# Patient Record
Sex: FEMALE | Race: WHITE | NOT HISPANIC OR LATINO | Employment: OTHER | URBAN - METROPOLITAN AREA
[De-identification: names, ages, dates, MRNs, and addresses within clinical notes are randomized per-mention and may not be internally consistent; named-entity substitution may affect disease eponyms.]

---

## 2019-01-25 ENCOUNTER — OFFICE VISIT (OUTPATIENT)
Dept: FAMILY MEDICINE CLINIC | Facility: CLINIC | Age: 64
End: 2019-01-25

## 2019-01-25 VITALS
HEIGHT: 64 IN | OXYGEN SATURATION: 98 % | WEIGHT: 168 LBS | HEART RATE: 90 BPM | SYSTOLIC BLOOD PRESSURE: 118 MMHG | BODY MASS INDEX: 28.68 KG/M2 | DIASTOLIC BLOOD PRESSURE: 70 MMHG

## 2019-01-25 DIAGNOSIS — M79.7 FIBROMYALGIA: ICD-10-CM

## 2019-01-25 DIAGNOSIS — E78.01 FAMILIAL HYPERCHOLESTEROLEMIA: ICD-10-CM

## 2019-01-25 DIAGNOSIS — I10 ESSENTIAL HYPERTENSION: Primary | ICD-10-CM

## 2019-01-25 PROCEDURE — 99202 OFFICE O/P NEW SF 15 MIN: CPT | Performed by: FAMILY MEDICINE

## 2019-01-25 RX ORDER — VERAPAMIL HYDROCHLORIDE 240 MG/1
240 CAPSULE, EXTENDED RELEASE ORAL
COMMUNITY
End: 2019-01-25 | Stop reason: SDUPTHER

## 2019-01-25 RX ORDER — DULOXETIN HYDROCHLORIDE 60 MG/1
60 CAPSULE, DELAYED RELEASE ORAL DAILY
Qty: 30 CAPSULE | Refills: 5 | Status: SHIPPED | OUTPATIENT
Start: 2019-01-25 | End: 2019-07-03 | Stop reason: SDUPTHER

## 2019-01-25 RX ORDER — CYCLOBENZAPRINE HCL 10 MG
10 TABLET ORAL 3 TIMES DAILY PRN
Qty: 30 TABLET | Refills: 5 | Status: SHIPPED | OUTPATIENT
Start: 2019-01-25 | End: 2019-10-14 | Stop reason: SDUPTHER

## 2019-01-25 RX ORDER — RABEPRAZOLE SODIUM 20 MG/1
20 TABLET, DELAYED RELEASE ORAL DAILY
COMMUNITY
End: 2019-06-12 | Stop reason: SDUPTHER

## 2019-01-25 RX ORDER — VERAPAMIL HYDROCHLORIDE 240 MG/1
240 CAPSULE, EXTENDED RELEASE ORAL
Qty: 90 CAPSULE | Refills: 3 | Status: SHIPPED | OUTPATIENT
Start: 2019-01-25 | End: 2020-01-22

## 2019-01-25 RX ORDER — MOMETASONE FUROATE 50 UG/1
2 SPRAY, METERED NASAL DAILY
COMMUNITY
End: 2019-01-25

## 2019-01-25 RX ORDER — VENLAFAXINE 75 MG/1
75 TABLET ORAL 2 TIMES DAILY
COMMUNITY
End: 2019-10-14 | Stop reason: SDUPTHER

## 2019-01-25 RX ORDER — OXYBUTYNIN CHLORIDE 5 MG/1
5 TABLET ORAL 2 TIMES DAILY
Qty: 180 TABLET | Refills: 3 | Status: SHIPPED | OUTPATIENT
Start: 2019-01-25 | End: 2020-01-30

## 2019-01-25 RX ORDER — SIMVASTATIN 10 MG
10 TABLET ORAL
Qty: 90 TABLET | Refills: 3 | Status: SHIPPED | OUTPATIENT
Start: 2019-01-25 | End: 2020-01-22

## 2019-01-25 RX ORDER — OXYBUTYNIN CHLORIDE 5 MG/1
5 TABLET ORAL 2 TIMES DAILY
COMMUNITY
End: 2019-01-25 | Stop reason: SDUPTHER

## 2019-01-25 RX ORDER — LORATADINE 10 MG/1
10 TABLET ORAL DAILY
COMMUNITY
End: 2022-04-27 | Stop reason: ALTCHOICE

## 2019-01-25 NOTE — PROGRESS NOTES
[facility]        NAME: Nawaf Sanderson is a 59 y o  female  : 1955    MRN: [id number]  DATE: 2019  TIME: 3:25 PM    Assessment and Plan   Essential hypertension [I10]  1  Essential hypertension  oxybutynin (DITROPAN) 5 mg tablet    verapamil (VERELAN) 240 MG 24 hr capsule    simvastatin (ZOCOR) 10 mg tablet   2  Familial hypercholesterolemia     3  Fibromyalgia  DULoxetine (CYMBALTA) 60 mg delayed release capsule    cyclobenzaprine (FLEXERIL) 10 mg tablet         Patient Instructions     Patient Instructions   See meds--labs 6mos          Chief Complaint     Chief Complaint   Patient presents with   Steven Cheung     "old" new pt--est care         History of Present Illness       Returning Pt---f/u for assessed med Dxs        Review of Systems   Review of Systems   Constitutional: Negative for appetite change, chills, diaphoresis and fever  HENT: Negative for ear pain, rhinorrhea, sinus pressure and sore throat  Eyes: Negative for discharge, redness and itching  Respiratory: Negative for cough, shortness of breath and wheezing  Cardiovascular: Negative for chest pain and palpitations  Gastrointestinal: Negative for abdominal pain, diarrhea, nausea and vomiting  Musculoskeletal: Positive for back pain and myalgias           Current Medications       Current Outpatient Prescriptions:     loratadine (CLARITIN) 10 mg tablet, Take 10 mg by mouth daily, Disp: , Rfl:     oxybutynin (DITROPAN) 5 mg tablet, Take 1 tablet (5 mg total) by mouth 2 (two) times a day, Disp: 180 tablet, Rfl: 3    RABEprazole (ACIPHEX) 20 MG tablet, Take 20 mg by mouth daily, Disp: , Rfl:     venlafaxine (EFFEXOR) 75 mg tablet, Take 75 mg by mouth 2 (two) times a day, Disp: , Rfl:     verapamil (VERELAN) 240 MG 24 hr capsule, Take 1 capsule (240 mg total) by mouth daily at bedtime, Disp: 90 capsule, Rfl: 3    cyclobenzaprine (FLEXERIL) 10 mg tablet, Take 1 tablet (10 mg total) by mouth 3 (three) times a day as needed for muscle spasms, Disp: 30 tablet, Rfl: 5    DULoxetine (CYMBALTA) 60 mg delayed release capsule, Take 1 capsule (60 mg total) by mouth daily, Disp: 30 capsule, Rfl: 5    simvastatin (ZOCOR) 10 mg tablet, Take 1 tablet (10 mg total) by mouth daily at bedtime, Disp: 90 tablet, Rfl: 3    Current Allergies     Allergies as of 01/25/2019    (No Known Allergies)            The following portions of the patient's history were reviewed and updated as appropriate: allergies, current medications, past family history, past medical history, past social history, past surgical history and problem list      History reviewed  No pertinent past medical history  History reviewed  No pertinent surgical history  Family History   Problem Relation Age of Onset    Fibromyalgia Mother     Heart disease Father     GI problems Father          Medications have been verified  Objective   /70   Pulse 90   Ht 5' 4" (1 626 m)   Wt 76 2 kg (168 lb)   SpO2 98%   BMI 28 84 kg/m²        Physical Exam     Physical Exam   Constitutional: She is oriented to person, place, and time  Vital signs are normal  She appears well-developed and well-nourished  HENT:   Right Ear: Ear canal normal  Tympanic membrane is not injected  Left Ear: Ear canal normal  Tympanic membrane is not injected  Nose: Nose normal    Mouth/Throat: Oropharynx is clear and moist    Eyes: Pupils are equal, round, and reactive to light  Conjunctivae and EOM are normal  Right eye exhibits no discharge  Left eye exhibits no discharge  Neck: Normal range of motion  Neck supple  No thyromegaly present  Cardiovascular: Normal rate, regular rhythm and normal heart sounds  No murmur heard  Pulmonary/Chest: Effort normal and breath sounds normal  No respiratory distress  She has no wheezes  Abdominal: Soft  Bowel sounds are normal  She exhibits no distension  There is no tenderness     Musculoskeletal: Normal range of motion  Lymphadenopathy:     She has no cervical adenopathy  Neurological: She is alert and oriented to person, place, and time  She has normal strength and normal reflexes  She is not disoriented  No sensory deficit  Gait normal    Skin: Skin is warm and dry  Psychiatric: She has a normal mood and affect   Her speech is normal and behavior is normal  Judgment and thought content normal  Cognition and memory are normal

## 2019-06-12 DIAGNOSIS — K21.9 GASTROESOPHAGEAL REFLUX DISEASE WITHOUT ESOPHAGITIS: Primary | ICD-10-CM

## 2019-06-12 RX ORDER — RABEPRAZOLE SODIUM 20 MG/1
TABLET, DELAYED RELEASE ORAL
Qty: 90 TABLET | Refills: 0 | Status: SHIPPED | OUTPATIENT
Start: 2019-06-12 | End: 2019-09-11 | Stop reason: SDUPTHER

## 2019-07-03 DIAGNOSIS — M79.7 FIBROMYALGIA: ICD-10-CM

## 2019-07-03 RX ORDER — DULOXETIN HYDROCHLORIDE 60 MG/1
CAPSULE, DELAYED RELEASE ORAL
Qty: 90 CAPSULE | Refills: 0 | Status: SHIPPED | OUTPATIENT
Start: 2019-07-22 | End: 2019-10-01 | Stop reason: SDUPTHER

## 2019-07-03 NOTE — TELEPHONE ENCOUNTER
Approve      Pt aware 6mos labs due 7/2019--msg left pt cb verify if she has ins or if we can use price check at [facility]

## 2019-09-11 DIAGNOSIS — K21.9 GASTROESOPHAGEAL REFLUX DISEASE WITHOUT ESOPHAGITIS: ICD-10-CM

## 2019-09-13 RX ORDER — RABEPRAZOLE SODIUM 20 MG/1
TABLET, DELAYED RELEASE ORAL
Qty: 90 TABLET | Refills: 0 | Status: SHIPPED | OUTPATIENT
Start: 2019-09-13 | End: 2019-12-09 | Stop reason: SDUPTHER

## 2019-10-01 DIAGNOSIS — M79.7 FIBROMYALGIA: ICD-10-CM

## 2019-10-01 RX ORDER — DULOXETIN HYDROCHLORIDE 60 MG/1
CAPSULE, DELAYED RELEASE ORAL
Qty: 90 CAPSULE | Refills: 0 | Status: SHIPPED | OUTPATIENT
Start: 2019-10-01 | End: 2019-10-14

## 2019-10-14 ENCOUNTER — OFFICE VISIT (OUTPATIENT)
Dept: FAMILY MEDICINE CLINIC | Facility: CLINIC | Age: 64
End: 2019-10-14

## 2019-10-14 VITALS
HEIGHT: 64 IN | HEART RATE: 94 BPM | WEIGHT: 163 LBS | BODY MASS INDEX: 27.83 KG/M2 | OXYGEN SATURATION: 96 % | DIASTOLIC BLOOD PRESSURE: 74 MMHG | SYSTOLIC BLOOD PRESSURE: 134 MMHG

## 2019-10-14 DIAGNOSIS — M79.7 FIBROMYALGIA: ICD-10-CM

## 2019-10-14 DIAGNOSIS — F41.9 ANXIETY: Primary | ICD-10-CM

## 2019-10-14 PROCEDURE — 99213 OFFICE O/P EST LOW 20 MIN: CPT | Performed by: FAMILY MEDICINE

## 2019-10-14 RX ORDER — CYCLOBENZAPRINE HCL 10 MG
10 TABLET ORAL 3 TIMES DAILY PRN
Qty: 60 TABLET | Refills: 5 | Status: SHIPPED | OUTPATIENT
Start: 2019-10-14 | End: 2021-01-28 | Stop reason: SDUPTHER

## 2019-10-14 RX ORDER — PAROXETINE HYDROCHLORIDE 20 MG/1
20 TABLET, FILM COATED ORAL DAILY
Qty: 90 TABLET | Refills: 1 | Status: SHIPPED | OUTPATIENT
Start: 2019-10-14 | End: 2020-04-08

## 2019-10-14 RX ORDER — VENLAFAXINE 75 MG/1
75 TABLET ORAL 2 TIMES DAILY
Qty: 180 TABLET | Refills: 1 | Status: SHIPPED | OUTPATIENT
Start: 2019-10-14 | End: 2020-04-10

## 2019-10-14 NOTE — PROGRESS NOTES
[facility]        NAME: Tiffany Pugh is a 59 y o  female  : 1955    MRN: [id number]  DATE: 2019  TIME: 10:17 AM    Assessment and Plan   Anxiety [F41 9]  1  Anxiety  venlafaxine (EFFEXOR) 75 mg tablet    PARoxetine (PAXIL) 20 mg tablet   2  Fibromyalgia  cyclobenzaprine (FLEXERIL) 10 mg tablet         Patient Instructions     There are no Patient Instructions on file for this visit  Chief Complaint     Chief Complaint   Patient presents with    Follow-up     discuss medications         History of Present Illness       C/o anxiety      Review of Systems   Review of Systems   Constitutional: Negative for appetite change, chills, diaphoresis and fever  HENT: Negative for ear pain, rhinorrhea, sinus pressure and sore throat  Eyes: Negative for discharge, redness and itching  Respiratory: Negative for cough, shortness of breath and wheezing  Cardiovascular: Negative for chest pain and palpitations  Gastrointestinal: Negative for abdominal pain, diarrhea, nausea and vomiting  Psychiatric/Behavioral: The patient is nervous/anxious            Current Medications       Current Outpatient Medications:     cyclobenzaprine (FLEXERIL) 10 mg tablet, Take 1 tablet (10 mg total) by mouth 3 (three) times a day as needed for muscle spasms, Disp: 60 tablet, Rfl: 5    loratadine (CLARITIN) 10 mg tablet, Take 10 mg by mouth daily, Disp: , Rfl:     oxybutynin (DITROPAN) 5 mg tablet, Take 1 tablet (5 mg total) by mouth 2 (two) times a day, Disp: 180 tablet, Rfl: 3    RABEprazole (ACIPHEX) 20 MG tablet, TAKE 1 TABLET BY MOUTH DAILY, Disp: 90 tablet, Rfl: 0    simvastatin (ZOCOR) 10 mg tablet, Take 1 tablet (10 mg total) by mouth daily at bedtime, Disp: 90 tablet, Rfl: 3    venlafaxine (EFFEXOR) 75 mg tablet, Take 1 tablet (75 mg total) by mouth 2 (two) times a day, Disp: 180 tablet, Rfl: 1    verapamil (VERELAN) 240 MG 24 hr capsule, Take 1 capsule (240 mg total) by mouth daily at bedtime, Disp: 90 capsule, Rfl: 3    PARoxetine (PAXIL) 20 mg tablet, Take 1 tablet (20 mg total) by mouth daily, Disp: 90 tablet, Rfl: 1    Current Allergies     Allergies as of 10/14/2019 - Reviewed 10/14/2019   Allergen Reaction Noted    Erythromycin  04/02/2013            The following portions of the patient's history were reviewed and updated as appropriate: allergies, current medications, past family history, past medical history, past social history, past surgical history and problem list      No past medical history on file  No past surgical history on file  Family History   Problem Relation Age of Onset    Fibromyalgia Mother     Heart disease Father     GI problems Father          Medications have been verified  Objective   /74   Pulse 94   Ht 5' 4" (1 626 m)   Wt 73 9 kg (163 lb)   SpO2 96%   BMI 27 98 kg/m²        Physical Exam     Physical Exam   Constitutional: She appears well-developed and well-nourished  No distress  HENT:   Right Ear: Tympanic membrane, external ear and ear canal normal  Tympanic membrane is not injected  Left Ear: Tympanic membrane, external ear and ear canal normal  Tympanic membrane is not injected  Nose: Nose normal    Mouth/Throat: Oropharynx is clear and moist and mucous membranes are normal    Eyes: Pupils are equal, round, and reactive to light  Conjunctivae and EOM are normal  Right eye exhibits no discharge  Left eye exhibits no discharge  Neck: Normal range of motion  Neck supple  No thyromegaly present  Cardiovascular: Normal rate, regular rhythm and normal heart sounds  No murmur heard  Pulmonary/Chest: Effort normal and breath sounds normal  No respiratory distress  She has no wheezes  Lymphadenopathy:     She has no cervical adenopathy  Skin: She is not diaphoretic  Nursing note and vitals reviewed  BMI Counseling: Body mass index is 27 98 kg/m²   The BMI is above normal  Nutrition recommendations include reducing portion sizes

## 2019-12-09 DIAGNOSIS — K21.9 GASTROESOPHAGEAL REFLUX DISEASE WITHOUT ESOPHAGITIS: ICD-10-CM

## 2019-12-09 RX ORDER — RABEPRAZOLE SODIUM 20 MG/1
TABLET, DELAYED RELEASE ORAL
Qty: 90 TABLET | Refills: 0 | Status: SHIPPED | OUTPATIENT
Start: 2019-12-09 | End: 2020-03-09

## 2020-01-22 DIAGNOSIS — I10 ESSENTIAL HYPERTENSION: ICD-10-CM

## 2020-01-22 RX ORDER — VERAPAMIL HYDROCHLORIDE 240 MG/1
CAPSULE, EXTENDED RELEASE ORAL
Qty: 90 CAPSULE | Refills: 0 | Status: SHIPPED | OUTPATIENT
Start: 2020-01-22 | End: 2020-04-18

## 2020-01-22 RX ORDER — SIMVASTATIN 10 MG
TABLET ORAL
Qty: 90 TABLET | Refills: 0 | Status: SHIPPED | OUTPATIENT
Start: 2020-01-22 | End: 2020-04-18

## 2020-01-30 DIAGNOSIS — I10 ESSENTIAL HYPERTENSION: ICD-10-CM

## 2020-01-30 RX ORDER — OXYBUTYNIN CHLORIDE 5 MG/1
TABLET ORAL
Qty: 180 TABLET | Refills: 0 | Status: SHIPPED | OUTPATIENT
Start: 2020-01-30 | End: 2020-05-18

## 2020-03-09 DIAGNOSIS — K21.9 GASTROESOPHAGEAL REFLUX DISEASE WITHOUT ESOPHAGITIS: ICD-10-CM

## 2020-03-09 RX ORDER — RABEPRAZOLE SODIUM 20 MG/1
TABLET, DELAYED RELEASE ORAL
Qty: 90 TABLET | Refills: 0 | Status: SHIPPED | OUTPATIENT
Start: 2020-03-09 | End: 2020-06-01

## 2020-03-27 ENCOUNTER — TELEPHONE (OUTPATIENT)
Dept: FAMILY MEDICINE CLINIC | Facility: CLINIC | Age: 65
End: 2020-03-27

## 2020-03-27 DIAGNOSIS — J01.00 ACUTE NON-RECURRENT MAXILLARY SINUSITIS: Primary | ICD-10-CM

## 2020-03-27 RX ORDER — AZITHROMYCIN 250 MG/1
TABLET, FILM COATED ORAL
Qty: 6 TABLET | Refills: 0 | Status: SHIPPED | OUTPATIENT
Start: 2020-03-27 | End: 2020-03-31

## 2020-03-27 NOTE — TELEPHONE ENCOUNTER
Patient called stating that she is having sinus issues, headache, sneezing, cough with mucus and dizziness, patient is asking if a z-pack can be send to pharmacy  Patient has allergies to ERYTHROMYCIN

## 2020-04-08 DIAGNOSIS — F41.9 ANXIETY: ICD-10-CM

## 2020-04-08 RX ORDER — PAROXETINE HYDROCHLORIDE 20 MG/1
TABLET, FILM COATED ORAL
Qty: 90 TABLET | Refills: 1 | Status: SHIPPED | OUTPATIENT
Start: 2020-04-08 | End: 2020-10-05

## 2020-04-10 DIAGNOSIS — F41.9 ANXIETY: ICD-10-CM

## 2020-04-10 RX ORDER — VENLAFAXINE 75 MG/1
TABLET ORAL
Qty: 180 TABLET | Refills: 1 | Status: SHIPPED | OUTPATIENT
Start: 2020-04-10 | End: 2020-10-05

## 2020-04-18 DIAGNOSIS — I10 ESSENTIAL HYPERTENSION: ICD-10-CM

## 2020-04-18 RX ORDER — SIMVASTATIN 10 MG
TABLET ORAL
Qty: 90 TABLET | Refills: 0 | Status: SHIPPED | OUTPATIENT
Start: 2020-04-18 | End: 2020-07-20

## 2020-04-18 RX ORDER — VERAPAMIL HYDROCHLORIDE 240 MG/1
CAPSULE, EXTENDED RELEASE ORAL
Qty: 90 CAPSULE | Refills: 0 | Status: SHIPPED | OUTPATIENT
Start: 2020-04-18 | End: 2020-07-20

## 2020-04-20 ENCOUNTER — HOSPITAL ENCOUNTER (INPATIENT)
Facility: HOSPITAL | Age: 65
LOS: 4 days | Discharge: HOME | DRG: 381 | End: 2020-04-24
Attending: EMERGENCY MEDICINE | Admitting: SURGERY

## 2020-04-20 ENCOUNTER — APPOINTMENT (EMERGENCY)
Dept: CT IMAGING | Facility: HOSPITAL | Age: 65
DRG: 381 | End: 2020-04-20

## 2020-04-20 DIAGNOSIS — I16.0 HYPERTENSIVE URGENCY: ICD-10-CM

## 2020-04-20 DIAGNOSIS — K56.609 SBO (SMALL BOWEL OBSTRUCTION) (HCC): ICD-10-CM

## 2020-04-20 DIAGNOSIS — Q43.0 MECKEL'S DIVERTICULITIS: ICD-10-CM

## 2020-04-20 DIAGNOSIS — R77.8 ELEVATED TROPONIN: Primary | ICD-10-CM

## 2020-04-20 DIAGNOSIS — Z90.49 S/P SMALL BOWEL RESECTION: ICD-10-CM

## 2020-04-20 PROBLEM — D72.829 LEUKOCYTOSIS: Status: ACTIVE | Noted: 2020-04-20

## 2020-04-20 PROBLEM — I10 ACCELERATED HYPERTENSION: Status: ACTIVE | Noted: 2020-04-20

## 2020-04-20 PROBLEM — R79.89 ELEVATED TROPONIN: Status: ACTIVE | Noted: 2020-04-20

## 2020-04-20 LAB
ALBUMIN SERPL BCP-MCNC: 4.2 G/DL (ref 3.5–5)
ALP SERPL-CCNC: 102 U/L (ref 46–116)
ALT SERPL W P-5'-P-CCNC: 17 U/L (ref 12–78)
AMORPH PHOS CRY URNS QL MICRO: ABNORMAL /HPF
ANION GAP SERPL CALCULATED.3IONS-SCNC: 11 MMOL/L (ref 4–13)
AST SERPL W P-5'-P-CCNC: 17 U/L (ref 5–45)
BACTERIA UR QL AUTO: ABNORMAL /HPF
BASOPHILS # BLD AUTO: 0.06 THOUSANDS/ΜL (ref 0–0.1)
BASOPHILS NFR BLD AUTO: 0 % (ref 0–1)
BILIRUB SERPL-MCNC: 0.3 MG/DL (ref 0.2–1)
BILIRUB UR QL STRIP: NEGATIVE
BUN SERPL-MCNC: 9 MG/DL (ref 5–25)
CALCIUM SERPL-MCNC: 9.2 MG/DL (ref 8.3–10.1)
CHLORIDE SERPL-SCNC: 98 MMOL/L (ref 100–108)
CLARITY UR: ABNORMAL
CLARITY, POC: CLEAR
CO2 SERPL-SCNC: 27 MMOL/L (ref 21–32)
COARSE GRAN CASTS URNS QL MICRO: ABNORMAL /LPF
COLOR UR: ABNORMAL
COLOR, POC: YELLOW
CREAT SERPL-MCNC: 0.72 MG/DL (ref 0.6–1.3)
EOSINOPHIL # BLD AUTO: 0.02 THOUSAND/ΜL (ref 0–0.61)
EOSINOPHIL NFR BLD AUTO: 0 % (ref 0–6)
ERYTHROCYTE [DISTWIDTH] IN BLOOD BY AUTOMATED COUNT: 12 % (ref 11.6–15.1)
EXT BILIRUBIN, UA: NEGATIVE
EXT BLOOD URINE: NORMAL
EXT GLUCOSE, UA: NEGATIVE
EXT KETONES: NORMAL
EXT NITRITE, UA: NEGATIVE
EXT PH, UA: 7.5
EXT PROTEIN, UA: NEGATIVE
EXT SPECIFIC GRAVITY, UA: 1.01
EXT UROBILINOGEN: 0.2
GFR SERPL CREATININE-BSD FRML MDRD: 89 ML/MIN/1.73SQ M
GLUCOSE SERPL-MCNC: 138 MG/DL (ref 65–140)
GLUCOSE UR STRIP-MCNC: NEGATIVE MG/DL
HCT VFR BLD AUTO: 44.4 % (ref 34.8–46.1)
HGB BLD-MCNC: 15 G/DL (ref 11.5–15.4)
HGB UR QL STRIP.AUTO: NEGATIVE
IMM GRANULOCYTES # BLD AUTO: 0.08 THOUSAND/UL (ref 0–0.2)
IMM GRANULOCYTES NFR BLD AUTO: 1 % (ref 0–2)
KETONES UR STRIP-MCNC: ABNORMAL MG/DL
LEUKOCYTE ESTERASE UR QL STRIP: ABNORMAL
LIPASE SERPL-CCNC: 179 U/L (ref 73–393)
LYMPHOCYTES # BLD AUTO: 1.14 THOUSANDS/ΜL (ref 0.6–4.47)
LYMPHOCYTES NFR BLD AUTO: 7 % (ref 14–44)
MCH RBC QN AUTO: 31.8 PG (ref 26.8–34.3)
MCHC RBC AUTO-ENTMCNC: 33.8 G/DL (ref 31.4–37.4)
MCV RBC AUTO: 94 FL (ref 82–98)
MONOCYTES # BLD AUTO: 0.59 THOUSAND/ΜL (ref 0.17–1.22)
MONOCYTES NFR BLD AUTO: 3 % (ref 4–12)
NEUTROPHILS # BLD AUTO: 15.35 THOUSANDS/ΜL (ref 1.85–7.62)
NEUTS SEG NFR BLD AUTO: 89 % (ref 43–75)
NITRITE UR QL STRIP: NEGATIVE
NON-SQ EPI CELLS URNS QL MICRO: ABNORMAL /HPF
NRBC BLD AUTO-RTO: 0 /100 WBCS
PH UR STRIP.AUTO: 7.5 [PH]
PLATELET # BLD AUTO: 354 THOUSANDS/UL (ref 149–390)
PMV BLD AUTO: 8.6 FL (ref 8.9–12.7)
POTASSIUM SERPL-SCNC: 3.4 MMOL/L (ref 3.5–5.3)
PROT SERPL-MCNC: 8.5 G/DL (ref 6.4–8.2)
PROT UR STRIP-MCNC: NEGATIVE MG/DL
RBC # BLD AUTO: 4.71 MILLION/UL (ref 3.81–5.12)
RBC #/AREA URNS AUTO: ABNORMAL /HPF
SODIUM SERPL-SCNC: 136 MMOL/L (ref 136–145)
SP GR UR STRIP.AUTO: 1.02 (ref 1–1.03)
TROPONIN I SERPL-MCNC: 0.05 NG/ML
UROBILINOGEN UR QL STRIP.AUTO: 0.2 E.U./DL
WBC # BLD AUTO: 17.24 THOUSAND/UL (ref 4.31–10.16)
WBC # BLD EST: NORMAL 10*3/UL
WBC #/AREA URNS AUTO: ABNORMAL /HPF

## 2020-04-20 PROCEDURE — 85025 COMPLETE CBC W/AUTO DIFF WBC: CPT | Performed by: EMERGENCY MEDICINE

## 2020-04-20 PROCEDURE — 99223 1ST HOSP IP/OBS HIGH 75: CPT | Performed by: PHYSICIAN ASSISTANT

## 2020-04-20 PROCEDURE — 99285 EMERGENCY DEPT VISIT HI MDM: CPT

## 2020-04-20 PROCEDURE — 84484 ASSAY OF TROPONIN QUANT: CPT | Performed by: EMERGENCY MEDICINE

## 2020-04-20 PROCEDURE — 81001 URINALYSIS AUTO W/SCOPE: CPT | Performed by: EMERGENCY MEDICINE

## 2020-04-20 PROCEDURE — 36415 COLL VENOUS BLD VENIPUNCTURE: CPT | Performed by: EMERGENCY MEDICINE

## 2020-04-20 PROCEDURE — 99284 EMERGENCY DEPT VISIT MOD MDM: CPT | Performed by: EMERGENCY MEDICINE

## 2020-04-20 PROCEDURE — 80053 COMPREHEN METABOLIC PANEL: CPT | Performed by: EMERGENCY MEDICINE

## 2020-04-20 PROCEDURE — 83690 ASSAY OF LIPASE: CPT | Performed by: EMERGENCY MEDICINE

## 2020-04-20 PROCEDURE — 93005 ELECTROCARDIOGRAM TRACING: CPT

## 2020-04-20 PROCEDURE — 96361 HYDRATE IV INFUSION ADD-ON: CPT

## 2020-04-20 PROCEDURE — 74177 CT ABD & PELVIS W/CONTRAST: CPT

## 2020-04-20 PROCEDURE — 96375 TX/PRO/DX INJ NEW DRUG ADDON: CPT

## 2020-04-20 PROCEDURE — 96374 THER/PROPH/DIAG INJ IV PUSH: CPT

## 2020-04-20 PROCEDURE — 84484 ASSAY OF TROPONIN QUANT: CPT | Performed by: PHYSICIAN ASSISTANT

## 2020-04-20 RX ORDER — HYDROMORPHONE HCL/PF 1 MG/ML
0.5 SYRINGE (ML) INJECTION ONCE
Status: COMPLETED | OUTPATIENT
Start: 2020-04-20 | End: 2020-04-20

## 2020-04-20 RX ORDER — MORPHINE SULFATE 4 MG/ML
4 INJECTION, SOLUTION INTRAMUSCULAR; INTRAVENOUS ONCE
Status: COMPLETED | OUTPATIENT
Start: 2020-04-20 | End: 2020-04-20

## 2020-04-20 RX ORDER — ONDANSETRON 2 MG/ML
4 INJECTION INTRAMUSCULAR; INTRAVENOUS ONCE
Status: COMPLETED | OUTPATIENT
Start: 2020-04-20 | End: 2020-04-20

## 2020-04-20 RX ORDER — LABETALOL 20 MG/4 ML (5 MG/ML) INTRAVENOUS SYRINGE
10 ONCE
Status: COMPLETED | OUTPATIENT
Start: 2020-04-20 | End: 2020-04-20

## 2020-04-20 RX ADMIN — MORPHINE SULFATE 4 MG: 4 INJECTION INTRAVENOUS at 20:24

## 2020-04-20 RX ADMIN — SODIUM CHLORIDE 1000 ML: 0.9 INJECTION, SOLUTION INTRAVENOUS at 20:30

## 2020-04-20 RX ADMIN — LABETALOL 20 MG/4 ML (5 MG/ML) INTRAVENOUS SYRINGE 10 MG: at 22:10

## 2020-04-20 RX ADMIN — HYDROMORPHONE HYDROCHLORIDE 0.5 MG: 1 INJECTION, SOLUTION INTRAMUSCULAR; INTRAVENOUS; SUBCUTANEOUS at 22:04

## 2020-04-20 RX ADMIN — IOHEXOL 100 ML: 350 INJECTION, SOLUTION INTRAVENOUS at 21:24

## 2020-04-20 RX ADMIN — ONDANSETRON 4 MG: 2 INJECTION INTRAMUSCULAR; INTRAVENOUS at 20:24

## 2020-04-21 ENCOUNTER — ANESTHESIA (INPATIENT)
Dept: PERIOP | Facility: HOSPITAL | Age: 65
DRG: 381 | End: 2020-04-21

## 2020-04-21 ENCOUNTER — APPOINTMENT (INPATIENT)
Dept: RADIOLOGY | Facility: HOSPITAL | Age: 65
DRG: 381 | End: 2020-04-21

## 2020-04-21 ENCOUNTER — ANESTHESIA EVENT (INPATIENT)
Dept: PERIOP | Facility: HOSPITAL | Age: 65
DRG: 381 | End: 2020-04-21

## 2020-04-21 PROBLEM — H81.93 PERIPHERAL VESTIBULOPATHY OF BOTH EARS: Status: ACTIVE | Noted: 2020-04-21

## 2020-04-21 PROBLEM — I16.0 HYPERTENSIVE URGENCY: Status: ACTIVE | Noted: 2020-04-21

## 2020-04-21 LAB
ALBUMIN SERPL BCP-MCNC: 3.3 G/DL (ref 3.5–5)
ALP SERPL-CCNC: 87 U/L (ref 46–116)
ALT SERPL W P-5'-P-CCNC: 16 U/L (ref 12–78)
ANION GAP SERPL CALCULATED.3IONS-SCNC: 8 MMOL/L (ref 4–13)
ANION GAP SERPL CALCULATED.3IONS-SCNC: 9 MMOL/L (ref 4–13)
AST SERPL W P-5'-P-CCNC: 19 U/L (ref 5–45)
ATRIAL RATE: 90 BPM
BASOPHILS # BLD AUTO: 0.03 THOUSANDS/ΜL (ref 0–0.1)
BASOPHILS # BLD AUTO: 0.05 THOUSANDS/ΜL (ref 0–0.1)
BASOPHILS NFR BLD AUTO: 0 % (ref 0–1)
BASOPHILS NFR BLD AUTO: 0 % (ref 0–1)
BILIRUB SERPL-MCNC: 0.2 MG/DL (ref 0.2–1)
BUN SERPL-MCNC: 8 MG/DL (ref 5–25)
BUN SERPL-MCNC: 9 MG/DL (ref 5–25)
CALCIUM SERPL-MCNC: 8 MG/DL (ref 8.3–10.1)
CALCIUM SERPL-MCNC: 8.3 MG/DL (ref 8.3–10.1)
CHLORIDE SERPL-SCNC: 101 MMOL/L (ref 100–108)
CHLORIDE SERPL-SCNC: 102 MMOL/L (ref 100–108)
CO2 SERPL-SCNC: 26 MMOL/L (ref 21–32)
CO2 SERPL-SCNC: 27 MMOL/L (ref 21–32)
CREAT SERPL-MCNC: 0.71 MG/DL (ref 0.6–1.3)
CREAT SERPL-MCNC: 0.82 MG/DL (ref 0.6–1.3)
EOSINOPHIL # BLD AUTO: 0.01 THOUSAND/ΜL (ref 0–0.61)
EOSINOPHIL # BLD AUTO: 0.09 THOUSAND/ΜL (ref 0–0.61)
EOSINOPHIL NFR BLD AUTO: 0 % (ref 0–6)
EOSINOPHIL NFR BLD AUTO: 0 % (ref 0–6)
ERYTHROCYTE [DISTWIDTH] IN BLOOD BY AUTOMATED COUNT: 12.3 % (ref 11.6–15.1)
ERYTHROCYTE [DISTWIDTH] IN BLOOD BY AUTOMATED COUNT: 12.6 % (ref 11.6–15.1)
GFR SERPL CREATININE-BSD FRML MDRD: 76 ML/MIN/1.73SQ M
GFR SERPL CREATININE-BSD FRML MDRD: 91 ML/MIN/1.73SQ M
GLUCOSE SERPL-MCNC: 109 MG/DL (ref 65–140)
GLUCOSE SERPL-MCNC: 117 MG/DL (ref 65–140)
HCT VFR BLD AUTO: 40.6 % (ref 34.8–46.1)
HCT VFR BLD AUTO: 41.1 % (ref 34.8–46.1)
HGB BLD-MCNC: 13.4 G/DL (ref 11.5–15.4)
HGB BLD-MCNC: 13.6 G/DL (ref 11.5–15.4)
IMM GRANULOCYTES # BLD AUTO: 0.04 THOUSAND/UL (ref 0–0.2)
IMM GRANULOCYTES # BLD AUTO: 0.1 THOUSAND/UL (ref 0–0.2)
IMM GRANULOCYTES NFR BLD AUTO: 0 % (ref 0–2)
IMM GRANULOCYTES NFR BLD AUTO: 1 % (ref 0–2)
LYMPHOCYTES # BLD AUTO: 2.01 THOUSANDS/ΜL (ref 0.6–4.47)
LYMPHOCYTES # BLD AUTO: 2.33 THOUSANDS/ΜL (ref 0.6–4.47)
LYMPHOCYTES NFR BLD AUTO: 10 % (ref 14–44)
LYMPHOCYTES NFR BLD AUTO: 19 % (ref 14–44)
MAGNESIUM SERPL-MCNC: 1.8 MG/DL (ref 1.6–2.6)
MCH RBC QN AUTO: 31.5 PG (ref 26.8–34.3)
MCH RBC QN AUTO: 32 PG (ref 26.8–34.3)
MCHC RBC AUTO-ENTMCNC: 32.6 G/DL (ref 31.4–37.4)
MCHC RBC AUTO-ENTMCNC: 33.5 G/DL (ref 31.4–37.4)
MCV RBC AUTO: 96 FL (ref 82–98)
MCV RBC AUTO: 97 FL (ref 82–98)
MONOCYTES # BLD AUTO: 0.9 THOUSAND/ΜL (ref 0.17–1.22)
MONOCYTES # BLD AUTO: 0.92 THOUSAND/ΜL (ref 0.17–1.22)
MONOCYTES NFR BLD AUTO: 5 % (ref 4–12)
MONOCYTES NFR BLD AUTO: 7 % (ref 4–12)
NEUTROPHILS # BLD AUTO: 17.07 THOUSANDS/ΜL (ref 1.85–7.62)
NEUTROPHILS # BLD AUTO: 9.27 THOUSANDS/ΜL (ref 1.85–7.62)
NEUTS SEG NFR BLD AUTO: 74 % (ref 43–75)
NEUTS SEG NFR BLD AUTO: 84 % (ref 43–75)
NRBC BLD AUTO-RTO: 0 /100 WBCS
NRBC BLD AUTO-RTO: 0 /100 WBCS
P AXIS: 82 DEGREES
PHOSPHATE SERPL-MCNC: 3.4 MG/DL (ref 2.3–4.1)
PLATELET # BLD AUTO: 324 THOUSANDS/UL (ref 149–390)
PLATELET # BLD AUTO: 338 THOUSANDS/UL (ref 149–390)
PMV BLD AUTO: 8.7 FL (ref 8.9–12.7)
PMV BLD AUTO: 8.8 FL (ref 8.9–12.7)
POTASSIUM SERPL-SCNC: 3.4 MMOL/L (ref 3.5–5.3)
POTASSIUM SERPL-SCNC: 4 MMOL/L (ref 3.5–5.3)
PR INTERVAL: 176 MS
PROT SERPL-MCNC: 7.1 G/DL (ref 6.4–8.2)
QRS AXIS: 22 DEGREES
QRSD INTERVAL: 80 MS
QT INTERVAL: 380 MS
QTC INTERVAL: 464 MS
RBC # BLD AUTO: 4.25 MILLION/UL (ref 3.81–5.12)
RBC # BLD AUTO: 4.26 MILLION/UL (ref 3.81–5.12)
SODIUM SERPL-SCNC: 136 MMOL/L (ref 136–145)
SODIUM SERPL-SCNC: 137 MMOL/L (ref 136–145)
T WAVE AXIS: 80 DEGREES
TROPONIN I SERPL-MCNC: 0.18 NG/ML
TROPONIN I SERPL-MCNC: 0.31 NG/ML
TROPONIN I SERPL-MCNC: 0.33 NG/ML
TROPONIN I SERPL-MCNC: 0.45 NG/ML
VENTRICULAR RATE: 90 BPM
WBC # BLD AUTO: 12.58 THOUSAND/UL (ref 4.31–10.16)
WBC # BLD AUTO: 20.24 THOUSAND/UL (ref 4.31–10.16)

## 2020-04-21 PROCEDURE — 74248 X-RAY SM INT F-THRU STD: CPT

## 2020-04-21 PROCEDURE — 80053 COMPREHEN METABOLIC PANEL: CPT | Performed by: PHYSICIAN ASSISTANT

## 2020-04-21 PROCEDURE — 93010 ELECTROCARDIOGRAM REPORT: CPT | Performed by: INTERNAL MEDICINE

## 2020-04-21 PROCEDURE — 49320 DIAG LAPARO SEPARATE PROC: CPT | Performed by: PHYSICIAN ASSISTANT

## 2020-04-21 PROCEDURE — 99233 SBSQ HOSP IP/OBS HIGH 50: CPT | Performed by: INTERNAL MEDICINE

## 2020-04-21 PROCEDURE — C9113 INJ PANTOPRAZOLE SODIUM, VIA: HCPCS | Performed by: PHYSICIAN ASSISTANT

## 2020-04-21 PROCEDURE — 85025 COMPLETE CBC W/AUTO DIFF WBC: CPT | Performed by: PHYSICIAN ASSISTANT

## 2020-04-21 PROCEDURE — 84100 ASSAY OF PHOSPHORUS: CPT | Performed by: PHYSICIAN ASSISTANT

## 2020-04-21 PROCEDURE — 83735 ASSAY OF MAGNESIUM: CPT | Performed by: PHYSICIAN ASSISTANT

## 2020-04-21 PROCEDURE — 49320 DIAG LAPARO SEPARATE PROC: CPT | Performed by: SURGERY

## 2020-04-21 PROCEDURE — 88304 TISSUE EXAM BY PATHOLOGIST: CPT | Performed by: PATHOLOGY

## 2020-04-21 PROCEDURE — NC001 PR NO CHARGE: Performed by: SURGERY

## 2020-04-21 PROCEDURE — 99254 IP/OBS CNSLTJ NEW/EST MOD 60: CPT | Performed by: SURGERY

## 2020-04-21 PROCEDURE — 36415 COLL VENOUS BLD VENIPUNCTURE: CPT

## 2020-04-21 PROCEDURE — 84484 ASSAY OF TROPONIN QUANT: CPT | Performed by: PHYSICIAN ASSISTANT

## 2020-04-21 PROCEDURE — 74240 X-RAY XM UPR GI TRC 1CNTRST: CPT

## 2020-04-21 PROCEDURE — 0DB84ZX EXCISION OF SMALL INTESTINE, PERCUTANEOUS ENDOSCOPIC APPROACH, DIAGNOSTIC: ICD-10-PCS | Performed by: SURGERY

## 2020-04-21 PROCEDURE — 84484 ASSAY OF TROPONIN QUANT: CPT

## 2020-04-21 PROCEDURE — 80048 BASIC METABOLIC PNL TOTAL CA: CPT | Performed by: PHYSICIAN ASSISTANT

## 2020-04-21 PROCEDURE — 74022 RADEX COMPL AQT ABD SERIES: CPT

## 2020-04-21 RX ORDER — HEPARIN SODIUM 5000 [USP'U]/ML
5000 INJECTION, SOLUTION INTRAVENOUS; SUBCUTANEOUS EVERY 8 HOURS SCHEDULED
Status: DISCONTINUED | OUTPATIENT
Start: 2020-04-21 | End: 2020-04-24 | Stop reason: HOSPADM

## 2020-04-21 RX ORDER — MIDAZOLAM HYDROCHLORIDE 2 MG/2ML
INJECTION, SOLUTION INTRAMUSCULAR; INTRAVENOUS AS NEEDED
Status: DISCONTINUED | OUTPATIENT
Start: 2020-04-21 | End: 2020-04-21 | Stop reason: SURG

## 2020-04-21 RX ORDER — FENTANYL CITRATE/PF 50 MCG/ML
25 SYRINGE (ML) INJECTION
Status: COMPLETED | OUTPATIENT
Start: 2020-04-21 | End: 2020-04-21

## 2020-04-21 RX ORDER — HYDROMORPHONE HCL/PF 1 MG/ML
0.5 SYRINGE (ML) INJECTION EVERY 2 HOUR PRN
Status: DISCONTINUED | OUTPATIENT
Start: 2020-04-21 | End: 2020-04-23

## 2020-04-21 RX ORDER — DEXTROSE AND SODIUM CHLORIDE 5; .45 G/100ML; G/100ML
125 INJECTION, SOLUTION INTRAVENOUS CONTINUOUS
Status: DISCONTINUED | OUTPATIENT
Start: 2020-04-21 | End: 2020-04-21

## 2020-04-21 RX ORDER — PRAVASTATIN SODIUM 20 MG
20 TABLET ORAL
Status: DISCONTINUED | OUTPATIENT
Start: 2020-04-21 | End: 2020-04-22

## 2020-04-21 RX ORDER — LIDOCAINE HYDROCHLORIDE 10 MG/ML
INJECTION, SOLUTION EPIDURAL; INFILTRATION; INTRACAUDAL; PERINEURAL AS NEEDED
Status: DISCONTINUED | OUTPATIENT
Start: 2020-04-21 | End: 2020-04-21 | Stop reason: SURG

## 2020-04-21 RX ORDER — ONDANSETRON 2 MG/ML
4 INJECTION INTRAMUSCULAR; INTRAVENOUS EVERY 6 HOURS PRN
Status: DISCONTINUED | OUTPATIENT
Start: 2020-04-21 | End: 2020-04-24 | Stop reason: HOSPADM

## 2020-04-21 RX ORDER — ROCURONIUM BROMIDE 10 MG/ML
INJECTION, SOLUTION INTRAVENOUS AS NEEDED
Status: DISCONTINUED | OUTPATIENT
Start: 2020-04-21 | End: 2020-04-21 | Stop reason: SURG

## 2020-04-21 RX ORDER — DEXTROSE AND SODIUM CHLORIDE 5; .45 G/100ML; G/100ML
75 INJECTION, SOLUTION INTRAVENOUS CONTINUOUS
Status: DISCONTINUED | OUTPATIENT
Start: 2020-04-21 | End: 2020-04-21

## 2020-04-21 RX ORDER — ONDANSETRON 2 MG/ML
4 INJECTION INTRAMUSCULAR; INTRAVENOUS EVERY 4 HOURS PRN
Status: DISCONTINUED | OUTPATIENT
Start: 2020-04-21 | End: 2020-04-21 | Stop reason: SDUPTHER

## 2020-04-21 RX ORDER — HYDROCODONE BITARTRATE AND ACETAMINOPHEN 5; 325 MG/1; MG/1
1 TABLET ORAL EVERY 4 HOURS PRN
Status: DISCONTINUED | OUTPATIENT
Start: 2020-04-21 | End: 2020-04-24 | Stop reason: HOSPADM

## 2020-04-21 RX ORDER — SODIUM CHLORIDE, SODIUM LACTATE, POTASSIUM CHLORIDE, CALCIUM CHLORIDE 600; 310; 30; 20 MG/100ML; MG/100ML; MG/100ML; MG/100ML
75 INJECTION, SOLUTION INTRAVENOUS CONTINUOUS
Status: DISCONTINUED | OUTPATIENT
Start: 2020-04-21 | End: 2020-04-24

## 2020-04-21 RX ORDER — ONDANSETRON 2 MG/ML
INJECTION INTRAMUSCULAR; INTRAVENOUS AS NEEDED
Status: DISCONTINUED | OUTPATIENT
Start: 2020-04-21 | End: 2020-04-21 | Stop reason: SURG

## 2020-04-21 RX ORDER — METOCLOPRAMIDE HYDROCHLORIDE 5 MG/ML
5 INJECTION INTRAMUSCULAR; INTRAVENOUS EVERY 6 HOURS PRN
Status: DISCONTINUED | OUTPATIENT
Start: 2020-04-21 | End: 2020-04-24 | Stop reason: HOSPADM

## 2020-04-21 RX ORDER — PROPOFOL 10 MG/ML
INJECTION, EMULSION INTRAVENOUS AS NEEDED
Status: DISCONTINUED | OUTPATIENT
Start: 2020-04-21 | End: 2020-04-21 | Stop reason: SURG

## 2020-04-21 RX ORDER — LORATADINE 10 MG/1
10 TABLET ORAL DAILY
Status: DISCONTINUED | OUTPATIENT
Start: 2020-04-21 | End: 2020-04-22

## 2020-04-21 RX ORDER — ACETAMINOPHEN 325 MG/1
650 TABLET ORAL EVERY 6 HOURS PRN
Status: DISCONTINUED | OUTPATIENT
Start: 2020-04-21 | End: 2020-04-24 | Stop reason: HOSPADM

## 2020-04-21 RX ORDER — VERAPAMIL HYDROCHLORIDE 240 MG/1
240 TABLET, FILM COATED, EXTENDED RELEASE ORAL DAILY
Status: DISCONTINUED | OUTPATIENT
Start: 2020-04-21 | End: 2020-04-22

## 2020-04-21 RX ORDER — NEOSTIGMINE METHYLSULFATE 1 MG/ML
INJECTION INTRAVENOUS AS NEEDED
Status: DISCONTINUED | OUTPATIENT
Start: 2020-04-21 | End: 2020-04-21 | Stop reason: SURG

## 2020-04-21 RX ORDER — MECLIZINE HCL 12.5 MG/1
12.5 TABLET ORAL EVERY 8 HOURS SCHEDULED
Status: DISCONTINUED | OUTPATIENT
Start: 2020-04-21 | End: 2020-04-22

## 2020-04-21 RX ORDER — HYDROMORPHONE HCL/PF 1 MG/ML
0.5 SYRINGE (ML) INJECTION
Status: DISCONTINUED | OUTPATIENT
Start: 2020-04-21 | End: 2020-04-22

## 2020-04-21 RX ORDER — VENLAFAXINE 37.5 MG/1
75 TABLET ORAL 2 TIMES DAILY
Status: DISCONTINUED | OUTPATIENT
Start: 2020-04-21 | End: 2020-04-22

## 2020-04-21 RX ORDER — SODIUM CHLORIDE, SODIUM LACTATE, POTASSIUM CHLORIDE, CALCIUM CHLORIDE 600; 310; 30; 20 MG/100ML; MG/100ML; MG/100ML; MG/100ML
200 INJECTION, SOLUTION INTRAVENOUS CONTINUOUS
Status: DISCONTINUED | OUTPATIENT
Start: 2020-04-21 | End: 2020-04-21

## 2020-04-21 RX ORDER — METOCLOPRAMIDE HYDROCHLORIDE 5 MG/ML
5 INJECTION INTRAMUSCULAR; INTRAVENOUS EVERY 6 HOURS PRN
Status: DISCONTINUED | OUTPATIENT
Start: 2020-04-21 | End: 2020-04-21

## 2020-04-21 RX ORDER — SUCCINYLCHOLINE/SOD CL,ISO/PF 100 MG/5ML
SYRINGE (ML) INTRAVENOUS AS NEEDED
Status: DISCONTINUED | OUTPATIENT
Start: 2020-04-21 | End: 2020-04-21 | Stop reason: SURG

## 2020-04-21 RX ORDER — PAROXETINE HYDROCHLORIDE 20 MG/1
20 TABLET, FILM COATED ORAL DAILY
Status: DISCONTINUED | OUTPATIENT
Start: 2020-04-21 | End: 2020-04-22

## 2020-04-21 RX ORDER — PANTOPRAZOLE SODIUM 40 MG/1
40 INJECTION, POWDER, FOR SOLUTION INTRAVENOUS
Status: DISCONTINUED | OUTPATIENT
Start: 2020-04-21 | End: 2020-04-24 | Stop reason: HOSPADM

## 2020-04-21 RX ORDER — HYDROCODONE BITARTRATE AND ACETAMINOPHEN 5; 325 MG/1; MG/1
2 TABLET ORAL EVERY 4 HOURS PRN
Status: DISCONTINUED | OUTPATIENT
Start: 2020-04-21 | End: 2020-04-24 | Stop reason: HOSPADM

## 2020-04-21 RX ORDER — CEFAZOLIN SODIUM 2 G/50ML
2000 SOLUTION INTRAVENOUS ONCE
Status: COMPLETED | OUTPATIENT
Start: 2020-04-21 | End: 2020-04-21

## 2020-04-21 RX ORDER — LEVOFLOXACIN 5 MG/ML
500 INJECTION, SOLUTION INTRAVENOUS EVERY 24 HOURS
Status: DISCONTINUED | OUTPATIENT
Start: 2020-04-21 | End: 2020-04-24 | Stop reason: HOSPADM

## 2020-04-21 RX ORDER — FENTANYL CITRATE 50 UG/ML
INJECTION, SOLUTION INTRAMUSCULAR; INTRAVENOUS AS NEEDED
Status: DISCONTINUED | OUTPATIENT
Start: 2020-04-21 | End: 2020-04-21 | Stop reason: SURG

## 2020-04-21 RX ORDER — DEXAMETHASONE SODIUM PHOSPHATE 4 MG/ML
INJECTION, SOLUTION INTRA-ARTICULAR; INTRALESIONAL; INTRAMUSCULAR; INTRAVENOUS; SOFT TISSUE AS NEEDED
Status: DISCONTINUED | OUTPATIENT
Start: 2020-04-21 | End: 2020-04-21 | Stop reason: SURG

## 2020-04-21 RX ORDER — GLYCOPYRROLATE 0.2 MG/ML
INJECTION INTRAMUSCULAR; INTRAVENOUS AS NEEDED
Status: DISCONTINUED | OUTPATIENT
Start: 2020-04-21 | End: 2020-04-21 | Stop reason: SURG

## 2020-04-21 RX ADMIN — DEXTROSE AND SODIUM CHLORIDE 75 ML/HR: 5; .45 INJECTION, SOLUTION INTRAVENOUS at 08:52

## 2020-04-21 RX ADMIN — ONDANSETRON 4 MG: 2 INJECTION INTRAMUSCULAR; INTRAVENOUS at 10:09

## 2020-04-21 RX ADMIN — Medication 100 MG: at 15:16

## 2020-04-21 RX ADMIN — FENTANYL CITRATE 50 MCG: 50 INJECTION, SOLUTION INTRAMUSCULAR; INTRAVENOUS at 15:10

## 2020-04-21 RX ADMIN — LEVOFLOXACIN 500 MG: 5 INJECTION, SOLUTION INTRAVENOUS at 22:46

## 2020-04-21 RX ADMIN — ONDANSETRON 4 MG: 2 INJECTION INTRAMUSCULAR; INTRAVENOUS at 16:10

## 2020-04-21 RX ADMIN — ROCURONIUM BROMIDE 5 MG: 10 INJECTION, SOLUTION INTRAVENOUS at 15:27

## 2020-04-21 RX ADMIN — ROCURONIUM BROMIDE 20 MG: 10 INJECTION, SOLUTION INTRAVENOUS at 15:30

## 2020-04-21 RX ADMIN — FENTANYL CITRATE 25 MCG: 50 INJECTION, SOLUTION INTRAMUSCULAR; INTRAVENOUS at 16:04

## 2020-04-21 RX ADMIN — FENTANYL CITRATE 25 MCG: 50 INJECTION, SOLUTION INTRAMUSCULAR; INTRAVENOUS at 17:17

## 2020-04-21 RX ADMIN — PANTOPRAZOLE SODIUM 40 MG: 40 INJECTION, POWDER, FOR SOLUTION INTRAVENOUS at 05:44

## 2020-04-21 RX ADMIN — DEXTROSE AND SODIUM CHLORIDE 125 ML/HR: 5; .45 INJECTION, SOLUTION INTRAVENOUS at 07:24

## 2020-04-21 RX ADMIN — LIDOCAINE HYDROCHLORIDE 50 MG: 10 INJECTION, SOLUTION EPIDURAL; INFILTRATION; INTRACAUDAL; PERINEURAL at 15:15

## 2020-04-21 RX ADMIN — SODIUM CHLORIDE, SODIUM LACTATE, POTASSIUM CHLORIDE, AND CALCIUM CHLORIDE 125 ML/HR: .6; .31; .03; .02 INJECTION, SOLUTION INTRAVENOUS at 21:45

## 2020-04-21 RX ADMIN — NEOSTIGMINE METHYLSULFATE 3 MG: 1 INJECTION, SOLUTION INTRAVENOUS at 16:13

## 2020-04-21 RX ADMIN — MECLIZINE 12.5 MG: 12.5 TABLET ORAL at 21:05

## 2020-04-21 RX ADMIN — FENTANYL CITRATE 25 MCG: 50 INJECTION, SOLUTION INTRAMUSCULAR; INTRAVENOUS at 16:57

## 2020-04-21 RX ADMIN — PAROXETINE HYDROCHLORIDE 20 MG: 20 TABLET, FILM COATED ORAL at 09:39

## 2020-04-21 RX ADMIN — CEFAZOLIN SODIUM 2000 MG: 2 SOLUTION INTRAVENOUS at 15:13

## 2020-04-21 RX ADMIN — VENLAFAXINE 75 MG: 37.5 TABLET ORAL at 09:38

## 2020-04-21 RX ADMIN — SODIUM CHLORIDE, SODIUM LACTATE, POTASSIUM CHLORIDE, AND CALCIUM CHLORIDE 125 ML/HR: .6; .31; .03; .02 INJECTION, SOLUTION INTRAVENOUS at 19:12

## 2020-04-21 RX ADMIN — VERAPAMIL HYDROCHLORIDE 240 MG: 240 TABLET, FILM COATED, EXTENDED RELEASE ORAL at 09:37

## 2020-04-21 RX ADMIN — MIDAZOLAM 2 MG: 1 INJECTION INTRAMUSCULAR; INTRAVENOUS at 15:07

## 2020-04-21 RX ADMIN — GLYCOPYRROLATE 0.4 MG: 0.2 INJECTION, SOLUTION INTRAMUSCULAR; INTRAVENOUS at 16:13

## 2020-04-21 RX ADMIN — SODIUM CHLORIDE, SODIUM LACTATE, POTASSIUM CHLORIDE, AND CALCIUM CHLORIDE 200 ML/HR: .6; .31; .03; .02 INJECTION, SOLUTION INTRAVENOUS at 14:45

## 2020-04-21 RX ADMIN — DEXAMETHASONE SODIUM PHOSPHATE 4 MG: 4 INJECTION, SOLUTION INTRAMUSCULAR; INTRAVENOUS at 15:37

## 2020-04-21 RX ADMIN — LORATADINE 10 MG: 10 TABLET ORAL at 09:40

## 2020-04-21 RX ADMIN — METRONIDAZOLE 500 MG: 500 INJECTION, SOLUTION INTRAVENOUS at 15:29

## 2020-04-21 RX ADMIN — HYDROCODONE BITARTRATE AND ACETAMINOPHEN 2 TABLET: 5; 325 TABLET ORAL at 20:44

## 2020-04-21 RX ADMIN — METRONIDAZOLE 500 MG: 500 INJECTION, SOLUTION INTRAVENOUS at 21:06

## 2020-04-21 RX ADMIN — FENTANYL CITRATE 25 MCG: 50 INJECTION, SOLUTION INTRAMUSCULAR; INTRAVENOUS at 17:11

## 2020-04-21 RX ADMIN — HEPARIN SODIUM 5000 UNITS: 5000 INJECTION INTRAVENOUS; SUBCUTANEOUS at 21:06

## 2020-04-21 RX ADMIN — MECLIZINE 12.5 MG: 12.5 TABLET ORAL at 08:52

## 2020-04-21 RX ADMIN — FENTANYL CITRATE 25 MCG: 50 INJECTION, SOLUTION INTRAMUSCULAR; INTRAVENOUS at 17:06

## 2020-04-21 RX ADMIN — HYDROMORPHONE HYDROCHLORIDE 0.5 MG: 1 INJECTION, SOLUTION INTRAMUSCULAR; INTRAVENOUS; SUBCUTANEOUS at 17:27

## 2020-04-21 RX ADMIN — VENLAFAXINE 75 MG: 37.5 TABLET ORAL at 19:24

## 2020-04-21 RX ADMIN — FENTANYL CITRATE 25 MCG: 50 INJECTION, SOLUTION INTRAMUSCULAR; INTRAVENOUS at 16:19

## 2020-04-21 RX ADMIN — PROPOFOL 180 MG: 10 INJECTION, EMULSION INTRAVENOUS at 15:16

## 2020-04-21 RX ADMIN — ACETAMINOPHEN 650 MG: 325 TABLET ORAL at 13:13

## 2020-04-22 PROBLEM — K56.609 SBO (SMALL BOWEL OBSTRUCTION) (HCC): Status: RESOLVED | Noted: 2020-04-22 | Resolved: 2020-04-22

## 2020-04-22 PROBLEM — K56.609 SBO (SMALL BOWEL OBSTRUCTION) (HCC): Status: ACTIVE | Noted: 2020-04-22

## 2020-04-22 LAB
ANION GAP SERPL CALCULATED.3IONS-SCNC: 5 MMOL/L (ref 4–13)
BUN SERPL-MCNC: 7 MG/DL (ref 5–25)
CALCIUM SERPL-MCNC: 8.3 MG/DL (ref 8.3–10.1)
CHLORIDE SERPL-SCNC: 101 MMOL/L (ref 100–108)
CO2 SERPL-SCNC: 29 MMOL/L (ref 21–32)
CREAT SERPL-MCNC: 0.72 MG/DL (ref 0.6–1.3)
ERYTHROCYTE [DISTWIDTH] IN BLOOD BY AUTOMATED COUNT: 12.3 % (ref 11.6–15.1)
GFR SERPL CREATININE-BSD FRML MDRD: 89 ML/MIN/1.73SQ M
GLUCOSE SERPL-MCNC: 114 MG/DL (ref 65–140)
HCT VFR BLD AUTO: 38.8 % (ref 34.8–46.1)
HGB BLD-MCNC: 12.9 G/DL (ref 11.5–15.4)
MAGNESIUM SERPL-MCNC: 1.7 MG/DL (ref 1.6–2.6)
MCH RBC QN AUTO: 32.2 PG (ref 26.8–34.3)
MCHC RBC AUTO-ENTMCNC: 33.2 G/DL (ref 31.4–37.4)
MCV RBC AUTO: 97 FL (ref 82–98)
PHOSPHATE SERPL-MCNC: 3.2 MG/DL (ref 2.3–4.1)
PLATELET # BLD AUTO: 297 THOUSANDS/UL (ref 149–390)
PMV BLD AUTO: 8.8 FL (ref 8.9–12.7)
POTASSIUM SERPL-SCNC: 3.7 MMOL/L (ref 3.5–5.3)
RBC # BLD AUTO: 4.01 MILLION/UL (ref 3.81–5.12)
SODIUM SERPL-SCNC: 135 MMOL/L (ref 136–145)
WBC # BLD AUTO: 14.5 THOUSAND/UL (ref 4.31–10.16)

## 2020-04-22 PROCEDURE — C9113 INJ PANTOPRAZOLE SODIUM, VIA: HCPCS | Performed by: PHYSICIAN ASSISTANT

## 2020-04-22 PROCEDURE — 84100 ASSAY OF PHOSPHORUS: CPT | Performed by: PHYSICIAN ASSISTANT

## 2020-04-22 PROCEDURE — 83735 ASSAY OF MAGNESIUM: CPT | Performed by: PHYSICIAN ASSISTANT

## 2020-04-22 PROCEDURE — 99024 POSTOP FOLLOW-UP VISIT: CPT | Performed by: SURGERY

## 2020-04-22 PROCEDURE — 80048 BASIC METABOLIC PNL TOTAL CA: CPT | Performed by: PHYSICIAN ASSISTANT

## 2020-04-22 PROCEDURE — 99232 SBSQ HOSP IP/OBS MODERATE 35: CPT | Performed by: INTERNAL MEDICINE

## 2020-04-22 PROCEDURE — 85027 COMPLETE CBC AUTOMATED: CPT | Performed by: PHYSICIAN ASSISTANT

## 2020-04-22 RX ORDER — METRONIDAZOLE 500 MG/1
500 TABLET ORAL EVERY 8 HOURS SCHEDULED
Qty: 21 TABLET | Refills: 0 | Status: SHIPPED | OUTPATIENT
Start: 2020-04-22 | End: 2020-04-29

## 2020-04-22 RX ORDER — METOPROLOL TARTRATE 5 MG/5ML
5 INJECTION INTRAVENOUS ONCE
Status: COMPLETED | OUTPATIENT
Start: 2020-04-22 | End: 2020-04-22

## 2020-04-22 RX ORDER — HYDROCODONE BITARTRATE AND ACETAMINOPHEN 5; 325 MG/1; MG/1
1 TABLET ORAL EVERY 4 HOURS PRN
Qty: 15 TABLET | Refills: 0 | Status: SHIPPED | OUTPATIENT
Start: 2020-04-22 | End: 2020-04-25

## 2020-04-22 RX ORDER — LEVOFLOXACIN 500 MG/1
500 TABLET, FILM COATED ORAL EVERY 24 HOURS
Qty: 7 TABLET | Refills: 0 | Status: SHIPPED | OUTPATIENT
Start: 2020-04-22 | End: 2020-04-29

## 2020-04-22 RX ADMIN — PANTOPRAZOLE SODIUM 40 MG: 40 INJECTION, POWDER, FOR SOLUTION INTRAVENOUS at 05:18

## 2020-04-22 RX ADMIN — MECLIZINE 12.5 MG: 12.5 TABLET ORAL at 05:19

## 2020-04-22 RX ADMIN — SODIUM CHLORIDE, SODIUM LACTATE, POTASSIUM CHLORIDE, AND CALCIUM CHLORIDE 125 ML/HR: .6; .31; .03; .02 INJECTION, SOLUTION INTRAVENOUS at 08:53

## 2020-04-22 RX ADMIN — METRONIDAZOLE 500 MG: 500 INJECTION, SOLUTION INTRAVENOUS at 21:51

## 2020-04-22 RX ADMIN — LEVOFLOXACIN 500 MG: 5 INJECTION, SOLUTION INTRAVENOUS at 22:49

## 2020-04-22 RX ADMIN — HYDROCODONE BITARTRATE AND ACETAMINOPHEN 1 TABLET: 5; 325 TABLET ORAL at 19:31

## 2020-04-22 RX ADMIN — METRONIDAZOLE 500 MG: 500 INJECTION, SOLUTION INTRAVENOUS at 13:18

## 2020-04-22 RX ADMIN — HYDROCODONE BITARTRATE AND ACETAMINOPHEN 1 TABLET: 5; 325 TABLET ORAL at 08:16

## 2020-04-22 RX ADMIN — METOPROLOL TARTRATE 5 MG: 1 INJECTION, SOLUTION INTRAVENOUS at 23:31

## 2020-04-22 RX ADMIN — HEPARIN SODIUM 5000 UNITS: 5000 INJECTION INTRAVENOUS; SUBCUTANEOUS at 05:21

## 2020-04-22 RX ADMIN — HEPARIN SODIUM 5000 UNITS: 5000 INJECTION INTRAVENOUS; SUBCUTANEOUS at 13:18

## 2020-04-22 RX ADMIN — SODIUM CHLORIDE, SODIUM LACTATE, POTASSIUM CHLORIDE, AND CALCIUM CHLORIDE 75 ML/HR: .6; .31; .03; .02 INJECTION, SOLUTION INTRAVENOUS at 22:55

## 2020-04-22 RX ADMIN — HYDROCODONE BITARTRATE AND ACETAMINOPHEN 1 TABLET: 5; 325 TABLET ORAL at 13:17

## 2020-04-22 RX ADMIN — HEPARIN SODIUM 5000 UNITS: 5000 INJECTION INTRAVENOUS; SUBCUTANEOUS at 21:49

## 2020-04-22 RX ADMIN — HYDROCODONE BITARTRATE AND ACETAMINOPHEN 2 TABLET: 5; 325 TABLET ORAL at 04:11

## 2020-04-22 RX ADMIN — METRONIDAZOLE 500 MG: 500 INJECTION, SOLUTION INTRAVENOUS at 05:18

## 2020-04-22 RX ADMIN — HYDROMORPHONE HYDROCHLORIDE 0.5 MG: 1 INJECTION, SOLUTION INTRAMUSCULAR; INTRAVENOUS; SUBCUTANEOUS at 23:08

## 2020-04-23 PROBLEM — E87.6 HYPOKALEMIA: Status: ACTIVE | Noted: 2020-04-23

## 2020-04-23 LAB
ANION GAP SERPL CALCULATED.3IONS-SCNC: 6 MMOL/L (ref 4–13)
BUN SERPL-MCNC: 7 MG/DL (ref 5–25)
CALCIUM SERPL-MCNC: 8.4 MG/DL (ref 8.3–10.1)
CHLORIDE SERPL-SCNC: 101 MMOL/L (ref 100–108)
CO2 SERPL-SCNC: 31 MMOL/L (ref 21–32)
CREAT SERPL-MCNC: 0.72 MG/DL (ref 0.6–1.3)
ERYTHROCYTE [DISTWIDTH] IN BLOOD BY AUTOMATED COUNT: 12.4 % (ref 11.6–15.1)
GFR SERPL CREATININE-BSD FRML MDRD: 89 ML/MIN/1.73SQ M
GLUCOSE SERPL-MCNC: 103 MG/DL (ref 65–140)
HCT VFR BLD AUTO: 37.6 % (ref 34.8–46.1)
HGB BLD-MCNC: 12.1 G/DL (ref 11.5–15.4)
MAGNESIUM SERPL-MCNC: 1.8 MG/DL (ref 1.6–2.6)
MCH RBC QN AUTO: 31.3 PG (ref 26.8–34.3)
MCHC RBC AUTO-ENTMCNC: 32.2 G/DL (ref 31.4–37.4)
MCV RBC AUTO: 97 FL (ref 82–98)
PHOSPHATE SERPL-MCNC: 2.7 MG/DL (ref 2.3–4.1)
PLATELET # BLD AUTO: 255 THOUSANDS/UL (ref 149–390)
PMV BLD AUTO: 9.1 FL (ref 8.9–12.7)
POTASSIUM SERPL-SCNC: 3 MMOL/L (ref 3.5–5.3)
RBC # BLD AUTO: 3.86 MILLION/UL (ref 3.81–5.12)
SODIUM SERPL-SCNC: 138 MMOL/L (ref 136–145)
WBC # BLD AUTO: 9.89 THOUSAND/UL (ref 4.31–10.16)

## 2020-04-23 PROCEDURE — 84100 ASSAY OF PHOSPHORUS: CPT | Performed by: PHYSICIAN ASSISTANT

## 2020-04-23 PROCEDURE — C9113 INJ PANTOPRAZOLE SODIUM, VIA: HCPCS | Performed by: PHYSICIAN ASSISTANT

## 2020-04-23 PROCEDURE — 99024 POSTOP FOLLOW-UP VISIT: CPT | Performed by: PHYSICIAN ASSISTANT

## 2020-04-23 PROCEDURE — 83735 ASSAY OF MAGNESIUM: CPT | Performed by: PHYSICIAN ASSISTANT

## 2020-04-23 PROCEDURE — 99232 SBSQ HOSP IP/OBS MODERATE 35: CPT | Performed by: INTERNAL MEDICINE

## 2020-04-23 PROCEDURE — 85027 COMPLETE CBC AUTOMATED: CPT | Performed by: INTERNAL MEDICINE

## 2020-04-23 PROCEDURE — 80048 BASIC METABOLIC PNL TOTAL CA: CPT | Performed by: INTERNAL MEDICINE

## 2020-04-23 RX ORDER — LABETALOL 20 MG/4 ML (5 MG/ML) INTRAVENOUS SYRINGE
10 ONCE
Status: COMPLETED | OUTPATIENT
Start: 2020-04-23 | End: 2020-04-23

## 2020-04-23 RX ORDER — METOPROLOL TARTRATE 5 MG/5ML
2.5 INJECTION INTRAVENOUS ONCE
Status: COMPLETED | OUTPATIENT
Start: 2020-04-23 | End: 2020-04-23

## 2020-04-23 RX ORDER — LABETALOL 20 MG/4 ML (5 MG/ML) INTRAVENOUS SYRINGE
10 EVERY 4 HOURS PRN
Status: DISCONTINUED | OUTPATIENT
Start: 2020-04-23 | End: 2020-04-24 | Stop reason: HOSPADM

## 2020-04-23 RX ORDER — POTASSIUM CHLORIDE 20 MEQ/1
40 TABLET, EXTENDED RELEASE ORAL ONCE
Status: COMPLETED | OUTPATIENT
Start: 2020-04-23 | End: 2020-04-23

## 2020-04-23 RX ORDER — PAROXETINE HYDROCHLORIDE 20 MG/1
20 TABLET, FILM COATED ORAL DAILY
Status: DISCONTINUED | OUTPATIENT
Start: 2020-04-23 | End: 2020-04-24 | Stop reason: HOSPADM

## 2020-04-23 RX ORDER — PRAVASTATIN SODIUM 40 MG
40 TABLET ORAL
Status: DISCONTINUED | OUTPATIENT
Start: 2020-04-23 | End: 2020-04-24 | Stop reason: HOSPADM

## 2020-04-23 RX ORDER — VENLAFAXINE 37.5 MG/1
75 TABLET ORAL 2 TIMES DAILY WITH MEALS
Status: DISCONTINUED | OUTPATIENT
Start: 2020-04-23 | End: 2020-04-24 | Stop reason: HOSPADM

## 2020-04-23 RX ORDER — MECLIZINE HCL 12.5 MG/1
12.5 TABLET ORAL EVERY 8 HOURS PRN
Status: DISCONTINUED | OUTPATIENT
Start: 2020-04-23 | End: 2020-04-24 | Stop reason: HOSPADM

## 2020-04-23 RX ORDER — VERAPAMIL HYDROCHLORIDE 240 MG/1
240 TABLET, FILM COATED, EXTENDED RELEASE ORAL DAILY
Status: DISCONTINUED | OUTPATIENT
Start: 2020-04-23 | End: 2020-04-24 | Stop reason: HOSPADM

## 2020-04-23 RX ADMIN — VENLAFAXINE 75 MG: 37.5 TABLET ORAL at 16:19

## 2020-04-23 RX ADMIN — VENLAFAXINE 75 MG: 37.5 TABLET ORAL at 09:37

## 2020-04-23 RX ADMIN — METRONIDAZOLE 500 MG: 500 INJECTION, SOLUTION INTRAVENOUS at 21:28

## 2020-04-23 RX ADMIN — VERAPAMIL HYDROCHLORIDE 240 MG: 240 TABLET, FILM COATED, EXTENDED RELEASE ORAL at 09:33

## 2020-04-23 RX ADMIN — METRONIDAZOLE 500 MG: 500 INJECTION, SOLUTION INTRAVENOUS at 05:34

## 2020-04-23 RX ADMIN — HYDROCODONE BITARTRATE AND ACETAMINOPHEN 2 TABLET: 5; 325 TABLET ORAL at 04:11

## 2020-04-23 RX ADMIN — PANTOPRAZOLE SODIUM 40 MG: 40 INJECTION, POWDER, FOR SOLUTION INTRAVENOUS at 05:11

## 2020-04-23 RX ADMIN — HEPARIN SODIUM 5000 UNITS: 5000 INJECTION INTRAVENOUS; SUBCUTANEOUS at 05:14

## 2020-04-23 RX ADMIN — HEPARIN SODIUM 5000 UNITS: 5000 INJECTION INTRAVENOUS; SUBCUTANEOUS at 14:16

## 2020-04-23 RX ADMIN — LEVOFLOXACIN 500 MG: 5 INJECTION, SOLUTION INTRAVENOUS at 22:11

## 2020-04-23 RX ADMIN — PAROXETINE 20 MG: 20 TABLET, FILM COATED ORAL at 09:33

## 2020-04-23 RX ADMIN — METOPROLOL TARTRATE 2.5 MG: 1 INJECTION, SOLUTION INTRAVENOUS at 00:25

## 2020-04-23 RX ADMIN — METRONIDAZOLE 500 MG: 500 INJECTION, SOLUTION INTRAVENOUS at 14:16

## 2020-04-23 RX ADMIN — HYDROCODONE BITARTRATE AND ACETAMINOPHEN 2 TABLET: 5; 325 TABLET ORAL at 16:17

## 2020-04-23 RX ADMIN — POTASSIUM CHLORIDE 40 MEQ: 1500 TABLET, EXTENDED RELEASE ORAL at 09:33

## 2020-04-23 RX ADMIN — LABETALOL 20 MG/4 ML (5 MG/ML) INTRAVENOUS SYRINGE 10 MG: at 01:51

## 2020-04-23 RX ADMIN — PRAVASTATIN SODIUM 40 MG: 40 TABLET ORAL at 16:19

## 2020-04-23 RX ADMIN — HEPARIN SODIUM 5000 UNITS: 5000 INJECTION INTRAVENOUS; SUBCUTANEOUS at 21:31

## 2020-04-23 RX ADMIN — SODIUM CHLORIDE, SODIUM LACTATE, POTASSIUM CHLORIDE, AND CALCIUM CHLORIDE 75 ML/HR: .6; .31; .03; .02 INJECTION, SOLUTION INTRAVENOUS at 14:23

## 2020-04-24 VITALS
HEART RATE: 80 BPM | RESPIRATION RATE: 20 BRPM | SYSTOLIC BLOOD PRESSURE: 124 MMHG | TEMPERATURE: 97.5 F | OXYGEN SATURATION: 93 % | BODY MASS INDEX: 27.31 KG/M2 | WEIGHT: 160 LBS | HEIGHT: 64 IN | DIASTOLIC BLOOD PRESSURE: 66 MMHG

## 2020-04-24 PROBLEM — R79.89 ELEVATED TROPONIN: Status: RESOLVED | Noted: 2020-04-20 | Resolved: 2020-04-24

## 2020-04-24 PROBLEM — D72.829 LEUKOCYTOSIS: Status: RESOLVED | Noted: 2020-04-20 | Resolved: 2020-04-24

## 2020-04-24 PROBLEM — R77.8 ELEVATED TROPONIN: Status: RESOLVED | Noted: 2020-04-20 | Resolved: 2020-04-24

## 2020-04-24 PROBLEM — I16.0 HYPERTENSIVE URGENCY: Status: RESOLVED | Noted: 2020-04-21 | Resolved: 2020-04-24

## 2020-04-24 LAB
ANION GAP SERPL CALCULATED.3IONS-SCNC: 9 MMOL/L (ref 4–13)
BUN SERPL-MCNC: 9 MG/DL (ref 5–25)
CALCIUM SERPL-MCNC: 8.6 MG/DL (ref 8.3–10.1)
CHLORIDE SERPL-SCNC: 97 MMOL/L (ref 100–108)
CO2 SERPL-SCNC: 28 MMOL/L (ref 21–32)
CREAT SERPL-MCNC: 0.76 MG/DL (ref 0.6–1.3)
GFR SERPL CREATININE-BSD FRML MDRD: 84 ML/MIN/1.73SQ M
GLUCOSE SERPL-MCNC: 127 MG/DL (ref 65–140)
POTASSIUM SERPL-SCNC: 3.2 MMOL/L (ref 3.5–5.3)
SODIUM SERPL-SCNC: 134 MMOL/L (ref 136–145)

## 2020-04-24 PROCEDURE — NC001 PR NO CHARGE: Performed by: SURGERY

## 2020-04-24 PROCEDURE — C9113 INJ PANTOPRAZOLE SODIUM, VIA: HCPCS | Performed by: PHYSICIAN ASSISTANT

## 2020-04-24 PROCEDURE — 80048 BASIC METABOLIC PNL TOTAL CA: CPT | Performed by: PHYSICIAN ASSISTANT

## 2020-04-24 PROCEDURE — 99024 POSTOP FOLLOW-UP VISIT: CPT | Performed by: SURGERY

## 2020-04-24 PROCEDURE — 99232 SBSQ HOSP IP/OBS MODERATE 35: CPT | Performed by: INTERNAL MEDICINE

## 2020-04-24 RX ORDER — POTASSIUM CHLORIDE 20 MEQ/1
40 TABLET, EXTENDED RELEASE ORAL ONCE
Status: COMPLETED | OUTPATIENT
Start: 2020-04-24 | End: 2020-04-24

## 2020-04-24 RX ADMIN — METRONIDAZOLE 500 MG: 500 INJECTION, SOLUTION INTRAVENOUS at 05:00

## 2020-04-24 RX ADMIN — POTASSIUM CHLORIDE 40 MEQ: 1500 TABLET, EXTENDED RELEASE ORAL at 15:29

## 2020-04-24 RX ADMIN — PAROXETINE 20 MG: 20 TABLET, FILM COATED ORAL at 09:25

## 2020-04-24 RX ADMIN — PANTOPRAZOLE SODIUM 40 MG: 40 INJECTION, POWDER, FOR SOLUTION INTRAVENOUS at 05:01

## 2020-04-24 RX ADMIN — SODIUM CHLORIDE, SODIUM LACTATE, POTASSIUM CHLORIDE, AND CALCIUM CHLORIDE 75 ML/HR: .6; .31; .03; .02 INJECTION, SOLUTION INTRAVENOUS at 04:51

## 2020-04-24 RX ADMIN — VENLAFAXINE 75 MG: 37.5 TABLET ORAL at 09:30

## 2020-04-24 RX ADMIN — ONDANSETRON 4 MG: 2 INJECTION INTRAMUSCULAR; INTRAVENOUS at 10:17

## 2020-04-24 RX ADMIN — VERAPAMIL HYDROCHLORIDE 240 MG: 240 TABLET, FILM COATED, EXTENDED RELEASE ORAL at 09:25

## 2020-04-24 RX ADMIN — HEPARIN SODIUM 5000 UNITS: 5000 INJECTION INTRAVENOUS; SUBCUTANEOUS at 05:00

## 2020-04-27 ENCOUNTER — TRANSITIONAL CARE MANAGEMENT (OUTPATIENT)
Dept: FAMILY MEDICINE CLINIC | Facility: CLINIC | Age: 65
End: 2020-04-27

## 2020-04-30 ENCOUNTER — OFFICE VISIT (OUTPATIENT)
Dept: FAMILY MEDICINE CLINIC | Facility: CLINIC | Age: 65
End: 2020-04-30

## 2020-04-30 VITALS
BODY MASS INDEX: 26.29 KG/M2 | HEIGHT: 64 IN | TEMPERATURE: 98.2 F | HEART RATE: 120 BPM | WEIGHT: 154 LBS | OXYGEN SATURATION: 97 %

## 2020-04-30 DIAGNOSIS — I10 ESSENTIAL HYPERTENSION: Primary | ICD-10-CM

## 2020-04-30 DIAGNOSIS — K56.609 SBO (SMALL BOWEL OBSTRUCTION) (HCC): ICD-10-CM

## 2020-04-30 DIAGNOSIS — Q43.0 MECKEL'S DIVERTICULITIS: ICD-10-CM

## 2020-04-30 PROCEDURE — 99213 OFFICE O/P EST LOW 20 MIN: CPT | Performed by: FAMILY MEDICINE

## 2020-04-30 RX ORDER — LISINOPRIL 10 MG/1
10 TABLET ORAL DAILY
Qty: 90 TABLET | Refills: 3 | Status: SHIPPED | OUTPATIENT
Start: 2020-04-30 | End: 2021-04-26

## 2020-05-04 ENCOUNTER — OFFICE VISIT (OUTPATIENT)
Dept: SURGERY | Facility: HOSPITAL | Age: 65
End: 2020-05-04

## 2020-05-04 VITALS
SYSTOLIC BLOOD PRESSURE: 169 MMHG | BODY MASS INDEX: 26.6 KG/M2 | TEMPERATURE: 98.7 F | HEIGHT: 64 IN | WEIGHT: 155.8 LBS | RESPIRATION RATE: 16 BRPM | HEART RATE: 96 BPM | DIASTOLIC BLOOD PRESSURE: 93 MMHG

## 2020-05-04 DIAGNOSIS — Z09 POSTOP CHECK: Primary | ICD-10-CM

## 2020-05-04 PROCEDURE — 99024 POSTOP FOLLOW-UP VISIT: CPT | Performed by: SURGERY

## 2020-05-04 PROCEDURE — 3077F SYST BP >= 140 MM HG: CPT | Performed by: SURGERY

## 2020-05-04 PROCEDURE — 3080F DIAST BP >= 90 MM HG: CPT | Performed by: SURGERY

## 2020-05-04 PROCEDURE — 1111F DSCHRG MED/CURRENT MED MERGE: CPT | Performed by: SURGERY

## 2020-05-16 DIAGNOSIS — I10 ESSENTIAL HYPERTENSION: ICD-10-CM

## 2020-05-18 RX ORDER — OXYBUTYNIN CHLORIDE 5 MG/1
TABLET ORAL
Qty: 180 TABLET | Refills: 0 | Status: SHIPPED | OUTPATIENT
Start: 2020-05-18 | End: 2020-12-22

## 2020-05-31 DIAGNOSIS — K21.9 GASTROESOPHAGEAL REFLUX DISEASE WITHOUT ESOPHAGITIS: ICD-10-CM

## 2020-06-01 RX ORDER — RABEPRAZOLE SODIUM 20 MG/1
TABLET, DELAYED RELEASE ORAL
Qty: 90 TABLET | Refills: 0 | Status: SHIPPED | OUTPATIENT
Start: 2020-06-01 | End: 2020-07-02

## 2020-07-01 DIAGNOSIS — K21.9 GASTROESOPHAGEAL REFLUX DISEASE WITHOUT ESOPHAGITIS: ICD-10-CM

## 2020-07-02 RX ORDER — RABEPRAZOLE SODIUM 20 MG/1
TABLET, DELAYED RELEASE ORAL
Qty: 30 TABLET | Refills: 2 | Status: SHIPPED | OUTPATIENT
Start: 2020-07-02 | End: 2020-10-01

## 2020-07-19 DIAGNOSIS — I10 ESSENTIAL HYPERTENSION: ICD-10-CM

## 2020-07-20 RX ORDER — VERAPAMIL HYDROCHLORIDE 240 MG/1
240 CAPSULE, EXTENDED RELEASE ORAL DAILY
Qty: 90 CAPSULE | Refills: 3 | Status: SHIPPED | OUTPATIENT
Start: 2020-07-20 | End: 2021-07-19

## 2020-07-20 RX ORDER — SIMVASTATIN 10 MG
TABLET ORAL
Qty: 90 TABLET | Refills: 0 | Status: SHIPPED | OUTPATIENT
Start: 2020-07-20 | End: 2020-10-12

## 2020-10-01 DIAGNOSIS — K21.9 GASTROESOPHAGEAL REFLUX DISEASE WITHOUT ESOPHAGITIS: ICD-10-CM

## 2020-10-01 RX ORDER — RABEPRAZOLE SODIUM 20 MG/1
TABLET, DELAYED RELEASE ORAL
Qty: 30 TABLET | Refills: 2 | Status: SHIPPED | OUTPATIENT
Start: 2020-10-01 | End: 2021-01-13

## 2020-10-03 DIAGNOSIS — F41.9 ANXIETY: ICD-10-CM

## 2020-10-05 RX ORDER — PAROXETINE HYDROCHLORIDE 20 MG/1
TABLET, FILM COATED ORAL
Qty: 90 TABLET | Refills: 1 | Status: SHIPPED | OUTPATIENT
Start: 2020-10-05 | End: 2021-03-30

## 2020-10-05 RX ORDER — VENLAFAXINE 75 MG/1
TABLET ORAL
Qty: 180 TABLET | Refills: 1 | Status: SHIPPED | OUTPATIENT
Start: 2020-10-05 | End: 2021-03-30

## 2020-10-11 DIAGNOSIS — I10 ESSENTIAL HYPERTENSION: ICD-10-CM

## 2020-10-12 RX ORDER — SIMVASTATIN 10 MG
TABLET ORAL
Qty: 90 TABLET | Refills: 0 | Status: SHIPPED | OUTPATIENT
Start: 2020-10-12 | End: 2021-01-09

## 2020-12-22 DIAGNOSIS — I10 ESSENTIAL HYPERTENSION: ICD-10-CM

## 2020-12-22 RX ORDER — OXYBUTYNIN CHLORIDE 5 MG/1
TABLET ORAL
Qty: 180 TABLET | Refills: 0 | Status: SHIPPED | OUTPATIENT
Start: 2020-12-22 | End: 2021-03-22

## 2021-01-09 DIAGNOSIS — I10 ESSENTIAL HYPERTENSION: ICD-10-CM

## 2021-01-09 RX ORDER — SIMVASTATIN 10 MG
TABLET ORAL
Qty: 90 TABLET | Refills: 0 | Status: SHIPPED | OUTPATIENT
Start: 2021-01-09 | End: 2021-04-03

## 2021-01-10 ENCOUNTER — OFFICE VISIT (OUTPATIENT)
Dept: URGENT CARE | Facility: CLINIC | Age: 66
End: 2021-01-10
Payer: COMMERCIAL

## 2021-01-10 VITALS
RESPIRATION RATE: 20 BRPM | TEMPERATURE: 97.2 F | WEIGHT: 171.4 LBS | DIASTOLIC BLOOD PRESSURE: 73 MMHG | BODY MASS INDEX: 29.26 KG/M2 | OXYGEN SATURATION: 97 % | SYSTOLIC BLOOD PRESSURE: 139 MMHG | HEIGHT: 64 IN | HEART RATE: 71 BPM

## 2021-01-10 DIAGNOSIS — N30.01 ACUTE CYSTITIS WITH HEMATURIA: Primary | ICD-10-CM

## 2021-01-10 DIAGNOSIS — R30.0 DYSURIA: ICD-10-CM

## 2021-01-10 LAB
SL AMB  POCT GLUCOSE, UA: ABNORMAL
SL AMB LEUKOCYTE ESTERASE,UA: ABNORMAL
SL AMB POCT BILIRUBIN,UA: ABNORMAL
SL AMB POCT BLOOD,UA: ABNORMAL
SL AMB POCT CLARITY,UA: ABNORMAL
SL AMB POCT COLOR,UA: YELLOW
SL AMB POCT KETONES,UA: ABNORMAL
SL AMB POCT NITRITE,UA: ABNORMAL
SL AMB POCT PH,UA: 6
SL AMB POCT SPECIFIC GRAVITY,UA: 1.02
SL AMB POCT URINE PROTEIN: ABNORMAL
SL AMB POCT UROBILINOGEN: 0.2

## 2021-01-10 PROCEDURE — 81002 URINALYSIS NONAUTO W/O SCOPE: CPT | Performed by: PHYSICIAN ASSISTANT

## 2021-01-10 PROCEDURE — 87086 URINE CULTURE/COLONY COUNT: CPT | Performed by: PHYSICIAN ASSISTANT

## 2021-01-10 PROCEDURE — 87186 SC STD MICRODIL/AGAR DIL: CPT | Performed by: PHYSICIAN ASSISTANT

## 2021-01-10 PROCEDURE — G0382 LEV 3 HOSP TYPE B ED VISIT: HCPCS | Performed by: PHYSICIAN ASSISTANT

## 2021-01-10 PROCEDURE — 87077 CULTURE AEROBIC IDENTIFY: CPT | Performed by: PHYSICIAN ASSISTANT

## 2021-01-10 RX ORDER — CEPHALEXIN 500 MG/1
500 CAPSULE ORAL EVERY 12 HOURS SCHEDULED
Qty: 14 CAPSULE | Refills: 0 | Status: SHIPPED | OUTPATIENT
Start: 2021-01-10 | End: 2021-01-17

## 2021-01-10 NOTE — PROGRESS NOTES
[facility]        NAME: Rosalio Johnson is a 60 y o  female  : 1955    MRN: [id number]  DATE: January 15, 2021  TIME: 7:56 AM    Assessment and Plan   Acute cystitis with hematuria [N30 01]  1  Acute cystitis with hematuria  cephalexin (KEFLEX) 500 mg capsule    Urine culture   2  Dysuria  POCT urine dip         Patient Instructions       Follow up with PCP in 3-5 days  Proceed to  ER if symptoms worsen  Chief Complaint     Chief Complaint   Patient presents with    Possible UTI     Pt c/o R back/flank pain, R lower abdominal cramping, burning with urination, increased frequency, and urgency since friday  Pt also states no bm since friday (usually [facility]), pt states passing gas daily  Denies fever, chills, n/v/d  History of Present Illness         71-year-old female presents the clinic with right-sided flank and back pain, right lower abdominal cramping, dysuria, frequency, urgency that started on Friday  Patient denies fevers, chills, nausea, vomiting, diarrhea  Patient states that she did notice a pink tinge when wiping  Review of Systems   Review of Systems   Constitutional: Negative for chills and fever  Respiratory: Negative for shortness of breath  Cardiovascular: Negative for chest pain and palpitations  Gastrointestinal: Positive for abdominal pain  Negative for constipation, diarrhea, nausea and vomiting  Genitourinary: Positive for dysuria, frequency, hematuria and urgency  Negative for flank pain  Musculoskeletal: Negative for back pain  Neurological: Negative for dizziness, syncope, weakness, light-headedness and headaches  All other systems reviewed and are negative          Current Medications       Current Outpatient Medications:     cephalexin (KEFLEX) 500 mg capsule, Take 1 capsule (500 mg total) by mouth every 12 (twelve) hours for 7 days, Disp: 14 capsule, Rfl: 0    cyclobenzaprine (FLEXERIL) 10 mg tablet, Take 1 tablet (10 mg total) by mouth 3 (three) times a day as needed for muscle spasms, Disp: 60 tablet, Rfl: 5    lisinopril (ZESTRIL) 10 mg tablet, Take 1 tablet (10 mg total) by mouth daily, Disp: 90 tablet, Rfl: 3    loratadine (CLARITIN) 10 mg tablet, Take 10 mg by mouth daily, Disp: , Rfl:     oxybutynin (DITROPAN) 5 mg tablet, TAKE 1 TABLET BY MOUTH TWICE A DAY, Disp: 180 tablet, Rfl: 0    PARoxetine (PAXIL) 20 mg tablet, TAKE 1 TABLET BY MOUTH EVERY DAY, Disp: 90 tablet, Rfl: 1    RABEprazole (ACIPHEX) 20 MG tablet, TAKE 1 TABLET BY MOUTH EVERY DAY, Disp: 30 tablet, Rfl: 2    simvastatin (ZOCOR) 10 mg tablet, TAKE 1 TABLET BY MOUTH EVERYDAY AT BEDTIME, Disp: 90 tablet, Rfl: 0    venlafaxine (EFFEXOR) 75 mg tablet, TAKE 1 TABLET BY MOUTH TWICE A DAY, Disp: 180 tablet, Rfl: 1    verapamil (VERELAN) 240 MG 24 hr capsule, Take 1 capsule (240 mg total) by mouth daily, Disp: 90 capsule, Rfl: 3    Current Allergies     Allergies as of 01/10/2021 - Reviewed 01/10/2021   Allergen Reaction Noted    Erythromycin  04/02/2013            The following portions of the patient's history were reviewed and updated as appropriate: allergies, current medications, past family history, past medical history, past social history, past surgical history and problem list      Past Medical History:   Diagnosis Date    Fibromyalgia     Hypertension        Past Surgical History:   Procedure Laterality Date    ADENOIDECTOMY      pt has 2nd appendix removed   BOWEL RESECTION      CHOLECYSTECTOMY  2000    OOPHORECTOMY      MN LAP,DIAGNOSTIC ABDOMEN N/A 4/21/2020    Procedure: LAPAROSCOPY DIAGNOSTIC, lysis of adhesions , possible bowel resection;  Surgeon: Mike Phillip MD;  Location:  MAIN OR;  Service: General    TUBAL LIGATION         Family History   Problem Relation Age of Onset    Fibromyalgia Mother     Heart disease Father     GI problems Father          Medications have been verified          Objective   /73   Pulse 71   Temp (!) 97 2 °F (36 2 °C)   Resp 20   Ht 5' 4" (1 626 m)   Wt 77 7 kg (171 lb 6 4 oz)   SpO2 97%   BMI 29 42 kg/m²   No LMP recorded  Patient is postmenopausal        Physical Exam     Physical Exam  Vitals signs and nursing note reviewed  Constitutional:       General: She is not in acute distress  Appearance: She is well-developed  She is not diaphoretic  Pulmonary:      Effort: Pulmonary effort is normal    Abdominal:      General: Bowel sounds are normal       Palpations: Abdomen is soft  Tenderness: There is abdominal tenderness (with R sided flank) in the suprapubic area  There is no right CVA tenderness or left CVA tenderness  Skin:     General: Skin is warm and dry  Neurological:      Mental Status: She is alert and oriented to person, place, and time

## 2021-01-10 NOTE — PATIENT INSTRUCTIONS
Urinary Tract Infection in Women   WHAT YOU NEED TO KNOW:   A urinary tract infection (UTI) is caused by bacteria that get inside your urinary tract  Most bacteria that enter your urinary tract come out when you urinate  If the bacteria stay in your urinary tract, you may get an infection  Your urinary tract includes your kidneys, ureters, bladder, and urethra  Urine is made in your kidneys, and it flows from the ureters to the bladder  Urine leaves the bladder through the urethra  A UTI is more common in your lower urinary tract, which includes your bladder and urethra  DISCHARGE INSTRUCTIONS:   Return to the emergency department if:   · You are urinating very little or not at all  · You have a high fever with shaking chills  · You have side or back pain that gets worse  Call your doctor if:   · You have a fever  · You do not feel better after 2 days of taking antibiotics  · You are vomiting  · You have questions or concerns about your condition or care  Medicines:   · Antibiotics  help fight a bacterial infection  If you have UTIs often (called recurrent UTIs), you may be given antibiotics to take regularly  You will be given directions for when and how to use antibiotics  The goal is to prevent UTIs but not cause antibiotic resistance by using antibiotics too often  · Medicines  may be given to decrease pain and burning when you urinate  They will also help decrease the feeling that you need to urinate often  These medicines will make your urine orange or red  · Take your medicine as directed  Contact your healthcare provider if you think your medicine is not helping or if you have side effects  Tell him or her if you are allergic to any medicine  Keep a list of the medicines, vitamins, and herbs you take  Include the amounts, and when and why you take them  Bring the list or the pill bottles to follow-up visits   Carry your medicine list with you in case of an emergency  Follow up with your healthcare provider as directed:  Write down your questions so you remember to ask them during your visits  Prevent another UTI:   · Empty your bladder often  Urinate and empty your bladder as soon as you feel the need  Do not hold your urine for long periods of time  · Wipe from front to back after you urinate or have a bowel movement  This will help prevent germs from getting into your urinary tract through your urethra  · Drink liquids as directed  Ask how much liquid to drink each day and which liquids are best for you  You may need to drink more liquids than usual to help flush out the bacteria  Do not drink alcohol, caffeine, or citrus juices  These can irritate your bladder and increase your symptoms  Your healthcare provider may recommend cranberry juice to help prevent a UTI  · Urinate after you have sex  This can help flush out bacteria passed during sex  · Do not douche or use feminine deodorants  These can change the chemical balance in your vagina  · Change sanitary pads or tampons often  This will help prevent germs from getting into your urinary tract  · Talk to your healthcare provider about your birth control method  You may need to change your method if it is increasing your risk for UTIs  · Wear cotton underwear and clothes that are loose  Tight pants and nylon underwear can trap moisture and cause bacteria to grow  · Vaginal estrogen may be recommended  This medicine helps prevent UTIs in women who have gone through menopause or are in belén-menopause  · Do pelvic muscle exercises often  Pelvic muscle exercises may help you start and stop urinating  Strong pelvic muscles may help you empty your bladder easier  Squeeze these muscles tightly for 5 seconds like you are trying to hold back urine  Then relax for 5 seconds  Gradually work up to squeezing for 10 seconds  Do 3 sets of 15 repetitions a day, or as directed      © Copyright [address] Information is for [employer] use only and may not be sold, redistributed or otherwise used for commercial purposes  All illustrations and images included in CareNotes® are the copyrighted property of A D A M [employer] Inc  or [employer]  The above information is an  only  It is not intended as medical advice for individual conditions or treatments  Talk to your doctor, nurse or pharmacist before following any medical regimen to see if it is safe and effective for you

## 2021-01-13 DIAGNOSIS — K21.9 GASTROESOPHAGEAL REFLUX DISEASE WITHOUT ESOPHAGITIS: ICD-10-CM

## 2021-01-13 LAB — BACTERIA UR CULT: ABNORMAL

## 2021-01-13 RX ORDER — RABEPRAZOLE SODIUM 20 MG/1
TABLET, DELAYED RELEASE ORAL
Qty: 30 TABLET | Refills: 2 | Status: SHIPPED | OUTPATIENT
Start: 2021-01-13 | End: 2021-04-19

## 2021-01-28 DIAGNOSIS — M79.7 FIBROMYALGIA: ICD-10-CM

## 2021-01-29 ENCOUNTER — OFFICE VISIT (OUTPATIENT)
Dept: URGENT CARE | Facility: CLINIC | Age: 66
End: 2021-01-29
Payer: COMMERCIAL

## 2021-01-29 VITALS — HEART RATE: 82 BPM | RESPIRATION RATE: 18 BRPM | OXYGEN SATURATION: 97 % | TEMPERATURE: 97 F

## 2021-01-29 DIAGNOSIS — J01.10 ACUTE NON-RECURRENT FRONTAL SINUSITIS: Primary | ICD-10-CM

## 2021-01-29 PROCEDURE — 99213 OFFICE O/P EST LOW 20 MIN: CPT | Performed by: FAMILY MEDICINE

## 2021-01-29 RX ORDER — AZITHROMYCIN 250 MG/1
TABLET, FILM COATED ORAL
Qty: 6 TABLET | Refills: 0 | Status: SHIPPED | OUTPATIENT
Start: 2021-01-29 | End: 2021-02-02

## 2021-01-29 NOTE — PROGRESS NOTES
[facility]        NAME: Prashant Ríos is a 60 y o  female  : 1955    MRN: [id number]  DATE: 2021  TIME: 1:04 PM    Assessment and Plan   Acute non-recurrent frontal sinusitis [J01 10]  1  Acute non-recurrent frontal sinusitis  azithromycin (ZITHROMAX) 250 mg tablet         Patient Instructions       Follow up with PCP in 3-5 days  Proceed to  ER if symptoms worsen  Chief Complaint     Chief Complaint   Patient presents with    Sinusitis     pressure, started 4 days ago    Earache     right hurts for 1 day    Dizziness     4 days         History of Present Illness         Patient with a three-day history of sinus pressure, headaches and ear pain  Pain is worse in the right ear described as a throbbing, achy sensation with associated decrease in hearing  Denies any nausea vomiting  Denies any fevers or chills  Review of Systems   Review of Systems   Constitutional: Negative  HENT: Positive for ear pain, facial swelling, rhinorrhea and sinus pain  Eyes: Negative  Respiratory: Negative  Cardiovascular: Negative  Gastrointestinal: Negative  Genitourinary: Negative  Skin: Negative  Allergic/Immunologic: Negative  Neurological: Negative  Hematological: Negative  Psychiatric/Behavioral: Negative            Current Medications       Current Outpatient Medications:     cyclobenzaprine (FLEXERIL) 10 mg tablet, Take 1 tablet (10 mg total) by mouth 3 (three) times a day as needed for muscle spasms, Disp: 60 tablet, Rfl: 5    lisinopril (ZESTRIL) 10 mg tablet, Take 1 tablet (10 mg total) by mouth daily, Disp: 90 tablet, Rfl: 3    loratadine (CLARITIN) 10 mg tablet, Take 10 mg by mouth daily, Disp: , Rfl:     oxybutynin (DITROPAN) 5 mg tablet, TAKE 1 TABLET BY MOUTH TWICE A DAY, Disp: 180 tablet, Rfl: 0    PARoxetine (PAXIL) 20 mg tablet, TAKE 1 TABLET BY MOUTH EVERY DAY, Disp: 90 tablet, Rfl: 1    RABEprazole (ACIPHEX) 20 MG tablet, TAKE 1 TABLET BY MOUTH EVERY DAY, Disp: 30 tablet, Rfl: 2    simvastatin (ZOCOR) 10 mg tablet, TAKE 1 TABLET BY MOUTH EVERYDAY AT BEDTIME, Disp: 90 tablet, Rfl: 0    venlafaxine (EFFEXOR) 75 mg tablet, TAKE 1 TABLET BY MOUTH TWICE A DAY, Disp: 180 tablet, Rfl: 1    verapamil (VERELAN) 240 MG 24 hr capsule, Take 1 capsule (240 mg total) by mouth daily, Disp: 90 capsule, Rfl: 3    azithromycin (ZITHROMAX) 250 mg tablet, Take 2 tablets today then 1 tablet daily x 4 days, Disp: 6 tablet, Rfl: 0    Current Allergies     Allergies as of 01/29/2021 - Reviewed 01/29/2021   Allergen Reaction Noted    Erythromycin  04/02/2013            The following portions of the patient's history were reviewed and updated as appropriate: allergies, current medications, past family history, past medical history, past social history, past surgical history and problem list      Past Medical History:   Diagnosis Date    Fibromyalgia     Hypertension        Past Surgical History:   Procedure Laterality Date    ADENOIDECTOMY      pt has 2nd appendix removed   BOWEL RESECTION      CHOLECYSTECTOMY  2000    OOPHORECTOMY      ME LAP,DIAGNOSTIC ABDOMEN N/A 4/21/2020    Procedure: LAPAROSCOPY DIAGNOSTIC, lysis of adhesions , possible bowel resection;  Surgeon: Ajit Wolfe MD;  Location:  MAIN OR;  Service: General    TUBAL LIGATION         Family History   Problem Relation Age of Onset    Fibromyalgia Mother     Heart disease Father     GI problems Father          Medications have been verified  Objective   Pulse 82   Temp (!) 97 °F (36 1 °C)   Resp 18   SpO2 97%   No LMP recorded  Patient is postmenopausal        Physical Exam     Physical Exam  Vitals signs and nursing note reviewed  Constitutional:       Appearance: She is well-developed  HENT:      Head: Normocephalic  Eyes:      Pupils: Pupils are equal, round, and reactive to light     Pulmonary:      Effort: Pulmonary effort is normal    Musculoskeletal: Normal range of motion  Skin:     General: Skin is warm and dry  Neurological:      Mental Status: She is alert and oriented to person, place, and time

## 2021-02-02 RX ORDER — CYCLOBENZAPRINE HCL 10 MG
10 TABLET ORAL 3 TIMES DAILY PRN
Qty: 60 TABLET | Refills: 0 | Status: SHIPPED | OUTPATIENT
Start: 2021-02-02 | End: 2022-04-27 | Stop reason: ALTCHOICE

## 2021-02-02 NOTE — TELEPHONE ENCOUNTER
PTS LAST OV WAS 4/20  NEEDS HER YEARLY PE/LABS  CMP/LIPIDS    NOT SURE WHAT LAB/INS      ONLY APPROVE [address]

## 2021-03-01 ENCOUNTER — OFFICE VISIT (OUTPATIENT)
Dept: FAMILY MEDICINE CLINIC | Facility: CLINIC | Age: 66
End: 2021-03-01

## 2021-03-01 VITALS
DIASTOLIC BLOOD PRESSURE: 80 MMHG | HEIGHT: 64 IN | SYSTOLIC BLOOD PRESSURE: 136 MMHG | BODY MASS INDEX: 30.01 KG/M2 | TEMPERATURE: 98.2 F | OXYGEN SATURATION: 98 % | WEIGHT: 175.8 LBS | HEART RATE: 90 BPM

## 2021-03-01 DIAGNOSIS — J01.10 ACUTE NON-RECURRENT FRONTAL SINUSITIS: ICD-10-CM

## 2021-03-01 DIAGNOSIS — Z12.31 SCREENING MAMMOGRAM, ENCOUNTER FOR: Primary | ICD-10-CM

## 2021-03-01 PROCEDURE — 99213 OFFICE O/P EST LOW 20 MIN: CPT | Performed by: FAMILY MEDICINE

## 2021-03-01 RX ORDER — PREDNISONE 20 MG/1
TABLET ORAL
Qty: 15 TABLET | Refills: 0 | Status: SHIPPED | OUTPATIENT
Start: 2021-03-01 | End: 2022-04-27 | Stop reason: ALTCHOICE

## 2021-03-01 RX ORDER — AMOXICILLIN AND CLAVULANATE POTASSIUM 875; 125 MG/1; MG/1
1 TABLET, FILM COATED ORAL EVERY 12 HOURS SCHEDULED
Qty: 20 TABLET | Refills: 0 | Status: SHIPPED | OUTPATIENT
Start: 2021-03-01 | End: 2021-03-11

## 2021-03-01 NOTE — PROGRESS NOTES
[facility]        NAME: Fidel Hawk is a 60 y o  female  : 1955    MRN: [id number]  DATE: 2021  TIME: 11:05 AM    Assessment and Plan   Screening mammogram, encounter for [Z12 31]  1  Screening mammogram, encounter for  Mammo screening bilateral w 3d & cad   2  Acute non-recurrent frontal sinusitis  amoxicillin-clavulanate (Augmentin) 875-125 mg per tablet    predniSONE 20 mg tablet         Patient Instructions     Patient Instructions   Pred/augmentin--self pay          Chief Complaint     Chief Complaint   Patient presents with    Sinus Problem     headaches, ear, & veritgo--feeling fatigue    Annual Exam     --NEEDS labs         History of Present Illness       C/o sinus Sx--4wks    Sinus Problem  Associated symptoms include sinus pressure  Pertinent negatives include no chills, coughing, diaphoresis, ear pain, shortness of breath or sore throat  Review of Systems   Review of Systems   Constitutional: Negative for appetite change, chills, diaphoresis and fever  HENT: Positive for sinus pressure and sinus pain  Negative for ear pain, rhinorrhea and sore throat  Eyes: Negative for discharge, redness and itching  Respiratory: Negative for cough, shortness of breath and wheezing  Cardiovascular: Negative for chest pain and palpitations  Gastrointestinal: Negative for abdominal pain, diarrhea, nausea and vomiting           Current Medications       Current Outpatient Medications:     cyclobenzaprine (FLEXERIL) 10 mg tablet, Take 1 tablet (10 mg total) by mouth 3 (three) times a day as needed for muscle spasms, Disp: 60 tablet, Rfl: 0    lisinopril (ZESTRIL) 10 mg tablet, Take 1 tablet (10 mg total) by mouth daily, Disp: 90 tablet, Rfl: 3    loratadine (CLARITIN) 10 mg tablet, Take 10 mg by mouth daily, Disp: , Rfl:     oxybutynin (DITROPAN) 5 mg tablet, TAKE 1 TABLET BY MOUTH TWICE A DAY, Disp: 180 tablet, Rfl: 0    PARoxetine (PAXIL) 20 mg tablet, TAKE 1 TABLET BY MOUTH EVERY DAY, Disp: 90 tablet, Rfl: 1    RABEprazole (ACIPHEX) 20 MG tablet, TAKE 1 TABLET BY MOUTH EVERY DAY, Disp: 30 tablet, Rfl: 2    simvastatin (ZOCOR) 10 mg tablet, TAKE 1 TABLET BY MOUTH EVERYDAY AT BEDTIME, Disp: 90 tablet, Rfl: 0    venlafaxine (EFFEXOR) 75 mg tablet, TAKE 1 TABLET BY MOUTH TWICE A DAY, Disp: 180 tablet, Rfl: 1    verapamil (VERELAN) 240 MG 24 hr capsule, Take 1 capsule (240 mg total) by mouth daily, Disp: 90 capsule, Rfl: 3    amoxicillin-clavulanate (Augmentin) 875-125 mg per tablet, Take 1 tablet by mouth every 12 (twelve) hours for 10 days, Disp: 20 tablet, Rfl: 0    predniSONE 20 mg tablet, TAKE 1 TABLET BID X 5 DAYS THEN TAKE 1 TABLET QD FOR 5 DAYS, Disp: 15 tablet, Rfl: 0    Current Allergies     Allergies as of 03/01/2021 - Reviewed 03/01/2021   Allergen Reaction Noted    Erythromycin  04/02/2013            The following portions of the patient's history were reviewed and updated as appropriate: allergies, current medications, past family history, past medical history, past social history, past surgical history and problem list      Past Medical History:   Diagnosis Date    Fibromyalgia     Hypertension        Past Surgical History:   Procedure Laterality Date    ADENOIDECTOMY      pt has 2nd appendix removed   BOWEL RESECTION      CHOLECYSTECTOMY  2000    OOPHORECTOMY      [place] LAP,DIAGNOSTIC ABDOMEN N/A 4/21/2020    Procedure: LAPAROSCOPY DIAGNOSTIC, lysis of adhesions , possible bowel resection;  Surgeon: Vincent Sanderson MD;  Location:  MAIN OR;  Service: General    TUBAL LIGATION         Family History   Problem Relation Age of Onset    Fibromyalgia Mother     Heart disease Father     GI problems Father          Medications have been verified          Objective   /80   Pulse 90   Temp 98 2 °F (36 8 °C) (Temporal)   Ht 5' 4" (1 626 m)   Wt 79 7 kg (175 lb 12 8 oz)   SpO2 98%   BMI 30 18 kg/m²        Physical Exam Physical Exam  Vitals signs and nursing note reviewed  Constitutional:       General: She is not in acute distress  Appearance: She is well-developed  She is not diaphoretic  HENT:      Right Ear: Tympanic membrane, ear canal and external ear normal  Tympanic membrane is not injected  Left Ear: Tympanic membrane, ear canal and external ear normal  Tympanic membrane is not injected  Nose: Nose normal    Eyes:      General:         Right eye: No discharge  Left eye: No discharge  Conjunctiva/sclera: Conjunctivae normal       Pupils: Pupils are equal, round, and reactive to light  Neck:      Musculoskeletal: Normal range of motion and neck supple  Thyroid: No thyromegaly  Cardiovascular:      Rate and Rhythm: Normal rate and regular rhythm  Heart sounds: Normal heart sounds  No murmur  Pulmonary:      Effort: Pulmonary effort is normal  No respiratory distress  Breath sounds: Normal breath sounds  No wheezing  Lymphadenopathy:      Cervical: No cervical adenopathy

## 2021-03-22 DIAGNOSIS — I10 ESSENTIAL HYPERTENSION: ICD-10-CM

## 2021-03-22 RX ORDER — OXYBUTYNIN CHLORIDE 5 MG/1
TABLET ORAL
Qty: 180 TABLET | Refills: 0 | Status: SHIPPED | OUTPATIENT
Start: 2021-03-22 | End: 2021-06-20

## 2021-03-30 DIAGNOSIS — F41.9 ANXIETY: ICD-10-CM

## 2021-03-30 RX ORDER — PAROXETINE HYDROCHLORIDE 20 MG/1
TABLET, FILM COATED ORAL
Qty: 90 TABLET | Refills: 1 | Status: SHIPPED | OUTPATIENT
Start: 2021-03-30 | End: 2022-04-27 | Stop reason: ALTCHOICE

## 2021-03-30 RX ORDER — VENLAFAXINE 75 MG/1
TABLET ORAL
Qty: 180 TABLET | Refills: 1 | Status: SHIPPED | OUTPATIENT
Start: 2021-03-30 | End: 2021-10-01

## 2021-04-03 DIAGNOSIS — I10 ESSENTIAL HYPERTENSION: ICD-10-CM

## 2021-04-03 RX ORDER — SIMVASTATIN 10 MG
TABLET ORAL
Qty: 90 TABLET | Refills: 0 | Status: SHIPPED | OUTPATIENT
Start: 2021-04-03 | End: 2021-07-01

## 2021-04-19 DIAGNOSIS — K21.9 GASTROESOPHAGEAL REFLUX DISEASE WITHOUT ESOPHAGITIS: ICD-10-CM

## 2021-04-19 RX ORDER — RABEPRAZOLE SODIUM 20 MG/1
TABLET, DELAYED RELEASE ORAL
Qty: 30 TABLET | Refills: 2 | Status: SHIPPED | OUTPATIENT
Start: 2021-04-19 | End: 2021-07-14

## 2021-04-25 DIAGNOSIS — I10 ESSENTIAL HYPERTENSION: ICD-10-CM

## 2021-04-26 RX ORDER — LISINOPRIL 10 MG/1
TABLET ORAL
Qty: 90 TABLET | Refills: 3 | Status: SHIPPED | OUTPATIENT
Start: 2021-04-26

## 2021-06-18 DIAGNOSIS — I10 ESSENTIAL HYPERTENSION: ICD-10-CM

## 2021-06-20 RX ORDER — OXYBUTYNIN CHLORIDE 5 MG/1
TABLET ORAL
Qty: 180 TABLET | Refills: 0 | Status: SHIPPED | OUTPATIENT
Start: 2021-06-20 | End: 2022-04-27 | Stop reason: ALTCHOICE

## 2021-07-01 DIAGNOSIS — I10 ESSENTIAL HYPERTENSION: ICD-10-CM

## 2021-07-01 RX ORDER — SIMVASTATIN 10 MG
TABLET ORAL
Qty: 90 TABLET | Refills: 0 | Status: SHIPPED | OUTPATIENT
Start: 2021-07-01 | End: 2021-09-28

## 2021-07-14 DIAGNOSIS — K21.9 GASTROESOPHAGEAL REFLUX DISEASE WITHOUT ESOPHAGITIS: ICD-10-CM

## 2021-07-14 RX ORDER — RABEPRAZOLE SODIUM 20 MG/1
TABLET, DELAYED RELEASE ORAL
Qty: 30 TABLET | Refills: 2 | Status: SHIPPED | OUTPATIENT
Start: 2021-07-14 | End: 2021-10-18

## 2021-07-18 DIAGNOSIS — I10 ESSENTIAL HYPERTENSION: ICD-10-CM

## 2021-07-19 RX ORDER — VERAPAMIL HYDROCHLORIDE 240 MG/1
CAPSULE, EXTENDED RELEASE ORAL
Qty: 90 CAPSULE | Refills: 3 | Status: SHIPPED | OUTPATIENT
Start: 2021-07-19

## 2021-09-28 DIAGNOSIS — I10 ESSENTIAL HYPERTENSION: ICD-10-CM

## 2021-09-28 RX ORDER — SIMVASTATIN 10 MG
TABLET ORAL
Qty: 90 TABLET | Refills: 0 | Status: SHIPPED | OUTPATIENT
Start: 2021-09-28 | End: 2021-11-17 | Stop reason: SDUPTHER

## 2021-10-01 DIAGNOSIS — F41.9 ANXIETY: ICD-10-CM

## 2021-10-01 RX ORDER — VENLAFAXINE 75 MG/1
TABLET ORAL
Qty: 180 TABLET | Refills: 0 | Status: SHIPPED | OUTPATIENT
Start: 2021-10-01 | End: 2022-04-27 | Stop reason: ALTCHOICE

## 2021-10-17 DIAGNOSIS — K21.9 GASTROESOPHAGEAL REFLUX DISEASE WITHOUT ESOPHAGITIS: ICD-10-CM

## 2021-10-18 RX ORDER — RABEPRAZOLE SODIUM 20 MG/1
TABLET, DELAYED RELEASE ORAL
Qty: 30 TABLET | Refills: 2 | Status: SHIPPED | OUTPATIENT
Start: 2021-10-18 | End: 2022-01-14

## 2021-11-17 DIAGNOSIS — I10 ESSENTIAL HYPERTENSION: ICD-10-CM

## 2021-11-17 RX ORDER — SIMVASTATIN 10 MG
10 TABLET ORAL
Qty: 90 TABLET | Refills: 0 | Status: SHIPPED | OUTPATIENT
Start: 2021-11-17 | End: 2022-04-27 | Stop reason: ALTCHOICE

## 2022-01-14 DIAGNOSIS — K21.9 GASTROESOPHAGEAL REFLUX DISEASE WITHOUT ESOPHAGITIS: ICD-10-CM

## 2022-01-14 RX ORDER — RABEPRAZOLE SODIUM 20 MG/1
TABLET, DELAYED RELEASE ORAL
Qty: 30 TABLET | Refills: 2 | Status: SHIPPED | OUTPATIENT
Start: 2022-01-14 | End: 2022-01-14

## 2022-01-14 RX ORDER — RABEPRAZOLE SODIUM 20 MG/1
TABLET, DELAYED RELEASE ORAL
Qty: 30 TABLET | Refills: 0 | Status: SHIPPED | OUTPATIENT
Start: 2022-01-14 | End: 2022-04-27 | Stop reason: ALTCHOICE

## 2022-01-14 RX ORDER — RABEPRAZOLE SODIUM 20 MG/1
20 TABLET, DELAYED RELEASE ORAL DAILY
Qty: 30 TABLET | Refills: 0 | Status: SHIPPED | OUTPATIENT
Start: 2022-01-14 | End: 2022-01-14

## 2022-01-14 RX ORDER — RABEPRAZOLE SODIUM 20 MG/1
TABLET, DELAYED RELEASE ORAL
Qty: 30 TABLET | Refills: 0 | Status: SHIPPED | OUTPATIENT
Start: 2022-01-14 | End: 2022-01-14

## 2022-01-14 RX ORDER — RABEPRAZOLE SODIUM 20 MG/1
TABLET, DELAYED RELEASE ORAL
Qty: 30 TABLET | Refills: 2 | Status: SHIPPED | OUTPATIENT
Start: 2022-01-14 | End: 2022-01-14 | Stop reason: SDUPTHER

## 2022-02-24 ENCOUNTER — HOSPITAL ENCOUNTER (OUTPATIENT)
Dept: RADIOLOGY | Facility: HOSPITAL | Age: 67
Discharge: HOME | End: 2022-02-24
Payer: COMMERCIAL

## 2022-02-24 DIAGNOSIS — M25.551 RIGHT HIP PAIN: ICD-10-CM

## 2022-02-24 PROCEDURE — 73502 X-RAY EXAM HIP UNI 2-3 VIEWS: CPT

## 2022-03-09 ENCOUNTER — HOSPITAL ENCOUNTER (OUTPATIENT)
Dept: MAMMOGRAPHY | Facility: IMAGING CENTER | Age: 67
Discharge: HOME | End: 2022-03-09
Payer: COMMERCIAL

## 2022-03-09 ENCOUNTER — HOSPITAL ENCOUNTER (OUTPATIENT)
Dept: BONE DENSITY | Facility: IMAGING CENTER | Age: 67
Discharge: HOME | End: 2022-03-09
Payer: COMMERCIAL

## 2022-03-09 VITALS — BODY MASS INDEX: 29.02 KG/M2 | HEIGHT: 64 IN | WEIGHT: 170 LBS

## 2022-03-09 DIAGNOSIS — N95.1 POSTMENOPAUSAL DISORDER: ICD-10-CM

## 2022-03-09 DIAGNOSIS — Z12.31 ENCOUNTER FOR SCREENING MAMMOGRAM FOR MALIGNANT NEOPLASM OF BREAST: ICD-10-CM

## 2022-03-09 PROCEDURE — 77067 SCR MAMMO BI INCL CAD: CPT

## 2022-03-09 PROCEDURE — 77080 DXA BONE DENSITY AXIAL: CPT

## 2022-03-09 PROCEDURE — 77063 BREAST TOMOSYNTHESIS BI: CPT

## 2022-03-16 ENCOUNTER — OFFICE VISIT (OUTPATIENT)
Dept: OBGYN CLINIC | Facility: CLINIC | Age: 67
End: 2022-03-16
Payer: COMMERCIAL

## 2022-03-16 ENCOUNTER — TELEPHONE (OUTPATIENT)
Dept: RADIOLOGY | Facility: CLINIC | Age: 67
End: 2022-03-16

## 2022-03-16 VITALS
HEIGHT: 64 IN | DIASTOLIC BLOOD PRESSURE: 80 MMHG | BODY MASS INDEX: 29.71 KG/M2 | SYSTOLIC BLOOD PRESSURE: 128 MMHG | WEIGHT: 174 LBS

## 2022-03-16 DIAGNOSIS — M46.1 SACROILIITIS (HCC): ICD-10-CM

## 2022-03-16 DIAGNOSIS — M16.11 PRIMARY OSTEOARTHRITIS OF RIGHT HIP: Primary | ICD-10-CM

## 2022-03-16 PROCEDURE — 1160F RVW MEDS BY RX/DR IN RCRD: CPT | Performed by: ORTHOPAEDIC SURGERY

## 2022-03-16 PROCEDURE — 99203 OFFICE O/P NEW LOW 30 MIN: CPT | Performed by: ORTHOPAEDIC SURGERY

## 2022-03-16 PROCEDURE — 1036F TOBACCO NON-USER: CPT | Performed by: ORTHOPAEDIC SURGERY

## 2022-03-16 PROCEDURE — 3008F BODY MASS INDEX DOCD: CPT | Performed by: ORTHOPAEDIC SURGERY

## 2022-03-16 NOTE — PROGRESS NOTES
Assessment:     1  Primary osteoarthritis of right hip    2  Sacroiliitis ([address] )        Plan:     Problem List Items Addressed This Visit        Musculoskeletal and Integument    Primary osteoarthritis of right hip - Primary    Relevant Orders    Ambulatory Referral to Pain Management    Ambulatory Referral to Physical Therapy    Sacroiliitis [facility])    Relevant Orders    Ambulatory Referral to Pain Management        Findings consistent with multifactorial pain right hip osteoarthritis moderate to severe and lumbar scoliosis, lumbar ddd, and right sacroiliitis  Imaging and prognosis reviewed with patient  At this time we recommend seeing Dr Lalo Fowler for right SI joint injection and also right hip cortisone injection  She will also be given script for physical therapy  She can use Ibuprofen for pain control, voltaren gel or aspercreme  Discuss surgical interventions with right hip replacement  I would like to have patient's low back issue under control 1st prior to hip replacement  See patient back in 3 months  All patient's questions were answered to her satisfaction  This note is created using dictation transcription  It may contain typographical errors, grammatical errors, improperly dictated words, background noise and other errors  Subjective:     Patient ID: Ashish Chang is a 73 y o  female  Chief Complaint:  73 yr old female in for evaluation of right hip pain  She has had chronic pain for years gradually worsening over time  She denies any injury or trauma, states just old age  Pain posterior-anterior groin region on daily basis  She has difficulty getting up from seated positions and then first 20 steps before she feels she can walk half decent  Prolonged activity also causes pain  Laying down at night her hip just throbs  She is losing motion in her hip and at times has difficulty bending down for shoes, socks  She uses ibuprofen for pain which helps some   She also has chronic back issues, scoliosis and does currently have pain in right buttock region that wraps around hip  Denies any numbness or tingling or radiating pain down right leg  Information on patient's intake form was reviewed  Allergy:  Allergies   Allergen Reactions    Erythromycin      Other reaction(s): GI Reaction     Medications:  all current active meds have been reviewed  Past Medical History:  Past Medical History:   Diagnosis Date    Fibromyalgia     Hypertension      Past Surgical History:  Past Surgical History:   Procedure Laterality Date    ADENOIDECTOMY      pt has 2nd appendix removed   BOWEL RESECTION      CHOLECYSTECTOMY  2000    OOPHORECTOMY      one     [place] LAP,DIAGNOSTIC ABDOMEN N/A 4/21/2020    Procedure: LAPAROSCOPY DIAGNOSTIC, lysis of adhesions , possible bowel resection;  Surgeon: Jocelynn Zamorano MD;  Location:  MAIN OR;  Service: General    TUBAL LIGATION       Family History:  Family History   Problem Relation Age of Onset    Fibromyalgia Mother     Heart disease Father     GI problems Father     No Known Problems Sister     No Known Problems Daughter     No Known Problems Maternal Grandmother     No Known Problems Maternal Grandfather     No Known Problems Paternal Grandmother     No Known Problems Paternal Grandfather     No Known Problems Sister     No Known Problems Sister     No Known Problems Paternal Aunt     No Known Problems Paternal Aunt      Social History:  Social History     Substance and Sexual Activity   Alcohol Use No     Social History     Substance and Sexual Activity   Drug Use Yes    Types: Marijuana     Social History     Tobacco Use   Smoking Status Former Smoker    Packs/day: 0 25    Types: Cigarettes   Smokeless Tobacco Never Used     Review of Systems   Constitutional: Negative for chills and fever  HENT: Negative for ear pain and sore throat  Eyes: Negative for pain and visual disturbance  Respiratory: Negative for cough and shortness of breath  Cardiovascular: Negative for chest pain and palpitations  Gastrointestinal: Negative for abdominal pain and vomiting  Genitourinary: Negative for dysuria and hematuria  Musculoskeletal: Positive for arthralgias (Right hip), back pain (Right buttock) and gait problem (Antalgic)  Skin: Negative for color change and rash  Neurological: Negative for seizures and syncope  Psychiatric/Behavioral: Negative  All other systems reviewed and are negative  Objective:  BP Readings from Last 1 Encounters:   03/16/22 128/80      Wt Readings from Last 1 Encounters:   03/16/22 78 9 kg (174 lb)      BMI:   Estimated body mass index is 29 87 kg/m² as calculated from the following:    Height as of this encounter: 5' 4" (1 626 m)  Weight as of this encounter: 78 9 kg (174 lb)  BSA:   Estimated body surface area is 1 84 meters squared as calculated from the following:    Height as of this encounter: 5' 4" (1 626 m)  Weight as of this encounter: 78 9 kg (174 lb)  Physical Exam  Vitals and nursing note reviewed  Constitutional:       Appearance: Normal appearance  She is well-developed  HENT:      Head: Normocephalic and atraumatic  Right Ear: External ear normal       Left Ear: External ear normal    Eyes:      Extraocular Movements: Extraocular movements intact  Conjunctiva/sclera: Conjunctivae normal    Pulmonary:      Effort: Pulmonary effort is normal    Musculoskeletal:         General: Tenderness (right hip arthralgia ) present  Cervical back: Neck supple  Lumbar back: Negative right straight leg raise test and negative left straight leg raise test    Skin:     General: Skin is warm and dry  Neurological:      Mental Status: She is alert and oriented to person, place, and time  Deep Tendon Reflexes: Reflexes are normal and symmetric     Psychiatric:         Mood and Affect: Mood normal          Behavior: Behavior normal        Right Hip Exam     Tenderness   Right hip tenderness location: right SI joint  Range of Motion   Flexion: 110   External rotation: 50   Internal rotation: 5 (pain)     Muscle Strength   The patient has normal right hip strength  Tests   AYDEN: positive  Austen: negative    Other   Erythema: absent  Scars: absent  Sensation: normal  Pulse: present      Back Exam     Tenderness   The patient is experiencing tenderness in the sacroiliac  Tests   Straight leg raise right: negative  Straight leg raise left: negative    Other   Sensation: normal  Gait: normal             I have personally reviewed pertinent films in PACS and my interpretation is xr right hip demonstrates moderate to severe degenerative changes with sclerosis, spurring inferior and superior femoral head       Scribe Attestation    I,:  Golden Cardenas am acting as a scribe while in the presence of the attending physician :       I,:  Asha Melton MD personally performed the services described in this documentation    as scribed in my presence :

## 2022-03-16 NOTE — TELEPHONE ENCOUNTER
Pt is direct referral from Dr Olga Lidia Ramírez  Called pt to ask where pain was located- she reported backside and around into groin- per SL proceed w RT HIP INJ  Scheduled RT HIP INJ on Thurs 03/24/22 arriving at 10:30am      Raffi over pre procedure instructions, no vaccinations 2 weeks prior/post proc, NPO 1 hr prior, if sick or on abx needs to call to rs, wear loose, comf clothing- no buttons/zippers, needs   Pt verbalized understanding

## 2022-03-24 ENCOUNTER — HOSPITAL ENCOUNTER (OUTPATIENT)
Dept: RADIOLOGY | Facility: CLINIC | Age: 67
Discharge: HOME | End: 2022-03-24
Attending: ANESTHESIOLOGY | Admitting: ANESTHESIOLOGY
Payer: COMMERCIAL

## 2022-03-24 VITALS
HEART RATE: 79 BPM | DIASTOLIC BLOOD PRESSURE: 93 MMHG | SYSTOLIC BLOOD PRESSURE: 165 MMHG | TEMPERATURE: 97.1 F | RESPIRATION RATE: 20 BRPM | OXYGEN SATURATION: 96 %

## 2022-03-24 DIAGNOSIS — M16.11 PRIMARY OSTEOARTHRITIS OF RIGHT HIP: ICD-10-CM

## 2022-03-24 DIAGNOSIS — M53.3 SACROILIAC JOINT DYSFUNCTION: ICD-10-CM

## 2022-03-24 PROCEDURE — 77002 NEEDLE LOCALIZATION BY XRAY: CPT | Performed by: ANESTHESIOLOGY

## 2022-03-24 PROCEDURE — 20610 DRAIN/INJ JOINT/BURSA W/O US: CPT | Performed by: ANESTHESIOLOGY

## 2022-03-24 PROCEDURE — 77002 NEEDLE LOCALIZATION BY XRAY: CPT

## 2022-03-24 RX ORDER — METHYLPREDNISOLONE ACETATE 80 MG/ML
80 INJECTION, SUSPENSION INTRA-ARTICULAR; INTRALESIONAL; INTRAMUSCULAR; PARENTERAL; SOFT TISSUE ONCE
Status: COMPLETED | OUTPATIENT
Start: 2022-03-24 | End: 2022-03-24

## 2022-03-24 RX ADMIN — METHYLPREDNISOLONE ACETATE 80 MG: 80 INJECTION, SUSPENSION INTRA-ARTICULAR; INTRALESIONAL; INTRAMUSCULAR; SOFT TISSUE at 10:52

## 2022-03-24 RX ADMIN — IOHEXOL 0.5 ML: 300 INJECTION, SOLUTION INTRAVENOUS at 10:51

## 2022-03-24 RX ADMIN — LIDOCAINE HYDROCHLORIDE 5 ML: 20 INJECTION, SOLUTION EPIDURAL; INFILTRATION; INTRACAUDAL at 10:51

## 2022-03-24 NOTE — DISCHARGE INSTRUCTIONS

## 2022-03-24 NOTE — H&P
History of Present Illness: The patient is a 73 y o  female who presents with complaints of hip pain    Patient Active Problem List   Diagnosis    Peripheral vestibulopathy of both ears    Hypokalemia    Acute frontal sinusitis    Primary osteoarthritis of right hip    Sacroiliitis (HCC)       Past Medical History:   Diagnosis Date    Fibromyalgia     Hypertension        Past Surgical History:   Procedure Laterality Date    ADENOIDECTOMY      pt has 2nd appendix removed       BOWEL RESECTION      CHOLECYSTECTOMY  2000    OOPHORECTOMY      one     [place] LAP,DIAGNOSTIC ABDOMEN N/A 4/21/2020    Procedure: LAPAROSCOPY DIAGNOSTIC, lysis of adhesions , possible bowel resection;  Surgeon: Pino Clinton MD;  Location:  MAIN OR;  Service: General    TUBAL LIGATION           Current Outpatient Medications:     cyclobenzaprine (FLEXERIL) 10 mg tablet, Take 1 tablet (10 mg total) by mouth 3 (three) times a day as needed for muscle spasms, Disp: 60 tablet, Rfl: 0    lisinopril (ZESTRIL) 10 mg tablet, TAKE 1 TABLET BY MOUTH ONCE A DAY, Disp: 90 tablet, Rfl: 3    loratadine (CLARITIN) 10 mg tablet, Take 10 mg by mouth daily, Disp: , Rfl:     oxybutynin (DITROPAN) 5 mg tablet, TAKE 1 TABLET BY MOUTH TWICE A DAY, Disp: 180 tablet, Rfl: 0    PARoxetine (PAXIL) 20 mg tablet, TAKE 1 TABLET BY MOUTH EVERY DAY, Disp: 90 tablet, Rfl: 1    predniSONE 20 mg tablet, TAKE 1 TABLET BID X 5 DAYS THEN TAKE 1 TABLET QD FOR 5 DAYS, Disp: 15 tablet, Rfl: 0    RABEprazole (ACIPHEX) 20 MG tablet, TAKE 1 TABLET BY MOUTH EVERY DAY, Disp: 30 tablet, Rfl: 0    simvastatin (ZOCOR) 10 mg tablet, Take 1 tablet (10 mg total) by mouth daily at bedtime, Disp: 90 tablet, Rfl: 0    venlafaxine (EFFEXOR) 75 mg tablet, TAKE 1 TABLET BY MOUTH TWICE A DAY, Disp: 180 tablet, Rfl: 0    verapamil (VERELAN) 240 MG 24 hr capsule, TAKE 1 CAPSULE BY MOUTH EVERY DAY, Disp: 90 capsule, Rfl: 3    Current Facility-Administered Medications:     iohexol (OMNIPAQUE) 300 mg/mL injection 50 mL, 50 mL, Intra-articular, Once, Sam Ashley DO    lidocaine (PF) (XYLOCAINE-MPF) 2 % injection 5 mL, 5 mL, Intra-articular, Once, Sam Ashley DO    methylPREDNISolone acetate (DEPO-MEDROL) injection 80 mg, 80 mg, Intra-articular, Once, Sam Ashley DO    Allergies   Allergen Reactions    Erythromycin      Other reaction(s): GI Reaction       Physical Exam:   Vitals:    03/24/22 1040   BP: 161/73   Pulse: 71   Resp: 20   Temp: (!) 97 1 °F (36 2 °C)   SpO2: 96%     General: Awake, Alert, Oriented x 3, Mood and affect appropriate  Respiratory: Respirations even and unlabored  Cardiovascular: Peripheral pulses intact; no edema  Musculoskeletal Exam:  Decreased range of motion right hip    ASA Score: II    Patient/Chart Verification  Patient ID Verified: Verbal  ID Band Applied: No  Consents Confirmed: Procedural,To be obtained in the Pre-Procedure area  H&P( within 30 days) Verified: To be obtained in the Pre-Procedure area  Allergies Reviewed: Yes  Anticoag/NSAID held?: NA  Currently on antibiotics?: No    Assessment:   1  Primary osteoarthritis of right hip    2   Sacroiliac joint dysfunction        Plan: RT HIP INJ- DIRECT REFERRAL

## 2022-03-31 ENCOUNTER — TELEPHONE (OUTPATIENT)
Dept: PAIN MEDICINE | Facility: CLINIC | Age: 67
End: 2022-03-31

## 2022-04-04 ENCOUNTER — TELEPHONE (OUTPATIENT)
Dept: RADIOLOGY | Facility: CLINIC | Age: 67
End: 2022-04-04

## 2022-04-04 NOTE — TELEPHONE ENCOUNTER
Patient called in wanting to push her procedure out due to some side effects that she was having with the first procedure  She also wanted some questions answered about the side effects advised I would forward to nurse and she would receive a call back to talk about medical questions she would have about the medications that we use  Please reach out to patient to discuss further

## 2022-04-04 NOTE — TELEPHONE ENCOUNTER
S/w pt, stated that she has noticed some improvement in her pain over the last few days, also noticed that x ~ 1 week after her R hip inj, she was very angry, miserable and mean to people for no reason  Pt questioned if there is an alternative medication - trying to avoid that behavior after her upcoming procedure in April  Advised pt, the writer will d/w Dr Patricia Clarke and cb if there is anything different or an alternative plan  Advised pt that she could have additional improvement over the next several days  Advised pt that if she does not feel that she needs the next injection - ok to call and cancel  Pt verbalized understanding and appreciation

## 2022-04-18 ENCOUNTER — HOSPITAL ENCOUNTER (OUTPATIENT)
Dept: RADIOLOGY | Facility: CLINIC | Age: 67
Discharge: HOME | End: 2022-04-18
Attending: ANESTHESIOLOGY | Admitting: ANESTHESIOLOGY
Payer: COMMERCIAL

## 2022-04-18 VITALS
SYSTOLIC BLOOD PRESSURE: 130 MMHG | OXYGEN SATURATION: 96 % | RESPIRATION RATE: 20 BRPM | DIASTOLIC BLOOD PRESSURE: 81 MMHG | TEMPERATURE: 97.2 F | HEART RATE: 54 BPM

## 2022-04-18 DIAGNOSIS — M16.11 PRIMARY OSTEOARTHRITIS OF RIGHT HIP: ICD-10-CM

## 2022-04-18 DIAGNOSIS — M46.1 SACROILIITIS (HCC): ICD-10-CM

## 2022-04-18 DIAGNOSIS — M53.3 SACROILIAC JOINT DYSFUNCTION: ICD-10-CM

## 2022-04-18 PROCEDURE — 27096 INJECT SACROILIAC JOINT: CPT | Performed by: ANESTHESIOLOGY

## 2022-04-18 RX ORDER — METHYLPREDNISOLONE ACETATE 80 MG/ML
80 INJECTION, SUSPENSION INTRA-ARTICULAR; INTRALESIONAL; INTRAMUSCULAR; PARENTERAL; SOFT TISSUE ONCE
Status: COMPLETED | OUTPATIENT
Start: 2022-04-18 | End: 2022-04-18

## 2022-04-18 RX ADMIN — LIDOCAINE HYDROCHLORIDE 2 ML: 20 INJECTION, SOLUTION EPIDURAL; INFILTRATION; INTRACAUDAL at 10:22

## 2022-04-18 RX ADMIN — IOHEXOL 0.5 ML: 300 INJECTION, SOLUTION INTRAVENOUS at 10:22

## 2022-04-18 RX ADMIN — METHYLPREDNISOLONE ACETATE 80 MG: 80 INJECTION, SUSPENSION INTRA-ARTICULAR; INTRALESIONAL; INTRAMUSCULAR; SOFT TISSUE at 10:22

## 2022-04-18 NOTE — H&P
History of Present Illness: The patient is a 73 y o  female who presents with complaints of low back pain  Patient Active Problem List   Diagnosis    Peripheral vestibulopathy of both ears    Hypokalemia    Acute frontal sinusitis    Primary osteoarthritis of right hip    [address]ro[address] ([facility] [address] )       Past Medical History:   Diagnosis Date    Fibromyalgia     Hypertension        Past Surgical History:   Procedure Laterality Date    ADENOIDECTOMY      pt has 2nd appendix removed       BOWEL RESECTION      CHOLECYSTECTOMY  2000    OOPHORECTOMY      one     [place] LAP,DIAGNOSTIC ABDOMEN N/A 4/21/2020    Procedure: LAPAROSCOPY DIAGNOSTIC, lysis of adhesions , possible bowel resection;  Surgeon: Sylvie Sheldon MD;  Location:  MAIN OR;  Service: General    TUBAL LIGATION           Current Outpatient Medications:     cyclobenzaprine (FLEXERIL) 10 mg tablet, Take 1 tablet (10 mg total) by mouth 3 (three) times a day as needed for muscle spasms, Disp: 60 tablet, Rfl: 0    lisinopril (ZESTRIL) 10 mg tablet, TAKE 1 TABLET BY MOUTH ONCE A DAY, Disp: 90 tablet, Rfl: 3    loratadine (CLARITIN) 10 mg tablet, Take 10 mg by mouth daily, Disp: , Rfl:     oxybutynin (DITROPAN) 5 mg tablet, TAKE 1 TABLET BY MOUTH TWICE A DAY, Disp: 180 tablet, Rfl: 0    PARoxetine (PAXIL) 20 mg tablet, TAKE 1 TABLET BY MOUTH EVERY DAY, Disp: 90 tablet, Rfl: 1    predniSONE 20 mg tablet, TAKE 1 TABLET BID X 5 DAYS THEN TAKE 1 TABLET QD FOR 5 DAYS, Disp: 15 tablet, Rfl: 0    RABEprazole (ACIPHEX) 20 MG tablet, TAKE 1 TABLET BY MOUTH EVERY DAY, Disp: 30 tablet, Rfl: 0    simvastatin (ZOCOR) 10 mg tablet, Take 1 tablet (10 mg total) by mouth daily at bedtime, Disp: 90 tablet, Rfl: 0    venlafaxine (EFFEXOR) 75 mg tablet, TAKE 1 TABLET BY MOUTH TWICE A DAY, Disp: 180 tablet, Rfl: 0    verapamil (VERELAN) 240 MG 24 hr capsule, TAKE 1 CAPSULE BY MOUTH EVERY DAY, Disp: 90 capsule, Rfl: 3    Current Facility-Administered Medications:    iohexol (OMNIPAQUE) 300 mg/mL injection 50 mL, 50 mL, Intra-articular, Once, Sam Ashley DO    lidocaine (PF) (XYLOCAINE-MPF) 2 % injection 5 mL, 5 mL, Intra-articular, Once, Sam Ashley DO    methylPREDNISolone acetate (DEPO-MEDROL) injection 80 mg, 80 mg, Intra-articular, Once, Sam Ashley DO    Allergies   Allergen Reactions    Erythromycin      Other reaction(s): GI Reaction       Physical Exam:   General: Awake, Alert, Oriented x 3, Mood and affect appropriate  Respiratory: Respirations even and unlabored  Cardiovascular: Peripheral pulses intact; no edema  Musculoskeletal Exam:  Normal gait and station    ASA Score: II         Assessment:   1  Sacroiliitis ([facility] [address] )    2  Sacroiliac joint dysfunction    3   Primary osteoarthritis of right hip        Plan: Right SI joint injection

## 2022-04-18 NOTE — DISCHARGE INSTRUCTIONS

## 2022-04-25 ENCOUNTER — TELEPHONE (OUTPATIENT)
Dept: PAIN MEDICINE | Facility: CLINIC | Age: 67
End: 2022-04-25

## 2022-04-25 NOTE — TELEPHONE ENCOUNTER
Pt reports 40% improvement post inj   Pain level 6/10      S/p Right SI joint injection 4/18, 5/12 F/u

## 2022-04-27 ENCOUNTER — OFFICE VISIT (OUTPATIENT)
Dept: GASTROENTEROLOGY | Facility: CLINIC | Age: 67
End: 2022-04-27
Payer: COMMERCIAL

## 2022-04-27 VITALS
HEIGHT: 64 IN | BODY MASS INDEX: 30.39 KG/M2 | WEIGHT: 178 LBS | DIASTOLIC BLOOD PRESSURE: 82 MMHG | SYSTOLIC BLOOD PRESSURE: 148 MMHG

## 2022-04-27 DIAGNOSIS — K21.00 REGURGITANT ESOPHAGITIS: Primary | ICD-10-CM

## 2022-04-27 DIAGNOSIS — Z12.11 SCREENING FOR COLON CANCER: ICD-10-CM

## 2022-04-27 DIAGNOSIS — K21.9 GASTROESOPHAGEAL REFLUX DISEASE, UNSPECIFIED WHETHER ESOPHAGITIS PRESENT: ICD-10-CM

## 2022-04-27 PROCEDURE — 99204 OFFICE O/P NEW MOD 45 MIN: CPT | Performed by: INTERNAL MEDICINE

## 2022-04-27 PROCEDURE — 1160F RVW MEDS BY RX/DR IN RCRD: CPT | Performed by: INTERNAL MEDICINE

## 2022-04-27 PROCEDURE — 3008F BODY MASS INDEX DOCD: CPT | Performed by: INTERNAL MEDICINE

## 2022-04-27 PROCEDURE — 1036F TOBACCO NON-USER: CPT | Performed by: INTERNAL MEDICINE

## 2022-04-27 RX ORDER — ATORVASTATIN CALCIUM 40 MG/1
40 TABLET, FILM COATED ORAL DAILY
COMMUNITY
Start: 2022-02-26

## 2022-04-27 RX ORDER — PANTOPRAZOLE SODIUM 40 MG/1
TABLET, DELAYED RELEASE ORAL
COMMUNITY
Start: 2022-01-21

## 2022-04-27 RX ORDER — CHOLECALCIFEROL (VITAMIN D3) 10(400)/ML
DROPS ORAL
COMMUNITY
Start: 2022-03-12 | End: 2022-07-22

## 2022-04-27 NOTE — PROGRESS NOTES
[address] [facility] - Outpatient Consultation  Tiffany Pugh 73 y o  female MRN: [id number]  Encounter: [id number]    ASSESSMENT AND PLAN:      1  Regurgitant esophagitis  2  Gastroesophageal reflux disease, unspecified whether esophagitis present  Longstanding reflux symptoms, previously well controlled with AcipHex, now well controlled on pantoprazole    About 1 year of progressive issues with gagging, regurgitation , vomiting and dry heaves  No specific food triggers  Not definitively related to reflux  Discussed treatment options  Will start with a barium swallow to assess for esophageal dysmotility or stricture  Pending results may benefit from EGD, potentially with Bravo pH probe    3  Screening for colon cancer  Most recent colonoscopy 2017 in [facility]  Negative Cologuard 2022      Followup Appointment:  Pending barium swallow  ______________________________________________________________________    Chief Complaint   Patient presents with    Gagging     x 1 year multiple times a day, referred by pcp       HPI:   Tiffany Pugh is a 73y o  year old female who presents for consultation at the request of her PCP regarding gagging and vomiting  She has a longstanding reflux history and has had upper endoscopies in [address] [place] [address] in 2012 and 2017 that were negative for Daniel's esophagus  Reflux symptoms previously required AcipHex  Symptoms in the past were not well controlled with other PPIs  Recently AcipHex was not covered by her insurance but symptoms are now well controlled with pantoprazole  She denies dysphagia, weight loss, early satiety, melena or other alarm symptoms  She denies any trigger foods  She is up-to-date on colon cancer screening with her last colonoscopy in 2017 and a negative Cologuard test in 2022  Her main complaint today is gagging and dry heaves  Symptoms began at least a year ago    They initially were intermittent but now happen nearly every day  Symptoms are typically worse in the morning  There are no food triggers  She denies any dysphagia  Symptoms are not preceded by nausea  She denies early satiety or other gastroparesis symptoms  Symptoms often improved with sucking on a mint  There is no improvement with drinking water  She recently traveled to [place] for a week, she had no symptoms while traveling, she thinks this may correlate with lower stress levels     Historical Information   Past Medical History:   Diagnosis Date    Fibromyalgia     GERD (gastroesophageal reflux disease)     Hypertension     Lactose intolerance      Past Surgical History:   Procedure Laterality Date    ABDOMINAL SURGERY      ADENOIDECTOMY      pt has 2nd appendix removed       BOWEL RESECTION      CHOLECYSTECTOMY      OOPHORECTOMY      one     [place] LAP,DIAGNOSTIC ABDOMEN N/A 2020    Procedure: LAPAROSCOPY DIAGNOSTIC, lysis of adhesions , possible bowel resection;  Surgeon: Azeem Loomis MD;  Location:  MAIN OR;  Service: General    TUBAL LIGATION       Social History     Substance and Sexual Activity   Alcohol Use No     Social History     Substance and Sexual Activity   Drug Use Never    Types: Marijuana     Social History     Tobacco Use   Smoking Status Former Smoker    Packs/day: 2 00    Years: 30 00    Pack years: 60 00    Types: Cigarettes    Start date: 1973   Boo Gifford Quit date: 2020    Years since quittin 6   Smokeless Tobacco Never Used     Family History   Problem Relation Age of Onset    Fibromyalgia Mother     Arthritis Mother     Hypertension Mother     Vision loss Mother         Glaucoma    Heart disease Father     GI problems Father     Hypertension Father     Vision loss Father         Macular Degeneration    Birth defects Sister         Arterial Vascular Malformation    No Known Problems Daughter     No Known Problems Maternal Grandmother     Diabetes Maternal Grandfather     No Known Problems Paternal Grandmother     No Known Problems Paternal Grandfather     No Known Problems Sister     No Known Problems Sister     No Known Problems Paternal Aunt     No Known Problems Paternal Aunt     Mental illness Brother     Colon cancer Neg Hx     Colon polyps Neg Hx        Meds/Allergies     Current Outpatient Medications:     atorvastatin (LIPITOR) 40 mg tablet    Calcium Carbonate-Vit D-Min (CALCIUM 1200 PO)    cholecalciferol (VITAMIN D) 400 units/1 mL    lisinopril (ZESTRIL) 10 mg tablet    Multiple Vitamins-Minerals (MULTIVITAMIN GUMMIES WOMENS PO)    pantoprazole (PROTONIX) 40 mg tablet    Probiotic Product (PROBIOTIC ADVANCED PO)    verapamil (VERELAN) 240 MG 24 hr capsule    Allergies   Allergen Reactions    Erythromycin      Other reaction(s): GI Reaction       PHYSICAL EXAM:    Blood pressure 148/82, height 5' 4" (1 626 m), weight 80 7 kg (178 lb)  Body mass index is 30 55 kg/m²  General Appearance: NAD, cooperative, alert  Eyes: Anicteric, PERRLA, EOMI  ENT:  Normocephalic, atraumatic, normal mucosa  Neck:  Supple, symmetrical, trachea midline,   Resp:  Clear to auscultation bilaterally; no rales, rhonchi or wheezing; respirations unlabored   CV:  S1 S2, Regular rate and rhythm; no murmur, rub, or gallop  GI:  Soft, non-tender, non-distended; normal bowel sounds; no masses, no organomegaly   Rectal: Deferred  Musculoskeletal: No cyanosis, clubbing or edema  Normal ROM    Skin:  No jaundice, rashes, or lesions   Heme/Lymph: No palpable cervical lymphadenopathy  Psych: Normal affect, good eye contact  Neuro: No gross deficits, AAOx3    Lab Results:   Lab Results   Component Value Date    WBC 9 89 04/23/2020    HGB 12 1 04/23/2020    HCT 37 6 04/23/2020    MCV 97 04/23/2020     04/23/2020     Lab Results   Component Value Date    K 3 2 (L) 04/24/2020    CL 97 (L) 04/24/2020    CO2 28 04/24/2020    BUN 9 04/24/2020    CREATININE 0 76 04/24/2020    CALCIUM 8 6 04/24/2020    AST 19 04/21/2020    ALT 16 04/21/2020    ALKPHOS 87 04/21/2020    EGFR 84 04/24/2020     No results found for: IRON, TIBC, FERRITIN  Lab Results   Component Value Date    LIPASE 179 04/20/2020       Radiology Results:   FL spine and pain procedure    Result Date: 4/18/2022  Narrative: Procedure:  Right sacroiliac joint injection under fluoroscopy  Diagnosis: Disorder of the sacrum, sacroiliitis Indication for Fluoroscopy: This procedure requires the precise placement of the spinal needle  It is the only way to accurately and safely perform the injection  Medical Necessity:  Failure of conservative management  Procedure Note: After fully informed written consent was obtained, the patient was positioned prone on the fluoroscopy table with pillows under the abdomen  Fluoroscopic guidance was used to isolate and identify the sacroiliac joint  A medial to lateral oblique projection allowed separation of the anterior (lateral) and posterior (medial) joint space  The sacrum and posterior iliac crest were sterilely prepared and draped  A 25-gauge needle was placed under the skin and lidocaine 1%  was injected for subcutaneous anesthesia  A 22-gauge 3 ½ inch spinal needle was directed into the inferior aspect of the sacroiliac joint using a posterior approach in bull's eye fashion  The interosseous ligament was engaged  Lidocaine 2%  and methylprednisolone was injected  Volumes were kept small-commensurate with the joint's capacity as determined by end-injection back pressure on the syringe  The needle was removed  Disposition:  Motor function was intact  Vital signs stable  The patient was discharged home with a   The discharge instruction sheet was given to the patient   Sacroiliac Joint Radiography: Omnipaque 300 was injected in the inferior pole of the SIJ Estimated blood loss:  Minimal No specimens were taken                REVIEW OF SYSTEMS:    CONSTITUTIONAL: Denies any fever, chills, rigors, and weight loss  HEENT: No earache or tinnitus  Denies hearing loss or visual disturbances  CARDIOVASCULAR: No chest pain or palpitations  RESPIRATORY: Denies any cough, hemoptysis, shortness of breath or dyspnea on exertion  GASTROINTESTINAL: As noted in the History of Present Illness  GENITOURINARY: No problems with urination  Denies any hematuria or dysuria  NEUROLOGIC: No dizziness or vertigo, denies headaches  MUSCULOSKELETAL: Denies any muscle or joint pain  SKIN: Denies skin rashes or itching  ENDOCRINE: Denies excessive thirst  Denies intolerance to heat or cold  PSYCHOSOCIAL: Denies depression or anxiety  Denies any recent memory loss

## 2022-05-04 ENCOUNTER — HOSPITAL ENCOUNTER (OUTPATIENT)
Dept: RADIOLOGY | Facility: HOSPITAL | Age: 67
Discharge: HOME | End: 2022-05-04
Attending: INTERNAL MEDICINE
Payer: COMMERCIAL

## 2022-05-04 DIAGNOSIS — K21.00 REGURGITANT ESOPHAGITIS: ICD-10-CM

## 2022-05-04 PROCEDURE — 74220 X-RAY XM ESOPHAGUS 1CNTRST: CPT

## 2022-05-11 DIAGNOSIS — I10 ESSENTIAL HYPERTENSION: ICD-10-CM

## 2022-05-11 RX ORDER — LISINOPRIL 10 MG/1
TABLET ORAL
Qty: 90 TABLET | Refills: 3 | OUTPATIENT
Start: 2022-05-11

## 2022-05-11 RX ORDER — SUCRALFATE ORAL 1 G/10ML
SUSPENSION ORAL
COMMUNITY
Start: 2022-02-13 | End: 2022-07-01

## 2022-05-11 RX ORDER — TIMOLOL MALEATE 5 MG/ML
SOLUTION/ DROPS OPHTHALMIC
COMMUNITY
Start: 2022-05-08

## 2022-05-11 NOTE — TELEPHONE ENCOUNTER
Yesenia Thompson, RN 5/11/22 2:31 PM    to [facility]   Patient needs an appointment  Refill refused  Chart reviewed, patient has not been seen in office 3/1/21  Iker Mi is listed as her PCP    Call to [facility], per [facility] -- above CRNP refilled

## 2022-05-12 ENCOUNTER — TELEPHONE (OUTPATIENT)
Dept: GASTROENTEROLOGY | Facility: CLINIC | Age: 67
End: 2022-05-12

## 2022-05-12 ENCOUNTER — TELEPHONE (OUTPATIENT)
Dept: OTHER | Facility: OTHER | Age: 67
End: 2022-05-12

## 2022-05-12 NOTE — TELEPHONE ENCOUNTER
1st call-lvm for pt to sched egd at [facility] per dr Pan Maddox have a hold on shin's schedule for May 25th to offer pt  Needs assessment questions asked

## 2022-05-13 NOTE — TELEPHONE ENCOUNTER
Called pt again  She states she tested positive for covid yesterday 5/12  She will call us back next week to schedule and I did let her know we need to schedule 4 weeks out due to positive date

## 2022-06-15 ENCOUNTER — TELEPHONE (OUTPATIENT)
Dept: GASTROENTEROLOGY | Facility: CLINIC | Age: 67
End: 2022-06-15

## 2022-06-15 NOTE — TELEPHONE ENCOUNTER
Pt sched 7/1/22 endoScheduled date of EGD(as of today):07/01/2022  Physician performing EGD:Dr Arden Jolley  Location of EGD:[facility]  Instructions reviewed with patient by:TK  Clearances: none

## 2022-06-23 ENCOUNTER — OFFICE VISIT (OUTPATIENT)
Dept: OBGYN CLINIC | Facility: CLINIC | Age: 67
End: 2022-06-23
Payer: COMMERCIAL

## 2022-06-23 VITALS
DIASTOLIC BLOOD PRESSURE: 82 MMHG | BODY MASS INDEX: 30.39 KG/M2 | WEIGHT: 178 LBS | SYSTOLIC BLOOD PRESSURE: 124 MMHG | HEIGHT: 64 IN

## 2022-06-23 DIAGNOSIS — M16.11 PRIMARY OSTEOARTHRITIS OF RIGHT HIP: Primary | ICD-10-CM

## 2022-06-23 PROCEDURE — 99214 OFFICE O/P EST MOD 30 MIN: CPT | Performed by: ORTHOPAEDIC SURGERY

## 2022-06-23 PROCEDURE — 1160F RVW MEDS BY RX/DR IN RCRD: CPT | Performed by: ORTHOPAEDIC SURGERY

## 2022-06-23 PROCEDURE — 1036F TOBACCO NON-USER: CPT | Performed by: ORTHOPAEDIC SURGERY

## 2022-06-23 PROCEDURE — 3008F BODY MASS INDEX DOCD: CPT | Performed by: ORTHOPAEDIC SURGERY

## 2022-06-23 NOTE — PROGRESS NOTES
Assessment:     1  Primary osteoarthritis of right hip        Plan:     Problem List Items Addressed This Visit        Musculoskeletal and Integument    Primary osteoarthritis of right hip - Primary        Findings consistent with right hip moderate to severe osteoarthritis  Patient did get short term relief from right hip intra articular injection but not enough to repeat  Discussed total hip replacement with patient as only option to alleviate arthritic pain  Patient would like to proceed with right posterior total hip replacement in July or August   She can continue with exercising in pool, stationary bike or elliptical for low impact exercise  OTC anti inflammatories for pain  See patient back for H&P in next week or two as she wants surgery end of July  All patient's questions were answered to her satisfaction  This note is created using dictation transcription  It may contain typographical errors, grammatical errors, improperly dictated words, background noise and other errors  Subjective:     Patient ID: Prashant Ríos is a 73 y o  female  Chief Complaint:  73 yr old female in for follow up regarding right hip moderate to severe osteoarthritis  At last appt also having pain related to right SI joint and lumbar ddd  She did have right hip intra articular injection 3/24/22 and right SI joint injection 4/18/22 by Dr Diane Nichols  She did get relief from right hip intra articular injection, more than she thought now that it has worn off  Right SI injection was beneficial for back pain but has returned  Pain starts around right SI joint and wraps around to anterior hip and groin  When she has bad days she really can't move secondary to pain  Difficulty bending over, putting socks and shoes on  Difficulty/pain initially walking  She is not able to walk far and pain does limit her daily activities  She does have to lift leg to get in and out of car at times  She does exercise in pool when able   She did not attend physical therapy due to getting covid  OTC anti inflammatories for pain  Allergy:  Allergies   Allergen Reactions    Erythromycin      Other reaction(s): GI Reaction     Medications:  all current active meds have been reviewed  Past Medical History:  Past Medical History:   Diagnosis Date    Fibromyalgia     GERD (gastroesophageal reflux disease) 1999    Hypertension     Lactose intolerance 1984     Past Surgical History:  Past Surgical History:   Procedure Laterality Date    ABDOMINAL SURGERY  2020    ADENOIDECTOMY      pt has 2nd appendix removed       BOWEL RESECTION      CHOLECYSTECTOMY  2000    OOPHORECTOMY      one     [place] LAP,DIAGNOSTIC ABDOMEN N/A 4/21/2020    Procedure: LAPAROSCOPY DIAGNOSTIC, lysis of adhesions , possible bowel resection;  Surgeon: Ian Bhatti MD;  Location:  MAIN OR;  Service: General    TUBAL LIGATION       Family History:  Family History   Problem Relation Age of Onset    Fibromyalgia Mother     Arthritis Mother     Hypertension Mother     Vision loss Mother         Glaucoma    Heart disease Father     GI problems Father     Hypertension Father     Vision loss Father         Macular Degeneration    Birth defects Sister         Arterial Vascular Malformation    No Known Problems Daughter     No Known Problems Maternal Grandmother     Diabetes Maternal Grandfather     No Known Problems Paternal Grandmother     No Known Problems Paternal Grandfather     No Known Problems Sister     No Known Problems Sister     No Known Problems Paternal Aunt     No Known Problems Paternal Aunt     Mental illness Brother     Colon cancer Neg Hx     Colon polyps Neg Hx      Social History:  Social History     Substance and Sexual Activity   Alcohol Use No     Social History     Substance and Sexual Activity   Drug Use Never    Types: Marijuana     Social History     Tobacco Use   Smoking Status Former Smoker    Packs/day: 2 00    Years: 30 00    Pack years: 60 00    Types: Cigarettes    Start date: 1973   Herman Matthews Quit date: 2020    Years since quittin 8   Smokeless Tobacco Never Used     Review of Systems   Constitutional: Negative for chills and fever  HENT: Negative for ear pain and sore throat  Eyes: Negative for pain and visual disturbance  Respiratory: Negative for cough and shortness of breath  Cardiovascular: Negative for chest pain and palpitations  Gastrointestinal: Negative for abdominal pain and vomiting  Genitourinary: Negative for dysuria and hematuria  Musculoskeletal: Positive for arthralgias (Right groin, right low back)  Negative for back pain and joint swelling  Skin: Negative for color change and rash  Neurological: Negative for seizures, syncope, weakness and numbness  Psychiatric/Behavioral: Negative  All other systems reviewed and are negative  Objective:  BP Readings from Last 1 Encounters:   22 124/82      Wt Readings from Last 1 Encounters:   22 80 7 kg (178 lb)      BMI:   Estimated body mass index is 30 55 kg/m² as calculated from the following:    Height as of this encounter: 5' 4" (1 626 m)  Weight as of this encounter: 80 7 kg (178 lb)  BSA:   Estimated body surface area is 1 86 meters squared as calculated from the following:    Height as of this encounter: 5' 4" (1 626 m)  Weight as of this encounter: 80 7 kg (178 lb)  Physical Exam  Vitals and nursing note reviewed  Constitutional:       Appearance: Normal appearance  She is well-developed  HENT:      Head: Normocephalic and atraumatic  Right Ear: External ear normal       Left Ear: External ear normal    Eyes:      Extraocular Movements: Extraocular movements intact  Conjunctiva/sclera: Conjunctivae normal    Pulmonary:      Effort: Pulmonary effort is normal    Musculoskeletal:         General: Tenderness (right hip arthralgia ) present  Cervical back: Neck supple     Skin:     General: Skin is warm and dry    Neurological:      Mental Status: She is alert and oriented to person, place, and time  Deep Tendon Reflexes: Reflexes are normal and symmetric  Psychiatric:         Mood and Affect: Mood normal          Behavior: Behavior normal        Right Hip Exam     Tenderness   The patient is experiencing no tenderness  Range of Motion   Flexion: 110 (pain)   External rotation: 40   Internal rotation: 5 (pain)     Muscle Strength   The patient has normal right hip strength      Tests   AYDEN: positive  Austen: negative    Other   Erythema: absent  Scars: absent  Sensation: normal  Pulse: present    Comments:  TTP right SI joint               no new imaging to review      Scribe Attestation    I,:  Jose Tobias am acting as a scribe while in the presence of the attending physician :       I,:  Janell Vargas MD personally performed the services described in this documentation    as scribed in my presence :

## 2022-07-01 ENCOUNTER — HOSPITAL ENCOUNTER (OUTPATIENT)
Dept: GASTROENTEROLOGY | Facility: AMBULATORY SURGERY CENTER | Age: 67
Discharge: HOME | End: 2022-07-01
Payer: COMMERCIAL

## 2022-07-01 ENCOUNTER — ANESTHESIA (OUTPATIENT)
Dept: GASTROENTEROLOGY | Facility: AMBULATORY SURGERY CENTER | Age: 67
End: 2022-07-01

## 2022-07-01 ENCOUNTER — ANESTHESIA EVENT (OUTPATIENT)
Dept: GASTROENTEROLOGY | Facility: AMBULATORY SURGERY CENTER | Age: 67
End: 2022-07-01

## 2022-07-01 VITALS
HEART RATE: 58 BPM | TEMPERATURE: 98 F | OXYGEN SATURATION: 100 % | WEIGHT: 178 LBS | RESPIRATION RATE: 27 BRPM | SYSTOLIC BLOOD PRESSURE: 178 MMHG | HEIGHT: 64 IN | DIASTOLIC BLOOD PRESSURE: 86 MMHG | BODY MASS INDEX: 30.39 KG/M2

## 2022-07-01 DIAGNOSIS — K21.9 GASTROESOPHAGEAL REFLUX DISEASE, UNSPECIFIED WHETHER ESOPHAGITIS PRESENT: ICD-10-CM

## 2022-07-01 DIAGNOSIS — K21.00 REGURGITANT ESOPHAGITIS: ICD-10-CM

## 2022-07-01 PROCEDURE — 88305 TISSUE EXAM BY PATHOLOGIST: CPT | Performed by: PATHOLOGY

## 2022-07-01 PROCEDURE — 43239 EGD BIOPSY SINGLE/MULTIPLE: CPT | Performed by: INTERNAL MEDICINE

## 2022-07-01 RX ORDER — GLYCOPYRROLATE 0.2 MG/ML
INJECTION INTRAMUSCULAR; INTRAVENOUS AS NEEDED
Status: DISCONTINUED | OUTPATIENT
Start: 2022-07-01 | End: 2022-07-01

## 2022-07-01 RX ORDER — LIDOCAINE HYDROCHLORIDE 10 MG/ML
INJECTION, SOLUTION EPIDURAL; INFILTRATION; INTRACAUDAL; PERINEURAL AS NEEDED
Status: DISCONTINUED | OUTPATIENT
Start: 2022-07-01 | End: 2022-07-01

## 2022-07-01 RX ORDER — PROPOFOL 10 MG/ML
INJECTION, EMULSION INTRAVENOUS AS NEEDED
Status: DISCONTINUED | OUTPATIENT
Start: 2022-07-01 | End: 2022-07-01

## 2022-07-01 RX ORDER — SODIUM CHLORIDE, SODIUM LACTATE, POTASSIUM CHLORIDE, CALCIUM CHLORIDE 600; 310; 30; 20 MG/100ML; MG/100ML; MG/100ML; MG/100ML
50 INJECTION, SOLUTION INTRAVENOUS CONTINUOUS
Status: DISCONTINUED | OUTPATIENT
Start: 2022-07-01 | End: 2022-07-05 | Stop reason: HOSPADM

## 2022-07-01 RX ADMIN — SODIUM CHLORIDE, SODIUM LACTATE, POTASSIUM CHLORIDE, CALCIUM CHLORIDE 50 ML/HR: 600; 310; 30; 20 INJECTION, SOLUTION INTRAVENOUS at 11:38

## 2022-07-01 RX ADMIN — PROPOFOL 50 MG: 10 INJECTION, EMULSION INTRAVENOUS at 12:18

## 2022-07-01 RX ADMIN — PROPOFOL 50 MG: 10 INJECTION, EMULSION INTRAVENOUS at 12:22

## 2022-07-01 RX ADMIN — LIDOCAINE HYDROCHLORIDE 80 MG: 10 INJECTION, SOLUTION EPIDURAL; INFILTRATION; INTRACAUDAL; PERINEURAL at 12:15

## 2022-07-01 RX ADMIN — GLYCOPYRROLATE 0.1 MG: 0.2 INJECTION INTRAMUSCULAR; INTRAVENOUS at 12:15

## 2022-07-01 RX ADMIN — PROPOFOL 100 MG: 10 INJECTION, EMULSION INTRAVENOUS at 12:15

## 2022-07-01 NOTE — H&P
History and Physical - [facility]  Ashish Chang 73 y o  female MRN: [id number]    HPI: Ashish Chang is a 73y o  year old female who presents for EGD for evaluation of GERD    REVIEW OF SYSTEMS: Per the HPI, and otherwise unremarkable  Historical Information   Past Medical History:   Diagnosis Date    COVID-19 2022    Fibromyalgia     GERD (gastroesophageal reflux disease)     Hyperlipidemia     Hypertension     Lactose intolerance      Past Surgical History:   Procedure Laterality Date    ABDOMINAL SURGERY      ADENOIDECTOMY      pt has 2nd appendix removed       BOWEL RESECTION      CHOLECYSTECTOMY      COLON SURGERY      EYE SURGERY      lasik    OOPHORECTOMY      one     [place] LAP,DIAGNOSTIC ABDOMEN N/A 2020    Procedure: LAPAROSCOPY DIAGNOSTIC, lysis of adhesions , possible bowel resection;  Surgeon: Vladimir Moses MD;  Location:  MAIN OR;  Service: General    TUBAL LIGATION       Social History   Social History     Substance and Sexual Activity   Alcohol Use No     Social History     Substance and Sexual Activity   Drug Use Not on file     Social History     Tobacco Use   Smoking Status Former Smoker    Packs/day: 2 00    Years: 30 00    Pack years: 60 00    Types: Cigarettes    Start date: 1973   Jovanna Levelock Quit date: 2020    Years since quittin 8   Smokeless Tobacco Never Used     Family History   Problem Relation Age of Onset    Fibromyalgia Mother     Arthritis Mother     Hypertension Mother     Vision loss Mother         Glaucoma    Heart disease Father     GI problems Father     Hypertension Father     Vision loss Father         Macular Degeneration    Birth defects Sister         Arterial Vascular Malformation    No Known Problems Daughter     No Known Problems Maternal Grandmother     Diabetes Maternal Grandfather     No Known Problems Paternal Grandmother     No Known Problems Paternal Grandfather     No Known Problems Sister     No Known Problems Sister     No Known Problems Paternal Aunt     No Known Problems Paternal Aunt     Mental illness Brother     Colon cancer Neg Hx     Colon polyps Neg Hx        Meds/Allergies       Current Outpatient Medications:     atorvastatin (LIPITOR) 40 mg tablet    Calcium Carbonate-Vit D-Min (CALCIUM 1200 PO)    cholecalciferol (VITAMIN D) 400 units/1 mL    lisinopril (ZESTRIL) 10 mg tablet    Multiple Vitamins-Minerals (MULTIVITAMIN GUMMIES WOMENS PO)    pantoprazole (PROTONIX) 40 mg tablet    Probiotic Product (PROBIOTIC ADVANCED PO)    timolol (TIMOPTIC) 0 5 % ophthalmic solution    verapamil (VERELAN) 240 MG 24 hr capsule    Current Facility-Administered Medications:     lactated ringers infusion, 50 mL/hr, Intravenous, Continuous, Continue from Pre-op at 07/01/22 1143    Allergies   Allergen Reactions    Erythromycin Nausea Only     Other reaction(s): GI Reaction       Objective     /68   Pulse (!) 53   Temp 98 °F (36 7 °C) (Temporal)   Resp 22   Ht 5' 4" (1 626 m)   Wt 80 7 kg (178 lb)   SpO2 97%   BMI 30 55 kg/m²     PHYSICAL EXAM    Gen: NAD AAOx3  Head: Normocephalic, Atraumatic  CV: S1S2 RRR no m/r/g  CHEST: Clear b/l no c/r/w  ABD: soft, +BS NT/ND  EXT: no edema    ASSESSMENT/PLAN:  This is a 73y o  year old female here for EGD, and she is stable and optimized for her procedure

## 2022-07-01 NOTE — ANESTHESIA PREPROCEDURE EVALUATION
Procedure:  EGD    Relevant Problems   ANESTHESIA (within normal limits)      CARDIO   (+) Hyperlipidemia   (+) Hypertension      GI/HEPATIC   (+) GERD (gastroesophageal reflux disease)      PULMONARY  COVID 5/2022 - mild, recovered   (-) Sleep apnea   (-) Smoking (former smoker)   (-) URI (upper respiratory infection)    BMI 30    Physical Exam    Airway    Mallampati score: II  TM Distance: <3 FB  Neck ROM: full     Dental   Comment: Lower intact, upper dentures,     Cardiovascular      Pulmonary      Other Findings     No recent labs    Anesthesia Plan  ASA Score- 2     Anesthesia Type- IV sedation with anesthesia with ASA Monitors  Additional Monitors:   Airway Plan:           Plan Factors-Exercise tolerance (METS): >4 METS  Chart reviewed  Existing labs reviewed  Patient summary reviewed  Patient is not a current smoker  Induction- intravenous  Postoperative Plan-     Informed Consent- Anesthetic plan and risks discussed with patient  I personally reviewed this patient with the CRNA  Discussed and agreed on the Anesthesia Plan with the CRNA  Richmond Hong

## 2022-07-01 NOTE — ANESTHESIA POSTPROCEDURE EVALUATION
Post-Op Assessment Note    CV Status:  Stable  Pain Score: 0    Pain management: adequate     Mental Status:  Awake and somnolent   Hydration Status:  Stable   PONV Controlled:  None   Airway Patency:  Patent      Post Op Vitals Reviewed: Yes      Staff: CRNA, Anesthesiologist         No complications documented      BP  124/60   Temp      Pulse  60   Resp  16   SpO2   96% on RA

## 2022-07-06 ENCOUNTER — TELEPHONE (OUTPATIENT)
Dept: GASTROENTEROLOGY | Facility: CLINIC | Age: 67
End: 2022-07-06

## 2022-07-08 ENCOUNTER — OFFICE VISIT (OUTPATIENT)
Dept: OBGYN CLINIC | Facility: CLINIC | Age: 67
End: 2022-07-08
Payer: COMMERCIAL

## 2022-07-08 VITALS
WEIGHT: 178 LBS | DIASTOLIC BLOOD PRESSURE: 82 MMHG | SYSTOLIC BLOOD PRESSURE: 138 MMHG | HEIGHT: 64 IN | BODY MASS INDEX: 30.39 KG/M2

## 2022-07-08 DIAGNOSIS — M16.11 PRIMARY OSTEOARTHRITIS OF RIGHT HIP: Primary | ICD-10-CM

## 2022-07-08 DIAGNOSIS — Z01.818 PREOP TESTING: ICD-10-CM

## 2022-07-08 PROCEDURE — 99214 OFFICE O/P EST MOD 30 MIN: CPT | Performed by: ORTHOPAEDIC SURGERY

## 2022-07-08 PROCEDURE — 1160F RVW MEDS BY RX/DR IN RCRD: CPT | Performed by: ORTHOPAEDIC SURGERY

## 2022-07-08 RX ORDER — CHLORHEXIDINE GLUCONATE 0.12 MG/ML
15 RINSE ORAL ONCE
Status: CANCELLED | OUTPATIENT
Start: 2022-08-08 | End: 2022-07-08

## 2022-07-08 RX ORDER — SODIUM CHLORIDE, SODIUM LACTATE, POTASSIUM CHLORIDE, CALCIUM CHLORIDE 600; 310; 30; 20 MG/100ML; MG/100ML; MG/100ML; MG/100ML
50 INJECTION, SOLUTION INTRAVENOUS CONTINUOUS
Status: CANCELLED | OUTPATIENT
Start: 2022-08-08

## 2022-07-08 RX ORDER — FOLIC ACID 1 MG/1
1 TABLET ORAL DAILY
Qty: 30 TABLET | Refills: 2 | Status: SHIPPED | OUTPATIENT
Start: 2022-07-08 | End: 2022-08-02

## 2022-07-08 RX ORDER — FERROUS SULFATE TAB EC 324 MG (65 MG FE EQUIVALENT) 324 (65 FE) MG
324 TABLET DELAYED RESPONSE ORAL
Qty: 60 TABLET | Refills: 2 | Status: SHIPPED | OUTPATIENT
Start: 2022-07-08 | End: 2022-08-02

## 2022-07-08 RX ORDER — CHLORHEXIDINE GLUCONATE 4 G/100ML
SOLUTION TOPICAL DAILY PRN
Status: CANCELLED | OUTPATIENT
Start: 2022-08-08

## 2022-07-08 RX ORDER — ASCORBIC ACID 500 MG
500 TABLET ORAL 2 TIMES DAILY
Qty: 60 TABLET | Refills: 2 | Status: SHIPPED | OUTPATIENT
Start: 2022-07-08 | End: 2022-08-02

## 2022-07-08 RX ORDER — CEFAZOLIN SODIUM 2 G/50ML
2000 SOLUTION INTRAVENOUS ONCE
Status: CANCELLED | OUTPATIENT
Start: 2022-08-08 | End: 2022-07-08

## 2022-07-08 NOTE — PROGRESS NOTES
Assessment:     1  Primary osteoarthritis of right hip    2  Preop testing        Plan:     Problem List Items Addressed This Visit        Musculoskeletal and Integument    Primary osteoarthritis of right hip - Primary    Relevant Medications    ascorbic acid (VITAMIN C) 500 MG tablet    ferrous sulfate 324 (65 Fe) mg    folic acid (FOLVITE) 1 mg tablet    Multiple Vitamin (MULTI-VITAMIN) tablet    Other Relevant Orders    Comprehensive metabolic panel    Hemoglobin A1C W/EAG Estimation    CBC and differential    Anemia Panel w/Reflex    Protime-INR    APTT    Type and screen    Ambulatory referral to [facility]    Ambulatory referral to Physical Therapy    Case request operating room: ARTHROPLASTY HIP TOTAL (Completed)    EKG 12 lead    Crutches    Walker      Other Visit Diagnoses     Preop testing        Relevant Medications    ascorbic acid (VITAMIN C) 500 MG tablet    ferrous sulfate 324 (65 Fe) mg    folic acid (FOLVITE) 1 mg tablet    Multiple Vitamin (MULTI-VITAMIN) tablet    Other Relevant Orders    Comprehensive metabolic panel    Hemoglobin A1C W/EAG Estimation    CBC and differential    Anemia Panel w/Reflex    Protime-INR    APTT    Type and screen    Ambulatory referral to [facility]    Ambulatory referral to Physical Therapy    EKG 12 lead          Findings consistent with right hip moderate to severe osteoarthritis  Findings and treatment options were discussed the patient  She has failed conservative treatment of intra-articular cortisone injection to the right hip joint  She would like to proceed in scheduling the right total hip arthroplasty  She will proceed with preoperative testing  All patient's questions were answered to her satisfaction  This note is created using dictation transcription  It may contain typographical errors, grammatical errors, improperly dictated words, background noise and other errors      Discussed with patient surgical risks and complications including but not limited to infection, persistent pain, nerve and vessel injury, complications associated with anesthesia, DVT, exposure to COVID virus, problem with prosthesis, hip dislocation, leg-length discrepancy, etc   Patient understands the risks and complication and consented to the surgery  Subjective:     Patient ID: Sebastian Howe is a 73 y o  female  Chief Complaint: This is a 72-year-old white female following up for her advanced right hip osteoarthritis  She had a previous intra-articular cortisone injection with Dr Darian Sierra on March 24, 2020 to with short term relief  She continues to have aching pain in the right groin  It is difficult to walk long distances  She is here to set up the right total hip arthroplasty  Allergy:  Allergies   Allergen Reactions    Erythromycin Nausea Only     Other reaction(s): GI Reaction     Medications:  all current active meds have been reviewed  Past Medical History:  Past Medical History:   Diagnosis Date    COVID-19 05/2022    Fibromyalgia     GERD (gastroesophageal reflux disease) 1999    Hyperlipidemia     Hypertension     Lactose intolerance 1984     Past Surgical History:  Past Surgical History:   Procedure Laterality Date    ABDOMINAL SURGERY  2020    ADENOIDECTOMY      pt has 2nd appendix removed       BOWEL RESECTION      CHOLECYSTECTOMY  2000    COLON SURGERY      EYE SURGERY      lasik    OOPHORECTOMY      one     [place] LAP,DIAGNOSTIC ABDOMEN N/A 04/21/2020    Procedure: LAPAROSCOPY DIAGNOSTIC, lysis of adhesions , possible bowel resection;  Surgeon: Onesimo Rahman MD;  Location:  MAIN OR;  Service: General    TUBAL LIGATION       Family History:  Family History   Problem Relation Age of Onset    Fibromyalgia Mother     Arthritis Mother     Hypertension Mother     Vision loss Mother         Glaucoma    Heart disease Father     GI problems Father     Hypertension Father     Vision loss Father         Macular Degeneration    Birth defects Sister         Arterial Vascular Malformation    No Known Problems Daughter     No Known Problems Maternal Grandmother     Diabetes Maternal Grandfather     No Known Problems Paternal Grandmother     No Known Problems Paternal Grandfather     No Known Problems Sister     No Known Problems Sister     No Known Problems Paternal Aunt     No Known Problems Paternal Aunt     Mental illness Brother     Colon cancer Neg Hx     Colon polyps Neg Hx      Social History:  Social History     Substance and Sexual Activity   Alcohol Use No     Social History     Substance and Sexual Activity   Drug Use Not on file     Social History     Tobacco Use   Smoking Status Former Smoker    Packs/day: 2 00    Years: 30 00    Pack years: 60 00    Types: Cigarettes    Start date: 1973   [facility] Quit date: 2020    Years since quittin 8   Smokeless Tobacco Never Used     Review of Systems   Constitutional: Negative for chills and fever  HENT: Negative for ear pain and sore throat  Eyes: Negative for pain and visual disturbance  Respiratory: Negative for cough and shortness of breath  Cardiovascular: Negative for chest pain and palpitations  Gastrointestinal: Negative for abdominal pain and vomiting  Genitourinary: Negative for dysuria and hematuria  Musculoskeletal: Positive for arthralgias (Right groin, right low back) and gait problem (Antalgic)  Negative for back pain and joint swelling  Skin: Negative for color change and rash  Neurological: Negative for seizures, syncope, weakness and numbness  Psychiatric/Behavioral: Negative  All other systems reviewed and are negative  Objective:  BP Readings from Last 1 Encounters:   22 138/82      Wt Readings from Last 1 Encounters:   22 80 7 kg (178 lb)      BMI:   Estimated body mass index is 30 55 kg/m² as calculated from the following:    Height as of this encounter: 5' 4" (1 626 m)      Weight as of this encounter: 80 7 kg (178 lb)  BSA:   Estimated body surface area is 1 86 meters squared as calculated from the following:    Height as of this encounter: 5' 4" (1 626 m)  Weight as of this encounter: 80 7 kg (178 lb)  Physical Exam  Vitals and nursing note reviewed  Constitutional:       Appearance: Normal appearance  She is well-developed  HENT:      Head: Normocephalic and atraumatic  Right Ear: External ear normal       Left Ear: External ear normal    Eyes:      Extraocular Movements: Extraocular movements intact  Conjunctiva/sclera: Conjunctivae normal       Pupils: Pupils are equal, round, and reactive to light  Cardiovascular:      Rate and Rhythm: Normal rate and regular rhythm  Heart sounds: Normal heart sounds  No murmur heard  No gallop  Pulmonary:      Effort: Pulmonary effort is normal  No respiratory distress  Breath sounds: Normal breath sounds  Chest:      Chest wall: No tenderness  Abdominal:      Palpations: Abdomen is soft  Musculoskeletal:      Cervical back: Normal range of motion and neck supple  Skin:     General: Skin is warm and dry  Neurological:      Mental Status: She is alert and oriented to person, place, and time  Deep Tendon Reflexes: Reflexes are normal and symmetric  Psychiatric:         Mood and Affect: Mood normal          Behavior: Behavior normal        Right Hip Exam     Tenderness   The patient is experiencing no tenderness  Range of Motion   Flexion: 110 (pain)   External rotation: 40   Internal rotation: 5 (pain)     Muscle Strength   The patient has normal right hip strength      Tests   AYDEN: positive  Austen: negative    Other   Erythema: absent  Scars: absent  Sensation: normal  Pulse: present    Comments:                no new imaging to review      Scribe Attestation    I,:  Nimco Ugarte PA-C am acting as a scribe while in the presence of the attending physician :       I,:  Bienvenido Bailey MD personally performed the services described in this documentation    as scribed in my presence :

## 2022-07-08 NOTE — PROGRESS NOTES
Assessment:     1  Primary osteoarthritis of right hip        Plan:     Problem List Items Addressed This Visit        Musculoskeletal and Integument    Primary osteoarthritis of right hip - Primary        Findings consistent with right hip moderate to severe osteoarthritis  Patient did get short term relief from right hip intra articular injection but not enough to repeat  Discussed total hip replacement with patient as only option to alleviate arthritic pain  Patient would like to proceed with right posterior total hip replacement in July or August   She can continue with exercising in pool, stationary bike or elliptical for low impact exercise  OTC anti inflammatories for pain  See patient back for H&P in next week or two as she wants surgery end of July  All patient's questions were answered to her satisfaction  This note is created using dictation transcription  It may contain typographical errors, grammatical errors, improperly dictated words, background noise and other errors  Subjective:     Patient ID: Farrukh Medina is a 73 y o  female  Chief Complaint:  73 yr old female in for follow up regarding right hip moderate to severe osteoarthritis  At last appt also having pain related to right SI joint and lumbar ddd  She did have right hip intra articular injection 3/24/22 and right SI joint injection 4/18/22 by Dr Masood Downs  She did get relief from right hip intra articular injection, more than she thought now that it has worn off  Right SI injection was beneficial for back pain but has returned  Pain starts around right SI joint and wraps around to anterior hip and groin  When she has bad days she really can't move secondary to pain  Difficulty bending over, putting socks and shoes on  Difficulty/pain initially walking  She is not able to walk far and pain does limit her daily activities  She does have to lift leg to get in and out of car at times  She does exercise in pool when able   She did not attend physical therapy due to getting covid  OTC anti inflammatories for pain  Allergy:  Allergies   Allergen Reactions    Erythromycin Nausea Only     Other reaction(s): GI Reaction     Medications:  all current active meds have been reviewed  Past Medical History:  Past Medical History:   Diagnosis Date    COVID-19 05/2022    Fibromyalgia     GERD (gastroesophageal reflux disease) 1999    Hyperlipidemia     Hypertension     Lactose intolerance 1984     Past Surgical History:  Past Surgical History:   Procedure Laterality Date    ABDOMINAL SURGERY  2020    ADENOIDECTOMY      pt has 2nd appendix removed       BOWEL RESECTION      CHOLECYSTECTOMY  2000    COLON SURGERY      EYE SURGERY      lasik    OOPHORECTOMY      one     [place] LAP,DIAGNOSTIC ABDOMEN N/A 04/21/2020    Procedure: LAPAROSCOPY DIAGNOSTIC, lysis of adhesions , possible bowel resection;  Surgeon: Salley Schaumann, MD;  Location:  MAIN OR;  Service: General    TUBAL LIGATION       Family History:  Family History   Problem Relation Age of Onset    Fibromyalgia Mother     Arthritis Mother     Hypertension Mother     Vision loss Mother         Glaucoma    Heart disease Father     GI problems Father     Hypertension Father     Vision loss Father         Macular Degeneration    Birth defects Sister         Arterial Vascular Malformation    No Known Problems Daughter     No Known Problems Maternal Grandmother     Diabetes Maternal Grandfather     No Known Problems Paternal Grandmother     No Known Problems Paternal Grandfather     No Known Problems Sister     No Known Problems Sister     No Known Problems Paternal Aunt     No Known Problems Paternal Aunt     Mental illness Brother     Colon cancer Neg Hx     Colon polyps Neg Hx      Social History:  Social History     Substance and Sexual Activity   Alcohol Use No     Social History     Substance and Sexual Activity   Drug Use Not on file     Social History     Tobacco Use Smoking Status Former Smoker    Packs/day: 2 00    Years: 30 00    Pack years: 60 00    Types: Cigarettes    Start date: 1973   [facility] Quit date: 2020    Years since quittin 8   Smokeless Tobacco Never Used     Review of Systems   Constitutional: Negative for chills and fever  HENT: Negative for ear pain and sore throat  Eyes: Negative for pain and visual disturbance  Respiratory: Negative for cough and shortness of breath  Cardiovascular: Negative for chest pain and palpitations  Gastrointestinal: Negative for abdominal pain and vomiting  Genitourinary: Negative for dysuria and hematuria  Musculoskeletal: Positive for arthralgias (Right groin, right low back)  Negative for back pain and joint swelling  Skin: Negative for color change and rash  Neurological: Negative for seizures, syncope, weakness and numbness  Psychiatric/Behavioral: Negative  All other systems reviewed and are negative  Objective:  BP Readings from Last 1 Encounters:   22 138/82      Wt Readings from Last 1 Encounters:   22 80 7 kg (178 lb)      BMI:   Estimated body mass index is 30 55 kg/m² as calculated from the following:    Height as of this encounter: 5' 4" (1 626 m)  Weight as of this encounter: 80 7 kg (178 lb)  BSA:   Estimated body surface area is 1 86 meters squared as calculated from the following:    Height as of this encounter: 5' 4" (1 626 m)  Weight as of this encounter: 80 7 kg (178 lb)  Physical Exam  Vitals and nursing note reviewed  Constitutional:       Appearance: Normal appearance  She is well-developed  HENT:      Head: Normocephalic and atraumatic  Right Ear: External ear normal       Left Ear: External ear normal    Eyes:      Extraocular Movements: Extraocular movements intact        Conjunctiva/sclera: Conjunctivae normal    Pulmonary:      Effort: Pulmonary effort is normal    Musculoskeletal:         General: Tenderness (right hip arthralgia ) present  Cervical back: Neck supple  Skin:     General: Skin is warm and dry  Neurological:      Mental Status: She is alert and oriented to person, place, and time  Deep Tendon Reflexes: Reflexes are normal and symmetric  Psychiatric:         Mood and Affect: Mood normal          Behavior: Behavior normal        Right Hip Exam     Tenderness   The patient is experiencing no tenderness  Range of Motion   Flexion: 110 (pain)   External rotation: 40   Internal rotation: 5 (pain)     Muscle Strength   The patient has normal right hip strength      Tests   AYDEN: positive  Austen: negative    Other   Erythema: absent  Scars: absent  Sensation: normal  Pulse: present    Comments:  TTP right SI joint               no new imaging to review      Scribe Attestation    I,:  Jeremiah Harmon am acting as a scribe while in the presence of the attending physician :       I,:  Carla Hutton MD personally performed the services described in this documentation    as scribed in my presence :

## 2022-07-13 ENCOUNTER — TELEPHONE (OUTPATIENT)
Dept: OBGYN CLINIC | Facility: HOSPITAL | Age: 67
End: 2022-07-13

## 2022-07-13 LAB
ABO GROUP BLD: NORMAL
ALBUMIN SERPL-MCNC: 4.2 G/DL (ref 3.8–4.8)
ALBUMIN/GLOB SERPL: 1.7 {RATIO} (ref 1.2–2.2)
ALP SERPL-CCNC: 82 IU/L (ref 44–121)
ALT SERPL-CCNC: 7 IU/L (ref 0–32)
APTT PPP: 25 SEC (ref 24–33)
AST SERPL-CCNC: 14 IU/L (ref 0–40)
BASOPHILS # BLD AUTO: 0.1 X10E3/UL (ref 0–0.2)
BASOPHILS NFR BLD AUTO: 1 %
BILIRUB SERPL-MCNC: 0.5 MG/DL (ref 0–1.2)
BLD GP AB SCN SERPL QL: NEGATIVE
BUN SERPL-MCNC: 11 MG/DL (ref 8–27)
BUN/CREAT SERPL: 13 (ref 12–28)
CALCIUM SERPL-MCNC: 9.5 MG/DL (ref 8.7–10.3)
CHLORIDE SERPL-SCNC: 103 MMOL/L (ref 96–106)
CO2 SERPL-SCNC: 26 MMOL/L (ref 20–29)
CREAT SERPL-MCNC: 0.87 MG/DL (ref 0.57–1)
EGFR: 74 ML/MIN/1.73
EOSINOPHIL # BLD AUTO: 0.2 X10E3/UL (ref 0–0.4)
EOSINOPHIL NFR BLD AUTO: 3 %
ERYTHROCYTE [DISTWIDTH] IN BLOOD BY AUTOMATED COUNT: 12.5 % (ref 11.7–15.4)
EST. AVERAGE GLUCOSE BLD GHB EST-MCNC: 111 MG/DL
GLOBULIN SER-MCNC: 2.5 G/DL (ref 1.5–4.5)
GLUCOSE SERPL-MCNC: 101 MG/DL (ref 65–99)
HBA1C MFR BLD: 5.5 % (ref 4.8–5.6)
HCT VFR BLD AUTO: 37.9 % (ref 34–46.6)
HGB BLD-MCNC: 12.4 G/DL (ref 11.1–15.9)
IMM GRANULOCYTES # BLD: 0 X10E3/UL (ref 0–0.1)
IMM GRANULOCYTES NFR BLD: 0 %
INR PPP: 1 (ref 0.9–1.2)
IRON SATN MFR SERPL: 36 % (ref 15–55)
IRON SERPL-MCNC: 101 UG/DL (ref 27–139)
LYMPHOCYTES # BLD AUTO: 1.8 X10E3/UL (ref 0.7–3.1)
LYMPHOCYTES NFR BLD AUTO: 28 %
MCH RBC QN AUTO: 31.3 PG (ref 26.6–33)
MCHC RBC AUTO-ENTMCNC: 32.7 G/DL (ref 31.5–35.7)
MCV RBC AUTO: 96 FL (ref 79–97)
MONOCYTES # BLD AUTO: 0.5 X10E3/UL (ref 0.1–0.9)
MONOCYTES NFR BLD AUTO: 8 %
NEUTROPHILS # BLD AUTO: 3.8 X10E3/UL (ref 1.4–7)
NEUTROPHILS NFR BLD AUTO: 60 %
PLATELET # BLD AUTO: 348 X10E3/UL (ref 150–450)
POTASSIUM SERPL-SCNC: 4.2 MMOL/L (ref 3.5–5.2)
PROT SERPL-MCNC: 6.7 G/DL (ref 6–8.5)
PROTHROMBIN TIME: 10.2 SEC (ref 9.1–12)
RBC # BLD AUTO: 3.96 X10E6/UL (ref 3.77–5.28)
RETICS/RBC NFR AUTO: 2.3 % (ref 0.6–2.6)
RH BLD: NEGATIVE
SODIUM SERPL-SCNC: 143 MMOL/L (ref 134–144)
TIBC SERPL-MCNC: 279 UG/DL (ref 250–450)
UIBC SERPL-MCNC: 178 UG/DL (ref 118–369)
WBC # BLD AUTO: 6.3 X10E3/UL (ref 3.4–10.8)

## 2022-07-13 NOTE — TELEPHONE ENCOUNTER
Preoperative Elective Admission Assessment- spoke to patient    [employer]- [facility]     Living Situation:    Who does pt live with: relative, Son, lives in an apartment in son's home  What kind of home: apartment  How do they enter the home: front  How many levels in home: 1 level apartment    # of steps to enter home: 0 to enter  Sleeping arrangement: first/entry floor  Where is Bathroom: Walk in shower on entry level, with shower chair  First Floor Setup:   Is there a bathroom: Yes  Where would pt sleep: bed     DME: frame walker, cane and 3-in-1 bedside commode     Patient's Current Level of Function: Ambulates: Independently and ADLs: Independent    Post-op Caregiver: relative  Caregiver Name and phone number for Inpatient discharge needs: Sister, Wayne Cottrell- will be coming to stay with patient for a "couple of days "   Currently receive any HHC/aides/community supports: No     Post-op Transport: child , daughter in law  To/from hospital: child  To/from PT 2-3x/week: child, daughter in law   Uses community transport now: No     Outpatient Physical Therapy Site:  Site: [facility]  pre and post-op appts scheduled? Yes     Medication Management: self and pillbox  Preferred Pharmacy for Post-op Medications: [facility]  Blood Management Vitamin Regimen: Pt confirms taking as prescribed  Post-op anticoagulant: to be determined by surgical team postoperatively     DC Plan: Pt plans to be discharged home  Pt educated that our goal, if at all possible, is to appropriately discharge patient based off their post-op function while striving to maintain maximal independence  If possible, the goal is to discharge patient to home and for them to attend outpatient physical therapy      Barriers to DC identified preoperatively: none identified    BMI: 30 55     [employer]: Enrolled in surveys     Patient Education:  Pt educated on post-op pain, early mobilization (POD0), Average inpt LOS, OP PT goal   Patient educated that our goal is to appropriately discharge patient based off their post-op function while striving to maintain maximal independence  The goal is to discharge patient to home and for them to attend outpatient physical therapy

## 2022-07-22 ENCOUNTER — APPOINTMENT (OUTPATIENT)
Dept: PREADMISSION TESTING | Facility: HOSPITAL | Age: 67
End: 2022-07-22
Payer: COMMERCIAL

## 2022-07-22 NOTE — PRE-PROCEDURE INSTRUCTIONS
Pre-Surgery Instructions:   Medication Instructions    ascorbic acid (VITAMIN C) 500 MG tablet Hold day of surgery   atorvastatin (LIPITOR) 40 mg tablet Hold day of surgery   Calcium Carbonate-Vit D-Min (CALCIUM 1200 PO) Hold day of surgery   ferrous sulfate 324 (65 Fe) mg Hold day of surgery   folic acid (FOLVITE) 1 mg tablet Hold day of surgery   lisinopril (ZESTRIL) 10 mg tablet Hold day of surgery   Multiple Vitamin (MULTI-VITAMIN) tablet Hold day of surgery   pantoprazole (PROTONIX) 40 mg tablet Take day of surgery   Probiotic Product (PROBIOTIC ADVANCED PO) Hold day of surgery   timolol (TIMOPTIC) 0 5 % ophthalmic solution Take day of surgery   verapamil (VERELAN) 240 MG 24 hr capsule Take night before surgery        NPO after midnight, meds in am with sips of water  Understands when to expect preop phone call  Remove all jewelry  Advised of visitation policy  Before your operation, you play an important role in decreasing your risk for infection by washing with special antiseptic soap  This is an effective way to reduce bacteria on the skin which may help to prevent infections at the surgical site  Please read the following directions in advance  1  In the week before your operation purchase a 4 ounce bottle of antiseptic soap containing chlorhexidine gluconate 4%  Some brand names include: Aplicare, Endure, and Hibiclens  The cost is usually less than $5 00  · For your convenience, the [address] [place] [address] carries the soap  · It may also be available at your doctor's office or pre-admission testing center, and at most retail pharmacies  · If you are allergic or sensitive to soaps containing chlorhexidine gluconate (CHG), please let your doctor know so another antiseptic soap can be suggested  · CHG antiseptic soap is for external use only  2      The day before your operation follow these directions carefully to get ready    · Place clean lines (sheets) on your bed; you should sleep on clean sheets after your evening shower  · Get clean towels and washcloths ready - you need enough for 2 showers  · Set aside clean underwear, pajamas, and clothing to wear after the shower  Reminders:  · DO NOT use any other soap or body rinse on your skin during or after the antiseptic showers  · DO NOT use lotion , powder, deodorant, or perfume/aftershave of any kind on your skin after your antiseptic shower  · DO NOT shave any body parts in the 24 hours/the day before your operation  · DO NOT get the antiseptic soap in your eyes, ears, nose, mouth, or vaginal area  3      You will need to shower the night before AND the morning of your Surgery  Shower 1:  · The evening before your operation, take the fist shower  · First, shampoo your hair with regular shampoo and rinse it completely before you use the anitseptic soap  After washing and rinsing your hair, rinse your body  · Next, use a clean wash cloth to apply the antiseptic soap and wash your body from the neck down to your toes using 1/2 bottle of the antiseptic soap  You will use the other 1/2 bottle for the second shower  · Clean the area where your incision will be; later this area well for about 2 minutes  · If you ar having head or neck surgery, wash areas with the antiseptic soap  · Rinse yourself completely with running water  · Use a clean towel to dry off  · Wear clean underwear and clothing/pajamas  Shower 2:  · The Morning of your operation, take the second shower following the same steps as Shower 1 using the second 1/2 of the bottle of antiseptic soap  · Use clean cloths and towels to was and dry yourself off  · Wear clean underwear and clothing

## 2022-07-26 ENCOUNTER — EVALUATION (OUTPATIENT)
Dept: PHYSICAL THERAPY | Facility: CLINIC | Age: 67
End: 2022-07-26
Payer: COMMERCIAL

## 2022-07-26 DIAGNOSIS — M16.11 PRIMARY OSTEOARTHRITIS OF RIGHT HIP: ICD-10-CM

## 2022-07-26 DIAGNOSIS — M25.551 RIGHT HIP PAIN: Primary | ICD-10-CM

## 2022-07-26 DIAGNOSIS — Z01.818 PREOP TESTING: ICD-10-CM

## 2022-07-26 PROCEDURE — 97161 PT EVAL LOW COMPLEX 20 MIN: CPT | Performed by: PHYSICAL THERAPIST

## 2022-07-26 NOTE — PROGRESS NOTES
PT Evaluation  and PT Discharge    Today's date: 2022  Patient name: Reema Damon  : 1955  MRN: [id number]  Referring provider: Ethel Chery PA-C  Dx:   Encounter Diagnosis     ICD-10-CM    1  Right hip pain  M25 551    2  Primary osteoarthritis of right hip  M16 11    3  Pre-op testing  Z01 [id number]                   Assessment  Assessment details: Reema Damon is a 73 y o  female presenting to outpatient physical therapy at [facility] with complaints of R hip pain, pre-op testing for R DENI on 22   They present with decreased range of motion, decreased strength, limited flexibility, poor postural awareness, poor body mechanics, poor balance, decreased tolerance to activity and decreased functional mobility due to Right hip pain  (primary encounter diagnosis)  Primary osteoarthritis of right hip  Pre-op testing  Jay Parker would benefit from skilled physical therapy to address noted impairments in order to allow for full functional return to work and ADL-related activities  Thank you for the referral!  Impairments: abnormal gait, abnormal muscle firing, abnormal or restricted ROM, activity intolerance, impaired balance, impaired physical strength, lacks appropriate home exercise program, pain with function and poor body mechanics  Barriers to therapy: n/a  Understanding of Dx/Px/POC: excellent  Goals  ST   Independent with HEP  - met      Plan  Plan details: D/c to HEP and f/u post-op    Patient would benefit from: skilled physical therapy  Planned modality interventions: cryotherapy, electrical stimulation/Russian stimulation and thermotherapy: hydrocollator packs  Planned therapy interventions: abdominal trunk stabilization, manual therapy, neuromuscular re-education, therapeutic activities, therapeutic exercise, body mechanics training and home exercise program  Treatment plan discussed with: patient        Subjective Evaluation    History of Present Illness  Mechanism of injury: Pt c/o R hip pain  She is scheduled for R DENI on 8/8/22 with Dr Rekha Nickerson secondary to R hip OA  Pain levels are 4/10 on avg within the R hip joint, buttocks, groin and occasional R knee pain  Agg with standing from prolonged sitting, walking  Eased with rest/lying down, ibupfrofen daily  Denies N/T, instability, balance issues or delacruz weakness of the R hip  Occasional clicking  She is able to perform all ADLs and driving independently  No AD use for walking  She is busy with walking her dog, doing office work and babysitting her grandkids (8 and 10 y/o)  She lives in an in-law suite type apartment below her son  Her family is available for help as needed  She has been working to make her space more accessible following surgery such as shower chairs, grab bars  No stairs to enter her apartment  Her sister is staying with her the first week post-op  Patient Goals  Patient goals for therapy: decreased edema, decreased pain, improved balance, increased motion, return to work, return to [employer] activities, independence with ADLs/IADLs and increased strength          Objective     Observations     Additional Observation Details  Standing posture with minimal WB through RLE    Tenderness     Additional Tenderness Details  TTP R greater troch, ant joint line    SLR to 90 deg bilat, HS length good    Lumbar Screen  Lumbar range of motion within normal limits      Active Range of Motion     Right Hip   Flexion: WFL and with pain  Extension: 0 degrees with pain  External rotation (90/90): 45 degrees with pain  Internal rotation (90/90): 0 degrees with pain    Strength/Myotome Testing     Right Hip   Planes of Motion   Flexion: 3+  Extension: 4  Abduction: 4+  Adduction: 4+  External rotation: 4+  Internal rotation: 4    Right Knee   Flexion: 5  Extension: 5  Quadriceps contraction: good    Functional Assessment        Comments  Gait: antalgic             Precautions: scheduled for R DENI 8/8/22    HEP:  Access Code: [email or web address]  URL: [email or web address]  Date: 07/26/2022  Prepared by: Carlos England    Exercises  · Glute squeeze + bridge - 15 reps  · Supine Active Straight Leg Raise - 15 reps  · Sidelying Hip Abduction - 15 reps  · Supine Hip Adduction Isometric with Ball - 15 reps  · Hip Hiking on Step - 30 sec hold            Manuals                                                                 Neuro Re-Ed                                                                                                        Ther Ex                                                                                                                     Ther Activity                                       Gait Training                                       Modalities             [facility] 10'

## 2022-08-01 DIAGNOSIS — Z01.818 PREOP TESTING: ICD-10-CM

## 2022-08-01 DIAGNOSIS — M16.11 PRIMARY OSTEOARTHRITIS OF RIGHT HIP: ICD-10-CM

## 2022-08-02 RX ORDER — FOLIC ACID 1 MG/1
TABLET ORAL
Qty: 90 TABLET | Refills: 1 | Status: SHIPPED | OUTPATIENT
Start: 2022-08-02

## 2022-08-02 RX ORDER — FERROUS SULFATE TAB EC 324 MG (65 MG FE EQUIVALENT) 324 (65 FE) MG
TABLET DELAYED RESPONSE ORAL
Qty: 180 TABLET | Refills: 1 | Status: SHIPPED | OUTPATIENT
Start: 2022-08-02

## 2022-08-02 RX ORDER — LORATADINE 10 MG
TABLET ORAL
Qty: 180 TABLET | Refills: 1 | Status: SHIPPED | OUTPATIENT
Start: 2022-08-02

## 2022-08-08 ENCOUNTER — APPOINTMENT (OUTPATIENT)
Dept: RADIOLOGY | Facility: HOSPITAL | Age: 67
End: 2022-08-08
Payer: COMMERCIAL

## 2022-08-08 ENCOUNTER — ANESTHESIA EVENT (OUTPATIENT)
Dept: PERIOP | Facility: HOSPITAL | Age: 67
End: 2022-08-08
Payer: COMMERCIAL

## 2022-08-08 ENCOUNTER — ANESTHESIA (OUTPATIENT)
Dept: PERIOP | Facility: HOSPITAL | Age: 67
End: 2022-08-08
Payer: COMMERCIAL

## 2022-08-08 ENCOUNTER — HOSPITAL ENCOUNTER (OUTPATIENT)
Facility: HOSPITAL | Age: 67
Setting detail: OUTPATIENT SURGERY
Discharge: HOME | End: 2022-08-09
Attending: ORTHOPAEDIC SURGERY | Admitting: ORTHOPAEDIC SURGERY
Payer: COMMERCIAL

## 2022-08-08 DIAGNOSIS — M16.11 PRIMARY OSTEOARTHRITIS OF RIGHT HIP: Primary | ICD-10-CM

## 2022-08-08 LAB
ABO GROUP BLD: NORMAL
BLD GP AB SCN SERPL QL: NEGATIVE
PLATELET # BLD AUTO: 279 THOUSANDS/UL (ref 149–390)
PMV BLD AUTO: 8.8 FL (ref 8.9–12.7)
RH BLD: NEGATIVE
SPECIMEN EXPIRATION DATE: NORMAL

## 2022-08-08 PROCEDURE — 85049 AUTOMATED PLATELET COUNT: CPT | Performed by: PHYSICIAN ASSISTANT

## 2022-08-08 PROCEDURE — 86900 BLOOD TYPING SEROLOGIC ABO: CPT | Performed by: STUDENT IN AN ORGANIZED HEALTH CARE EDUCATION/TRAINING PROGRAM

## 2022-08-08 PROCEDURE — 86901 BLOOD TYPING SEROLOGIC RH(D): CPT | Performed by: STUDENT IN AN ORGANIZED HEALTH CARE EDUCATION/TRAINING PROGRAM

## 2022-08-08 PROCEDURE — 97162 PT EVAL MOD COMPLEX 30 MIN: CPT

## 2022-08-08 PROCEDURE — 27130 TOTAL HIP ARTHROPLASTY: CPT | Performed by: ORTHOPAEDIC SURGERY

## 2022-08-08 PROCEDURE — C1776 JOINT DEVICE (IMPLANTABLE): HCPCS | Performed by: ORTHOPAEDIC SURGERY

## 2022-08-08 PROCEDURE — 27130 TOTAL HIP ARTHROPLASTY: CPT | Performed by: PHYSICIAN ASSISTANT

## 2022-08-08 PROCEDURE — C1713 ANCHOR/SCREW BN/BN,TIS/BN: HCPCS | Performed by: ORTHOPAEDIC SURGERY

## 2022-08-08 PROCEDURE — 97116 GAIT TRAINING THERAPY: CPT

## 2022-08-08 PROCEDURE — 99219 PR INITIAL OBSERVATION CARE/DAY 50 MINUTES: CPT

## 2022-08-08 PROCEDURE — 99024 POSTOP FOLLOW-UP VISIT: CPT | Performed by: ORTHOPAEDIC SURGERY

## 2022-08-08 PROCEDURE — 72170 X-RAY EXAM OF PELVIS: CPT

## 2022-08-08 PROCEDURE — 97166 OT EVAL MOD COMPLEX 45 MIN: CPT

## 2022-08-08 PROCEDURE — 86850 RBC ANTIBODY SCREEN: CPT | Performed by: STUDENT IN AN ORGANIZED HEALTH CARE EDUCATION/TRAINING PROGRAM

## 2022-08-08 DEVICE — ACTIS DUOFIX HIP PROSTHESIS (FEMORAL STEM 12/14 TAPER CEMENTLESS SIZE 3 HIGH COLLAR)  CE
Type: IMPLANTABLE DEVICE | Site: HIP | Status: FUNCTIONAL
Brand: ACTIS

## 2022-08-08 DEVICE — ARTICUL/EZE FEMORAL HEAD DIAMETER 32MM +5 12/14 TAPER
Type: IMPLANTABLE DEVICE | Site: HIP | Status: FUNCTIONAL
Brand: ARTICUL/EZE

## 2022-08-08 DEVICE — PINNACLE CANCELLOUS BONE SCREW 6.5MM X 20MM
Type: IMPLANTABLE DEVICE | Site: HIP | Status: FUNCTIONAL
Brand: PINNACLE

## 2022-08-08 DEVICE — PINNACLE POROCOAT ACETABULAR SHELL SECTOR II 48MM OD
Type: IMPLANTABLE DEVICE | Site: HIP | Status: FUNCTIONAL
Brand: PINNACLE POROCOAT

## 2022-08-08 DEVICE — PINNACLE HIP SOLUTIONS ALTRX POLYETHYLENE ACETABULAR LINER +4 10 DEGREES 32MM ID 48MM OD
Type: IMPLANTABLE DEVICE | Site: HIP | Status: FUNCTIONAL
Brand: PINNACLE ALTRX

## 2022-08-08 RX ORDER — GABAPENTIN 100 MG/1
100 CAPSULE ORAL EVERY 8 HOURS
Status: DISCONTINUED | OUTPATIENT
Start: 2022-08-08 | End: 2022-08-09 | Stop reason: HOSPADM

## 2022-08-08 RX ORDER — TIMOLOL MALEATE 5 MG/ML
1 SOLUTION/ DROPS OPHTHALMIC DAILY
Status: DISCONTINUED | OUTPATIENT
Start: 2022-08-08 | End: 2022-08-09 | Stop reason: HOSPADM

## 2022-08-08 RX ORDER — ONDANSETRON 2 MG/ML
4 INJECTION INTRAMUSCULAR; INTRAVENOUS ONCE AS NEEDED
Status: DISCONTINUED | OUTPATIENT
Start: 2022-08-08 | End: 2022-08-08 | Stop reason: HOSPADM

## 2022-08-08 RX ORDER — SENNOSIDES 8.6 MG
1 TABLET ORAL DAILY
Status: DISCONTINUED | OUTPATIENT
Start: 2022-08-08 | End: 2022-08-09 | Stop reason: HOSPADM

## 2022-08-08 RX ORDER — BISACODYL 10 MG
10 SUPPOSITORY, RECTAL RECTAL DAILY PRN
Status: DISCONTINUED | OUTPATIENT
Start: 2022-08-08 | End: 2022-08-09 | Stop reason: HOSPADM

## 2022-08-08 RX ORDER — MAGNESIUM HYDROXIDE 1200 MG/15ML
LIQUID ORAL AS NEEDED
Status: DISCONTINUED | OUTPATIENT
Start: 2022-08-08 | End: 2022-08-08 | Stop reason: HOSPADM

## 2022-08-08 RX ORDER — ONDANSETRON 2 MG/ML
4 INJECTION INTRAMUSCULAR; INTRAVENOUS EVERY 6 HOURS PRN
Status: DISCONTINUED | OUTPATIENT
Start: 2022-08-08 | End: 2022-08-09 | Stop reason: HOSPADM

## 2022-08-08 RX ORDER — ASCORBIC ACID 500 MG
500 TABLET ORAL 2 TIMES DAILY
Status: DISCONTINUED | OUTPATIENT
Start: 2022-08-08 | End: 2022-08-09 | Stop reason: HOSPADM

## 2022-08-08 RX ORDER — DOCUSATE SODIUM 100 MG/1
100 CAPSULE, LIQUID FILLED ORAL 2 TIMES DAILY
Status: DISCONTINUED | OUTPATIENT
Start: 2022-08-08 | End: 2022-08-09 | Stop reason: HOSPADM

## 2022-08-08 RX ORDER — METOCLOPRAMIDE HYDROCHLORIDE 5 MG/ML
10 INJECTION INTRAMUSCULAR; INTRAVENOUS ONCE AS NEEDED
Status: DISCONTINUED | OUTPATIENT
Start: 2022-08-08 | End: 2022-08-08 | Stop reason: HOSPADM

## 2022-08-08 RX ORDER — CHLORHEXIDINE GLUCONATE 0.12 MG/ML
15 RINSE ORAL ONCE
Status: COMPLETED | OUTPATIENT
Start: 2022-08-08 | End: 2022-08-08

## 2022-08-08 RX ORDER — SODIUM CHLORIDE, SODIUM LACTATE, POTASSIUM CHLORIDE, CALCIUM CHLORIDE 600; 310; 30; 20 MG/100ML; MG/100ML; MG/100ML; MG/100ML
75 INJECTION, SOLUTION INTRAVENOUS CONTINUOUS
Status: DISCONTINUED | OUTPATIENT
Start: 2022-08-08 | End: 2022-08-09 | Stop reason: HOSPADM

## 2022-08-08 RX ORDER — FAMOTIDINE 10 MG/ML
20 INJECTION, SOLUTION INTRAVENOUS ONCE AS NEEDED
Status: COMPLETED | OUTPATIENT
Start: 2022-08-08 | End: 2022-08-08

## 2022-08-08 RX ORDER — FENTANYL CITRATE 50 UG/ML
INJECTION, SOLUTION INTRAMUSCULAR; INTRAVENOUS AS NEEDED
Status: DISCONTINUED | OUTPATIENT
Start: 2022-08-08 | End: 2022-08-08

## 2022-08-08 RX ORDER — MAGNESIUM HYDROXIDE/ALUMINUM HYDROXICE/SIMETHICONE 120; 1200; 1200 MG/30ML; MG/30ML; MG/30ML
30 SUSPENSION ORAL EVERY 6 HOURS PRN
Status: DISCONTINUED | OUTPATIENT
Start: 2022-08-08 | End: 2022-08-09 | Stop reason: HOSPADM

## 2022-08-08 RX ORDER — FERROUS SULFATE 325(65) MG
325 TABLET ORAL 2 TIMES DAILY WITH MEALS
Status: DISCONTINUED | OUTPATIENT
Start: 2022-08-08 | End: 2022-08-09 | Stop reason: HOSPADM

## 2022-08-08 RX ORDER — BUPIVACAINE HYDROCHLORIDE 7.5 MG/ML
INJECTION, SOLUTION INTRASPINAL AS NEEDED
Status: DISCONTINUED | OUTPATIENT
Start: 2022-08-08 | End: 2022-08-08

## 2022-08-08 RX ORDER — PANTOPRAZOLE SODIUM 40 MG/1
40 TABLET, DELAYED RELEASE ORAL
Status: DISCONTINUED | OUTPATIENT
Start: 2022-08-09 | End: 2022-08-09 | Stop reason: HOSPADM

## 2022-08-08 RX ORDER — SIMETHICONE 80 MG
80 TABLET,CHEWABLE ORAL 4 TIMES DAILY PRN
Status: DISCONTINUED | OUTPATIENT
Start: 2022-08-08 | End: 2022-08-09 | Stop reason: HOSPADM

## 2022-08-08 RX ORDER — OXYCODONE HYDROCHLORIDE 5 MG/1
10 TABLET ORAL EVERY 4 HOURS PRN
Status: DISCONTINUED | OUTPATIENT
Start: 2022-08-08 | End: 2022-08-09 | Stop reason: HOSPADM

## 2022-08-08 RX ORDER — ONDANSETRON 2 MG/ML
INJECTION INTRAMUSCULAR; INTRAVENOUS AS NEEDED
Status: DISCONTINUED | OUTPATIENT
Start: 2022-08-08 | End: 2022-08-08

## 2022-08-08 RX ORDER — MIDAZOLAM HYDROCHLORIDE 2 MG/2ML
INJECTION, SOLUTION INTRAMUSCULAR; INTRAVENOUS AS NEEDED
Status: DISCONTINUED | OUTPATIENT
Start: 2022-08-08 | End: 2022-08-08

## 2022-08-08 RX ORDER — SODIUM CHLORIDE, SODIUM LACTATE, POTASSIUM CHLORIDE, CALCIUM CHLORIDE 600; 310; 30; 20 MG/100ML; MG/100ML; MG/100ML; MG/100ML
50 INJECTION, SOLUTION INTRAVENOUS CONTINUOUS
Status: DISCONTINUED | OUTPATIENT
Start: 2022-08-08 | End: 2022-08-08

## 2022-08-08 RX ORDER — OXYCODONE HYDROCHLORIDE 5 MG/1
5 TABLET ORAL EVERY 4 HOURS PRN
Status: DISCONTINUED | OUTPATIENT
Start: 2022-08-08 | End: 2022-08-09 | Stop reason: HOSPADM

## 2022-08-08 RX ORDER — HYDROMORPHONE HCL/PF 1 MG/ML
0.5 SYRINGE (ML) INJECTION
Status: DISCONTINUED | OUTPATIENT
Start: 2022-08-08 | End: 2022-08-08 | Stop reason: HOSPADM

## 2022-08-08 RX ORDER — IBUPROFEN 800 MG/1
TABLET ORAL 2 TIMES DAILY PRN
COMMUNITY
End: 2022-08-09

## 2022-08-08 RX ORDER — CYCLOBENZAPRINE HCL 5 MG
5 TABLET ORAL 3 TIMES DAILY
Status: DISCONTINUED | OUTPATIENT
Start: 2022-08-08 | End: 2022-08-09 | Stop reason: HOSPADM

## 2022-08-08 RX ORDER — LISINOPRIL 10 MG/1
10 TABLET ORAL DAILY
Status: DISCONTINUED | OUTPATIENT
Start: 2022-08-09 | End: 2022-08-09 | Stop reason: HOSPADM

## 2022-08-08 RX ORDER — FOLIC ACID 1 MG/1
1000 TABLET ORAL DAILY
Status: DISCONTINUED | OUTPATIENT
Start: 2022-08-08 | End: 2022-08-09 | Stop reason: HOSPADM

## 2022-08-08 RX ORDER — CHLORHEXIDINE GLUCONATE 4 G/100ML
SOLUTION TOPICAL DAILY PRN
Status: DISCONTINUED | OUTPATIENT
Start: 2022-08-08 | End: 2022-08-08

## 2022-08-08 RX ORDER — EPHEDRINE SULFATE 50 MG/ML
INJECTION INTRAVENOUS AS NEEDED
Status: DISCONTINUED | OUTPATIENT
Start: 2022-08-08 | End: 2022-08-08

## 2022-08-08 RX ORDER — METOCLOPRAMIDE HYDROCHLORIDE 5 MG/ML
10 INJECTION INTRAMUSCULAR; INTRAVENOUS ONCE AS NEEDED
Status: COMPLETED | OUTPATIENT
Start: 2022-08-08 | End: 2022-08-08

## 2022-08-08 RX ORDER — CEFAZOLIN SODIUM 2 G/50ML
2000 SOLUTION INTRAVENOUS ONCE
Status: COMPLETED | OUTPATIENT
Start: 2022-08-08 | End: 2022-08-08

## 2022-08-08 RX ORDER — PROPOFOL 10 MG/ML
INJECTION, EMULSION INTRAVENOUS CONTINUOUS PRN
Status: DISCONTINUED | OUTPATIENT
Start: 2022-08-08 | End: 2022-08-08

## 2022-08-08 RX ORDER — CALCIUM CARBONATE 200(500)MG
1000 TABLET,CHEWABLE ORAL DAILY PRN
Status: DISCONTINUED | OUTPATIENT
Start: 2022-08-08 | End: 2022-08-09 | Stop reason: HOSPADM

## 2022-08-08 RX ORDER — DEXAMETHASONE SODIUM PHOSPHATE 10 MG/ML
INJECTION, SOLUTION INTRAMUSCULAR; INTRAVENOUS AS NEEDED
Status: DISCONTINUED | OUTPATIENT
Start: 2022-08-08 | End: 2022-08-08

## 2022-08-08 RX ORDER — VERAPAMIL HYDROCHLORIDE 240 MG/1
240 TABLET, FILM COATED, EXTENDED RELEASE ORAL
Status: DISCONTINUED | OUTPATIENT
Start: 2022-08-09 | End: 2022-08-09 | Stop reason: HOSPADM

## 2022-08-08 RX ORDER — ATORVASTATIN CALCIUM 40 MG/1
40 TABLET, FILM COATED ORAL DAILY
Status: DISCONTINUED | OUTPATIENT
Start: 2022-08-08 | End: 2022-08-09 | Stop reason: HOSPADM

## 2022-08-08 RX ORDER — ENOXAPARIN SODIUM 100 MG/ML
40 INJECTION SUBCUTANEOUS DAILY
Status: DISCONTINUED | OUTPATIENT
Start: 2022-08-09 | End: 2022-08-09 | Stop reason: HOSPADM

## 2022-08-08 RX ORDER — CEFAZOLIN SODIUM 2 G/50ML
2000 SOLUTION INTRAVENOUS EVERY 8 HOURS
Status: DISPENSED | OUTPATIENT
Start: 2022-08-08 | End: 2022-08-09

## 2022-08-08 RX ORDER — SODIUM CHLORIDE, SODIUM LACTATE, POTASSIUM CHLORIDE, CALCIUM CHLORIDE 600; 310; 30; 20 MG/100ML; MG/100ML; MG/100ML; MG/100ML
INJECTION, SOLUTION INTRAVENOUS CONTINUOUS PRN
Status: DISCONTINUED | OUTPATIENT
Start: 2022-08-08 | End: 2022-08-08

## 2022-08-08 RX ORDER — ACETAMINOPHEN 325 MG/1
975 TABLET ORAL EVERY 8 HOURS
Status: DISCONTINUED | OUTPATIENT
Start: 2022-08-08 | End: 2022-08-09 | Stop reason: HOSPADM

## 2022-08-08 RX ORDER — TRANEXAMIC ACID 100 MG/ML
INJECTION, SOLUTION INTRAVENOUS AS NEEDED
Status: DISCONTINUED | OUTPATIENT
Start: 2022-08-08 | End: 2022-08-08

## 2022-08-08 RX ADMIN — TRANEXAMIC ACID 1000 G: 100 INJECTION, SOLUTION INTRAVENOUS at 10:08

## 2022-08-08 RX ADMIN — SODIUM CHLORIDE, POTASSIUM CHLORIDE, SODIUM LACTATE AND CALCIUM CHLORIDE 50 ML/HR: 600; 310; 30; 20 INJECTION, SOLUTION INTRAVENOUS at 09:01

## 2022-08-08 RX ADMIN — SODIUM CHLORIDE, SODIUM LACTATE, POTASSIUM CHLORIDE, AND CALCIUM CHLORIDE: .6; .31; .03; .02 INJECTION, SOLUTION INTRAVENOUS at 10:07

## 2022-08-08 RX ADMIN — PROPOFOL 80 MCG/KG/MIN: 10 INJECTION, EMULSION INTRAVENOUS at 10:18

## 2022-08-08 RX ADMIN — BUPIVACAINE HYDROCHLORIDE IN DEXTROSE 1.2 ML: 7.5 INJECTION, SOLUTION SUBARACHNOID at 10:17

## 2022-08-08 RX ADMIN — FERROUS SULFATE TAB 325 MG (65 MG ELEMENTAL FE) 325 MG: 325 (65 FE) TAB at 15:43

## 2022-08-08 RX ADMIN — SENNOSIDES 8.6 MG: 8.6 TABLET, FILM COATED ORAL at 13:46

## 2022-08-08 RX ADMIN — Medication 1 TABLET: at 13:46

## 2022-08-08 RX ADMIN — FOLIC ACID 1000 MCG: 1 TABLET ORAL at 13:46

## 2022-08-08 RX ADMIN — EPHEDRINE SULFATE 10 MG: 50 INJECTION INTRAVENOUS at 10:41

## 2022-08-08 RX ADMIN — TRANEXAMIC ACID 1000 MG: 1 INJECTION, SOLUTION INTRAVENOUS at 09:01

## 2022-08-08 RX ADMIN — FENTANYL CITRATE 0.15 MCG: 50 INJECTION, SOLUTION INTRAMUSCULAR; INTRAVENOUS at 10:17

## 2022-08-08 RX ADMIN — ONDANSETRON 4 MG: 2 INJECTION INTRAMUSCULAR; INTRAVENOUS at 11:53

## 2022-08-08 RX ADMIN — GABAPENTIN 100 MG: 100 CAPSULE ORAL at 23:06

## 2022-08-08 RX ADMIN — GABAPENTIN 100 MG: 100 CAPSULE ORAL at 13:46

## 2022-08-08 RX ADMIN — CEFAZOLIN SODIUM 2000 MG: 2 SOLUTION INTRAVENOUS at 18:01

## 2022-08-08 RX ADMIN — MIDAZOLAM 2 MG: 1 INJECTION INTRAMUSCULAR; INTRAVENOUS at 10:08

## 2022-08-08 RX ADMIN — CHLORHEXIDINE GLUCONATE 15 ML: 1.2 SOLUTION ORAL at 09:01

## 2022-08-08 RX ADMIN — CEFAZOLIN SODIUM 2000 MG: 2 SOLUTION INTRAVENOUS at 10:18

## 2022-08-08 RX ADMIN — FAMOTIDINE 20 MG: 10 INJECTION INTRAVENOUS at 12:44

## 2022-08-08 RX ADMIN — DOCUSATE SODIUM 100 MG: 100 CAPSULE, LIQUID FILLED ORAL at 18:01

## 2022-08-08 RX ADMIN — OXYCODONE HYDROCHLORIDE 5 MG: 5 TABLET ORAL at 13:46

## 2022-08-08 RX ADMIN — DEXAMETHASONE SODIUM PHOSPHATE 10 MG: 10 INJECTION, SOLUTION INTRAMUSCULAR; INTRAVENOUS at 10:08

## 2022-08-08 RX ADMIN — OXYCODONE HYDROCHLORIDE AND ACETAMINOPHEN 500 MG: 500 TABLET ORAL at 18:01

## 2022-08-08 RX ADMIN — SODIUM CHLORIDE, SODIUM LACTATE, POTASSIUM CHLORIDE, AND CALCIUM CHLORIDE: .6; .31; .03; .02 INJECTION, SOLUTION INTRAVENOUS at 11:08

## 2022-08-08 RX ADMIN — SODIUM CHLORIDE, POTASSIUM CHLORIDE, SODIUM LACTATE AND CALCIUM CHLORIDE 75 ML/HR: 600; 310; 30; 20 INJECTION, SOLUTION INTRAVENOUS at 18:02

## 2022-08-08 RX ADMIN — SODIUM CHLORIDE, POTASSIUM CHLORIDE, SODIUM LACTATE AND CALCIUM CHLORIDE 75 ML/HR: 600; 310; 30; 20 INJECTION, SOLUTION INTRAVENOUS at 13:47

## 2022-08-08 RX ADMIN — ACETAMINOPHEN 975 MG: 325 TABLET ORAL at 13:46

## 2022-08-08 RX ADMIN — MORPHINE SULFATE 2 MG: 2 INJECTION, SOLUTION INTRAMUSCULAR; INTRAVENOUS at 15:46

## 2022-08-08 RX ADMIN — ATORVASTATIN CALCIUM 40 MG: 40 TABLET, FILM COATED ORAL at 13:46

## 2022-08-08 RX ADMIN — EPHEDRINE SULFATE 10 MG: 50 INJECTION INTRAVENOUS at 10:54

## 2022-08-08 RX ADMIN — ACETAMINOPHEN 975 MG: 325 TABLET ORAL at 23:06

## 2022-08-08 RX ADMIN — OXYCODONE HYDROCHLORIDE 10 MG: 5 TABLET ORAL at 20:07

## 2022-08-08 RX ADMIN — METOCLOPRAMIDE 10 MG: 5 INJECTION, SOLUTION INTRAMUSCULAR; INTRAVENOUS at 12:36

## 2022-08-08 NOTE — H&P
Assessment:     1  Primary osteoarthritis of right hip        Plan:     Problem List Items Addressed This Visit    Primary osteoarthritis of right hip          Findings consistent with right hip moderate to severe osteoarthritis  Findings and treatment options were discussed the patient  She has failed conservative treatment of intra-articular cortisone injection to the right hip joint  She is here to proceed in scheduling the right total hip arthroplasty  All patient's questions were answered to her satisfaction  This note is created using dictation transcription  It may contain typographical errors, grammatical errors, improperly dictated words, background noise and other errors  Discussed with patient surgical risks and complications including but not limited to infection, persistent pain, nerve and vessel injury, complications associated with anesthesia, DVT, exposure to COVID virus, problem with prosthesis, hip dislocation, leg-length discrepancy, etc   Patient understands the risks and complication and consented to the surgery  Subjective:     Patient ID: Phil Martinez is a 73 y o  female  Chief Complaint: This is a 28-year-old white female following up for her advanced right hip osteoarthritis  She had a previous intra-articular cortisone injection with Dr Martín Shaffer on March 24, 2020 to with short term relief  She continues to have aching pain in the right groin  It is difficult to walk long distances  She is here to proceed with right total hip arthroplasty      Allergy:  Allergies   Allergen Reactions    Erythromycin Nausea Only     Other reaction(s): GI Reaction     Medications:  all current active meds have been reviewed  Past Medical History:  Past Medical History:   Diagnosis Date    COVID-19 05/2022    Fibromyalgia, primary     GERD (gastroesophageal reflux disease) 1999    Hyperlipidemia     Hypertension     Lactose intolerance 1984    Small bowel obstruction (HCC)      Past Surgical History:  Past Surgical History:   Procedure Laterality Date    ABDOMINAL SURGERY      ADENOIDECTOMY      pt has 2nd appendix removed   BOWEL RESECTION      CHOLECYSTECTOMY      EYE SURGERY      lasik    OOPHORECTOMY      one     [place] LAP,DIAGNOSTIC ABDOMEN N/A 2020    Procedure: LAPAROSCOPY DIAGNOSTIC, lysis of adhesions , possible bowel resection;  Surgeon: Antony Bonner MD;  Location:  MAIN OR;  Service: General    TUBAL LIGATION       Family History:  Family History   Problem Relation Age of Onset    Fibromyalgia Mother     Arthritis Mother     Hypertension Mother     Vision loss Mother         Glaucoma    Heart disease Father     GI problems Father     Hypertension Father     Vision loss Father         Macular Degeneration    Birth defects Sister         Arterial Vascular Malformation    No Known Problems Daughter     No Known Problems Maternal Grandmother     Diabetes Maternal Grandfather     No Known Problems Paternal Grandmother     No Known Problems Paternal Grandfather     No Known Problems Sister     No Known Problems Sister     No Known Problems Paternal Aunt     No Known Problems Paternal Aunt     Mental illness Brother     Colon cancer Neg Hx     Colon polyps Neg Hx      Social History:  Social History     Substance and Sexual Activity   Alcohol Use Yes    Comment: rarely, 3 x year     Social History     Substance and Sexual Activity   Drug Use Yes    Types: Marijuana    Comment: medical marijuana     Social History     Tobacco Use   Smoking Status Former Smoker    Packs/day: 2 00    Years: 30 00    Pack years: 60 00    Types: Cigarettes    Start date: 1973   [employer] Quit date: 2020    Years since quittin 9   Smokeless Tobacco Never Used     Review of Systems   Constitutional: Negative for chills and fever  HENT: Negative for ear pain and sore throat  Eyes: Negative for pain and visual disturbance     Respiratory: Negative for cough and shortness of breath  Cardiovascular: Negative for chest pain and palpitations  Gastrointestinal: Negative for abdominal pain and vomiting  Genitourinary: Negative for dysuria and hematuria  Musculoskeletal: Positive for arthralgias (Right groin, right low back) and gait problem (Antalgic)  Negative for back pain and joint swelling  Skin: Negative for color change and rash  Neurological: Negative for seizures, syncope, weakness and numbness  Psychiatric/Behavioral: Negative  All other systems reviewed and are negative  Objective:  /65   Pulse 55   Temp (!) 97 1 °F (36 2 °C) (Temporal)   Resp 18   Ht 5' 4" (1 626 m)   Wt 78 2 kg (172 lb 4 8 oz)   SpO2 95%   BMI 29 58 kg/m²      Physical Exam  Vitals and nursing note reviewed  Constitutional:       Appearance: Normal appearance  She is well-developed  HENT:      Head: Normocephalic and atraumatic  Right Ear: External ear normal       Left Ear: External ear normal    Eyes:      Extraocular Movements: Extraocular movements intact  Conjunctiva/sclera: Conjunctivae normal       Pupils: Pupils are equal, round, and reactive to light  Cardiovascular:      Rate and Rhythm: Normal rate and regular rhythm  Heart sounds: Normal heart sounds  No murmur heard  No gallop  Pulmonary:      Effort: Pulmonary effort is normal  No respiratory distress  Breath sounds: Normal breath sounds  Chest:      Chest wall: No tenderness  Abdominal:      Palpations: Abdomen is soft  Musculoskeletal:      Cervical back: Normal range of motion and neck supple  Skin:     General: Skin is warm and dry  Neurological:      Mental Status: She is alert and oriented to person, place, and time  Deep Tendon Reflexes: Reflexes are normal and symmetric  Psychiatric:         Mood and Affect: Mood normal          Behavior: Behavior normal        Right Hip Exam     Tenderness   The patient is experiencing no tenderness  Range of Motion   Flexion: 110 (pain)   External rotation: 40   Internal rotation: 5 (pain)     Muscle Strength   The patient has normal right hip strength  Tests   AYDEN: positive  Austen: negative    Other   Erythema: absent  Scars: absent  Sensation: normal  Pulse: present    Comments:                I have personally reviewed pertinent films in PACS and my interpretation is moderate to severe right hip osteoarthritis

## 2022-08-08 NOTE — OCCUPATIONAL THERAPY NOTE
Occupational Therapy Evaluation     Patient Name: Michelet Alaniz  CDPBG'R Date: 8/8/2022  Problem List  Principal Problem:    Primary osteoarthritis of right hip  Active Problems:    GERD (gastroesophageal reflux disease)    Hypertension    Hyperlipidemia    Past Medical History  Past Medical History:   Diagnosis Date    COVID-19 05/2022    Fibromyalgia, primary     GERD (gastroesophageal reflux disease) 1999    Hyperlipidemia     Hypertension     Lactose intolerance 1984    Small bowel obstruction ([address] )      Past Surgical History  Past Surgical History:   Procedure Laterality Date    ABDOMINAL SURGERY  2020    ADENOIDECTOMY      pt has 2nd appendix removed  BOWEL RESECTION      CHOLECYSTECTOMY  2000    EYE SURGERY      lasik    OOPHORECTOMY      one     [place] LAP,DIAGNOSTIC ABDOMEN N/A 04/21/2020    Procedure: LAPAROSCOPY DIAGNOSTIC, lysis of adhesions , possible bowel resection;  Surgeon: María Elena Guerrier MD;  Location: [facility] MAIN OR;  Service: General    TUBAL LIGATION           08/08/22 3055   OT Last Visit   OT Visit Date 08/08/22   Note Type   Note type Evaluation   Restrictions/Precautions   Weight Bearing Precautions Per Order Yes   RLE Weight Bearing Per Order WBAT   Other Precautions Fall Risk;THR   Pain Assessment   Pain Assessment Tool 0-10   Pain Score 5   Pain Location/Orientation Orientation: Right;Location: Hip   Hospital Pain Intervention(s) Repositioned; Rest   Home Living   Type of Home Apartment  (In-law suite of son's home)   Home Layout One level   Bathroom Shower/Tub Walk-in shower   Bathroom Equipment Shower chair;Commode   Home Equipment Walker   Additional Comments Pt reports that she may stay at sister's home which requries steps to enter   Sister's home also has a stall shower with shower chair   Prior Function   Level of [place] Independent with ADLs and functional mobility   Lives With Alone   Receives Help From Family   ADL Assistance Independent   IADLs Independent   Subjective Subjective Pt received in supine position  Pt agreeable to session  ADL   Eating Assistance 7  Independent   Grooming Assistance 7  Independent   UB Bathing Assistance 5  Supervision/Setup   LB Bathing Assistance 2  Maximal Assistance   UB Dressing Assistance 5  Supervision/Setup   LB Dressing Assistance 2  Maximal [address]  4  Minimal Assistance   Bed Mobility   Supine to Sit 5  Supervision   Additional items Verbal cues; Increased time required   Additional Comments Pt remained seated in recliner by end of session   Transfers   Sit to Stand 5  Supervision   Stand to Sit 5  Supervision   Toilet transfer 5  Supervision   Additional items Increased time required  (Commode over toilet)   Balance   Static Sitting Good   Dynamic Sitting Fair +   Static Standing Fair -   Dynamic Standing Fair -   Activity Tolerance   Activity Tolerance Patient tolerated treatment well   Medical Staff Made Aware PT Charley   Nurse Made Aware RN Cody Malave   RUE Assessment   RUE Assessment WFL   LUE Assessment   LUE Assessment WFL   Cognition   Overall Cognitive Status WFL   Arousal/Participation Alert   Attention Within functional limits   Orientation Level Oriented X4   Memory Within functional limits   Following Commands Follows all commands and directions without difficulty   Assessment   Limitation Decreased ADL status; Decreased self-care trans;Decreased high-level ADLs   Prognosis Good   Assessment Pt is a 73 y o  female seen for OT evaluation at [facility], admitted 8/8/2022 w/ Primary osteoarthritis of right hip  Pt now s/p right DENI  OT completed expanded review of pt's medical and social history  Comorbidities affecting pt's functional performance at time of assessment include: HTN, Peripheral vestibulopathy of both ears, etc (see chart)    Personal factors affecting pt at time of IE include:steps to enter environment, difficulty performing ADLS, difficulty performing IADLS  and decreased functional mobility  Prior to admission, pt was living alone in an in-law suite with no steps to manage  Pt was I w/  ADLS and IADLS, (+) drove, & required no DME/AD PTA  Pt reports that she may stay at sister's home which requires steps to manage  Upon evaluation: Pt requires sup for bed mobility, sup for functional mobility/transfers, sup for UB ADLs and min-max A for LB ADLS 2* the following deficits impacting occupational performance: weakness, decreased balance, decreased tolerance and orthopedic restrictions  Full objective findings from OT assessment regarding body systems outlined above  Pt to benefit from continued skilled OT tx while in the hospital to address deficits as defined above and maximize level of functional independence w ADL's and functional mobility  Occupational Performance areas to address include: bathing/shower, toilet hygiene, dressing and functional mobility  Based on findings, pt is of moderate complexity  The patient's raw score on the AM-PAC Daily Activity inpatient short form is 19, standardized score is 40 22, greater than 39 4  Patients at this level are likely to benefit from DC to home, which DOES coincide with CURRENT above OT recommendations  However please refer to therapist recommendation for discharge planning given other factors that may influence destination  At this time, OT recommendations at time of discharge are home with no OT needs  Goals   Patient Goals Pt wishes to get better   Plan   Treatment Interventions ADL retraining;Functional transfer training;Patient/family training; Compensatory technique education;Equipment evaluation/education   Goal Expiration Date 08/18/22   OT Treatment Day 0   OT Frequency 2-3x/wk   Recommendation   OT Discharge Recommendation No rehabilitation needs   AM-PAC Daily Activity Inpatient   Lower Body Dressing 2   Bathing 2   Toileting 3   Upper Body Dressing 4   Grooming 4   Eating 4   Daily Activity Raw Score 19   Daily Activity Standardized Score (Calc for Raw Score >=11) 40 22   AM-PAC Applied Cognition Inpatient   Following a Speech/Presentation 4   Understanding Ordinary Conversation 4   Taking Medications 4   Remembering Where Things Are Placed or Put Away 4   Remembering List of 4-5 Errands 4   Taking Care of Complicated Tasks 4   Applied Cognition Raw Score 24   Applied Cognition Standardized Score 62 21     Pt will achieve the following goals within 10 days  *Pt will complete UB bathing and dressing with mod I     *Pt will complete LB bathing and dressing with mod I      * Pt will complete toileting w/ mod I w/ G hygiene/thoroughness using DME PRN    *Pt will complete bed mobility with mod I, with bed flat and no side rail to prep for purposeful tasks    *Pt will perform functional transfers with on/off all surfaces with mod I using DME as needed w/ G balance/safety  *Pt will increase standing tolerance to 5 minutes in order to complete sinkside ADL task  *Pt will identify hip precautions to ensure safety upon discharge  *Pt will improve functional mobility during ADL/IADL/leisure tasks to mod I using DME as needed w/ G balance/safety       *Assess DME/AD needs         Erin Nath OTR/L

## 2022-08-08 NOTE — DISCHARGE INSTRUCTIONS
Dr Daniel Fermin MD  Department of [address]  [address], [address], [address] [facility]  [phone]                                        PATIENT INSTRUCTIONS AFTER TOTAL HIP REPLACEMENT/REVISION    Diet: You may resume your normal diet  It is important to maintain a healthy, balanced diet  Include plenty of fluids, as pain medicines tend to cause constipation  Medications  Pain Medications: A prescription for pain medication has been provided to upon your hospital discharge  Your goal is reduce your pain medication gradually over the next 4-6 weeks  Take your pain medicine with food to avoid stomach discomfort  Please allow at least 24-48 hours (Monday through Friday) from the time of your request to the time a will need the prescription  Constipation: To avoid constipation, take an over-the-counter stool softener (i e  docusate sodium) twice daily such as docusate sodium or Senna S twice daily and Miralax laxative once daily while on pain medication  If the combination does not alleviate your symptoms after 3 days, use a rectal suppository such as dulcolax  Blood Clot Prevention as below:   Aspirin 325 mg TWICE daily x 4 weeks                   Activity  Rest Periods: Gradually increase your activity on a daily basis  The amount of time you spend out of bed and the number and distance of your walks should gradually increase each day  Between activities such as walking, meals, exercises, etc , take a rest period for approximately 1 hour in the morning, afternoon and evening  Limit sitting to 1 hour intervals for the next 4 - 6 weeks  Weight-bearing: You may put as much weight as is comfortable on your operative leg with activity  Use a walker or crutches while walking for at least 3 weeks  At that time, it is advised to use a cane until you are able to apply full weight to the operated leg    Impact Loading: Low-impact activities such as golf, stationary bicycling and slow dancing may begin in 6 weeks, while swimming is allowed at 3 weeks after surgery  All activities involving quick starts and stops, or impact loading, such as tennis, should be avoided to lower your risk of early loosening of the prosthesis  Bathing:  DO NOT shower until seen in office for first post op visit  Driving: You should not drive until approximately 4 weeks after surgery  While you are traveling as a passenger for the first 4 weeks following surgery, it is advised that you get out of the car at least hourly and take a short walk  Returning to work: The decision to return to work will be based on the type of work you do, your physical stamina, and whether you have other medical conditions  We recommend that you avoid making any major changes in your work or halfway plans until your recovery is complete  Dislocation Precautions No crossing your legs for 3 months  Wound Care  Your incision is covered with a Mepilex dressing  It can be removed in one week  Then daily dressing change with gauze and tape can be done  Do NOT scrub or rub the incision  No creams or ointments on the incision for 4 weeks  Your incision may be warm, itchy, and slightly red for several weeks after surgery  Excessive redness, soreness, or drainage from the incision area should be reported to our office  Common Problems  Leg & ankle swelling: You may have some swelling in your operated leg that should gradually decrease  If swelling occurs, lie down, elevate your legs, and rest   Pain:  Pain may be a result of over-activity  When you are in pain, sit or lie down, elevated your legs, and rest   If the pain does not subside, take the pain medication prescribed for you  Pain is a protective mechanism that helps to prevent over-usage and should not be ignored  Return Appointments: You should have a scheduled appointment for followup    If you do not already have a followup appointment scheduled, please call the office at [phone] to schedule an appointment  Call our office if you have:  Temperature of 101o or higher  Drainage from your incision  Increasing redness around your incision  Increasing  pain around the incision, unrelieved by pain medication  Excessive calf or thigh pain & swelling that does not go away with elevation and rest      ANTIBIOTIC PROPHYLAXIS AFTER TOTAL JOINT REPLACEMENT  For protection against the remote possibility of blood-borne bacteria, carried from the mouth during a dental procedure, creating an infection in a total joint replacement, a combined task force of [employer] and the [address] [place] [address] has made the following guideline recommendations: Following total joint replacement, all patients are advised to take an antibiotic regimen for the following dental procedures AS LIFETIME THERAPY:  Prophylactic cleaning of teeth or implants  Intraligamentary local anesthetic injections  Periodontal procedures  Root canal procedures  Dental extractions  Dental implant procedures  Implantation of avulsed teeth  Initial placement of orthodontic bands    The recommended antibiotic regimen (if not allergic to penicillin) is:  Amoxicillin, Cephalexin (e g  Keflex), or Cephradine two (2 0) grams orally one (1) hour prior to the dental procedure  For patients with a penicillin allergy, the recommended antibiotic is:  Clindamycin (e g  Cleocin) 600 mg orally one hour prior to the dental procedure  Antibiotic prophylaxis is not warranted for dental procedures for patients with previously placed orthopedic pins, plates or screws  The above recommendations are considered minimum guidelines  Your doctor and/or dentist are ultimately responsible for making individual treatment recommendations to you based on their clinical judgment

## 2022-08-08 NOTE — ANESTHESIA POSTPROCEDURE EVALUATION
Post-Op Assessment Note    CV Status:  Stable  Pain Score: 0    Pain management: adequate     Mental Status:  Alert and awake   Hydration Status:  Euvolemic   PONV Controlled:  Controlled   Airway Patency:  Patent      Post Op Vitals Reviewed: Yes      Staff: CRNA, Anesthesiologist         No complications documented      BP   119/62   Temp  97 6   Pulse  66   Resp   17   SpO2   98

## 2022-08-08 NOTE — ASSESSMENT & PLAN NOTE
· S/P total hip arthroplasty right hip on 08/08 due to primary OA of right hip  · Orthopedics on board, appreciate recommendations  · Pain management  · Resume DVT prophylaxis 8/9 (lovenox)  · PT/OT eval

## 2022-08-08 NOTE — PLAN OF CARE
Problem: MOBILITY - ADULT  Goal: Maintain or return to baseline ADL function  Description: INTERVENTIONS:  -  Assess patient's ability to carry out ADLs; assess patient's baseline for ADL function and identify physical deficits which impact ability to perform ADLs (bathing, care of mouth/teeth, toileting, grooming, dressing, etc )  - Assess/evaluate cause of self-care deficits   - Assess range of motion  - Assess patient's mobility; develop plan if impaired  - Assess patient's need for assistive devices and provide as appropriate  - Encourage maximum independence but intervene and supervise when necessary  - Involve family in performance of ADLs  - Assess for home care needs following discharge   - Consider OT consult to assist with ADL evaluation and planning for discharge  - Provide patient education as appropriate  Outcome: Progressing  Goal: Maintains/Returns to pre admission functional level  Description: INTERVENTIONS:  - Perform BMAT or MOVE assessment daily    - Set and communicate daily mobility goal to care team and patient/family/caregiver  - Collaborate with rehabilitation services on mobility goals if consulted  - Perform Range of Motion 5 times a day  - Reposition patient every 2 hours    - Dangle patient 3 times a day  - Stand patient 3 times a day  - Ambulate patient 3 times a day  - Out of bed to chair 3 times a day   - Out of bed for meals 3 times a day  - Out of bed for toileting  - Record patient progress and toleration of activity level   Outcome: Progressing     Problem: Potential for Falls  Goal: Patient will remain free of falls  Description: INTERVENTIONS:  -  Assess patient's ability to carry out ADLs; assess patient's baseline for ADL function and identify physical deficits which impact ability to perform ADLs (bathing, care of mouth/teeth, toileting, grooming, dressing, etc )  - Assess/evaluate cause of self-care deficits   - Assess range of motion  - Assess patient's mobility; develop plan if impaired  - Assess patient's need for assistive devices and provide as appropriate  - Encourage maximum independence but intervene and supervise when necessary  - Involve family in performance of ADLs  - Assess for home care needs following discharge   - Consider OT consult to assist with ADL evaluation and planning for discharge  - Provide patient education as appropriate  Outcome: Progressing     Problem: PAIN - ADULT  Goal: Verbalizes/displays adequate comfort level or baseline comfort level  Description: Interventions:  - Encourage patient to monitor pain and request assistance  - Assess pain using appropriate pain scale  - Administer analgesics based on type and severity of pain and evaluate response  - Implement non-pharmacological measures as appropriate and evaluate response  - Consider cultural and social influences on pain and pain management  - Notify physician/advanced practitioner if interventions unsuccessful or patient reports new pain  Outcome: Progressing     Problem: INFECTION - ADULT  Goal: Absence or prevention of progression during hospitalization  Description: INTERVENTIONS:  - Assess and monitor for signs and symptoms of infection  - Monitor lab/diagnostic results  - Monitor all insertion sites, i e  indwelling lines, tubes, and drains  - Monitor endotracheal if appropriate and nasal secretions for changes in amount and color  - [place] appropriate cooling/warming therapies per order  - Administer medications as ordered  - Instruct and encourage patient and family to use good hand hygiene technique  - Identify and instruct in appropriate isolation precautions for identified infection/condition  Outcome: Progressing  Goal: Absence of fever/infection during neutropenic period  Description: INTERVENTIONS:  - Monitor WBC    Outcome: Progressing     Problem: SAFETY ADULT  Goal: Maintain or return to baseline ADL function  Description: INTERVENTIONS:  -  Assess patient's ability to carry out ADLs; assess patient's baseline for ADL function and identify physical deficits which impact ability to perform ADLs (bathing, care of mouth/teeth, toileting, grooming, dressing, etc )  - Assess/evaluate cause of self-care deficits   - Assess range of motion  - Assess patient's mobility; develop plan if impaired  - Assess patient's need for assistive devices and provide as appropriate  - Encourage maximum independence but intervene and supervise when necessary  - Involve family in performance of ADLs  - Assess for home care needs following discharge   - Consider OT consult to assist with ADL evaluation and planning for discharge  - Provide patient education as appropriate  Outcome: Progressing  Goal: Maintains/Returns to pre admission functional level  Description: INTERVENTIONS:  - Perform BMAT or MOVE assessment daily    - Set and communicate daily mobility goal to care team and patient/family/caregiver  - Collaborate with rehabilitation services on mobility goals if consulted  - Perform Range of Motion 5 times a day  - Reposition patient every 2 hours    - Dangle patient 3 times a day  - Stand patient 3 times a day  - Ambulate patient 3 times a day  - Out of bed to chair 3 times a day   - Out of bed for meals 3 times a day  - Out of bed for toileting  - Record patient progress and toleration of activity level   Outcome: Progressing  Goal: Patient will remain free of falls  Description: INTERVENTIONS:  -  Assess patient's ability to carry out ADLs; assess patient's baseline for ADL function and identify physical deficits which impact ability to perform ADLs (bathing, care of mouth/teeth, toileting, grooming, dressing, etc )  - Assess/evaluate cause of self-care deficits   - Assess range of motion  - Assess patient's mobility; develop plan if impaired  - Assess patient's need for assistive devices and provide as appropriate  - Encourage maximum independence but intervene and supervise when necessary  - Involve family in performance of ADLs  - Assess for home care needs following discharge   - Consider OT consult to assist with ADL evaluation and planning for discharge  - Provide patient education as appropriate  Outcome: Progressing     Problem: DISCHARGE PLANNING  Goal: Discharge to home or other facility with appropriate resources  Description: INTERVENTIONS:  - Identify barriers to discharge w/patient and caregiver  - Arrange for needed discharge resources and transportation as appropriate  - Identify discharge learning needs (meds, wound care, etc )  - Arrange for interpretive services to assist at discharge as needed  - Refer to Case Management Department for coordinating discharge planning if the patient needs post-hospital services based on physician/advanced practitioner order or complex needs related to functional status, cognitive ability, or social support system  Outcome: Progressing     Problem: Knowledge Deficit  Goal: Patient/family/caregiver demonstrates understanding of disease process, treatment plan, medications, and discharge instructions  Description: Complete learning assessment and assess knowledge base    Interventions:  - Provide teaching at level of understanding  - Provide teaching via preferred learning methods  Outcome: Progressing     Problem: METABOLIC, FLUID AND ELECTROLYTES - ADULT  Goal: Electrolytes maintained within normal limits  Description: INTERVENTIONS:  - Monitor labs and assess patient for signs and symptoms of electrolyte imbalances  - Administer electrolyte replacement as ordered  - Monitor response to electrolyte replacements, including repeat lab results as appropriate  - Instruct patient on fluid and nutrition as appropriate  Outcome: Progressing  Goal: Fluid balance maintained  Description: INTERVENTIONS:  - Monitor labs   - Monitor I/O and WT  - Instruct patient on fluid and nutrition as appropriate  - Assess for signs & symptoms of volume excess or deficit  Outcome: Progressing  Goal: Glucose maintained within target range  Description: INTERVENTIONS:  - Monitor Blood Glucose as ordered  - Assess for signs and symptoms of hyperglycemia and hypoglycemia  - Administer ordered medications to maintain glucose within target range  - Assess nutritional intake and initiate nutrition service referral as needed  Outcome: Progressing     Problem: MUSCULOSKELETAL - ADULT  Goal: Maintain or return mobility to safest level of function  Description: INTERVENTIONS:  - Assess patient's ability to carry out ADLs; assess patient's baseline for ADL function and identify physical deficits which impact ability to perform ADLs (bathing, care of mouth/teeth, toileting, grooming, dressing, etc )  - Assess/evaluate cause of self-care deficits   - Assess range of motion  - Assess patient's mobility  - Assess patient's need for assistive devices and provide as appropriate  - Encourage maximum independence but intervene and supervise when necessary  - Involve family in performance of ADLs  - Assess for home care needs following discharge   - Consider OT consult to assist with ADL evaluation and planning for discharge  - Provide patient education as appropriate  Outcome: Progressing  Goal: Maintain proper alignment of affected body part  Description: INTERVENTIONS:  - Support, maintain and protect limb and body alignment  - Provide patient/ family with appropriate education  Outcome: Progressing

## 2022-08-08 NOTE — CONSULTS
[address] [facility] 1955, 73 y o  female MRN: [id number]  Unit/Bed#: -01 Encounter: [id number]  Primary Care Provider: LOGAN Taylor   Date and time admitted to hospital: 8/8/2022  8:06 AM    Inpatient consult to Internal Medicine  Consult performed by: Chidi Peng PA-C  Consult ordered by: Rosmery Beck PA-C          * Primary osteoarthritis of right hip  Assessment & Plan  · S/P total hip arthroplasty right hip on 08/08 due to primary OA of right hip  · Orthopedics on board, appreciate recommendations  · Pain management  · Resume DVT prophylaxis 8/9 (lovenox)  · PT/OT eval    Hyperlipidemia  Assessment & Plan  · Continue atorvastatin 40 mg daily    Hypertension  Assessment & Plan  · Home regimen: lisinopril 10 mg daily and verapamil 240 mg daily  · SBP following procedure controlled 115-120s  · Restart home medications tomorrow     GERD (gastroesophageal reflux disease)  Assessment & Plan  · Continue Protonix 40 mg daily      VTE Prophylaxis:   Moderate Risk (Score 3-4) - Pharmacological DVT Prophylaxis Ordered: enoxaparin (Lovenox)  Recommendations for Discharge:  · Please continue all previously prescribed medications by outpatient specialists and PCP  · Please follow-up with orthopedics for postop care  · Please see PCP within 1 week of discharge    Counseling / Coordination of Care Time: 30 minutes Greater than 50% of total time spent on patient counseling and coordination of care  Collaboration of Care: Were Recommendations Directly Discussed with Primary Treatment Team? No    History of Present Illness:  Rylee Sanchez is a 73 y o  female with PMH of HTN, GERD, HLD and mod-severe R hip OA who is originally admitted to the orthopedics service due to need for DENI following failed conservative treatment of OA with intra-articular cortisone  We are consulted for medical management of the patient's comorbidities      Patient seen/examined following procedure, she denies fevers/chills, headache/dizziness, chest pain, shortness of breath, abdominal symptoms, urinary symptoms  She reports starting to notice mild pain in R hip but is otherwise feeling comfortable  She tolerated lunch well, no further complaints at this time  Discussed home medications as above  Review of Systems:  Review of Systems   Constitutional: Negative for chills, fatigue and fever  HENT: Negative for congestion, rhinorrhea and sore throat  Eyes: Negative for visual disturbance  Respiratory: Negative for cough, chest tightness, shortness of breath and wheezing  Cardiovascular: Negative for chest pain and palpitations  Gastrointestinal: Negative for abdominal pain, constipation, diarrhea, nausea and vomiting  Genitourinary: Negative for difficulty urinating, dysuria, frequency and urgency  Musculoskeletal: Positive for arthralgias  Negative for back pain and myalgias  Skin: Negative for rash and wound  Neurological: Negative for dizziness, light-headedness and headaches  All other systems reviewed and are negative  Past Medical and Surgical History:   Past Medical History:   Diagnosis Date    COVID-19 05/2022    Fibromyalgia, primary     GERD (gastroesophageal reflux disease) 1999    Hyperlipidemia     Hypertension     Lactose intolerance 1984    Small bowel obstruction ([facility] [address] )        Past Surgical History:   Procedure Laterality Date    ABDOMINAL SURGERY  2020    ADENOIDECTOMY      pt has 2nd appendix removed       BOWEL RESECTION      CHOLECYSTECTOMY  2000    EYE SURGERY      lasik    OOPHORECTOMY      one     CA LAP,DIAGNOSTIC ABDOMEN N/A 04/21/2020    Procedure: LAPAROSCOPY DIAGNOSTIC, lysis of adhesions , possible bowel resection;  Surgeon: Dex Jeronimo MD;  Location:  MAIN OR;  Service: General    TUBAL LIGATION         Meds/Allergies:  PTA meds:   Prior to Admission Medications   Prescriptions Last Dose Informant Patient Reported? Taking? [facility] Vitamin C 500 MG tablet 2022 at Unknown time  No Yes   Sig: TAKE 1 TABLET BY MOUTH TWICE A DAY   Calcium Carbonate-Vit D-Min (CALCIUM 1200 PO) 2022 at Unknown time Self Yes Yes   Multiple Vitamin (MULTI-VITAMIN) tablet 2022 at Unknown time  No Yes   Sig: Take 1 tablet by mouth daily   Probiotic Product (PROBIOTIC ADVANCED PO) 2022 at Unknown time Self Yes Yes   atorvastatin (LIPITOR) 40 mg tablet 2022 at Unknown time Self Yes Yes   Sig: Take 40 mg by mouth daily   ferrous sulfate 324 (65 Fe) mg 2022 at Unknown time  No Yes   Sig: TAKE 1 TABLET BY MOUTH 2 TIMES A DAY BEFORE MEALS   folic acid (FOLVITE) 1 mg tablet 2022 at Unknown time  No Yes   Sig: TAKE 1 TABLET BY MOUTH EVERY DAY   ibuprofen (MOTRIN) 800 mg tablet 2022  Yes Yes   Sig: Take by mouth 2 (two) times a day as needed for mild pain   lisinopril (ZESTRIL) 10 mg tablet 2022 at Unknown time Self No Yes   Sig: TAKE 1 TABLET BY MOUTH ONCE A DAY   pantoprazole (PROTONIX) 40 mg tablet 2022 at 0700 Self Yes Yes   timolol (TIMOPTIC) 0 5 % ophthalmic solution 2022 at Unknown time  Yes Yes   verapamil (VERELAN) 240 MG 24 hr capsule 2022 at Unknown time Self No Yes   Sig: TAKE 1 CAPSULE BY MOUTH EVERY DAY      Facility-Administered Medications: None       Allergies:    Allergies   Allergen Reactions    Erythromycin Nausea Only     Other reaction(s): GI Reaction       Social History:  Marital Status: Single  Substance Use History:   Social History     Substance and Sexual Activity   Alcohol Use Yes    Comment: rarely, 3 x year     Social History     Tobacco Use   Smoking Status Former Smoker    Packs/day: 2 00    Years: 30 00    Pack years: 60 00    Types: Cigarettes    Start date: 1973   Daphne Mare Quit date: 2020    Years since quittin 9   Smokeless Tobacco Never Used     Social History     Substance and Sexual Activity   Drug Use Yes    Types: Marijuana    Comment: medical marijuana       Family History:  Family History   Problem Relation Age of Onset    Fibromyalgia Mother     Arthritis Mother     Hypertension Mother     Vision loss Mother         Glaucoma    Heart disease Father     GI problems Father     Hypertension Father     Vision loss Father         Macular Degeneration    Birth defects Sister         Arterial Vascular Malformation    No Known Problems Daughter     No Known Problems Maternal Grandmother     Diabetes Maternal Grandfather     No Known Problems Paternal Grandmother     No Known Problems Paternal Grandfather     No Known Problems Sister     No Known Problems Sister     No Known Problems Paternal Aunt     No Known Problems Paternal Aunt     Mental illness Brother     Colon cancer Neg Hx     Colon polyps Neg Hx        Physical Exam:   Vitals:   Blood Pressure: 127/60 (08/08/22 1317)  Pulse: 67 (08/08/22 1317)  Temperature: 97 8 °F (36 6 °C) (08/08/22 1317)  Temp Source: Axillary (08/08/22 1317)  Respirations: 15 (08/08/22 1317)  Height: 5' 4" (162 6 cm) (08/08/22 0843)  Weight - Scale: 78 2 kg (172 lb 4 8 oz) (08/08/22 1322)  SpO2: 99 % (08/08/22 1317)    Physical Exam  Vitals and nursing note reviewed  Constitutional:       General: She is not in acute distress  Appearance: She is well-developed  She is not ill-appearing  HENT:      Head: Normocephalic and atraumatic  Eyes:      General:         Right eye: No discharge  Left eye: No discharge  Extraocular Movements: Extraocular movements intact  Conjunctiva/sclera: Conjunctivae normal    Cardiovascular:      Rate and Rhythm: Normal rate and regular rhythm  Heart sounds: No murmur heard  Pulmonary:      Effort: Pulmonary effort is normal  No respiratory distress  Breath sounds: Normal breath sounds  No wheezing, rhonchi or rales  Abdominal:      General: Bowel sounds are normal  There is no distension  Palpations: Abdomen is soft        Tenderness: There is no abdominal tenderness  Musculoskeletal:         General: Tenderness present  Cervical back: Neck supple  Right lower leg: No edema  Left lower leg: No edema  Comments: Tenderness of R hip, post surgical dressing is in place, no evidence of bleeding/saturation   Skin:     General: Skin is warm and dry  Neurological:      Mental Status: She is alert and oriented to person, place, and time  Psychiatric:         Mood and Affect: Mood normal          Behavior: Behavior normal          Additional Data:   Lab Results:    Results from last 7 days   Lab Units 08/08/22  1421   PLATELETS Thousands/uL 279                 Lab Results   Component Value Date/Time    HGBA1C 5 5 07/12/2022 09:52 AM               Imaging: Reviewed radiology reports from this admission including: xray(s)  XR pelvis ap only 1 or 2 vw   Final Result by Antwon Membreno MD (08/08 1316)      Typical appearance newly placed total right hip arthroplasty without complication      Workstation performed: [id number]             EKG, Pathology, and Other Studies Reviewed on Admission:   · EKG: No EKG obtained  ** Please Note: This note may have been constructed using a voice recognition system   **

## 2022-08-08 NOTE — PLAN OF CARE
Problem: PHYSICAL THERAPY ADULT  Goal: Performs mobility at highest level of function for planned discharge setting  See evaluation for individualized goals  Description: Treatment/Interventions: ADL retraining, LE strengthening/ROM, Functional transfer training, Elevations, Therapeutic exercise, Endurance training, Patient/family training, Equipment eval/education, Gait training, Bed mobility, Continued evaluation, Compensatory technique education, Spoke to nursing, OT  Equipment Recommended: Tori Long       See flowsheet documentation for full assessment, interventions and recommendations  Note: Prognosis: Fair  Problem List: Decreased strength, Decreased range of motion, Decreased endurance, Impaired balance, Decreased mobility, Decreased coordination, Decreased cognition, Impaired judgement, Decreased safety awareness, Decreased skin integrity, Orthopedic restrictions, Pain  Assessment: Pt is a 73 y o  female who is POD0 R DENI, posterior THP  Comorbidities affecting pt's physical performance at time of assessment include: COVID, fibromyalgia  Personal factors affecting pt at time of IE include: THP, pain, IV  PLOF and home set up listed above; Pt has the option to stay with sister as well at DC  Upon evaluation: Pt requires S for bed mobility, S for sit to stand, and S for ambulation with RW; VC for THP throughout  Full objective findings from PT assessment regarding body systems outlined above  Current limitations include impaired balance, decreased endurance/activity tolerance, gait deviations  The following objective measures performed on IE also reveal limitations: appearance of leg length discrepancy  Pt able to use RW and ambulate to/from bathroom, able to perform hygiene without assistance  Pt's clinical presentation is currently unstable/unpredictable seen in pt's presentation of continuous monitoring in hospital, fall risk, and THP     Pt would benefit from continued PT while in hospital and follow up with [facility]T at D/C to increase strength, balance, endurance, independence with funcitonal mobility to return to PLOF, maximize independence, decrease caregiver burden and improve quality of life  The patient's AM-PAC Basic Mobility Inpatient Short Form Raw Score is 22  A Raw score of greater than 17 suggests the patient may benefit from discharge to home  Please also refer to the recommendation of the Physical Therapist for safe discharge planning  PT Discharge Recommendation: Home with home health rehabilitation    See flowsheet documentation for full assessment

## 2022-08-08 NOTE — ANESTHESIA PROCEDURE NOTES
Spinal Block    Patient location during procedure: OR  Start time: 8/8/2022 10:17 AM  Reason for block: procedure for pain and at surgeon's request  Staffing  Performed: CRNA   Anesthesiologist: Chelsea Ramos MD  Resident/CRNA: Flaquito Boone CRNA  Preanesthetic Checklist  Completed: patient identified, IV checked, site marked, risks and benefits discussed, surgical consent, monitors and equipment checked, pre-op evaluation and timeout performed  Spinal Block  Patient position: sitting  Prep: ChloraPrep  Patient monitoring: cardiac monitor and frequent blood pressure checks  Approach: midline  Location: L3-4  Injection technique: single-shot  Needle  Needle type: Quincke   Needle gauge: 22 G  Needle length: 10 cm  Assessment  Sensory level: T4  Injection Assessment:  negative aspiration for heme, no paresthesia on injection and positive aspiration for clear CSF    Post-procedure:  adhesive bandage applied, pressure dressing applied, secured with tape, site cleaned and sterile dressing applied

## 2022-08-08 NOTE — PLAN OF CARE
Problem: OCCUPATIONAL THERAPY ADULT  Goal: Performs self-care activities at highest level of function for planned discharge setting  See evaluation for individualized goals  Description: Treatment Interventions: ADL retraining, Functional transfer training, Patient/family training, Compensatory technique education, Equipment evaluation/education          See flowsheet documentation for full assessment, interventions and recommendations  Note: Limitation: Decreased ADL status, Decreased self-care trans, Decreased high-level ADLs  Prognosis: Good  Assessment: Pt is a 73 y o  female seen for OT evaluation at [facility], admitted 8/8/2022 w/ Primary osteoarthritis of right hip  Pt now s/p right DENI  OT completed expanded review of pt's medical and social history  Comorbidities affecting pt's functional performance at time of assessment include: HTN, Peripheral vestibulopathy of both ears, etc (see chart)   Personal factors affecting pt at time of IE include:steps to enter environment, difficulty performing ADLS, difficulty performing IADLS  and decreased functional mobility  Prior to admission, pt was living alone in an in-law suite with no steps to manage  Pt was I w/  ADLS and IADLS, (+) drove, & required no DME/AD PTA  Pt reports that she may stay at sister's home which requires steps to manage  Upon evaluation: Pt requires sup for bed mobility, sup for functional mobility/transfers, sup for [facility] ADLs and min-max A for LB ADLS 2* the following deficits impacting occupational performance: weakness, decreased balance, decreased tolerance and orthopedic restrictions  Full objective findings from OT assessment regarding body systems outlined above  Pt to benefit from continued skilled OT tx while in the hospital to address deficits as defined above and maximize level of functional independence w ADL's and functional mobility   Occupational Performance areas to address include: bathing/shower, toilet hygiene, dressing and functional mobility  Based on findings, pt is of moderate complexity  The patient's raw score on the AM-PAC Daily Activity inpatient short form is 19, standardized score is 40 22, greater than 39 4  Patients at this level are likely to benefit from DC to home, which DOES coincide with CURRENT above OT recommendations  However please refer to therapist recommendation for discharge planning given other factors that may influence destination  At this time, OT recommendations at time of discharge are home with no OT needs       OT Discharge Recommendation: No rehabilitation needs

## 2022-08-08 NOTE — OP NOTE
OPERATIVE REPORT  PATIENT NAME: Nawaf Sanderson    :  1955  MRN: [id number]  Pt Location:  OR ROOM 03    SURGERY DATE: 2022    Surgeon(s) and Role:     * Marika Escoto MD - Primary     * Lacy Cody PA-C - Assisting    Preop Diagnosis:  Primary osteoarthritis of right hip [M16 11]    Post-Op Diagnosis Codes:     * Primary osteoarthritis of right hip [M16 11]    Procedure(s) (LRB):  ARTHROPLASTY HIP TOTAL (Right)    Specimen(s):  * No specimens in log *    Estimated Blood Loss:   Minimal    Drains:  * No LDAs found *    Anesthesia Type:   Choice    Operative Indications:  Primary osteoarthritis of right hip [M16 11]    Prosthesis: Depuy Actis femur size 3 Hi-offset, Sector acetabulum size 48 +4 10* liner, Femoral head size 32 +5 mm  Indications: Nawaf Sanderson is a 73y o  years old female diagnosed with right hip osteoarthritis  Patient failed conservative treatments and elected to proceed with surgical intervention  The risks and complications are discussed with the patient  The patient consented to the procedure  Procedure: Patient was brought into the OR and anethetized with spinal anesthesia without any complications  Patient was placed in lateral decubitus position with right up  Patient's right hip and leg were prepped and draped in sterile fashion  A time out was called and identified the right hip was the operating site  A posterior-lateral incision was made over the right hip  Dissection was then carried down to the Gluteus jazzmine and fascia sharla  The short external rotators was detached from the posterior aspect of the greater trochanter  Bleeders were controlled with electro-Bovie  Care was make sure the Siatic nerve was protected and retracted out of way  An arthrotomy was then done in the posterior capsule in a T-fashion  The hip was then dislocated posteriorly  Femoral neck osteotomy was done about 2 cm proximal to the lesser trochanter   The hip was then repositioned to exposed the acetabulum  The soft tissue in the Acetabular fossa was then cleared  Sequential acetabular reamer was used to enlarge the acetabulum to accommendate a 48 trial, which appeared to have good anterior, posterior, superior, and inferior fit  The actual prosthesis was then placed in the acetabulum with press fit mechanism  Care was made sure that the prosthesis its position in a 45 degree  of inclination and 15-20 degree of anteversion  The acetabulum cup was further fixed with a screw  A trial liner was then placed in the acetabulum component  The attention was then turned to the proximal femur  The leg was then internally rotated to expose the proximal femur  A sequential broach system was used to enlarge the femoral canal   Size 3 broach appeared to be fitting the femoral canal well  Care was made sure while broaching the femoral canal about 15 degree of anteversion was created  A size 32 femoral head trial with hi-offset +5 neck length was used for trial   The trial was reduced and range of motion was carried out  Patient appeared to have a good stability with the hip in full flexion, internal rotation to more than 50 degree, and adduction of the hip, but there is some uncovering of the femoral head posteriorly  A +4 10* line was placed to test out the stability further which had better posterior cover  The femoral trial and acetabulum liner trial were then removed  The actual acetabular liner +4 10* was then inserted with a taper lock mechanism into the acetabulum component  The femoral prosthesis was also inserted with  Press-fit mechanism  Again, about 15 of anteversion was placed with the femoral component  The femoral head component was placed onto the femoral stem with taper lock mechanism  The prosthesis was reduced and range of motion was carried out    The hip appeared to be stable just like the trial  Wound was thoroughly irrigated with Irricept for 1 minute and then irrigated with NS   20 cc of Duramorph was injected around the joint capsule  Aquamantys was used to treat the soft tissue around the joint  The posterior capsule was repaired with #1 Vicryl in an interrupted fashion  The short external rotator was reattached to the greater trochanter posteriorly  The gluteus jazzmine and fascia sharla were repair with a #1 Vicryl   0 and 2-0 Vicryl were used to close the subcutaneous tissue  Incision was painted with betadine and Stratafix was used to close the skin  Steri-Strips was then applied  Sterile bulky dressing was applied over the incision  The patient tolerated the procedure well without any complications  An abduction pillow was placed between patient's legs  Patient was transferred to recovery room for post-op care  The family was contacted  There was no qualified resident available to assist     Mrs Dail Gottron was required in the OR is helping manipulate the hip, exposure of the joint, placement of the prosthesis, and reduction of the hip joint      Complications:   None    Patient Disposition:  PACU     SIGNATURE: Rylie Arroyo MD  DATE: August 8, 2022  TIME: 12:07 PM

## 2022-08-08 NOTE — ASSESSMENT & PLAN NOTE
· Home regimen: lisinopril 10 mg daily and verapamil 240 mg daily  · SBP following procedure controlled 115-120s  · Restart home medications tomorrow

## 2022-08-08 NOTE — PHYSICAL THERAPY NOTE
PHYSICAL THERAPY Evaluation    Performed at least 2 patient identifiers during session:  Patient Active Problem List   Diagnosis    Peripheral vestibulopathy of both ears    Hypokalemia    Acute frontal sinusitis    Primary osteoarthritis of right hip    Sacroiliac joint dysfunction    GERD (gastroesophageal reflux disease)    Hypertension    Hyperlipidemia       Past Medical History:   Diagnosis Date    COVID-19 05/2022    Fibromyalgia, primary     GERD (gastroesophageal reflux disease) 1999    Hyperlipidemia     Hypertension     Lactose intolerance 1984    Small bowel obstruction ([address] )        Past Surgical History:   Procedure Laterality Date    ABDOMINAL SURGERY  2020    ADENOIDECTOMY      pt has 2nd appendix removed       BOWEL RESECTION      CHOLECYSTECTOMY  2000    EYE SURGERY      lasik    OOPHORECTOMY      one     [place] LAP,DIAGNOSTIC ABDOMEN N/A 04/21/2020    Procedure: LAPAROSCOPY DIAGNOSTIC, lysis of adhesions , possible bowel resection;  Surgeon: Vladimir Moses MD;  Location: [facility];  Service: General    TUBAL LIGATION          08/08/22 1431   PT Last Visit   PT Visit Date 08/08/22   Note Type   Note type Evaluation   Pain Assessment   Pain Assessment Tool 0-10   Pain Score 5   Pain Location/Orientation Orientation: Right;Location: Hip   Hospital Pain Intervention(s) Repositioned   Restrictions/Precautions   Weight Bearing Precautions Per Order Yes   RLE Weight Bearing Per Order WBAT   Other Precautions THR  (R posterior THP)   Home Living   Type of Home Apartment  (in-law-suite on son's home)   Home Layout One level   Bathroom Shower/Tub Walk-in shower   Bathroom Toilet   (commode right inside the bathroom door)   [address]  (standard walker)   Additional Comments pt states she may go to sister's home at [place];  5 DERIC   Prior Function   Level of [place] Independent with ADLs and functional mobility   Lives With Alone  (in-law-suite on son's home)   Receives Help From Family   ADL Assistance Independent   IADLs Independent  (pt has grocery delivery)   Falls in the last 6 months 0   General   Family/Caregiver Present Yes  (Sister)   Cognition   Overall Cognitive Status WFL   Arousal/Participation Alert   Orientation Level Oriented X4   Memory Within functional limits   Following Commands Follows all commands and directions without difficulty   Subjective   Subjective Educated pt on THP   RUE Assessment   RUE Assessment WFL   LUE Assessment   LUE Assessment WFL   RLE Assessment   RLE Assessment   (2-/5 at hip flexion/abd/add;  4-/5 at knee extension;  4/5 df)   LLE Assessment   LLE Assessment WFL   Bed Mobility   Supine to Sit 5  Supervision   Additional items Assist x 1;Verbal cues  (VC for THP)   Additional Comments Pt remains seated in chair at bedside at end of session   Transfers   Sit to Stand 5  Supervision   Additional items Assist x 1; Increased time required;Verbal cues  (RW)   Stand to Sit 5  Supervision   Additional items Assist x 1; Increased time required;Armrests; Verbal cues   Toilet transfer 5  Supervision   Additional items Assist x 1; Increased time required;Raised toilet seat  (commode over toilet)   Ambulation/Elevation   Gait pattern Decreased foot clearance;Decreased R stance; Excessively slow; Step to   Gait Assistance 5  Supervision   Additional items Assist x 1   Assistive Device Rolling walker   Distance 50ft x 2 with RW, no LOB or unsteadiness however leg length discrepancy noted with RLE longer than L, no hip difference noted at present time  pt compensates with excessive R knee flexion during swing phase and step to pattern     Stair Management Assistance Not tested  (attempt steps next session if pt decides to go to sisters home at [place])   [employer] [employer] -  ([address] [place] [address])   [address]   ([address] [place] [address]) [facility] -  (RW)   Activity Tolerance   Activity Tolerance   (no adverse effects to PT)   Nurse Made Aware Shea Galindo   Assessment   Prognosis Fair   Problem List Decreased strength;Decreased range of motion;Decreased endurance; Impaired balance;Decreased mobility; Decreased coordination;Decreased cognition; Impaired judgement;Decreased safety awareness;Decreased skin integrity;Orthopedic restrictions;Pain   Assessment Pt is a 73 y o  female who is POD0 R DENI, posterior THP  Comorbidities affecting pt's physical performance at time of assessment include: COVID, fibromyalgia  Personal factors affecting pt at time of IE include: THP, pain, IV  PLOF and home set up listed above; Pt has the option to stay with sister as well at [place]  Upon evaluation: Pt requires S for bed mobility, S for sit to stand, and S for ambulation with RW; VC for THP throughout  Full objective findings from PT assessment regarding body systems outlined above  Current limitations include impaired balance, decreased endurance/activity tolerance, gait deviations  The following objective measures performed on IE also reveal limitations: appearance of leg length discrepancy  Pt able to use RW and ambulate to/from bathroom, able to perform hygiene without assistance  Pt performed supine HEP for ankle pumps, quad sets, glute sets 10x each;  provided printed material for THP  Pt's clinical presentation is currently unstable/unpredictable seen in pt's presentation of continuous monitoring in hospital, fall risk, and THP  Pt would benefit from continued PT while in hospital and follow up with HHCPT at D/C to increase strength, balance, endurance, independence with funcitonal mobility to return to PLOF, maximize independence, decrease caregiver burden and improve quality of life  The patient's AM-PAC Basic Mobility Inpatient Short Form Raw Score is 22  A Raw score of greater than 17 suggests the patient may benefit from discharge to home   Please also refer to the recommendation of the Physical Therapist for safe discharge planning  Goals   Patient Goals to go home   STG Expiration Date 08/12/22   Short Term Goal #1 Pt will be able to demo:  mod I sit to/from supine, mod I sit to/from standing with RW, mod I to ambulate 150ft with RW, S to ascend/descend 5 steps with handrail;  to return home or to sister's home at mod I level, improve quality of life and decrease caregiver burden   Plan   Treatment/Interventions ADL retraining;LE strengthening/ROM; Functional transfer training;Elevations; Therapeutic exercise; Endurance training;Patient/family training;Equipment eval/education;Gait training;Bed mobility;Continued evaluation; Compensatory technique education;Spoke to nursing;OT   PT Frequency Twice a day   Recommendation   PT Discharge Recommendation Home with home health rehabilitation   Equipment Recommended [facility] [facility] Recommended Wheeled walker  (pt has standard walker available at home however RW is recommended)   [facility] [address] Mobility Inpatient   Turning in Bed Without Bedrails 4   Lying on Back to Sitting on Edge of Flat Bed 4   Moving Bed to Chair 4   Standing Up From Chair 4   Walk in Room 3   Climb 3-5 Stairs 3   Basic Mobility Inpatient Raw Score 22   Basic Mobility Standardized Score 47 4   Highest Level Of Mobility   JH-HLM Goal 7: Walk 25 feet or more   JH-HLM Achieved 7: Walk 25 feet or more     Kristy Fierro, PT      Patient Name: Jose VIEIRAUO'K Date: 8/8/2022

## 2022-08-08 NOTE — ANESTHESIA PREPROCEDURE EVALUATION
Procedure:  ARTHROPLASTY HIP TOTAL (Right Hip)    Relevant Problems   CARDIO   (+) Hyperlipidemia   (+) Hypertension      GI/HEPATIC   (+) GERD (gastroesophageal reflux disease)      MUSCULOSKELETAL   (+) Primary osteoarthritis of right hip      Nervous and Auditory   (+) Peripheral vestibulopathy of both ears   Fibromyalgia, primary     Physical Exam    Airway    Mallampati score: II  TM Distance: >3 FB  Neck ROM: full     Dental       Cardiovascular      Pulmonary      Other Findings        Anesthesia Plan  ASA Score- 2     Anesthesia Type- spinal with ASA Monitors  Additional Monitors:   Airway Plan:           Plan Factors-    Chart reviewed  Induction- intravenous  Postoperative Plan-     Informed Consent- Anesthetic plan and risks discussed with patient  I personally reviewed this patient with the CRNA  Discussed and agreed on the Anesthesia Plan with the CRNA  Prasanna Morel

## 2022-08-09 VITALS
OXYGEN SATURATION: 94 % | BODY MASS INDEX: 30.42 KG/M2 | DIASTOLIC BLOOD PRESSURE: 69 MMHG | HEIGHT: 64 IN | SYSTOLIC BLOOD PRESSURE: 139 MMHG | TEMPERATURE: 98.6 F | WEIGHT: 178.2 LBS | HEART RATE: 81 BPM | RESPIRATION RATE: 18 BRPM

## 2022-08-09 PROBLEM — D62 ACUTE BLOOD LOSS ANEMIA: Status: ACTIVE | Noted: 2022-08-09

## 2022-08-09 LAB
ANION GAP SERPL CALCULATED.3IONS-SCNC: 9 MMOL/L (ref 4–13)
BUN SERPL-MCNC: 13 MG/DL (ref 5–25)
CALCIUM SERPL-MCNC: 8.7 MG/DL (ref 8.3–10.1)
CHLORIDE SERPL-SCNC: 101 MMOL/L (ref 96–108)
CO2 SERPL-SCNC: 27 MMOL/L (ref 21–32)
CREAT SERPL-MCNC: 1.06 MG/DL (ref 0.6–1.3)
GFR SERPL CREATININE-BSD FRML MDRD: 55 ML/MIN/1.73SQ M
GLUCOSE SERPL-MCNC: 152 MG/DL (ref 65–140)
HBV CORE AB SER QL: NORMAL
HBV CORE IGM SER QL: NORMAL
HBV SURFACE AG SER QL: NORMAL
HCT VFR BLD AUTO: 32 % (ref 34.8–46.1)
HCV AB SER QL: NORMAL
HGB BLD-MCNC: 10.8 G/DL (ref 11.5–15.4)
POTASSIUM SERPL-SCNC: 3.8 MMOL/L (ref 3.5–5.3)
SODIUM SERPL-SCNC: 137 MMOL/L (ref 135–147)

## 2022-08-09 PROCEDURE — 86803 HEPATITIS C AB TEST: CPT | Performed by: ORTHOPAEDIC SURGERY

## 2022-08-09 PROCEDURE — 97535 SELF CARE MNGMENT TRAINING: CPT

## 2022-08-09 PROCEDURE — 86704 HEP B CORE ANTIBODY TOTAL: CPT | Performed by: ORTHOPAEDIC SURGERY

## 2022-08-09 PROCEDURE — 86705 HEP B CORE ANTIBODY IGM: CPT | Performed by: ORTHOPAEDIC SURGERY

## 2022-08-09 PROCEDURE — 85014 HEMATOCRIT: CPT | Performed by: ORTHOPAEDIC SURGERY

## 2022-08-09 PROCEDURE — 97530 THERAPEUTIC ACTIVITIES: CPT

## 2022-08-09 PROCEDURE — 85018 HEMOGLOBIN: CPT | Performed by: ORTHOPAEDIC SURGERY

## 2022-08-09 PROCEDURE — 87340 HEPATITIS B SURFACE AG IA: CPT | Performed by: ORTHOPAEDIC SURGERY

## 2022-08-09 PROCEDURE — 99225 PR SBSQ OBSERVATION CARE/DAY 25 MINUTES: CPT

## 2022-08-09 PROCEDURE — 80048 BASIC METABOLIC PNL TOTAL CA: CPT | Performed by: ORTHOPAEDIC SURGERY

## 2022-08-09 PROCEDURE — 99024 POSTOP FOLLOW-UP VISIT: CPT | Performed by: PHYSICIAN ASSISTANT

## 2022-08-09 RX ORDER — ACETAMINOPHEN 325 MG/1
975 TABLET ORAL EVERY 8 HOURS
Qty: 60 TABLET | Refills: 1 | Status: SHIPPED | OUTPATIENT
Start: 2022-08-09

## 2022-08-09 RX ORDER — ASPIRIN 325 MG
325 TABLET, DELAYED RELEASE (ENTERIC COATED) ORAL 2 TIMES DAILY
Qty: 90 TABLET | Refills: 0 | Status: SHIPPED | OUTPATIENT
Start: 2022-08-09

## 2022-08-09 RX ORDER — OXYCODONE HYDROCHLORIDE 5 MG/1
5 TABLET ORAL EVERY 4 HOURS PRN
Qty: 30 TABLET | Refills: 0 | Status: SHIPPED | OUTPATIENT
Start: 2022-08-09 | End: 2022-08-15 | Stop reason: SDUPTHER

## 2022-08-09 RX ADMIN — FOLIC ACID 1000 MCG: 1 TABLET ORAL at 08:10

## 2022-08-09 RX ADMIN — OXYCODONE HYDROCHLORIDE AND ACETAMINOPHEN 500 MG: 500 TABLET ORAL at 08:10

## 2022-08-09 RX ADMIN — Medication 1 TABLET: at 08:10

## 2022-08-09 RX ADMIN — DOCUSATE SODIUM 100 MG: 100 CAPSULE, LIQUID FILLED ORAL at 08:10

## 2022-08-09 RX ADMIN — PANTOPRAZOLE SODIUM 40 MG: 40 TABLET, DELAYED RELEASE ORAL at 06:10

## 2022-08-09 RX ADMIN — ENOXAPARIN SODIUM 40 MG: 100 INJECTION SUBCUTANEOUS at 06:10

## 2022-08-09 RX ADMIN — ACETAMINOPHEN 975 MG: 325 TABLET ORAL at 06:09

## 2022-08-09 RX ADMIN — TIMOLOL MALEATE 1 DROP: 5 SOLUTION/ DROPS OPHTHALMIC at 08:10

## 2022-08-09 RX ADMIN — SENNOSIDES 8.6 MG: 8.6 TABLET, FILM COATED ORAL at 08:10

## 2022-08-09 RX ADMIN — ATORVASTATIN CALCIUM 40 MG: 40 TABLET, FILM COATED ORAL at 08:10

## 2022-08-09 RX ADMIN — OXYCODONE HYDROCHLORIDE 10 MG: 5 TABLET ORAL at 00:44

## 2022-08-09 RX ADMIN — FERROUS SULFATE TAB 325 MG (65 MG ELEMENTAL FE) 325 MG: 325 (65 FE) TAB at 08:10

## 2022-08-09 RX ADMIN — OXYCODONE HYDROCHLORIDE 10 MG: 5 TABLET ORAL at 08:10

## 2022-08-09 RX ADMIN — GABAPENTIN 100 MG: 100 CAPSULE ORAL at 06:10

## 2022-08-09 RX ADMIN — LISINOPRIL 10 MG: 10 TABLET ORAL at 08:12

## 2022-08-09 RX ADMIN — MORPHINE SULFATE 2 MG: 2 INJECTION, SOLUTION INTRAMUSCULAR; INTRAVENOUS at 02:13

## 2022-08-09 RX ADMIN — CALCIUM CARBONATE (ANTACID) CHEW TAB 500 MG 1000 MG: 500 CHEW TAB at 10:20

## 2022-08-09 NOTE — OCCUPATIONAL THERAPY NOTE
Occupational Therapy Tx Note     Patient Name: Tyrone Cheema  Today's Date: 8/9/2022  Problem List  Principal Problem:    Primary osteoarthritis of right hip  Active Problems:    GERD (gastroesophageal reflux disease)    Hypertension    Hyperlipidemia    Acute blood loss anemia            08/09/22 5222   OT Last Visit   OT Visit Date 08/09/22   Note Type   Note Type Treatment   Restrictions/Precautions   Weight Bearing Precautions Per Order Yes   RLE Weight Bearing Per Order WBAT   Other Precautions Fall Risk;THR   Pain Assessment   Pain Assessment Tool 0-10   Pain Score 6   Pain Location/Orientation Orientation: Right;Location: Hip;Location: Leg   Hospital Pain Intervention(s) Repositioned; Rest   ADL   LB Dressing Assistance 5  Supervision/Setup   LB Dressing Deficit Thread RLE into underwear; Thread LLE into underwear; Thread RLE into pants; Thread LLE into pants;Use of adaptive equipment  (Performed seated in recliner)   Toileting Assistance  5  Supervision/Setup   Toileting Deficit   (Commode over toilet)   Toileting Comments VC required to maintain hip precautions   Bed Mobility   Supine to Sit 6  Modified independent   Sit to Supine 6  Modified independent   Transfers   Sit to Stand 6  Modified independent   Stand to Sit 6  Modified independent   Functional Mobility   Functional Mobility 6  Modified independent   Additional Comments RW   Toilet Transfers   Toilet Transfer From Rolling walker   Toilet Transfer Type To and from   Toilet Transfer to Standard bedside commode  (over toilet)   Toilet Transfer Technique Ambulating   Toilet Transfers Modified independence   Cognition   Overall Cognitive Status WFL   Arousal/Participation Alert   Attention Within functional limits   Orientation Level Oriented X4   Memory Within functional limits   Following Commands Follows all commands and directions without difficulty   Assessment   Assessment Pt seen for OT tx session with focus on functional balance, functional mobility, ADL status, and transfer safety  Patient agreeable to OT treatment session  Pt received supine in bed  Performed bed mobility with mod I  Performed transfers and functional mobility with mod I with RW  Pt educated on LB ADL strategies and appropriate use of hip kit  Able to demonstrate appropriate use of AE with supervision  Reviewed hip precautions  PT reports understanding  Patient continues to be functioning below baseline level, occupational performance remains limited secondary to factors listed above, and pt at increased risk for falls and injury  The patient's raw score on the AM-PAC Daily Activity inpatient short form is 22, standardized score is 47 1, greater than 39 4  Patients at this level are likely to benefit from DC to post-acute rehabilitation services  Please refer to the recommendation of the Occupational Therapist for safe DC planning  From OT standpoint, recommendation at time of d/c would be Short Term Rehab  Patient to benefit from continued Occupational Therapy treatment while in the hospital to address deficits as defined above and maximize level of functional independence with ADLs and functional mobility  Pt left with call bell in reach, tray table in reach, needs met  RN aware  Plan   Treatment Interventions ADL retraining;Functional transfer training;Patient/family training; Compensatory technique education   Goal Expiration Date 08/18/22   OT Treatment Day 1   OT Frequency 2-3x/wk   Recommendation   OT Discharge Recommendation No rehabilitation needs   Equipment Recommended   (Pt owns a hip kit, commode, and shower chair)   AM-PAC Daily Activity Inpatient   Lower Body Dressing 3   Bathing 3   Toileting 4   Upper Body Dressing 4   Grooming 4   Eating 4   Daily Activity Raw Score 22   Daily Activity Standardized Score (Calc for Raw Score >=11) 47  1   AM-PAC Applied Cognition Inpatient   Following a Speech/Presentation 4   Understanding Ordinary Conversation 4   Taking Medications 4   Remembering Where Things Are Placed or Put Away 4   Remembering List of 4-5 Errands 4   Taking Care of Complicated Tasks 4   Applied Cognition Raw Score 24   Applied Cognition Standardized Score 62 21         Aydee Montero, OTR/L

## 2022-08-09 NOTE — CASE MANAGEMENT
Case Management Discharge Planning Note    Patient name Charity Payan  Location [address] [id number]/-50 MRN [id number]  : 1955 Date 2022       Current Admission Date: 2022  Current Admission Diagnosis:Primary osteoarthritis of right hip   Patient Active Problem List    Diagnosis Date Noted    Hypertension     Hyperlipidemia     Primary osteoarthritis of right hip 2022    Sacroiliac joint dysfunction 2022    Acute frontal sinusitis 2021    Hypokalemia 2020    Peripheral vestibulopathy of both ears 2020    GERD (gastroesophageal reflux disease)       LOS (days): 0  Geometric Mean LOS (GMLOS) (days):   Days to GMLOS:     OBJECTIVE:            Current admission status: Outpatient Surgery   Preferred Pharmacy:   [facility]/pharmacy #[id number] Ngo, [address] [address]  Phone: [phone] Fax: [phone]    Primary Care Provider: LOGAN Castillo    Primary Insurance: [facility]  Secondary Insurance:     DISCHARGE DETAILS:  As per PT, patient needs a RW  Met with patient to discuss same and she is agreeable  Given freedom of choice - she does not have a preference for DME company  Referral sent through [place] to [address] for RW

## 2022-08-09 NOTE — PROGRESS NOTES
[place]  Progress Note - Gayla Reef 1955, 73 y o  female MRN: [id number]  Unit/Bed#: -01 Encounter: [id number]  Primary Care Provider: LOGAN Diego   Date and time admitted to hospital: 8/8/2022  8:06 AM    * Primary osteoarthritis of right hip  Assessment & Plan  · S/P total hip arthroplasty right hip on 08/08 due to primary OA of right hip  · Orthopedics on board, appreciate recommendations  · Pain management- patient reports pain is improving/well controlled despite ambulating frequently overnight to urinate (normal for her)  · Resume DVT prophylaxis 8/9 (lovenox), ASA on dc x 6 weeks per ortho  · Encourage IS  · PT/OT eval  · Outpatient follow-up with Orthopedics  · Stable for discharge today 8/9    Acute blood loss anemia  Assessment & Plan  · Hgb 12 4 on 7/12, now 10 8   · Initiated on ferrous sulfate/folic acid 8/9  · Suspect dilutional component/mild blood loss during procedure  · Recommend outpatient CBC within 3-7 days of discharge     Hyperlipidemia  Assessment & Plan  · Continue atorvastatin 40 mg daily    Hypertension  Assessment & Plan  · Home regimen: lisinopril 10 mg daily and verapamil 240 mg daily  · Home medications restarted, BP stable in 130s    GERD (gastroesophageal reflux disease)  Assessment & Plan  · Continue Protonix 40 mg daily        VTE Pharmacologic Prophylaxis:   Moderate Risk (Score 3-4) - Pharmacological DVT Prophylaxis Ordered: enoxaparin (Lovenox)  Patient Centered Rounds: I performed bedside rounds with nursing staff today  Discussions with Specialists or Other Care Team Provider: none    Education and Discussions with Family / Patient: Patient declined call to   Time Spent for Care: 30 minutes  More than 50% of total time spent on counseling and coordination of care as described above      Current Length of Stay: 0 day(s)  Current Patient Status: Outpatient Surgery   Certification Statement: Admitted on outpatient surgery class, discharge today   Discharge Plan: Anticipate discharge later today or tomorrow to home  Code Status: Level 1 - Full Code    Subjective:   Patient reports pain is well controlled, she states she was able to ambulate well to the bathroom every hour throughout the night which he does so typically in order to urinate  Patient denies all additional physical complaints  Patient states she worked well with PT/OT this morning and has been using incentive spirometer  Denies saturation or continued bleeding from surgical site overnight  Objective:     Vitals:   Temp (24hrs), Av 9 °F (36 6 °C), Min:97 4 °F (36 3 °C), Max:98 6 °F (37 °C)    Temp:  [97 4 °F (36 3 °C)-98 6 °F (37 °C)] 98 6 °F (37 °C)  HR:  [62-81] 81  Resp:  [13-24] 18  BP: (106-139)/(55-69) 139/69  SpO2:  [93 %-99 %] 94 %  Body mass index is 30 59 kg/m²  Input and Output Summary (last 24 hours): Intake/Output Summary (Last 24 hours) at 2022 1006  Last data filed at 2022 0620  Gross per 24 hour   Intake 3620 ml   Output 1400 ml   Net 2220 ml       Physical Exam:   Physical Exam  Vitals and nursing note reviewed  Constitutional:       General: She is not in acute distress  Appearance: She is well-developed  She is not ill-appearing  HENT:      Head: Normocephalic and atraumatic  Eyes:      General:         Right eye: No discharge  Left eye: No discharge  Extraocular Movements: Extraocular movements intact  Conjunctiva/sclera: Conjunctivae normal    Cardiovascular:      Rate and Rhythm: Normal rate and regular rhythm  Heart sounds: No murmur heard  Pulmonary:      Effort: Pulmonary effort is normal  No respiratory distress  Breath sounds: Normal breath sounds  No wheezing, rhonchi or rales  Abdominal:      General: Bowel sounds are normal  There is no distension  Palpations: Abdomen is soft  Tenderness: There is no abdominal tenderness  Musculoskeletal:      Cervical back: Neck supple  Right lower leg: No edema  Left lower leg: No edema  Comments: Patient ambulating well in room with walker   Skin:     General: Skin is warm and dry  Neurological:      Mental Status: She is alert and oriented to person, place, and time     Psychiatric:         Mood and Affect: Mood normal          Behavior: Behavior normal           Additional Data:     Labs:  Results from last 7 days   Lab Units 08/09/22 0224 08/08/22  1421   HEMOGLOBIN g/dL 10 8*  --    HEMATOCRIT % 32 0*  --    PLATELETS Thousands/uL  --  279     Results from last 7 days   Lab Units 08/09/22  0224   SODIUM mmol/L 137   POTASSIUM mmol/L 3 8   CHLORIDE mmol/L 101   CO2 mmol/L 27   BUN mg/dL 13   CREATININE mg/dL 1 06   ANION GAP mmol/L 9   CALCIUM mg/dL 8 7   GLUCOSE RANDOM mg/dL 152*                       Lines/Drains:  Invasive Devices  Report    Peripheral Intravenous Line  Duration           Peripheral IV 08/08/22 Left;Ventral (anterior) Forearm 1 day                      Imaging: Reviewed radiology reports from this admission including: xray(s)    Recent Cultures (last 7 days):         Last 24 Hours Medication List:   Current Facility-Administered Medications   Medication Dose Route Frequency Provider Last Rate    acetaminophen  975 mg Oral Q8H Yobani Miller PA-C      aluminum-magnesium hydroxide-simethicone  30 mL Oral Q6H PRN Yobani Miller PA-C      ascorbic acid  500 mg Oral BID Yobani Miller PA-C      atorvastatin  40 mg Oral Daily Franchesca Olsen      bisacodyl  10 mg Rectal Daily PRN Yobani Miller PA-C      calcium carbonate  1,000 mg Oral Daily PRN Yobani Miller PA-C      cyclobenzaprine  5 mg Oral TID Yobani Miller PA-C      docusate sodium  100 mg Oral BID Yobani Miller PA-C      enoxaparin  40 mg Subcutaneous Daily Yobani Miller PA-C      ferrous sulfate  325 mg Oral BID With Meals Yobani Miller PA-C      folic acid  2,016 mcg Oral Daily Yobani Miller PA-C      gabapentin 100 mg Oral Q8H Adina Quintanilla PA-C      lactated ringers  1,000 mL Intravenous Once PRN Adina Quintanilla PA-C      And    lactated ringers  1,000 mL Intravenous Once PRN ELDA Moon-YAKOV      lactated ringers  75 mL/hr Intravenous Continuous ELDA Moon-C 75 mL/hr (08/08/22 1802)    lisinopril  10 mg Oral Daily [place]      morphine injection  2 mg Intravenous Q3H PRN Adina Quintanilla PA-C      multivitamin-minerals  1 tablet Oral Daily Adina Quintanilla PA-C      ondansetron  4 mg Intravenous Q6H PRN Adina Quintanilla PA-C      oxyCODONE  10 mg Oral Q4H PRN Adina Quintanilla PA-C      oxyCODONE  5 mg Oral Q4H PRN Adina Quintanilla PA-C      pantoprazole  40 mg Oral Early Morning Adina Quintanilla PA-C      patient supplied medication  1 each Oral See Admin Instructions Sandra Barraza PA-C      patient supplied medication  1 each Oral See Admin Instructions Sandra Barraza PA-C      patient supplied medication  1 each Oral See Admin Instructions Mark Paige PA-C      senna  1 tablet Oral Daily Adina Quintanilla PA-C      simethicone  80 mg Oral 4x Daily PRN Adina Quintanilla PA-C      sodium chloride  1,000 mL Intravenous Once PRN Adina Quintanilla PA-C      And    sodium chloride  1,000 mL Intravenous Once PRN Adina Quintanilla PA-C      timolol  1 drop Both Eyes Daily Adina Quintanilla PA-C      verapamil  240 mg Oral HS Sandra Barraza PA-C          Today, Patient Was Seen By: Sandra Barraza PA-C    **Please Note: This note may have been constructed using a voice recognition system  **

## 2022-08-09 NOTE — UTILIZATION REVIEW
Initial Clinical Review    Elective outpatient  surgical procedure    Age/Sex: 73 y o  female     Surgery Date: 8/8/22     Procedure: ARTHROPLASTY HIP TOTAL (Right)    Anesthesia: Spinal     Operative Findings: Primary osteoarthritis of right hip    POD#1 Progress Note: 8/9/22 patient is status post right total hip arthroplasty  Admission Orders: Date/Time/Statement:   No orders of the defined types were placed in this encounter      Start   Ordered   08/08/22 1208  Outpatient No Charge Bed  (Outpatient No Charge Bed/Extended Recovery)  Once        Transfer Service: Orthopedic Surgery        08/08/22 1211       Vital Signs: /58 (BP Location: Right arm)   Pulse 71   Temp 97 9 °F (36 6 °C) (Oral)   Resp 18   Ht 5' 4" (1 626 m)   Wt 80 8 kg (178 lb 3 2 oz)   SpO2 96%   BMI 30 59 kg/m²   08/09/22 0045 97 9 °F (36 6 °C) 71 18 122/58 84 96 % 20 0 L/min None (Room air) Lying   08/08/22 2004 98 6 °F (37 °C) 67 24 Abnormal  124/57 73 95 % 20 0 L/min None (Room air) Sitting   08/08/22 1940 -- -- -- -- -- 97 % 20 0 L/min None (Room air) --   08/08/22 1650 97 4 °F (36 3 °C) Abnormal  64 14 112/55 78 95 % -- -- None (Room air) Sitting   08/08/22 1546 97 4 °F (36 3 °C) Abnormal  63 14 112/55 78 95 % -- -- None (Room air) Sitting   08/08/22 1500 97 7 °F (36 5 °C) 66 13 106/58 77 96 % -- -- None (Room air) Sitting   08/08/22 1317 97 8 °F (36 6 °C) 67 15 127/60 86 99 % 28 2 L/min Nasal cannula        Pertinent Labs/Diagnostic Test Results:   XR pelvis ap only 1 or 2 vw   Final Result by Virgil Pearce MD (08/08 1316)      Typical appearance newly placed total right hip arthroplasty without complication      Workstation performed: [id number]             Results from last 7 days   Lab Units 08/09/22  0224 08/08/22  1421   HEMOGLOBIN g/dL 10 8*  --    HEMATOCRIT % 32 0*  --    PLATELETS Thousands/uL  --  279     Results from last 7 days   Lab Units 08/09/22  0224   SODIUM mmol/L 137   POTASSIUM mmol/L 3 8 CHLORIDE mmol/L 101   CO2 mmol/L 27   ANION GAP mmol/L 9   BUN mg/dL 13   CREATININE mg/dL 1 06   EGFR ml/min/1 73sq m 55   CALCIUM mg/dL 8 7     Results from last 7 days   Lab Units 08/09/22  0224   GLUCOSE RANDOM mg/dL 152*       Diet: Regular     Mobility: Up to bedside chair at least three times a day as tolerated  2)  Ambulate in room progressing to hallway with assistive device four times daily (including PT) when PT advises    3)  Bathroom privileges with assistance      DVT Prophylaxis: Bilateral SCDs, Lovenox    Medications/Pain Control:   Scheduled Medications:  acetaminophen, 975 mg, Oral, Q8H  ascorbic acid, 500 mg, Oral, BID  atorvastatin, 40 mg, Oral, Daily  cyclobenzaprine, 5 mg, Oral, TID  docusate sodium, 100 mg, Oral, BID  enoxaparin, 40 mg, Subcutaneous, Daily  ferrous sulfate, 325 mg, Oral, BID With Meals  folic acid, 6,081 mcg, Oral, Daily  gabapentin, 100 mg, Oral, Q8H  lisinopril, 10 mg, Oral, Daily  multivitamin-minerals, 1 tablet, Oral, Daily  pantoprazole, 40 mg, Oral, Early Morning  patient supplied medication, 1 each, Oral, See Admin Instructions  patient supplied medication, 1 each, Oral, See Admin Instructions  patient supplied medication, 1 each, Oral, See Admin Instructions  senna, 1 tablet, Oral, Daily  timolol, 1 drop, Both Eyes, Daily  verapamil, 240 mg, Oral, HS    ceFAZolin (ANCEF) IVPB (premix in dextrose) 2,000 mg 50 mL  Dose: 2,000 mg  Freq: Every 8 hours Route: IV  Last Dose: 2,000 mg (08/08/22 1801)  Start: 08/08/22 1215 End: 08/09/22 0159    Continuous IV Infusions:  lactated ringers, 75 mL/hr, Intravenous, Continuous      PRN Meds:  aluminum-magnesium hydroxide-simethicone, 30 mL, Oral, Q6H PRN  bisacodyl, 10 mg, Rectal, Daily PRN  calcium carbonate, 1,000 mg, Oral, Daily PRN  lactated ringers, 1,000 mL, Intravenous, Once PRN   And  lactated ringers, 1,000 mL, Intravenous, Once PRN  morphine injection, 2 mg, Intravenous, Q3H PRN - used x 1 8/8, x 1 8/9  ondansetron, 4 mg, Intravenous, Q6H PRN  oxyCODONE, 10 mg, Oral, Q4H PRN - used x 1 8/8, x 1 8/9  oxyCODONE, 5 mg, Oral, Q4H PRN - used x 1 8/8  simethicone, 80 mg, Oral, 4x Daily PRN  sodium chloride, 1,000 mL, Intravenous, Once PRN   And  sodium chloride, 1,000 mL, Intravenous, Once PRN    Posterior hip precautions - Do not flex hip greater than 90 degrees   2)  No hip internal rotation    Network Utilization Review Department  ATTENTION: Please call with any questions or concerns to [phone] and carefully listen to the prompts so that you are directed to the right person  All voicemails are confidential   Cleotis Dirk all requests for admission clinical reviews, approved or denied determinations and any other requests to dedicated fax number below belonging to the campus where the patient is receiving treatment   List of dedicated fax numbers for the Facilities:  [address] DENIALS (Administrative/Medical Necessity) [phone]   [address] (Maternity/NICU/Pediatrics) [phone]   [address] [place] [address]  [address] [facility] [place] [address] [address] [place] [address] Adali [address] [facility] [place] [address] [phone]

## 2022-08-09 NOTE — PLAN OF CARE
Problem: PHYSICAL THERAPY ADULT  Goal: Performs mobility at highest level of function for planned discharge setting  See evaluation for individualized goals  Description: Treatment/Interventions: ADL retraining, LE strengthening/ROM, Functional transfer training, Elevations, Therapeutic exercise, Endurance training, Patient/family training, Equipment eval/education, Gait training, Bed mobility, Continued evaluation, Compensatory technique education, Spoke to nursing, OT  Equipment Recommended: Lemuel Downs       See flowsheet documentation for full assessment, interventions and recommendations  Note: Prognosis: Excellent  Problem List: Decreased strength, Decreased range of motion, Decreased endurance, Impaired balance, Decreased mobility, Orthopedic restrictions, Decreased skin integrity, Pain  Assessment: Treatment consisted of bed mobility,balance training, ambulation training, safety awareness, fall prevention, endurance training to increase upright positions and functional mobility  Pt able to perform bed mobility, transfers and ambulation at mod I level, able to practice steps at supervision level however pt states she will be going to her own home without steps at DC  Pt safe for DC home with OPPT when medically ready; Pt requesting to return to bed to attempt to get rest as she states she did not sleep well last night, abduction wedge placed for THP  The patient's AM-PAC Basic Mobility Inpatient Short Form Raw Score is 23  A Raw score of greater than 17 suggests the patient may benefit from discharge to home  Please also refer to the recommendation of the Physical Therapist for safe discharge planning  PT Discharge Recommendation: Home with outpatient rehabilitation    See flowsheet documentation for full assessment

## 2022-08-09 NOTE — PROGRESS NOTES
Marj Meeks  73 y o   female  MR#: [id number]  8/9/2022    Post-op days: 1  Extremity: right hip    Subjective: Patient seen and examined  Lying comfortably in bed  No issues overnight  Denies chest pain, SOB, fever or chills  She has been getting out of bed frequently to urinate which she states is normal for her  Pain is well controlled  Vitals:   Vitals:    08/09/22 0045   BP: 122/58   Pulse: 71   Resp: 18   Temp: 97 9 °F (36 6 °C)   SpO2: 96%       Exam:   A&O x 3 NAD  Right hip:  Mepilex dressing c/d/i  Thigh swollen but compressible  Calf soft, nontender  Hip abduction pillow in place  NV intact    X-rays:  Right hip:  Well aligned prosthesis with no evidence of loosening    Labs:   WBC No results for input(s): WBC in the last 72 hours  H/H   Recent Labs     08/09/22  0224   HGB 10 8*   /  Recent Labs     08/09/22  0224   HCT 32 0*     Sed Rate No results for input(s): SEDRATE in the last 72 hours  CRP No results for input(s): CRP in the last 72 hours  Assessment:   S/p right DENI    Plan:   Weight-bearing as tolerated to right lower extremity with assistance  PT/OT- posterior hip precautions  DVT prophylaxis:  Lovenox and mechanical   Will discharge on  mg b i d  X6 weeks  Pain control  Encourage incentive spirometry  ABLA: Hbg 10 8 this AM  Will continue to monitor vitals and H/H  Appreciate medical consult  Discharge home once cleared by PT/OT and Medicine

## 2022-08-09 NOTE — PHYSICAL THERAPY NOTE
PHYSICAL THERAPY NOTE         08/09/22 0817   PT Last Visit   PT Visit Date 08/09/22   Note Type   Note Type Treatment   Pain Assessment   Pain Assessment Tool 0-10   Pain Score 6   Pain Location/Orientation Orientation: Right;Location: Hip   Hospital Pain Intervention(s) Repositioned; Ambulation/increased activity; Other (Comment)  (nurse medicated pt for pain)   Precautions   Total Hip Replacement   (R posterior THP)   Restrictions/Precautions   RLE Weight Bearing Per Order WBAT   Other Precautions THR   General   Chart Reviewed Yes   Additional Pertinent History reviewed THP   Family/Caregiver Present No   Cognition   Overall Cognitive Status WFL   Arousal/Participation Alert; Cooperative   Attention Within functional limits   Orientation Level Oriented X4   Memory Within functional limits   Following Commands Follows all commands and directions without difficulty   Bed Mobility   Supine to Sit 6  Modified independent   Sit to Supine 6  Modified independent   Additional Comments pt able to follow THP throughout   Transfers   Sit to Stand 6  Modified independent   Additional items Assist x 1; Increased time required  (RW)   Stand to Sit 6  Modified independent   Additional items Bedrails   Ambulation/Elevation   Gait pattern Decreased foot clearance; Excessively slow   Gait Assistance 6  Modified independent   Assistive Device Rolling walker   Distance 250ft with RW, no LOB or unsteadiness;  slow saad   Stair Management Assistance 5  Supervision  (pt states she will be going to her own home at [place])   Additional items Assist x 1;Verbal cues   Stair Management Technique One rail R;Step to pattern   Number of Stairs 3   Balance   Static Sitting Good   Dynamic Sitting Good   Static Standing Fair +   Dynamic Standing Fair +   Ambulatory Fair -  (RW)   Activity Tolerance   Activity Tolerance   (no adverse effects to PT noted)   Medical Staff Made Aware TT to ELDA Shay, Nurse [facility]   Assessment   Prognosis Excellent   Problem List Decreased strength;Decreased range of motion;Decreased endurance; Impaired balance;Decreased mobility;Orthopedic restrictions;Decreased skin integrity;Pain   Assessment Treatment consisted of bed mobility,balance training, ambulation training, safety awareness, fall prevention, endurance training to increase upright positions and functional mobility  Pt able to perform bed mobility, transfers and ambulation at mod I level, able to practice steps at supervision level however pt states she will be going to her own home without steps at DC  Pt safe for DC home with OPPT when medically ready; Pt requesting to return to bed to attempt to get rest as she states she did not sleep well last night, abduction wedge placed for THP  The patient's AM-PAC Basic Mobility Inpatient Short Form Raw Score is 23  A Raw score of greater than 17 suggests the patient may benefit from discharge to home  Please also refer to the recommendation of the Physical Therapist for safe discharge planning  Goals   Patient Goals to get some rest   STG Expiration Date 08/12/22   Plan   Treatment/Interventions ADL retraining;Functional transfer training;LE strengthening/ROM; Elevations; Therapeutic exercise; Endurance training;Patient/family training;Equipment eval/education; Bed mobility;Gait training; Compensatory technique education;Continued evaluation;Spoke to nursing;Spoke to MD;Spoke to advanced practitioner;OT   PT Frequency Twice a day   Recommendation   PT Discharge Recommendation Home with outpatient rehabilitation   Equipment Recommended [facility] Recommended Wheeled walker   AM-PAC Basic Mobility Inpatient   Turning in Bed Without Bedrails 4   Lying on Back to Sitting on Edge of Flat Bed 4   Moving Bed to Chair 4   Standing Up From Chair 4   Walk in Room 4   Climb 3-5 Stairs 3   Basic Mobility Inpatient Raw Score 23   Basic Mobility Standardized Score 50 88   Highest Level Of Mobility   JH-HLM Goal 7: Walk 25 feet or more   JH-HLM Achieved 8: Walk 250 feet ot more       Yoel Huizar PT      Patient Name: Rylee Sanchez  [id number] Date: 8/9/2022

## 2022-08-09 NOTE — CASE MANAGEMENT
Case Management Assessment & Discharge Planning Note    Patient name Zoya Trimble  Location [address] [id number] MRN [id number]  : 1955 Date 2022       Current Admission Date: 2022  Current Admission Diagnosis:Primary osteoarthritis of right hip   Patient Active Problem List    Diagnosis Date Noted    Hypertension     Hyperlipidemia     Primary osteoarthritis of right hip 2022    Sacroiliac joint dysfunction 2022    Acute frontal sinusitis 2021    Hypokalemia 2020    Peripheral vestibulopathy of both ears 2020    GERD (gastroesophageal reflux disease)       LOS (days): 0  Geometric Mean LOS (GMLOS) (days):   Days to GMLOS:     OBJECTIVE:              Current admission status: Outpatient Surgery       Preferred Pharmacy:   [id number], [address] [place] [address]  Phone: [phone] Fax: [phone]    Primary Care Provider: LOGAN Jean    Primary Insurance: [facility]  Secondary Insurance:     ASSESSMENT:  [address] Proxies    There are no active Health Care Proxies on file  Advance Directives  Does patient have a [address]?: No  Was patient offered paperwork?: Yes (given)  Does patient have Advance Directives?: No  Was patient offered paperwork?: Yes (given)  Primary Contact: Lidia Garg - daughter         Readmission Root Cause  30 Day Readmission: No    Patient Information  Admitted from[de-identified] Home  Mental Status: Alert  During Assessment patient was accompanied by: Not accompanied during assessment  Assessment information provided by[de-identified] Patient  Primary Caregiver: Self  Support Systems: Children, [address] 47 of Residence:  [facility] do you live in?: [address] [facility] entry access options   Select all that apply : No steps to enter home  Type of Current Residence: [place] (has basement apartment at son's home, Son , DIL and grandchildren in main house)  Upon entering residence, is there a bedroom on the main floor (no further steps)?: Yes  Upon entering residence, is there a bathroom on the main floor (no further steps)?: Yes  In the last 12 months, was there a time when you were not able to pay the mortgage or rent on time?: No  In the last 12 months, how many places have you lived?: 1  In the last 12 months, was there a time when you did not have a steady place to sleep or slept in a shelter (including now)?: No  Homeless/housing insecurity resource given?: N/A  Living Arrangements: Lives w/ Son (DIL, grandchildren)  Is patient a ?: No    Activities of Daily Living Prior to Admission  Functional Status: Independent  Completes ADLs independently?: Yes  Ambulates independently?: Yes  Does patient use assisted devices?: No  Does patient currently own DME?: Yes  What DME does the patient currently own?: [place] Jean, Bedside [employer]  Does patient have a history of Outpatient Therapy (PT/OT)?: No  Does the patient have a history of Short-Term Rehab?: No  Does patient have a history of HHC?: No  Does patient currently have [address]?: No         Patient Information Continued  Income Source: SSI/SSD  Does patient have prescription coverage?: Yes  Within the past 12 months, you worried that your food would run out before you got the money to buy more : Never true  Within the past 12 months, the food you bought just didn't last and you didn't have money to get more : Never true  Food insecurity resource given?: N/A  Does patient receive dialysis treatments?: No  Does patient have a history of substance abuse?: No  Does patient have a history of Mental Health Diagnosis?: No         Means of Transportation  Means of Transport to Appts[de-identified] Drives Self  In the past 12 months, has lack of transportation kept you from medical appointments or from getting medications?: No  In the past 12 months, has lack of transportation kept you from meetings, work, or from getting things needed for daily living?: No  Was application for public transport provided?: N/A        DISCHARGE DETAILS:    Discharge planning discussed with[de-identified] patient  Freedom of Choice: Yes  Comments - Freedom of Choice: Patient plans on returning home at discharge and has OP PT/OT set up for 8/11/22  CM contacted family/caregiver?: No- see comments (declined)  Were Treatment Team discharge recommendations reviewed with patient/caregiver?: Yes  Did patient/caregiver verbalize understanding of patient care needs?: Yes  Were patient/caregiver advised of the risks associated with not following Treatment Team discharge recommendations?: Yes         [facility]         Is the patient interested in [address] at discharge?: No    DME Referral Provided  Referral made for DME?: No        Treatment Team Recommendation: Home  Discharge Destination Plan[de-identified] Home  Transport at Discharge : Automobile, Family        Additional Comments: Patient lives in a basement apartment at son and DIL's home  She is independent adl's and ambulation, drives  Plans on going to OP PT/OT  Sister will transport home and stay with patient for a few days

## 2022-08-09 NOTE — PLAN OF CARE
Problem: MOBILITY - ADULT  Goal: Maintain or return to baseline ADL function  Description: INTERVENTIONS:  -  Assess patient's ability to carry out ADLs; assess patient's baseline for ADL function and identify physical deficits which impact ability to perform ADLs (bathing, care of mouth/teeth, toileting, grooming, dressing, etc )  - Assess/evaluate cause of self-care deficits   - Assess range of motion  - Assess patient's mobility; develop plan if impaired  - Assess patient's need for assistive devices and provide as appropriate  - Encourage maximum independence but intervene and supervise when necessary  - Involve family in performance of ADLs  - Assess for home care needs following discharge   - Consider OT consult to assist with ADL evaluation and planning for discharge  - Provide patient education as appropriate  Outcome: Progressing  Goal: Maintains/Returns to pre admission functional level  Description: INTERVENTIONS:  - Perform BMAT or MOVE assessment daily    - Set and communicate daily mobility goal to care team and patient/family/caregiver  - Collaborate with rehabilitation services on mobility goals if consulted  - Perform Range of Motion 5 times a day  - Reposition patient every 2 hours    - Dangle patient 3 times a day  - Stand patient 3 times a day  - Ambulate patient 3 times a day  - Out of bed to chair 3 times a day   - Out of bed for meals 3 times a day  - Out of bed for toileting  - Record patient progress and toleration of activity level   Outcome: Progressing     Problem: Potential for Falls  Goal: Patient will remain free of falls  Description: INTERVENTIONS:  - Educate patient/family on patient safety including physical limitations  - Instruct patient to call for assistance with activity   - Consult OT/PT to assist with strengthening/mobility   - Keep Call bell within reach  - Keep bed low and locked with side rails adjusted as appropriate  - Keep care items and personal belongings within reach  - Initiate and maintain comfort rounds  - Make Fall Risk Sign visible to staff  - Offer Toileting every 2 Hours, in advance of need  - Apply yellow socks and bracelet for high fall risk patients  - Consider moving patient to room near nurses station  Outcome: Progressing     Problem: PAIN - ADULT  Goal: Verbalizes/displays adequate comfort level or baseline comfort level  Description: Interventions:  - Encourage patient to monitor pain and request assistance  - Assess pain using appropriate pain scale  - Administer analgesics based on type and severity of pain and evaluate response  - Implement non-pharmacological measures as appropriate and evaluate response  - Consider cultural and social influences on pain and pain management  - Notify physician/advanced practitioner if interventions unsuccessful or patient reports new pain  Outcome: Progressing     Problem: INFECTION - ADULT  Goal: Absence or prevention of progression during hospitalization  Description: INTERVENTIONS:  - Assess and monitor for signs and symptoms of infection  - Monitor lab/diagnostic results  - Monitor all insertion sites, i e  indwelling lines, tubes, and drains  - Monitor endotracheal if appropriate and nasal secretions for changes in amount and color  - [place] appropriate cooling/warming therapies per order  - Administer medications as ordered  - Instruct and encourage patient and family to use good hand hygiene technique  - Identify and instruct in appropriate isolation precautions for identified infection/condition  Outcome: Progressing  Goal: Absence of fever/infection during neutropenic period  Description: INTERVENTIONS:  - Monitor WBC    Outcome: Progressing     Problem: SAFETY ADULT  Goal: Maintain or return to baseline ADL function  Description: INTERVENTIONS:  -  Assess patient's ability to carry out ADLs; assess patient's baseline for ADL function and identify physical deficits which impact ability to perform ADLs (bathing, care of mouth/teeth, toileting, grooming, dressing, etc )  - Assess/evaluate cause of self-care deficits   - Assess range of motion  - Assess patient's mobility; develop plan if impaired  - Assess patient's need for assistive devices and provide as appropriate  - Encourage maximum independence but intervene and supervise when necessary  - Involve family in performance of ADLs  - Assess for home care needs following discharge   - Consider OT consult to assist with ADL evaluation and planning for discharge  - Provide patient education as appropriate  Outcome: Progressing  Goal: Maintains/Returns to pre admission functional level  Description: INTERVENTIONS:  - Perform BMAT or MOVE assessment daily    - Set and communicate daily mobility goal to care team and patient/family/caregiver  - Collaborate with rehabilitation services on mobility goals if consulted  - Perform Range of Motion 5 times a day  - Reposition patient every 2 hours    - Dangle patient 3 times a day  - Stand patient 3 times a day  - Ambulate patient 3 times a day  - Out of bed to chair 3 times a day   - Out of bed for meals 3 times a day  - Out of bed for toileting  - Record patient progress and toleration of activity level   Outcome: Progressing  Goal: Patient will remain free of falls  Description: INTERVENTIONS:  - Educate patient/family on patient safety including physical limitations  - Instruct patient to call for assistance with activity   - Consult OT/PT to assist with strengthening/mobility   - Keep Call bell within reach  - Keep bed low and locked with side rails adjusted as appropriate  - Keep care items and personal belongings within reach  - Initiate and maintain comfort rounds  - Make Fall Risk Sign visible to staff  - Offer Toileting every 2 Hours, in advance of need  - Apply yellow socks and bracelet for high fall risk patients  - Consider moving patient to room near nurses station  Outcome: Progressing     Problem: DISCHARGE PLANNING  Goal: Discharge to home or other facility with appropriate resources  Description: INTERVENTIONS:  - Identify barriers to discharge w/patient and caregiver  - Arrange for needed discharge resources and transportation as appropriate  - Identify discharge learning needs (meds, wound care, etc )  - Arrange for interpretive services to assist at discharge as needed  - Refer to Case Management Department for coordinating discharge planning if the patient needs post-hospital services based on physician/advanced practitioner order or complex needs related to functional status, cognitive ability, or social support system  Outcome: Progressing     Problem: Knowledge Deficit  Goal: Patient/family/caregiver demonstrates understanding of disease process, treatment plan, medications, and discharge instructions  Description: Complete learning assessment and assess knowledge base    Interventions:  - Provide teaching at level of understanding  - Provide teaching via preferred learning methods  Outcome: Progressing     Problem: METABOLIC, FLUID AND ELECTROLYTES - ADULT  Goal: Electrolytes maintained within normal limits  Description: INTERVENTIONS:  - Monitor labs and assess patient for signs and symptoms of electrolyte imbalances  - Administer electrolyte replacement as ordered  - Monitor response to electrolyte replacements, including repeat lab results as appropriate  - Instruct patient on fluid and nutrition as appropriate  Outcome: Progressing  Goal: Fluid balance maintained  Description: INTERVENTIONS:  - Monitor labs   - Monitor I/O and WT  - Instruct patient on fluid and nutrition as appropriate  - Assess for signs & symptoms of volume excess or deficit  Outcome: Progressing  Goal: Glucose maintained within target range  Description: INTERVENTIONS:  - Monitor Blood Glucose as ordered  - Assess for signs and symptoms of hyperglycemia and hypoglycemia  - Administer ordered medications to maintain glucose within target range  - Assess nutritional intake and initiate nutrition service referral as needed  Outcome: Progressing     Problem: MUSCULOSKELETAL - ADULT  Goal: Maintain or return mobility to safest level of function  Description: INTERVENTIONS:  - Assess patient's ability to carry out ADLs; assess patient's baseline for ADL function and identify physical deficits which impact ability to perform ADLs (bathing, care of mouth/teeth, toileting, grooming, dressing, etc )  - Assess/evaluate cause of self-care deficits   - Assess range of motion  - Assess patient's mobility  - Assess patient's need for assistive devices and provide as appropriate  - Encourage maximum independence but intervene and supervise when necessary  - Involve family in performance of ADLs  - Assess for home care needs following discharge   - Consider OT consult to assist with ADL evaluation and planning for discharge  - Provide patient education as appropriate  Outcome: Progressing  Goal: Maintain proper alignment of affected body part  Description: INTERVENTIONS:  - Support, maintain and protect limb and body alignment  - Provide patient/ family with appropriate education  Outcome: Progressing

## 2022-08-09 NOTE — ASSESSMENT & PLAN NOTE
· Home regimen: lisinopril 10 mg daily and verapamil 240 mg daily  · Home medications restarted, BP stable in 130s

## 2022-08-09 NOTE — ASSESSMENT & PLAN NOTE
· Hgb 12 4 on 7/12, now 10 8   · Initiated on ferrous sulfate/folic acid 8/9  · Suspect dilutional component/mild blood loss during procedure  · Recommend outpatient CBC within 3-7 days of discharge

## 2022-08-09 NOTE — PLAN OF CARE
Problem: PAIN - ADULT  Goal: Verbalizes/displays adequate comfort level or baseline comfort level  Description: Interventions:  - Encourage patient to monitor pain and request assistance  - Assess pain using appropriate pain scale  - Administer analgesics based on type and severity of pain and evaluate response  - Implement non-pharmacological measures as appropriate and evaluate response  - Consider cultural and social influences on pain and pain management  - Notify physician/advanced practitioner if interventions unsuccessful or patient reports new pain  Outcome: Progressing     Problem: INFECTION - ADULT  Goal: Absence or prevention of progression during hospitalization  Description: INTERVENTIONS:  - Assess and monitor for signs and symptoms of infection  - Monitor lab/diagnostic results  - Monitor all insertion sites, i e  indwelling lines, tubes, and drains  - Monitor endotracheal if appropriate and nasal secretions for changes in amount and color  - [place] appropriate cooling/warming therapies per order  - Administer medications as ordered  - Instruct and encourage patient and family to use good hand hygiene technique  - Identify and instruct in appropriate isolation precautions for identified infection/condition  Outcome: Progressing  Goal: Absence of fever/infection during neutropenic period  Description: INTERVENTIONS:  - Monitor WBC    Outcome: Progressing

## 2022-08-09 NOTE — PLAN OF CARE
Problem: OCCUPATIONAL THERAPY ADULT  Goal: Performs self-care activities at highest level of function for planned discharge setting  See evaluation for individualized goals  Description: Treatment Interventions: ADL retraining, Functional transfer training, Patient/family training, Compensatory technique education  Equipment Recommended:  (Pt owns a hip kit, commode, and shower chair)       See flowsheet documentation for full assessment, interventions and recommendations  Outcome: Progressing  Note: Limitation: Decreased ADL status, Decreased self-care trans, Decreased high-level ADLs  Prognosis: Good  Assessment: Pt seen for OT tx session with focus on functional balance, functional mobility, ADL status, and transfer safety  Patient agreeable to OT treatment session  Pt received supine in bed  Performed bed mobility with mod I  Performed transfers and functional mobility with mod I with RW  Pt educated on LB ADL strategies and appropriate use of hip kit  Able to demonstrate appropriate use of AE with supervision  Reviewed hip precautions  PT reports understanding  Patient continues to be functioning below baseline level, occupational performance remains limited secondary to factors listed above, and pt at increased risk for falls and injury  The patient's raw score on the AM-PAC Daily Activity inpatient short form is 22, standardized score is 47 1, greater than 39 4  Patients at this level are likely to benefit from DC to post-acute rehabilitation services  Please refer to the recommendation of the Occupational Therapist for safe DC planning  From OT standpoint, recommendation at time of d/c would be Short Term Rehab  Patient to benefit from continued Occupational Therapy treatment while in the hospital to address deficits as defined above and maximize level of functional independence with ADLs and functional mobility  Pt left with call bell in reach, tray table in reach, needs met  RN aware       OT Discharge Recommendation: No rehabilitation needs

## 2022-08-09 NOTE — ASSESSMENT & PLAN NOTE
· S/P total hip arthroplasty right hip on 08/08 due to primary OA of right hip  · Orthopedics on board, appreciate recommendations  · Pain management- patient reports pain is improving/well controlled despite ambulating frequently overnight to urinate (normal for her)  · Resume DVT prophylaxis 8/9 (lovenox), ASA on dc x 6 weeks per ortho  · Encourage IS  · PT/OT eval  · Outpatient follow-up with Orthopedics  · Stable for discharge today 8/9

## 2022-08-11 ENCOUNTER — OFFICE VISIT (OUTPATIENT)
Dept: PHYSICAL THERAPY | Facility: CLINIC | Age: 67
End: 2022-08-11
Payer: COMMERCIAL

## 2022-08-11 ENCOUNTER — TELEPHONE (OUTPATIENT)
Dept: OBGYN CLINIC | Facility: HOSPITAL | Age: 67
End: 2022-08-11

## 2022-08-11 DIAGNOSIS — M25.551 RIGHT HIP PAIN: Primary | ICD-10-CM

## 2022-08-11 DIAGNOSIS — Z96.641 STATUS POST RIGHT HIP REPLACEMENT: ICD-10-CM

## 2022-08-11 DIAGNOSIS — M16.11 PRIMARY OSTEOARTHRITIS OF RIGHT HIP: ICD-10-CM

## 2022-08-11 PROCEDURE — 97110 THERAPEUTIC EXERCISES: CPT | Performed by: PHYSICAL THERAPIST

## 2022-08-11 PROCEDURE — 97161 PT EVAL LOW COMPLEX 20 MIN: CPT | Performed by: PHYSICAL THERAPIST

## 2022-08-11 NOTE — PROGRESS NOTES
PT Evaluation     Today's date: 2022  Patient name: Yanet Quintero  : 1955  MRN: [id number]  Referring provider: Edelmira De Oliveira PA-C  Dx:   Encounter Diagnosis     ICD-10-CM    1  Right hip pain  M25 551    2  Primary osteoarthritis of right hip  M16 11    3  Status post right hip replacement  Z96 641        Start Time: 5881  Stop Time: 1125  Total time in clinic (min): 40 minutes    Assessment  Assessment details: Yanet Quintero is a 73 y o  female presenting to outpatient physical therapy at [place] [address] with complaints of R hip pain s/p DENI on 22  They present with decreased range of motion, decreased strength, limited flexibility, poor postural awareness, poor body mechanics, poor balance, decreased tolerance to activity and decreased functional mobility due to Right hip pain  (primary encounter diagnosis)  Primary osteoarthritis of right hip  Status post right hip replacement  Emperatriz Ashley would benefit from skilled physical therapy to address noted impairments in order to allow for full functional return to work and ADL-related activities  Thank you for the referral!  Impairments: abnormal muscle firing, abnormal or restricted ROM, activity intolerance, impaired balance, impaired physical strength, lacks appropriate home exercise program, pain with function and poor body mechanics  Barriers to therapy: n/a  Understanding of Dx/Px/POC: excellent  Goals  ST   Independent with HEP in 2 weeks  2  Decrease pain by 50% in 3 wks  3  Able to walk without assistive devices in 3 weeks     LT  Achieve FOTO score of 60/100 in 6 weeks   2   Able to ascend a flight of stairs with alternate step pattern in 6 weeks  3  Strength = 5/5 R hip all planes in 6 weeks      Plan  Plan details: RE in 6 weeks    Patient would benefit from: skilled physical therapy  Planned modality interventions: cryotherapy, electrical stimulation/Russian stimulation and thermotherapy: hydrocollator packs  Planned therapy interventions: abdominal trunk stabilization, manual therapy, neuromuscular re-education, therapeutic activities, therapeutic exercise, body mechanics training and home exercise program  Frequency: 2x week  Duration in visits: 12  Duration in weeks: 6  Plan of Care beginning date: 8/11/2022  Plan of Care expiration date: 9/23/2022  Treatment plan discussed with: patient        Subjective Evaluation    History of Present Illness  Mechanism of injury: Pt is 3 days s/p R DENI performed by Dr Nathalie Jolley  Posterior approach  DOS: 8/8/22    Pt reports she is doing well  Pain is well managed with tylenol around the clock, rated avg 2/10  Denies falls, instability of the hip, N/T     She's been ambulating with a RW  She does have a SPC at home but does not feel ready for it yet  She is sleeping in bed without much issue, she has a strap which assists with bed mobility, but feels like she does not need it much anymore  She is staying at home with her sister for help with ADLs  She assists with meals, carrying stuff  She is independent with dressing, toileting  She's been washing up in the sink as she is not allowed to submerge the bandage for a week  She isn't doing any exercises, but has been active throughout her home  She does note she has a residual 1in leg length discrepancy, R longer than L  A shoe lift was ordered and hopefully will be available by next week  Patient Goals  Patient goals for therapy: decreased edema, decreased pain, improved balance, increased motion, return to work, return to [employer] activities, independence with ADLs/IADLs and increased strength          Objective     Observations     Additional Observation Details  LLD R longer than L approx 1in; swelling t/o R thigh to the superior knee   Dressing covering incision appropriately    Tenderness     Additional Tenderness Details  R SLR to 15 deg    Neurological Testing     Sensation     Hip   Left Hip   Intact: light touch    Right Hip Intact: light touch    Active Range of Motion     Right Hip   Flexion: 100 degrees   Abduction: 35 degrees     Strength/Myotome Testing     Right Hip   Planes of Motion   Flexion: 3+  Abduction: 3+  Adduction: 4-  External rotation: 4-  Internal rotation: 4-    Right Knee   Flexion: 4-  Extension: 4  Quadriceps contraction: fair    Functional Assessment        Comments  Gait: RW, limp secondary to LLD, decreased gait speed  Bed mobility: independent  STS: increased R knee flexion secondary to LLD, able to perform without assist  WB: NL stance, 30 sec unsupported with equal weight distribution through both LE             Precautions: s/p R DENI ---- DOS: 8/8/22  Protocol: posterior approach  Other factors: 1 in LLD R longer than L  Pt goals: no assistive device for walking, independence with ADLs      HEP:  Access Code: [email or web address]  URL: [email or web address]  Date: 08/11/2022  Prepared by: Jose Enid    Exercises  · Supine Gluteal Sets - 10 reps - 10 sec hold  · Supine Quad Set - 10 reps - 10 sec hold  · Supine Isometric Hip Adduction with Pillow at Knees - 10 reps - 10 sec hold  · Supine Hip Abduction - 10 reps  · Small Range Straight Leg Raise - 10 reps  · Seated Long Arc Quad - 10 reps - 10 sec hold  · Standing Heel Raise - 10 reps            Manuals 8/11            Hip PROM Gentle ASHLYN            Quad, HS, calf, glute roller                                       Neuro Re-Ed             Weight shift s-s, f-b             tandem             SLS             Foam walk             march                                       Ther Ex             nustep             Calf stretch             HR x10            glute set, quad set 10"x10            Hip add Iso, legs straight 10"x10            Hip abd supine arom x10            SLR             S/L hip abd             bridge             LAQ x10            STS                                                                              Ther Activity Gait Training                                       Modalities

## 2022-08-15 ENCOUNTER — OFFICE VISIT (OUTPATIENT)
Dept: PHYSICAL THERAPY | Facility: CLINIC | Age: 67
End: 2022-08-15
Payer: COMMERCIAL

## 2022-08-15 ENCOUNTER — TELEPHONE (OUTPATIENT)
Dept: OBGYN CLINIC | Facility: HOSPITAL | Age: 67
End: 2022-08-15

## 2022-08-15 DIAGNOSIS — M16.11 PRIMARY OSTEOARTHRITIS OF RIGHT HIP: ICD-10-CM

## 2022-08-15 DIAGNOSIS — M25.551 RIGHT HIP PAIN: Primary | ICD-10-CM

## 2022-08-15 PROCEDURE — 97112 NEUROMUSCULAR REEDUCATION: CPT

## 2022-08-15 PROCEDURE — 97140 MANUAL THERAPY 1/> REGIONS: CPT

## 2022-08-15 PROCEDURE — 97110 THERAPEUTIC EXERCISES: CPT

## 2022-08-15 RX ORDER — OXYCODONE HYDROCHLORIDE 5 MG/1
5 TABLET ORAL EVERY 4 HOURS PRN
Qty: 20 TABLET | Refills: 0 | Status: SHIPPED | OUTPATIENT
Start: 2022-08-15 | End: 2022-08-25

## 2022-08-15 NOTE — TELEPHONE ENCOUNTER
Patient had hip replacement 08- and as of yesterday has increased pain and is not able to straighten the leg        Patient also would like a refill of oxyCODONE (ROXICODONE) 5 immediate release tablet [[id number]]     [facility] in [facility] [phone]

## 2022-08-15 NOTE — PROGRESS NOTES
Daily Note     Today's date: 8/15/2022  Patient name: Taylor Adams  : 1955  MRN: [id number]  Referring provider: Juanjose Clayton PA-C  Dx:   Encounter Diagnosis     ICD-10-CM    1  Right hip pain  M25 551                   Subjective: Pt reports she feels she took a big step back and was about to go to the emergency room over the weekend  She thinks it started when she bent over the pick something up form the floor without her reacher  Objective: See treatment diary below      Assessment: Initiated PT POC today  Went over posterior hip precautions again with pt- she showed good understanding but was feeling good over the weekend and bent over without thinking  Kept session light by stretching and going over her HEP to check for good understanding, this due to her current state and symptoms after setback  Also modified height of walker and it was too low for her height  +Tolerated treatment fair  Patient demonstrated fatigue post treatment, exhibited good technique with therapeutic exercises and would benefit from continued PT      Plan: Continue per plan of care  Progress treatment as tolerated  Precautions: s/p R DENI ---- DOS: 22  Protocol: posterior approach  Other factors: 1 in LLD R longer than L  Pt goals: no assistive device for walking, independence with ADLs      HEP:  Access Code: [email or web address]  URL: [email or web address]  Date: 2022  Prepared by: Dashawn Sanderson    Exercises  · Supine Gluteal Sets - 10 reps - 10 sec hold  · Supine Quad Set - 10 reps - 10 sec hold  · Supine Isometric Hip Adduction with Pillow at Knees - 10 reps - 10 sec hold  · Supine Hip Abduction - 10 reps  · Small Range Straight Leg Raise - 10 reps  · Seated Long Arc Quad - 10 reps - 10 sec hold  · Standing Heel Raise - 10 reps            Manuals 8/11 8/15           Hip PROM Gentle ASHLYN WE           Quad, HS, calf, glute roller                                       Neuro Re-Ed             Weight shift s-s, f-b             tandem             SLS             Foam walk             march                                       Ther Ex             nustep             Calf stretch             HR x10 x10           glute set, quad set 10"x10 10"x10           Hip add Iso, legs straight 10"x10 Iso, legs straight 10"x10           Hip abd supine arom x10 supine arom x10           SLR             S/L hip abd             bridge             LAQ x10 x10           STS                                                                              Ther Activity                                       Gait Training                                       Modalities

## 2022-08-15 NOTE — TELEPHONE ENCOUNTER
All sounds good  I refilled oxycodone, but we want her to start weaning off of it  She should still be on the tylenol around the clock and icing  Thank you

## 2022-08-17 ENCOUNTER — OFFICE VISIT (OUTPATIENT)
Dept: PHYSICAL THERAPY | Facility: CLINIC | Age: 67
End: 2022-08-17
Payer: COMMERCIAL

## 2022-08-17 DIAGNOSIS — M25.551 RIGHT HIP PAIN: Primary | ICD-10-CM

## 2022-08-17 DIAGNOSIS — M16.11 PRIMARY OSTEOARTHRITIS OF RIGHT HIP: ICD-10-CM

## 2022-08-17 DIAGNOSIS — Z01.818 PREOP TESTING: ICD-10-CM

## 2022-08-17 DIAGNOSIS — Z96.641 STATUS POST RIGHT HIP REPLACEMENT: ICD-10-CM

## 2022-08-17 LAB
DME PARACHUTE DELIVERY DATE ACTUAL: NORMAL
DME PARACHUTE DELIVERY DATE REQUESTED: NORMAL
DME PARACHUTE ITEM DESCRIPTION: NORMAL
DME PARACHUTE ORDER STATUS: NORMAL
DME PARACHUTE SUPPLIER NAME: NORMAL
DME PARACHUTE SUPPLIER PHONE: NORMAL

## 2022-08-17 PROCEDURE — 97140 MANUAL THERAPY 1/> REGIONS: CPT | Performed by: PHYSICAL THERAPIST

## 2022-08-17 PROCEDURE — 97110 THERAPEUTIC EXERCISES: CPT | Performed by: PHYSICAL THERAPIST

## 2022-08-17 NOTE — PROGRESS NOTES
Daily Note     Today's date: 2022  Patient name: Rajni Funez  : 1955  MRN: [id number]  Referring provider: Caron Mario PA-C  Dx:   Encounter Diagnosis     ICD-10-CM    1  Right hip pain  M25 551    2  Primary osteoarthritis of right hip  M16 11    3  Status post right hip replacement  Z96 641    4  Preop testing  Z01 818          Subjective: Pt reports she was on the toilet when her left leg went numb, so she braced and grabbed the sink then felt a "pull" in her right glut mm, which has improved since  Objective: See treatment diary below  Added active hamsting stretch w/sciatic nerve glide to HEP, see handout  Assessment: Pt tolerated treatment well with program progressions as noted below with minimal to no inc in sxs  Pt requires verbal and tactile cues from PT for safe execution of therapeutic exercises  Pt reports improved sxs from baseline following MT and TE today  Patient demonstrated fatigue post treatment, exhibited good technique with therapeutic exercises and would benefit from continued PT      Plan: Continue per plan of care  Progress treatment as tolerated  Assess HEP technique & effects of implementation  Precautions: s/p R DENI ---- DOS: 22  Protocol: posterior approach  Other factors: 1 in LLD R longer than L  Pt goals: no assistive device for walking, independence with ADLs      HEP:  Access Code: [email or web address]  URL: [email or web address]  Date: 2022  Prepared by: Ledy Pennington    Exercises  · Supine Gluteal Sets - 10 reps - 10 sec hold  · Supine Quad Set - 10 reps - 10 sec hold  · Supine Isometric Hip Adduction with Pillow at Knees - 10 reps - 10 sec hold  · Supine Hip Abduction - 10 reps  · Small Range Straight Leg Raise - 10 reps  · Seated Long Arc Quad - 10 reps - 10 sec hold  · Standing Heel Raise - 10 reps            Manuals 8/11 8/15 8/17          Hip PROM Gentle ASHLYN WE NS          Quad, HS, calf, glute roller Neuro Re-Ed             Weight shift s-s, f-b             tandem             SLS             Foam walk             march                                       Ther Ex             nustep             Calf stretch             HR x10 x10 x20 with dynadisc under LLE          glute set, quad set 10"x10 10"x10 10"x10          Hip add Iso, legs straight 10"x10 Iso, legs straight 10"x10 Iso, legs straight 10"x10          Hip abd supine arom x10 supine arom x10 supine arom x10          SLR             S/L hip abd             bridge             LAQ x10 x10 x20          STS             Active hamstring stretch w/sciatic n  glide   10x10"          Slant board   10x10"                                                 Ther Activity                                       Gait Training                                       Modalities

## 2022-08-22 ENCOUNTER — OFFICE VISIT (OUTPATIENT)
Dept: PHYSICAL THERAPY | Facility: CLINIC | Age: 67
End: 2022-08-22
Payer: COMMERCIAL

## 2022-08-22 DIAGNOSIS — M16.11 PRIMARY OSTEOARTHRITIS OF RIGHT HIP: ICD-10-CM

## 2022-08-22 DIAGNOSIS — M25.551 RIGHT HIP PAIN: Primary | ICD-10-CM

## 2022-08-22 DIAGNOSIS — Z96.641 STATUS POST RIGHT HIP REPLACEMENT: ICD-10-CM

## 2022-08-22 PROCEDURE — 97110 THERAPEUTIC EXERCISES: CPT | Performed by: PHYSICAL THERAPIST

## 2022-08-22 PROCEDURE — 97140 MANUAL THERAPY 1/> REGIONS: CPT | Performed by: PHYSICAL THERAPIST

## 2022-08-22 NOTE — PROGRESS NOTES
Daily Note     Today's date: 2022  Patient name: Monica Martino  : 1955  MRN: [id number]  Referring provider: Nimco Samuels PA-C  Dx:   Encounter Diagnosis     ICD-10-CM    1  Right hip pain  M25 551    2  Primary osteoarthritis of right hip  M16 11    3  Status post right hip replacement  Z96 641          Subjective: Feels off balance and pain in the leg secondary to the LLD  This is also restricting her from reducing AD use  Objective: See treatment diary below  Assessment: Favorable to nustep for warm up  Started session with static balance work  The LLD requires her to rely more on the LLE, which causes pain in the R hip during tandem stance  SLR continues to be appropriately challenging  Able to bridge without issue, she will likely tolerate increased reps/hold nv  Ended with MHP for pain relief  Patient demonstrated fatigue post treatment, exhibited good technique with therapeutic exercises and would benefit from continued PT      Plan: Continue per plan of care  Progress treatment as tolerated  Continue with balance and hip extension strengthening  Precautions: s/p R DENI ---- DOS: 22  Protocol: posterior approach  Other factors: 1 in LLD R longer than L  Pt goals: no assistive device for walking, independence with ADLs      HEP:  Access Code: [email or web address]  URL: [email or web address]/  Date: 2022  Prepared by: Nahun Basericka    Exercises  · Supine Gluteal Sets - 10 reps - 10 sec hold  · Supine Quad Set - 10 reps - 10 sec hold  · Supine Isometric Hip Adduction with Pillow at Knees - 10 reps - 10 sec hold  · Supine Hip Abduction - 10 reps  · Small Range Straight Leg Raise - 10 reps  · Seated Long Arc Quad - 10 reps - 10 sec hold  · Standing Heel Raise - 10 reps            Manuals 8/11 8/15 8/17 8/22         Hip PROM Gentle ASHLYN WE NS ASHLYN         Quad, HS, calf, glute roller                                       Neuro Re-Ed             Weight shift s-s, f-b NL stance balance    30"         Narrow stance balance    30"x2         tandem    30"x2 ea         SLS             Foam walk             march                                       Ther Ex             nustep    6'         Calf stretch             HR x10 x10 x20 with dynadisc under LLE          glute set, quad set 10"x10 10"x10 10"x10          Hip add Iso, legs straight 10"x10 Iso, legs straight 10"x10 Iso, legs straight 10"x10          Hip abd supine arom x10 supine arom x10 supine arom x10 H/L btb 5"x10         SLR    x10         S/L hip abd             bridge    btb abd x10         LAQ x10 x10 x20 3# 5"x20         STS             Active hamstring stretch w/sciatic n  glide   10x10" 10"x10         Slant board   10x10"                                                 Ther Activity                                       Gait Training                                       Modalities             [facility]    10'

## 2022-08-23 ENCOUNTER — APPOINTMENT (OUTPATIENT)
Dept: RADIOLOGY | Facility: CLINIC | Age: 67
End: 2022-08-23
Payer: COMMERCIAL

## 2022-08-23 ENCOUNTER — OFFICE VISIT (OUTPATIENT)
Dept: OBGYN CLINIC | Facility: CLINIC | Age: 67
End: 2022-08-23

## 2022-08-23 VITALS
DIASTOLIC BLOOD PRESSURE: 90 MMHG | SYSTOLIC BLOOD PRESSURE: 130 MMHG | BODY MASS INDEX: 30.56 KG/M2 | WEIGHT: 179 LBS | HEIGHT: 64 IN

## 2022-08-23 DIAGNOSIS — M21.70 LEG LENGTH DISCREPANCY: ICD-10-CM

## 2022-08-23 DIAGNOSIS — Z47.1 AFTERCARE FOLLOWING RIGHT HIP JOINT REPLACEMENT SURGERY: ICD-10-CM

## 2022-08-23 DIAGNOSIS — Z47.1 AFTERCARE FOLLOWING RIGHT HIP JOINT REPLACEMENT SURGERY: Primary | ICD-10-CM

## 2022-08-23 DIAGNOSIS — Z96.641 AFTERCARE FOLLOWING RIGHT HIP JOINT REPLACEMENT SURGERY: ICD-10-CM

## 2022-08-23 DIAGNOSIS — Z96.641 AFTERCARE FOLLOWING RIGHT HIP JOINT REPLACEMENT SURGERY: Primary | ICD-10-CM

## 2022-08-23 PROCEDURE — 73502 X-RAY EXAM HIP UNI 2-3 VIEWS: CPT

## 2022-08-23 PROCEDURE — 77073 BONE LENGTH STUDIES: CPT

## 2022-08-23 PROCEDURE — 99024 POSTOP FOLLOW-UP VISIT: CPT | Performed by: ORTHOPAEDIC SURGERY

## 2022-08-23 NOTE — PROGRESS NOTES
Assessment:     1  Aftercare following right hip joint replacement surgery        Plan:     Problem List Items Addressed This Visit    None     Visit Diagnoses     Aftercare following right hip joint replacement surgery    -  Primary    Relevant Orders    XR hip/pelv 2-3 vws right if performed    XR bone length (scanogram)        Patient is s/p right total hip replacement 8/8/22 overall doing well  Imaging reviewed with patient  Right leg is about 1 5 cm longer than left  Discussed custom orthotics vs shoe balancers/extenders  We will wait for official scanogram result and then can call patient with results  Referral for 1 5 cm orthotics script placed  Continue with physical and maintain HEP  Continue with tylenol for pain,  BID for DVT prophylaxis until 6 week post op rohan  Continue posterior total hip precaution  See patient back in 12 weeks  All patient's questions were answered to her satisfaction  This note is created using dictation transcription  It may contain typographical errors, grammatical errors, improperly dictated words, background noise and other errors  Subjective:     Patient ID: Sebastian Howe is a 73 y o  female  Chief Complaint:  73 yr old female s/p right total hip arthroplasty 8/8/22  She is attending physical therapy which is going well  Old pain is gone in hip following surgery  She is using walker to ambulate  Right leg is longer than left and is using insert currently in left shoe, she may get custom inserts  Denies any fever,chills  No evidence of infection       Allergy:  Allergies   Allergen Reactions    Erythromycin Nausea Only     Other reaction(s): GI Reaction     Medications:  all current active meds have been reviewed  Past Medical History:  Past Medical History:   Diagnosis Date    COVID-19 05/2022    Fibromyalgia, primary     GERD (gastroesophageal reflux disease) 1999    Hyperlipidemia     Hypertension     Lactose intolerance 1984    [facility] bowel obstruction [facility])      Past Surgical History:  Past Surgical History:   Procedure Laterality Date    ABDOMINAL SURGERY      ADENOIDECTOMY      pt has 2nd appendix removed       BOWEL RESECTION      CHOLECYSTECTOMY      EYE SURGERY      lasik    OOPHORECTOMY      one     OK LAP,DIAGNOSTIC ABDOMEN N/A 2020    Procedure: LAPAROSCOPY DIAGNOSTIC, lysis of adhesions , possible bowel resection;  Surgeon: Onesimo Rahman MD;  Location:  MAIN OR;  Service: General    OK TOTAL HIP ARTHROPLASTY Right 2022    Procedure: ARTHROPLASTY HIP TOTAL;  Surgeon: Wendy Dover MD;  Location:  MAIN OR;  Service: Orthopedics    TUBAL LIGATION       Family History:  Family History   Problem Relation Age of Onset    Fibromyalgia Mother     Arthritis Mother     Hypertension Mother     Vision loss Mother         Glaucoma    Heart disease Father     GI problems Father     Hypertension Father     Vision loss Father         Macular Degeneration    Birth defects Sister         Arterial Vascular Malformation    No Known Problems Daughter     No Known Problems Maternal Grandmother     Diabetes Maternal Grandfather     No Known Problems Paternal Grandmother     No Known Problems Paternal Grandfather     No Known Problems Sister     No Known Problems Sister     No Known Problems Paternal Aunt     No Known Problems Paternal Aunt     Mental illness Brother     Colon cancer Neg Hx     Colon polyps Neg Hx      Social History:  Social History     Substance and Sexual Activity   Alcohol Use Yes    Comment: rarely, 3 x year     Social History     Substance and Sexual Activity   Drug Use Yes    Types: Marijuana    Comment: medical marijuana     Social History     Tobacco Use   Smoking Status Former Smoker    Packs/day: 2 00    Years: 30 00    Pack years: 60 00    Types: Cigarettes    Start date: 1973   Exie Turrell Quit date: 2020    Years since quittin 9   Smokeless Tobacco Never Used     Review of Systems   Constitutional: Negative for chills and fever  HENT: Negative for ear pain and sore throat  Eyes: Negative for pain and visual disturbance  Respiratory: Negative for cough and shortness of breath  Cardiovascular: Negative for chest pain and palpitations  Gastrointestinal: Negative for abdominal pain and vomiting  Genitourinary: Negative for dysuria and hematuria  Musculoskeletal: Positive for arthralgias (Right), gait problem (Ambulate with walker) and joint swelling (Right hip)  Negative for back pain  Skin: Negative for color change and rash  Neurological: Negative for seizures and syncope  Psychiatric/Behavioral: Negative  All other systems reviewed and are negative  Objective:  BP Readings from Last 1 Encounters:   08/23/22 130/90      Wt Readings from Last 1 Encounters:   08/23/22 81 2 kg (179 lb)      BMI:   Estimated body mass index is 30 73 kg/m² as calculated from the following:    Height as of this encounter: 5' 4" (1 626 m)  Weight as of this encounter: 81 2 kg (179 lb)  BSA:   Estimated body surface area is 1 87 meters squared as calculated from the following:    Height as of this encounter: 5' 4" (1 626 m)  Weight as of this encounter: 81 2 kg (179 lb)  Physical Exam  Vitals and nursing note reviewed  Constitutional:       Appearance: Normal appearance  She is well-developed  HENT:      Head: Normocephalic and atraumatic  Right Ear: External ear normal       Left Ear: External ear normal    Eyes:      Extraocular Movements: Extraocular movements intact  Conjunctiva/sclera: Conjunctivae normal    Pulmonary:      Effort: Pulmonary effort is normal    Musculoskeletal:         General: Tenderness (right hip arthralgia ) present  Cervical back: Neck supple  Skin:     General: Skin is warm and dry  Neurological:      Mental Status: She is alert and oriented to person, place, and time        Deep Tendon Reflexes: Reflexes are normal and symmetric  Psychiatric:         Mood and Affect: Mood normal          Behavior: Behavior normal        Right Hip Exam     Tenderness   The patient is experiencing no tenderness  Range of Motion   Flexion: normal   External rotation: normal   Internal rotation: normal     Tests   AYDEN: negative    Other   Erythema: absent  Scars: present (well healing incision wiht no evidence of infection or drainage )  Sensation: normal  Pulse: present    Comments:  Right leg is longer than left about 1 5 cm            I have personally reviewed pertinent films in PACS and my interpretation is xr right hip demonstrates stable total hip prosthesis in acceptable position and alignement        Scribe Attestation    I,:  Rachael Brice am acting as a scribe while in the presence of the attending physician :       I,:  Rozina Vu MD personally performed the services described in this documentation    as scribed in my presence :

## 2022-08-26 ENCOUNTER — OFFICE VISIT (OUTPATIENT)
Dept: PHYSICAL THERAPY | Facility: CLINIC | Age: 67
End: 2022-08-26
Payer: COMMERCIAL

## 2022-08-26 DIAGNOSIS — M25.551 RIGHT HIP PAIN: Primary | ICD-10-CM

## 2022-08-26 DIAGNOSIS — Z96.641 STATUS POST RIGHT HIP REPLACEMENT: ICD-10-CM

## 2022-08-26 DIAGNOSIS — M16.11 PRIMARY OSTEOARTHRITIS OF RIGHT HIP: ICD-10-CM

## 2022-08-26 PROCEDURE — 97110 THERAPEUTIC EXERCISES: CPT | Performed by: PHYSICAL THERAPIST

## 2022-08-26 PROCEDURE — 97112 NEUROMUSCULAR REEDUCATION: CPT | Performed by: PHYSICAL THERAPIST

## 2022-08-26 NOTE — PROGRESS NOTES
Daily Note     Today's date: 2022  Patient name: Zoya Trimble  : 1955  MRN: [id number]  Referring provider: Ermelinda Tinajero PA-C  Dx:   Encounter Diagnosis     ICD-10-CM    1  Right hip pain  M25 551    2  Primary osteoarthritis of right hip  M16 11    3  Status post right hip replacement  [id number] [id number]          Subjective: Has an appt with orthotics dr on   Still having soreness to the L hip muscles  Objective: See treatment diary below  Assessment: Much better balance and tolerance to activity in standing when the LLD is accommodated for with lifts under the LLE  Her NWB hip strength is coming along well  I expect her hip soreness, walking and overall stability will improve significantly when she has a shoe lift on the L  MHP denied end of session  Patient demonstrated fatigue post treatment, exhibited good technique with therapeutic exercises and would benefit from continued PT      Plan: Continue per plan of care  Progress treatment as tolerated  Continue with balance and hip extension strengthening  Precautions: s/p R DENI ---- DOS: 22  Protocol: posterior approach  Other factors: 1 in LLD R longer than L  Pt goals: no assistive device for walking, independence with ADLs  F/u appts: orthotics       HEP:  Access Code: [email or web address]  URL: [email or web address]  Date: 2022  Prepared by: Gardenia Schlatter    Exercises  · Supine Gluteal Sets - 10 reps - 10 sec hold  · Supine Quad Set - 10 reps - 10 sec hold  · Supine Isometric Hip Adduction with Pillow at Knees - 10 reps - 10 sec hold  · Supine Hip Abduction - 10 reps  · Small Range Straight Leg Raise - 10 reps  · Seated Long Arc Quad - 10 reps - 10 sec hold  · Standing Heel Raise - 10 reps            Manuals 8/11 8/15 8/17 8/22 8/26        Hip PROM Gentle ASHLYN WE NS ASHLYN         Quad, HS, calf, glute roller                                       Neuro Re-Ed             Weight shift s-s, f-b     x10 ea        NL stance balance    30"         Narrow stance balance    30"x2         tandem    30"x2 ea 30"x2 ea        SLS             Foam walk             march                                       Ther Ex             nustep    6' 6'        Calf stretch             HR x10 x10 x20 with dynadisc under LLE          glute set, quad set 10"x10 10"x10 10"x10          Hip add Iso, legs straight 10"x10 Iso, legs straight 10"x10 Iso, legs straight 10"x10          Hip abd supine arom x10 supine arom x10 supine arom x10 H/L btb 5"x10         SLR    x10 2x10        S/L hip abd     2x10        bridge    btb abd x10 btb abd 5" 2x10        LAQ x10 x10 x20 3# 5"x20 3# 5"x20        Mini squats     5"x10 ea        march     x10 ea        Active hamstring stretch w/sciatic n  glide   10x10" 10"x10         Slant board   10x10"          HS stretch     strap 10"x10 ea                                  Ther Activity                                       Gait Training                                       Modalities             MHP    10'

## 2022-08-29 ENCOUNTER — APPOINTMENT (OUTPATIENT)
Dept: PHYSICAL THERAPY | Facility: CLINIC | Age: 67
End: 2022-08-29
Payer: COMMERCIAL

## 2022-09-01 ENCOUNTER — OFFICE VISIT (OUTPATIENT)
Dept: PHYSICAL THERAPY | Facility: CLINIC | Age: 67
End: 2022-09-01
Payer: COMMERCIAL

## 2022-09-01 DIAGNOSIS — M16.11 PRIMARY OSTEOARTHRITIS OF RIGHT HIP: ICD-10-CM

## 2022-09-01 DIAGNOSIS — Z96.641 STATUS POST RIGHT HIP REPLACEMENT: ICD-10-CM

## 2022-09-01 DIAGNOSIS — M25.551 RIGHT HIP PAIN: Primary | ICD-10-CM

## 2022-09-01 PROCEDURE — 97112 NEUROMUSCULAR REEDUCATION: CPT | Performed by: PHYSICAL THERAPIST

## 2022-09-01 PROCEDURE — 97110 THERAPEUTIC EXERCISES: CPT | Performed by: PHYSICAL THERAPIST

## 2022-09-01 NOTE — PROGRESS NOTES
Daily Note     Today's date: 2022  Patient name: Tyrone Cheema  : 1955  MRN: [id number]  Referring provider: Anupam Woodson PA-C  Dx:   Encounter Diagnosis     ICD-10-CM    1  Right hip pain  M25 551    2  Primary osteoarthritis of right hip  M16 11    3  Status post right hip replacement  [id number] [id number]          Subjective: Patient states continued significant soreness in her right hip  Objective: See treatment diary below  Assessment: Patient demonstrated moderate challenge with balance activities; stated moderate soreness at completion of treatment session  Plan: Continue per plan of care  Progress treatment as tolerated  Continue with balance and hip extension strengthening  Precautions: s/p R DENI ---- DOS: 22  Protocol: posterior approach  Other factors: 1 in LLD R longer than L  Pt goals: no assistive device for walking, independence with ADLs  F/u appts: orthotics       HEP:  Access Code: [email or web address]  URL: [email or web address]  Date: 2022  Prepared by: Issac Cohen    Exercises  · Supine Gluteal Sets - 10 reps - 10 sec hold  · Supine Quad Set - 10 reps - 10 sec hold  · Supine Isometric Hip Adduction with Pillow at Knees - 10 reps - 10 sec hold  · Supine Hip Abduction - 10 reps  · Small Range Straight Leg Raise - 10 reps  · Seated Long Arc Quad - 10 reps - 10 sec hold  · Standing Heel Raise - 10 reps            Manuals 8/11 8/15 8/17 8/22 8/26 9/1       Hip PROM Gentle ASHLYN WE NS ASHLYN         Quad, HS, calf, glute roller                                       Neuro Re-Ed             Weight shift s-s, f-b     x10 ea        NL stance balance    30"         Narrow stance balance    30"x2  30"x2       tandem    30"x2 ea 30"x2 ea 30"x2 ea       SLS             Foam walk             march                                       Ther Ex             nustep    6' 6' 6'       Calf stretch             HR x10 x10 x20 with dynadisc under LLE          glute set, quad set 10"x10 10"x10 10"x10          Hip add Iso, legs straight 10"x10 Iso, legs straight 10"x10 Iso, legs straight 10"x10          Hip abd supine arom x10 supine arom x10 supine arom x10 H/L btb 5"x10         SLR    x10 2x10 2x10       S/L hip abd     2x10 2x10       bridge    btb abd x10 btb abd 5" 2x10 btb abd 5" 2x10       LAQ x10 x10 x20 3# 5"x20 3# 5"x20 3# 5"x20       Mini squats     5"x10 ea 5"x10 ea       march     x10 ea x10 ea         Active hamstring stretch w/sciatic n  glide   10x10" 10"x10         Slant board   10x10"          HS stretch     strap 10"x10 ea strap 10"x10 ea                                 Ther Activity                                       Gait Training                                       Modalities             MHP    10'

## 2022-09-06 ENCOUNTER — OFFICE VISIT (OUTPATIENT)
Dept: PHYSICAL THERAPY | Facility: CLINIC | Age: 67
End: 2022-09-06
Payer: COMMERCIAL

## 2022-09-06 DIAGNOSIS — M16.11 PRIMARY OSTEOARTHRITIS OF RIGHT HIP: ICD-10-CM

## 2022-09-06 DIAGNOSIS — M25.551 RIGHT HIP PAIN: Primary | ICD-10-CM

## 2022-09-06 DIAGNOSIS — Z96.641 STATUS POST RIGHT HIP REPLACEMENT: ICD-10-CM

## 2022-09-06 PROCEDURE — 97112 NEUROMUSCULAR REEDUCATION: CPT | Performed by: PHYSICAL THERAPIST

## 2022-09-06 PROCEDURE — 97110 THERAPEUTIC EXERCISES: CPT | Performed by: PHYSICAL THERAPIST

## 2022-09-06 NOTE — PROGRESS NOTES
Daily Note     Today's date: 2022  Patient name: Little Galan  : 1955  MRN: [id number]  Referring provider: Andrew Castano PA-C  Dx:   Encounter Diagnosis     ICD-10-CM    1  Right hip pain  M25 551    2  Primary osteoarthritis of right hip  M16 11    3  Status post right hip replacement  [id number] [id number]          Subjective: Patient stated moderate soreness in her hip prior to treatment session  Objective: See treatment diary below  Assessment: Patient demonstrated significant challenge with SLR flexion and abduction exercises; instructed in gait training with SPC, stated moderate pain with ambulation with SPC  Plan: Continue per plan of care  Progress treatment as tolerated  Continue with balance and hip extension strengthening  Precautions: s/p R DENI ---- DOS: 22  Protocol: posterior approach  Other factors: 1 in LLD R longer than L  Pt goals: no assistive device for walking, independence with ADLs  F/u appts: orthotics       HEP:  Access Code: [email or web address]  URL: [email or web address]  Date: 2022  Prepared by: Lexii Standard    Exercises  · Supine Gluteal Sets - 10 reps - 10 sec hold  · Supine Quad Set - 10 reps - 10 sec hold  · Supine Isometric Hip Adduction with Pillow at Knees - 10 reps - 10 sec hold  · Supine Hip Abduction - 10 reps  · Small Range Straight Leg Raise - 10 reps  · Seated Long Arc Quad - 10 reps - 10 sec hold  · Standing Heel Raise - 10 reps            Manuals 8/11 8/15 8/17 8/22 8/26 9/1 9/6      Hip PROM Gentle ASHLYN WE NS ASHLYN         Quad, HS, calf, glute roller                                       Neuro Re-Ed             Weight shift s-s, f-b     x10 ea        NL stance balance    30"         Narrow stance balance    30"x2  30"x2 30"x2      tandem    30"x2 ea 30"x2 ea 30"x2 ea 30"x2 ea      SLS             Foam walk             march                                       Ther Ex             nustep    6' 6' 6' 6'      Calf stretch HR x10 x10 x20 with dynadisc under LLE          glute set, quad set 10"x10 10"x10 10"x10          Hip add Iso, legs straight 10"x10 Iso, legs straight 10"x10 Iso, legs straight 10"x10          Hip abd supine arom x10 supine arom x10 supine arom x10 H/L btb 5"x10         SLR    x10 2x10 2x10 2x10      S/L hip abd     2x10 2x10 15x      bridge    btb abd x10 btb abd 5" 2x10 btb abd 5" 2x10 btb abd 5" 2x10      LAQ x10 x10 x20 3# 5"x20 3# 5"x20 3# 5"x20 3# 5"x20      Mini squats     5"x10 ea 5"x10 ea 5"x20       march     x10 ea x10 ea   x20 ea      Active hamstring stretch w/sciatic n  glide   10x10" 10"x10         Slant board   10x10"          HS stretch     strap 10"x10 ea strap 10"x10 ea strap 10"x 10 ea                                Ther Activity                                       Gait Training             [facility]       KK                   Modalities             MHP    10'

## 2022-09-08 ENCOUNTER — OFFICE VISIT (OUTPATIENT)
Dept: PHYSICAL THERAPY | Facility: CLINIC | Age: 67
End: 2022-09-08
Payer: COMMERCIAL

## 2022-09-08 DIAGNOSIS — M25.551 RIGHT HIP PAIN: Primary | ICD-10-CM

## 2022-09-08 DIAGNOSIS — M16.11 PRIMARY OSTEOARTHRITIS OF RIGHT HIP: ICD-10-CM

## 2022-09-08 DIAGNOSIS — Z96.641 STATUS POST RIGHT HIP REPLACEMENT: ICD-10-CM

## 2022-09-08 PROCEDURE — 97110 THERAPEUTIC EXERCISES: CPT | Performed by: PHYSICAL THERAPIST

## 2022-09-08 PROCEDURE — 97112 NEUROMUSCULAR REEDUCATION: CPT | Performed by: PHYSICAL THERAPIST

## 2022-09-08 NOTE — PROGRESS NOTES
Daily Note     Today's date: 2022  Patient name: Rylee Sanchez  : 1955  MRN: [id number]  Referring provider: Ida Dorado PA-C  Dx:   Encounter Diagnosis     ICD-10-CM    1  Right hip pain  M25 551    2  Primary osteoarthritis of right hip  M16 11    3  Status post right hip replacement  [id number] [id number]          Subjective: Patient stated moderate soreness prior to treatment session  Objective: See treatment diary below  Assessment: Patient performed addition of SAQ without c/o pain; no c/o at completion of treatment session  Plan: Continue per plan of care  Progress treatment as tolerated  Continue with balance and hip extension strengthening  Precautions: s/p R DENI ---- DOS: 22  Protocol: posterior approach  Other factors: 1 in LLD R longer than L  Pt goals: no assistive device for walking, independence with ADLs  F/u appts: orthotics       HEP:  Access Code: [email or web address]  URL: [email or web address]  Date: 2022  Prepared by: Nadean Almo    Exercises  · Supine Gluteal Sets - 10 reps - 10 sec hold  · Supine Quad Set - 10 reps - 10 sec hold  · Supine Isometric Hip Adduction with Pillow at Knees - 10 reps - 10 sec hold  · Supine Hip Abduction - 10 reps  · Small Range Straight Leg Raise - 10 reps  · Seated Long Arc Quad - 10 reps - 10 sec hold  · Standing Heel Raise - 10 reps            Manuals 8/11 8/15 8/17 8/22 8/26 9/1 9/6 9/8     Hip PROM Gentle ASHLYN WE NS ASHLYN         Quad, HS, calf, glute roller                                       Neuro Re-Ed             Weight shift s-s, f-b     x10 ea        NL stance balance    30"         Narrow stance balance    30"x2  30"x2 30"x2 30"x2     tandem    30"x2 ea 30"x2 ea 30"x2 ea 30"x2 ea 30"x2 ea     SLS             Foam walk              Ex             nustep    6' 6' 6' 6' 6'     Calf stretch             HR x10 x10 x20 with dynadisc under LLE          glute set, quad set 10"x10 10"x10 10"x10          Hip add Iso, legs straight 10"x10 Iso, legs straight 10"x10 Iso, legs straight 10"x10          Hip abd supine arom x10 supine arom x10 supine arom x10 H/L btb 5"x10         SLR    x10 2x10 2x10 2x10 2x10     S/L hip abd     2x10 2x10 15x 2x10     bridge    btb abd x10 btb abd 5" 2x10 btb abd 5" 2x10 btb abd 5" 2x10 btb abd 5" 2x10     LAQ x10 x10 x20 3# 5"x20 3# 5"x20 3# 5"x20 3# 5"x20 3# 5"x20     Mini squats     5"x10 ea 5"x10 ea 5"x20  5"x20     march     x10 ea x10 ea   x20 ea 20x ea     SAQ        3# 2x10     Active hamstring stretch w/sciatic n  glide   10x10" 10"x10         Slant board   10x10"          HS stretch     strap 10"x10 ea strap 10"x10 ea strap 10"x 10 ea strap 10"x 10 ea                               Ther Activity                                       Gait Training             [facility]       KK                   Modalities             MHP    10'

## 2022-09-13 ENCOUNTER — OFFICE VISIT (OUTPATIENT)
Dept: PHYSICAL THERAPY | Facility: CLINIC | Age: 67
End: 2022-09-13
Payer: COMMERCIAL

## 2022-09-13 DIAGNOSIS — M16.11 PRIMARY OSTEOARTHRITIS OF RIGHT HIP: ICD-10-CM

## 2022-09-13 DIAGNOSIS — Z96.641 STATUS POST RIGHT HIP REPLACEMENT: ICD-10-CM

## 2022-09-13 DIAGNOSIS — M25.551 RIGHT HIP PAIN: Primary | ICD-10-CM

## 2022-09-13 PROCEDURE — 97140 MANUAL THERAPY 1/> REGIONS: CPT | Performed by: PHYSICAL THERAPIST

## 2022-09-13 PROCEDURE — 97110 THERAPEUTIC EXERCISES: CPT | Performed by: PHYSICAL THERAPIST

## 2022-09-13 NOTE — PROGRESS NOTES
PT Re-Evaluation     Today's date: 2022  Patient name: Earnest Landry  : 1955  MRN: [id number]  Referring provider: Gautam Vides PA-C  Dx:   Encounter Diagnosis     ICD-10-CM    1  Right hip pain  M25 551    2  Primary osteoarthritis of right hip  M16 11    3  Status post right hip replacement  Z96 641        Start Time: 1133          Assessment  Assessment details: Earnest Landry is a 73 y o  female presenting to outpatient physical therapy at [place] [address] with complaints of R hip pain s/p DENI on 22  They present with decreased range of motion, decreased strength, limited flexibility, poor postural awareness, poor body mechanics, poor balance, decreased tolerance to activity and decreased functional mobility due to Right hip pain  (primary encounter diagnosis)  Primary osteoarthritis of right hip  Status post right hip replacement  Han Gracia would benefit from skilled physical therapy to address noted impairments in order to allow for full functional return to work and ADL-related activities  Thank you for the referral!    RE: 22  Earnest Landry has attended physical therapy for 9 visits  In this time, they have made MINIMAL significant improvements in symptoms and function, as noted by subjective report, improved balance, ROM, strength, flexibility and activity tolerance  They have made positive goal progress with FOTO score DECREASE from 37 at initial evaluation to 32 currently  They do continue to have impairments in pain, ROM, strength, balance, flexibility and activity tolerance  Recommend continued skilled PT in order to maximize function  Impairments: abnormal muscle firing, abnormal or restricted ROM, activity intolerance, impaired balance, impaired physical strength, lacks appropriate home exercise program, pain with function and poor body mechanics  Barriers to therapy: n/a  Understanding of Dx/Px/POC: excellent  Goals  ST   Independent with HEP in 2 weeks - met  2   Decrease pain by 50% in 3 wks - not met  3  Able to walk without assistive devices in 3 weeks - not met    LT  Achieve FOTO score of 60/100 in 6 weeks - not met  2   Able to ascend a flight of stairs with alternate step pattern in 6 weeks - not met  3  Strength = 5/5 R hip all planes in 6 weeks - not met      Plan  Plan details: RE in 6 weeks  Patient would benefit from: skilled physical therapy  Planned modality interventions: cryotherapy, electrical stimulation/Russian stimulation and thermotherapy: hydrocollator packs  Planned therapy interventions: abdominal trunk stabilization, manual therapy, neuromuscular re-education, therapeutic activities, therapeutic exercise, body mechanics training and home exercise program  Frequency: 2x week  Duration in visits: 12  Duration in weeks: 6  Plan of Care beginning date: 2022  Plan of Care expiration date: 10/28/2022  Treatment plan discussed with: patient        Subjective Evaluation    History of Present Illness  Mechanism of injury: Pt is 3 days s/p R DENI performed by Dr Duggan Copper  Posterior approach  DOS: 22    Pt reports she is doing well  Pain is well managed with tylenol around the clock, rated avg 2/10  Denies falls, instability of the hip, N/T     She's been ambulating with a RW  She does have a SPC at home but does not feel ready for it yet  She is sleeping in bed without much issue, she has a strap which assists with bed mobility, but feels like she does not need it much anymore  She is staying at home with her sister for help with ADLs  She assists with meals, carrying stuff  She is independent with dressing, toileting  She's been washing up in the sink as she is not allowed to submerge the bandage for a week  She isn't doing any exercises, but has been active throughout her home  She does note she has a residual 1in leg length discrepancy, R longer than L  A shoe lift was ordered and hopefully will be available by next week            RE: 22  Pt is 5 weeks s/p R DENI  Since surgery, she has been ambulating with a RW with increased ease  She has gotten a OTC shoe lift to adjust for her LLD, which has made a positive difference in her gait and balance  She has been consistent with the HEP daily though continues to have pain constantly throughout the day, rated 3/10 on avg  Pain in the AM is worse, rated 5/10  Located lateral and anterior hip, described as a tightness and stiffness  She has been trying to use a SPC in place of the RW, though notes she does not feel confident in walking without bilateral arm support if needed; she also notes increased R hip pain with WB  She mentions increased frequency of urinary leaking/incontinence since the surgery with difficulty stopping the flow once started  The only pain medication she takes is ibuprofen  Denies instability or falls, N/T of the legs or groin, difficulty with gait  Patient Goals  Patient goals for therapy: decreased edema, decreased pain, improved balance, increased motion, return to work, return to [employer] activities, independence with ADLs/IADLs and increased strength          Objective     Observations     Additional Observation Details  LLD R longer than L approx 1in; incision with appropriate healing    Tenderness     Additional Tenderness Details  TTP with increased tone R hip flexor, adductor; greater troch, glute max/med, HS    R SLR to >90 deg with min HS lag    Lumbar Screen  Lumbar range of motion within normal limits      Neurological Testing     Sensation     Hip   Left Hip   Intact: light touch    Right Hip   Intact: light touch    Reflexes   Left   Patellar (L4): brisk (3+)  Achilles (S1): brisk (3+)    Right   Patellar (L4): brisk (3+)  Achilles (S1): brisk (3+)    Active Range of Motion     Right Hip   Flexion: 135 degrees WFL  Abduction: 35 degrees with pain  External rotation (90/90): 45 degrees WFL  Internal rotation (90/90): 30 degrees with pain    Strength/Myotome Testing Right Hip   Planes of Motion   Flexion: 4  Extension: 4  Abduction: 4  Adduction: 4-  External rotation: 4  Internal rotation: 4    Right Knee   Flexion: 4  Extension: 4+  Quadriceps contraction: good    Functional Assessment        Comments  Gait: RW, decreased gait speed  Bed mobility: independent  STS: good, able to perform without assist  Squat: fair, unassisted  WB: NL stance, 30 sec unsupported with equal weight distribution through both LE             Precautions: s/p R DENI ---- DOS: 8/8/22  Protocol: posterior approach  Other factors: 1 in LLD R longer than L  Pt goals: no assistive device for walking, independence with ADLs  F/u appts: orthotics 9/23      HEP:  Access Code: [email or web address]  URL: [email or web address]  Date: 08/11/2022  Prepared by: Oralia Norlina    Exercises  · Supine Gluteal Sets - 10 reps - 10 sec hold  · Supine Quad Set - 10 reps - 10 sec hold  · Supine Isometric Hip Adduction with Pillow at Knees - 10 reps - 10 sec hold  · Supine Hip Abduction - 10 reps  · Small Range Straight Leg Raise - 10 reps  · Seated Long Arc Quad - 10 reps - 10 sec hold  · Standing Heel Raise - 10 reps            Manuals 8/11 8/15 8/17 8/22 8/26 9/1 9/6 9/8 9/13    RE         ASHLYN    Hip PROM Gentle ASHLYN WE NS ASHLYN         Adductor trigger point release         ASHLYN    Groin stretch         ASHLYN                 Neuro Re-Ed             Weight shift s-s, f-b     x10 ea        NL stance balance    30"         Narrow stance balance    30"x2  30"x2 30"x2 30"x2     tandem    30"x2 ea 30"x2 ea 30"x2 ea 30"x2 ea 30"x2 ea     SLS             Foam walk             march Ther Ex             nustep    6' 6' 6' 6' 6'     Standing groin stretch         20"x3    HR x10 x10 x20 with dynadisc under LLE          glute set, quad set 10"x10 10"x10 10"x10          Hip add Iso, legs straight 10"x10 Iso, legs straight 10"x10 Iso, legs straight 10"x10          Hip abd supine arom x10 supine arom x10 supine arom x10 H/L btb 5"x10         SLR    x10 2x10 2x10 2x10 2x10     S/L hip abd     2x10 2x10 15x 2x10     bridge    btb abd x10 btb abd 5" 2x10 btb abd 5" 2x10 btb abd 5" 2x10 btb abd 5" 2x10     LAQ x10 x10 x20 3# 5"x20 3# 5"x20 3# 5"x20 3# 5"x20 3# 5"x20     Mini squats     5"x10 ea 5"x10 ea 5"x20  5"x20     march     x10 ea x10 ea   x20 ea 20x ea     SAQ        3# 2x10     Active hamstring stretch w/sciatic n  glide   10x10" 10"x10         Slant board   10x10"          HS stretch     strap 10"x10 ea strap 10"x10 ea strap 10"x 10 ea strap 10"x 10 ea                               Ther Activity                                       Gait Training             [facility]    10'     10'

## 2022-09-19 ENCOUNTER — TELEPHONE (OUTPATIENT)
Dept: OBGYN CLINIC | Facility: HOSPITAL | Age: 67
End: 2022-09-19

## 2022-09-19 NOTE — TELEPHONE ENCOUNTER
Pt sees Dr Lucho Kuo    Pt is calling stating that she had rt hip surgery on 8-8 and states that she is still in so much pain and cant put any weight on it   Pt scheduled an appt for tomorrow

## 2022-09-20 ENCOUNTER — OFFICE VISIT (OUTPATIENT)
Dept: PHYSICAL THERAPY | Facility: CLINIC | Age: 67
End: 2022-09-20
Payer: COMMERCIAL

## 2022-09-20 ENCOUNTER — APPOINTMENT (OUTPATIENT)
Dept: RADIOLOGY | Facility: CLINIC | Age: 67
End: 2022-09-20
Payer: COMMERCIAL

## 2022-09-20 ENCOUNTER — OFFICE VISIT (OUTPATIENT)
Dept: OBGYN CLINIC | Facility: CLINIC | Age: 67
End: 2022-09-20

## 2022-09-20 VITALS
HEIGHT: 64 IN | DIASTOLIC BLOOD PRESSURE: 80 MMHG | SYSTOLIC BLOOD PRESSURE: 120 MMHG | WEIGHT: 175 LBS | BODY MASS INDEX: 29.88 KG/M2

## 2022-09-20 DIAGNOSIS — M25.551 RIGHT HIP PAIN: Primary | ICD-10-CM

## 2022-09-20 DIAGNOSIS — Z47.1 AFTERCARE FOLLOWING RIGHT HIP JOINT REPLACEMENT SURGERY: ICD-10-CM

## 2022-09-20 DIAGNOSIS — Z47.1 AFTERCARE FOLLOWING RIGHT HIP JOINT REPLACEMENT SURGERY: Primary | ICD-10-CM

## 2022-09-20 DIAGNOSIS — Z96.641 AFTERCARE FOLLOWING RIGHT HIP JOINT REPLACEMENT SURGERY: ICD-10-CM

## 2022-09-20 DIAGNOSIS — Z96.641 STATUS POST RIGHT HIP REPLACEMENT: ICD-10-CM

## 2022-09-20 DIAGNOSIS — Z96.641 AFTERCARE FOLLOWING RIGHT HIP JOINT REPLACEMENT SURGERY: Primary | ICD-10-CM

## 2022-09-20 DIAGNOSIS — M16.11 PRIMARY OSTEOARTHRITIS OF RIGHT HIP: ICD-10-CM

## 2022-09-20 PROCEDURE — 73502 X-RAY EXAM HIP UNI 2-3 VIEWS: CPT

## 2022-09-20 PROCEDURE — 97140 MANUAL THERAPY 1/> REGIONS: CPT | Performed by: PHYSICAL THERAPIST

## 2022-09-20 PROCEDURE — 99024 POSTOP FOLLOW-UP VISIT: CPT | Performed by: ORTHOPAEDIC SURGERY

## 2022-09-20 PROCEDURE — 97110 THERAPEUTIC EXERCISES: CPT | Performed by: PHYSICAL THERAPIST

## 2022-09-20 NOTE — PROGRESS NOTES
Daily Note     Today's date: 2022  Patient name: Reema Damon  : 1955  MRN: [id number]  Referring provider: Ethel Chery PA-C  Dx:   Encounter Diagnosis     ICD-10-CM    1  Right hip pain  M25 551    2  Primary osteoarthritis of right hip  M16 11    3  Status post right hip replacement  [id number]                   Subjective: Was feeling really good after lv and a few days doing the groin soft tissue work and stretching  She was even feeling good and confident enough to begin practicing walking with the cane  One day while stretching she felt a pop in the hip and has had progressively worsening pain since  Pain is located lateral hip, prox ITB, anterior hip joint and groin  The leg feels both very tight and like a rubber band at the same time  Pain has been so bad that she considered bringing the commode back into her room to minimize travel throughout her home  Overall she isn't sure what she should do and is upset  She has a f/u with Dr Cherlyn Severin this afternoon  Objective: See treatment diary below      Assessment: No evidence of joint displacement  Began with heat for general symptom control  She continues to be quite restricted in the adductors, proximal quad, ITB and glute  Painful to trigger point release, though effective for tissue extensibility  Did not tolerate glute stretching due to anterior hip pain  I do feel as though the muscular restriction surrounding the hip are a result of chronic pain felt since surgery, not the primary cause of the pain she has been having though I am unsure of the pathology of her symptoms and do advise an evaluation by the surgeon  Tolerated treatment well  Patient demonstrated fatigue post treatment and would benefit from continued PT      Plan: Continue per plan of care    Assess f/u with Dr Cherlyn Severin     Precautions: s/p R DENI ---- DOS: 22  Protocol: posterior approach  Other factors: 1 in LLD R longer than L  Pt goals: no assistive device for walking, independence with ADLs  F/u appts: orthotics 9/23      HEP:  Access Code: Ferol Pick  URL: [email or web address]  Date: 08/11/2022  Prepared by: Lizz Morin    Exercises  · Supine Gluteal Sets - 10 reps - 10 sec hold  · Supine Quad Set - 10 reps - 10 sec hold  · Supine Isometric Hip Adduction with Pillow at Knees - 10 reps - 10 sec hold  · Supine Hip Abduction - 10 reps  · Small Range Straight Leg Raise - 10 reps  · Seated Long Arc Quad - 10 reps - 10 sec hold  · Standing Heel Raise - 10 reps            Manuals 8/11 8/15 8/17 8/22 8/26 9/1 9/6 9/8 9/13 9/20   RE         ASHLYN    Hip PROM Gentle ASHLYN WE NS ASHLYN         Adductor trigger point release         ASHLYN    Groin stretch         ASHLYN                 Neuro Re-Ed             Weight shift s-s, f-b     x10 ea        NL stance balance    30"         Narrow stance balance    30"x2  30"x2 30"x2 30"x2     tandem    30"x2 ea 30"x2 ea 30"x2 ea 30"x2 ea 30"x2 ea     SLS             Foam walk             march Ther Ex             nustep    6' 6' 6' 6' 6'     Fantasma stretch EOT          tailgate 1'   Standing groin stretch         20"x3    HR x10 x10 x20 with dynadisc under LLE          glute set, quad set 10"x10 10"x10 10"x10          Hip add Iso, legs straight 10"x10 Iso, legs straight 10"x10 Iso, legs straight 10"x10          Hip abd supine arom x10 supine arom x10 supine arom x10 H/L btb 5"x10         SLR    x10 2x10 2x10 2x10 2x10     S/L hip abd     2x10 2x10 15x 2x10     bridge    btb abd x10 btb abd 5" 2x10 btb abd 5" 2x10 btb abd 5" 2x10 btb abd 5" 2x10     LAQ x10 x10 x20 3# 5"x20 3# 5"x20 3# 5"x20 3# 5"x20 3# 5"x20     Mini squats     5"x10 ea 5"x10 ea 5"x20  5"x20     march     x10 ea x10 ea   x20 ea 20x ea  x10   SAQ        3# 2x10     Active hamstring stretch w/sciatic n  glide   10x10" 10"x10         Slant board   10x10"          HS stretch     strap 10"x10 ea strap 10"x10 ea strap 10"x 10 ea strap 10"x 10 ea Ther Activity                                       Gait Training             [facility]                   Modalities             P    10'     10' 10'

## 2022-09-20 NOTE — ASSESSMENT & PLAN NOTE
Patient is status post right total hip arthroplasty  X-rays reviewed with the patient reveal a well-aligned prosthesis with no evidence of loosening  No evidence of infection the hip  Patient feels that the leg length his different and clinically also seen to have leg-length discrepancy  Leg-length x-ray did not show different  Discussed the pain that she is having at this time can be related to her lumbar spine  She has severe scoliosis with degenerative disc disease at L2-L3  We will send her for a consultation with spine surgeon Dr Dawood Garcia for further workup  We will also have her see total joint specialist Dr Tyler Hdz to see if he finds any issues with the hip prosthesis  Follow-up after these consultations  All patient's questions were answered to her satisfaction  This note is created using dictation transcription  It may contain typographical errors, grammatical errors, improperly dictated words, background noise and other errors

## 2022-09-20 NOTE — PROGRESS NOTES
Assessment:     1  Aftercare following right hip joint replacement surgery        Plan:     Problem List Items Addressed This Visit        Other    Aftercare following right hip joint replacement surgery - Primary     Patient is status post right total hip arthroplasty  X-rays reviewed with the patient reveal a well-aligned prosthesis with no evidence of loosening  No evidence of infection the hip  Patient feels that the leg length his different and clinically also seen to have leg-length discrepancy  Leg-length x-ray did not show different  Discussed the pain that she is having at this time can be related to her lumbar spine  She has severe scoliosis with degenerative disc disease at L2-L3  We will send her for a consultation with spine surgeon Dr Gail Cesar for further workup  We will also have her see total joint specialist Dr Emile Pickard to see if he finds any issues with the hip prosthesis  Follow-up after these consultations  All patient's questions were answered to her satisfaction  This note is created using dictation transcription  It may contain typographical errors, grammatical errors, improperly dictated words, background noise and other errors  Relevant Orders    XR hip/pelv 2-3 vws right if performed    Ambulatory Referral to Orthopedic Surgery    Ambulatory Referral to Orthopedic Surgery            Subjective:     Patient ID: Sunshine Sousa is a 73 y o  female  Chief Complaint:  73 yr old female who is s/p right total hip arthroplasty on 8/8/22  She is ambulating with a walker  She had a scanogram that showed equal leg lengths  She returns the office today due to pain over lateral aspect of the right hip  It is worse with prolonged walking  She feels a lot of tightness in the muscles in her thigh  She states that the stretches that they do in physical therapy to help  She does feel some groin pain when ambulating, but resolves at rest   She denies any recent fevers or chills  She does have history of urinary incontinence with uterine prolapse and use of pessary in the past   She states that since the surgery her urinary continence has gotten worse  She does have an appointment with her PCP tomorrow  Allergy:  Allergies   Allergen Reactions    Erythromycin Nausea Only     Other reaction(s): GI Reaction     Medications:  all current active meds have been reviewed  Past Medical History:  Past Medical History:   Diagnosis Date    COVID-19 05/2022    Fibromyalgia, primary     GERD (gastroesophageal reflux disease) 1999    Hyperlipidemia     Hypertension     Lactose intolerance 1984    Small bowel obstruction ([facility] [address] )      Past Surgical History:  Past Surgical History:   Procedure Laterality Date    ABDOMINAL SURGERY  2020    ADENOIDECTOMY      pt has 2nd appendix removed       BOWEL RESECTION      CHOLECYSTECTOMY  2000    EYE SURGERY      lasik    OOPHORECTOMY      one     CT LAP,DIAGNOSTIC ABDOMEN N/A 04/21/2020    Procedure: LAPAROSCOPY DIAGNOSTIC, lysis of adhesions , possible bowel resection;  Surgeon: Jonathan Mcgrath MD;  Location:  MAIN OR;  Service: General    CT TOTAL HIP ARTHROPLASTY Right 8/8/2022    Procedure: ARTHROPLASTY HIP TOTAL;  Surgeon: Chrystal Spatz, MD;  Location:  MAIN OR;  Service: Orthopedics    TUBAL LIGATION       Family History:  Family History   Problem Relation Age of Onset    Fibromyalgia Mother     Arthritis Mother     Hypertension Mother     Vision loss Mother         Glaucoma    Heart disease Father     GI problems Father     Hypertension Father     Vision loss Father         Macular Degeneration    Birth defects Sister         Arterial Vascular Malformation    No Known Problems Daughter     No Known Problems Maternal Grandmother     Diabetes Maternal Grandfather     No Known Problems Paternal Grandmother     No Known Problems Paternal Grandfather     No Known Problems Sister     No Known Problems Sister     No Known Problems Paternal Aunt     No Known Problems Paternal Aunt     Mental illness Brother     Colon cancer Neg Hx     Colon polyps Neg Hx      Social History:  Social History     Substance and Sexual Activity   Alcohol Use Yes    Comment: rarely, 3 x year     Social History     Substance and Sexual Activity   Drug Use Yes    Types: Marijuana    Comment: medical marijuana     Social History     Tobacco Use   Smoking Status Former Smoker    Packs/day: 2 00    Years: 30 00    Pack years: 60 00    Types: Cigarettes    Start date: 1973   Kay Do Quit date: 2020    Years since quittin 0   Smokeless Tobacco Never Used     Review of Systems   Constitutional: Negative for chills and fever  HENT: Negative for ear pain and sore throat  Eyes: Negative for pain and visual disturbance  Respiratory: Negative for cough and shortness of breath  Cardiovascular: Negative for chest pain and palpitations  Gastrointestinal: Negative for abdominal pain and vomiting  Genitourinary: Negative for dysuria and hematuria  Musculoskeletal: Positive for arthralgias (Right) and gait problem (Ambulate with walker)  Negative for back pain and joint swelling  Skin: Negative for color change and rash  Neurological: Negative for seizures and syncope  Psychiatric/Behavioral: Negative  All other systems reviewed and are negative  Objective:  BP Readings from Last 1 Encounters:   22 120/80      Wt Readings from Last 1 Encounters:   22 79 4 kg (175 lb)      BMI:   Estimated body mass index is 30 04 kg/m² as calculated from the following:    Height as of this encounter: 5' 4" (1 626 m)  Weight as of this encounter: 79 4 kg (175 lb)  BSA:   Estimated body surface area is 1 85 meters squared as calculated from the following:    Height as of this encounter: 5' 4" (1 626 m)  Weight as of this encounter: 79 4 kg (175 lb)  Physical Exam  Vitals and nursing note reviewed     Constitutional: Appearance: Normal appearance  She is well-developed  HENT:      Head: Normocephalic and atraumatic  Right Ear: External ear normal       Left Ear: External ear normal    Eyes:      Extraocular Movements: Extraocular movements intact  Conjunctiva/sclera: Conjunctivae normal    Pulmonary:      Effort: Pulmonary effort is normal    Musculoskeletal:      Cervical back: Neck supple  Skin:     General: Skin is warm and dry  Neurological:      Mental Status: She is alert and oriented to person, place, and time  Deep Tendon Reflexes: Reflexes are normal and symmetric  Psychiatric:         Mood and Affect: Mood normal          Behavior: Behavior normal        Right Hip Exam     Tenderness   Right hip tenderness location: ITB  Range of Motion   Flexion: normal   External rotation: normal   Internal rotation: normal     Muscle Strength   Abduction: 5/5   Adduction: 5/5   Flexion: 5/5     Tests   AYDEN: negative    Other   Erythema: absent  Scars: present (well healed incision with no evidence of infection  )  Sensation: normal  Pulse: present    Comments:  No pain with passive range of motion            I have personally reviewed pertinent films in PACS and my interpretation is xr right hip demonstrates stable total hip prosthesis in acceptable position and alignement        Scribe Attestation    I,:  Umair Kunz PA-C am acting as a scribe while in the presence of the attending physician :       I,:  oRzina Vu MD personally performed the services described in this documentation    as scribed in my presence :

## 2022-09-23 ENCOUNTER — EVALUATION (OUTPATIENT)
Dept: PHYSICAL THERAPY | Facility: CLINIC | Age: 67
End: 2022-09-23
Payer: COMMERCIAL

## 2022-09-23 ENCOUNTER — OFFICE VISIT (OUTPATIENT)
Dept: PHYSICAL THERAPY | Facility: CLINIC | Age: 67
End: 2022-09-23

## 2022-09-23 DIAGNOSIS — M25.551 RIGHT HIP PAIN: ICD-10-CM

## 2022-09-23 DIAGNOSIS — M21.70 LEG LENGTH DIFFERENCE, ACQUIRED: Primary | ICD-10-CM

## 2022-09-23 PROCEDURE — 97161 PT EVAL LOW COMPLEX 20 MIN: CPT | Performed by: PHYSICAL THERAPIST

## 2022-09-23 PROCEDURE — 97760 ORTHOTIC MGMT&TRAING 1ST ENC: CPT | Performed by: PHYSICAL THERAPIST

## 2022-09-23 NOTE — PROGRESS NOTES
PT Evaluation     Today's date: 2022  Patient name: Kinsey Haines  : 1955  MRN: [id number]  Referring provider: Eda De La Cruz MD  Dx:   Encounter Diagnosis     ICD-10-CM    1  Leg length difference, acquired  M21 70    2  Right hip pain  M25 551                   Assessment  Understanding of Dx/Px/POC: good   Prognosis: good    Pt should benefit from custom orthotics  Goals:  Fit orthotics  IP in break in period  Assure good shoe fit    Return for one more visit in 2-4 weeks to fit orthotics  Subjective Evaluation    History of Present Illness  Mechanism of injury: Pt presents for custom orthotics due to LLD/R hip pain  She reports that x-rays were (-) for LLD but when she stands with knees straight her L foot is elevated  She has been using OTC inserts for heel lift which has helped      Pain  Current pain ratin  At best pain ratin  At worst pain ratin          Objective  Mild pes planus  Gait: SPC, lists to L    Stands with R knee flexed  With R knee extended, L heel does not contact floor    R knee higher in hooklying    Supine LL R approx 1 inch longer  ASIS to med mal  R 89 cm  L 87 cm    Scanned for custom orthotics, will use 3/4" heel lift in L due to problems with shoe fit with anything higher     Precautions: DENI      Manuals                                                                 Neuro Re-Ed                                                                                                        Ther Ex                                                                                                                     Ther Activity                                       Gait Training                                       Modalities

## 2022-09-27 ENCOUNTER — OFFICE VISIT (OUTPATIENT)
Dept: PHYSICAL THERAPY | Facility: CLINIC | Age: 67
End: 2022-09-27
Payer: COMMERCIAL

## 2022-09-27 DIAGNOSIS — M16.11 PRIMARY OSTEOARTHRITIS OF RIGHT HIP: ICD-10-CM

## 2022-09-27 DIAGNOSIS — M25.551 RIGHT HIP PAIN: ICD-10-CM

## 2022-09-27 DIAGNOSIS — Z96.641 STATUS POST RIGHT HIP REPLACEMENT: ICD-10-CM

## 2022-09-27 DIAGNOSIS — M21.70 LEG LENGTH DIFFERENCE, ACQUIRED: Primary | ICD-10-CM

## 2022-09-27 PROCEDURE — 97140 MANUAL THERAPY 1/> REGIONS: CPT | Performed by: PHYSICAL THERAPIST

## 2022-09-27 PROCEDURE — 97110 THERAPEUTIC EXERCISES: CPT | Performed by: PHYSICAL THERAPIST

## 2022-09-27 NOTE — PROGRESS NOTES
Daily Note     Today's date: 2022  Patient name: Earnest Landry  : 1955  MRN: [id number]  Referring provider: Gautam Vides PA-C  Dx:   Encounter Diagnosis     ICD-10-CM    1  Leg length difference, acquired  M21 70    2  Right hip pain  M25 551    3  Primary osteoarthritis of right hip  M16 11    4  Status post right hip replacement  [id number] [id number]        Start Time: 1135          Subjective: Dr Nathalie Jolley noted hip replacement is doing well and increased pain is potentially coming from the low back secondary to her h/o scoliosis  She has a f/u reg the lumbar on   Still having a lot of pain and muscle tightness  She is trying to be more active with walking, no longer using the RW and using the [address]  Every time she sits and stands up, the hip is tight  Regardless of the amount of time spent sitting  She needs to stretch every time she stands back up  Seen by PT MB for orthotics for LLD  Objective: See treatment diary below      Assessment: She continues to be highly irritable within the R hip, pain region is becoming more diffuse and I am concerned about a central sensitization component to her symptoms  She would likely benefit from L-R discrimination, mirror or 2 point discrimination training for pain modulation  The ITB and quad are very tight  Responds positively to myofascial decompression of the glutes  R hip is becoming more weak in all directions  Pain increased with supine 4 way isometrics and manually resisted eccentrics  Tolerated treatment well  Patient demonstrated fatigue post treatment and would benefit from continued PT      Plan: Continue per plan of care  Trial L-R discrimination nv     Precautions: s/p R DENI ---- DOS: 22  Protocol: posterior approach  Other factors: 1 in LLD R longer than L  Pt goals: no assistive device for walking, independence with ADLs  F/u appts: orthotics       HEP:  Access Code: [email or web address]  URL: [email or web address]  Date: 2022  Prepared by: Annette Bevel    Exercises  · Supine Gluteal Sets - 10 reps - 10 sec hold  · Supine Quad Set - 10 reps - 10 sec hold  · Supine Isometric Hip Adduction with Pillow at Knees - 10 reps - 10 sec hold  · Supine Hip Abduction - 10 reps  · Small Range Straight Leg Raise - 10 reps  · Seated Long Arc Quad - 10 reps - 10 sec hold  · Standing Heel Raise - 10 reps            Manuals 9/27 9/1 9/6 9/8 9/13 9/20   RE         ASHLYN    Hip PROM             Adductor trigger point release         ASHLYN    Groin stretch         ASHLYN    Glute, HS quad, ITB roller ASHLYN            Hip 4 way iso +Ecc manual ASHLYN                                      Neuro Re-Ed             Narrow stance balance      30"x2 30"x2 30"x2     tandem      30"x2 ea 30"x2 ea 30"x2 ea     SLS             Foam walk             march                                       Ther Ex             nustep      6' 6' 6'     Fantasma stretch EOT 1'         tailgate 1'   LTR legs crossed 5"x5             Standing groin stretch         20"x3    SLR      2x10 2x10 2x10     S/L hip abd      2x10 15x 2x10     bridge      btb abd 5" 2x10 btb abd 5" 2x10 btb abd 5" 2x10     LAQ      3# 5"x20 3# 5"x20 3# 5"x20     Mini squats      5"x10 ea 5"x20  5"x20     march      x10 ea   x20 ea 20x ea  x10   SAQ        3# 2x10     HS stretch      strap 10"x10 ea strap 10"x 10 ea strap 10"x 10 ea                               Ther Activity                                       Gait Training             [facility]       KK                   Modalities             MHP Begin 10'        10' 10'

## 2022-09-28 ENCOUNTER — OFFICE VISIT (OUTPATIENT)
Dept: PHYSICAL THERAPY | Facility: CLINIC | Age: 67
End: 2022-09-28
Payer: COMMERCIAL

## 2022-09-28 DIAGNOSIS — M25.551 RIGHT HIP PAIN: ICD-10-CM

## 2022-09-28 DIAGNOSIS — M16.11 PRIMARY OSTEOARTHRITIS OF RIGHT HIP: ICD-10-CM

## 2022-09-28 DIAGNOSIS — Z96.641 STATUS POST RIGHT HIP REPLACEMENT: ICD-10-CM

## 2022-09-28 DIAGNOSIS — M21.70 LEG LENGTH DIFFERENCE, ACQUIRED: Primary | ICD-10-CM

## 2022-09-28 PROCEDURE — 97140 MANUAL THERAPY 1/> REGIONS: CPT | Performed by: PHYSICAL THERAPIST

## 2022-09-28 PROCEDURE — 97112 NEUROMUSCULAR REEDUCATION: CPT | Performed by: PHYSICAL THERAPIST

## 2022-09-28 NOTE — PROGRESS NOTES
Daily Note     Today's date: 2022  Patient name: Nelly Kamara  : 1955  MRN: [id number]  Referring provider: Antwon Hardy PA-C  Dx:   Encounter Diagnosis     ICD-10-CM    1  Leg length difference, acquired  M21 70    2  Right hip pain  M25 551    3  Primary osteoarthritis of right hip  M16 11    4  Status post right hip replacement  [id number] [id number]        Start Time: 1130          Subjective: Felt a lot of relief yesterday after session for the whole day  There was even a point where there was no pain at all  Today she woke up in a lot of pain all over again  Objective: See treatment diary below    hand 1:  Attempt 1: 18 right, 5 wrong 2 passes in 58 sec  Attempt 2: 22 right, 3 wrong, 0 passes in 42 sec      Assessment: Added L-R discrimination work for neuro-reeducation during MHP warm up  Difficulty noted with this Less irritation today with soft tissue work  She does continue to have increased tone throughout the groin and glutes  Again responds positively to myofascial decompression as well as gentle graded mobilizations AP and laterally  Slightly more strength with eccentrics of the hip today vs yesterday  Shown pt how to perform supine isometric hip flexion, ext, abd and add  Instructed not to push at max effort in order to protect the proximal insertions/hip joint  Tolerated treatment well  Patient demonstrated fatigue post treatment and would benefit from continued PT      Plan: Continue per plan of care  Continue w L-R discrim game during MHP warm up; f/u with visit to spine dr     Precautions: s/p R DENI ---- DOS: 22  Protocol: posterior approach  Other factors: 1 in LLD R longer than L  Pt goals: no assistive device for walking, independence with ADLs  F/u appts: orthotics       HEP:  Access Code: [email or web address]  URL: [email or web address]  Date: 2022  Prepared by: Ursula Rojo    Exercises  · Hooklying Isometric Hip Flexion - 10 reps - 5 sec hold  · Supine 90/90 Isometric Hip Extension - 10 reps - 5 sec hold  · Supine Hip Adduction Isometric with Ball - 10 reps - 5 sec hold  · Hooklying Isometric Hip Abduction with Belt - 10 reps - 5 sec hold          Manuals 9/27 9/28 9/1 9/6 9/8 9/13 9/20   RE         ASHLYN    Hip PROM             Adductor trigger point release         ASHLYN    Groin stretch         ASHLYN    Glute, HS quad, ITB roller ASHLYN ASHLYN           Hip 4 way iso +Ecc manual ASHLYN ASHLYN           AP, lat hip mob  ASHLYN                        Neuro Re-Ed             L-R discrim  hand 1 x2 tries           Narrow stance balance      30"x2 30"x2 30"x2     tandem      30"x2 ea 30"x2 ea 30"x2 ea     SLS             Foam walk             march                                       Ther Ex             nustep      6' 6' 6'     Fantasma stretch EOT 1'         tailgate 1'   LTR legs crossed 5"x5             90/90 hip flexion iso  5"x10           90/90 hip ext iso  5"x10           H/L hip add iso  5"x10           H/L hip abd iso  5"x10           Standing groin stretch         20"x3    SLR      2x10 2x10 2x10     S/L hip abd      2x10 15x 2x10     bridge      btb abd 5" 2x10 btb abd 5" 2x10 btb abd 5" 2x10     LAQ      3# 5"x20 3# 5"x20 3# 5"x20     Mini squats      5"x10 ea 5"x20  5"x20     march      x10 ea   x20 ea 20x ea  x10   SAQ        3# 2x10     HS stretch      strap 10"x10 ea strap 10"x 10 ea strap 10"x 10 ea                               Ther Activity                                       Gait Training             [facility]       KK                   Modalities             MHP Begin 10' Begin 10'       10' 10'

## 2022-09-30 ENCOUNTER — CONSULT (OUTPATIENT)
Dept: OBGYN CLINIC | Facility: CLINIC | Age: 67
End: 2022-09-30

## 2022-09-30 VITALS
BODY MASS INDEX: 30.39 KG/M2 | WEIGHT: 178 LBS | HEIGHT: 64 IN | DIASTOLIC BLOOD PRESSURE: 80 MMHG | SYSTOLIC BLOOD PRESSURE: 122 MMHG

## 2022-09-30 DIAGNOSIS — Z47.1 AFTERCARE FOLLOWING RIGHT HIP JOINT REPLACEMENT SURGERY: ICD-10-CM

## 2022-09-30 DIAGNOSIS — Z96.641 HISTORY OF TOTAL HIP ARTHROPLASTY, RIGHT: Primary | ICD-10-CM

## 2022-09-30 DIAGNOSIS — Z96.641 AFTERCARE FOLLOWING RIGHT HIP JOINT REPLACEMENT SURGERY: ICD-10-CM

## 2022-09-30 PROCEDURE — 99024 POSTOP FOLLOW-UP VISIT: CPT | Performed by: STUDENT IN AN ORGANIZED HEALTH CARE EDUCATION/TRAINING PROGRAM

## 2022-09-30 RX ORDER — DULOXETIN HYDROCHLORIDE 30 MG/1
CAPSULE, DELAYED RELEASE ORAL
COMMUNITY
Start: 2022-09-21

## 2022-09-30 NOTE — PROGRESS NOTES
Hip New Office Note    Assessment:     1  History of total hip arthroplasty, right    2  Aftercare following right hip joint replacement surgery        Plan:     Problem List Items Addressed This Visit        Other    Aftercare following right hip joint replacement surgery      Other Visit Diagnoses     History of total hip arthroplasty, right    -  Primary          72 y/o female with right hip pain about 8 weeks s/p Right DENI with Dr Junior Dodge  Her xrays were reviewed today showing that her prosthesis is in position and her leg lengths are essentially equal   Explained that the leg length discrepancy that she is feeling is coming from the curvature of her lumbar spine with pelvic obliquity  This curvature is causing her pelvis to be off balance, which gives her a functional leg length discrepancy  Discussed that her arthritis was corrected with her right total hip replacement, however now her back is still not used to her having a normal hip that is back to appropriate length  She is only 8 weeks postop from her right total hip so we advised that she only use the 1 orthotic in her shoe at this time and not place a 2nd shoe lift in to allow her back and pelvis to equilibrate over the next few months  Patient expressed understanding in this treatment plan  Follow-up with Dr Junior Dodge at her routine post-op appointment  Subjective:     Patient ID: Yanet Quintero is a 73 y o  female  Chief Complaint:  HPI:  Patient is a 72 y/o female who presents today for an evaluation of her right hip at the referral of Dr Junior Dodge  She has a history of a Right DENI from 8/8/22 with Dr Junior Dodge  She states that she feels like her leg is stiff and she continues with discomfort  She is aware that she has a significant curvature in her lumbar spine  She states that she gets a lot of pain with walking globally around the hip  She states that she has been working in physical therapy, which does help her symptoms    When she wakes up in the morning the stiffness and pain returns  She has been wearing orthotic shoe lifts due to her feeling of leg length discrepancy  She does not feel that she has any improvement with wearing the lift  She did have a scanogram measuring her legs to be essentially equal     Allergy:  Allergies   Allergen Reactions    Erythromycin Nausea Only     Other reaction(s): GI Reaction     Medications:  all current active meds have been reviewed  Past Medical History:  Past Medical History:   Diagnosis Date    COVID-19 05/2022    Fibromyalgia, primary     GERD (gastroesophageal reflux disease) 1999    Hyperlipidemia     Hypertension     Lactose intolerance 1984    Small bowel obstruction ([facility] [address] )      Past Surgical History:  Past Surgical History:   Procedure Laterality Date    ABDOMINAL SURGERY  2020    ADENOIDECTOMY      pt has 2nd appendix removed       BOWEL RESECTION      CHOLECYSTECTOMY  2000    EYE SURGERY      lasik    OOPHORECTOMY      one     [place] LAP,DIAGNOSTIC ABDOMEN N/A 04/21/2020    Procedure: LAPAROSCOPY DIAGNOSTIC, lysis of adhesions , possible bowel resection;  Surgeon: Dex Jeronimo MD;  Location:  MAIN OR;  Service: General    [place] TOTAL HIP ARTHROPLASTY Right 8/8/2022    Procedure: ARTHROPLASTY HIP TOTAL;  Surgeon: Mariola Francois MD;  Location:  MAIN OR;  Service: Orthopedics    TUBAL LIGATION       Family History:  Family History   Problem Relation Age of Onset    Fibromyalgia Mother     Arthritis Mother     Hypertension Mother     Vision loss Mother         Glaucoma    Heart disease Father     GI problems Father     Hypertension Father     Vision loss Father         Macular Degeneration    Birth defects Sister         Arterial Vascular Malformation    No Known Problems Daughter     No Known Problems Maternal Grandmother     Diabetes Maternal Grandfather     No Known Problems Paternal Grandmother     No Known Problems Paternal Grandfather     No Known Problems Sister     No Known Problems Sister     No Known Problems Paternal Aunt     No Known Problems Paternal Aunt     Mental illness Brother     Colon cancer Neg Hx     Colon polyps Neg Hx      Social History:  Social History     Substance and Sexual Activity   Alcohol Use Yes    Comment: rarely, 3 x year     Social History     Substance and Sexual Activity   Drug Use Yes    Types: Marijuana    Comment: medical marijuana     Social History     Tobacco Use   Smoking Status Former Smoker    Packs/day: 2 00    Years: 30 00    Pack years: 60 00    Types: Cigarettes    Start date: 1973   Mariana Alberto Quit date: 2020    Years since quittin 0   Smokeless Tobacco Never Used           ROS:  General: Per HPI  Skin: Negative, except if noted below  HEENT: Negative  Respiratory: Negative  Cardiovascular: Negative  Gastrointestinal: Negative  Urinary: Negative  Vascular: Negative  Musculoskeletal: Positive per HPI   Neurologic: Positive per HPI  Endocrine: Negative    Objective:  BP Readings from Last 1 Encounters:   22 122/80      Wt Readings from Last 1 Encounters:   22 80 7 kg (178 lb)        Respiratory:   non-labored respirations    Lymphatics:  no palpable lymph nodes    Gait and Station:   Altered with cane    Neurologic:   Alert and oriented times 3  Patient with normal sensation except as noted below  Deep tendon reflexes 2+ except as noted in MSK exam    Bilateral Lower Extremity:  Right Hip     Inspection: skin intact    Range of Motion: normal post op motion wo pain    - no pain with IR/ER    Slight clinical leg length discrepancy of right longer than left due to her pelvic obliquity     Motor: 5/5 IP/Q/HS/TA/GS    Pulses: 2+ DP / 2+ PT    SILT DP/SP/S/S/TN    Imaging:  My interpretation XR AP pelvis/ right hip: Right hip DENI prosthesis in good position without loosening or fracture    Significant lumbar curvature noted with pelvic obliquity     BMI:   Estimated body mass index is 30 55 kg/m² as calculated from the following:    Height as of this encounter: 5' 4" (1 626 m)  Weight as of this encounter: 80 7 kg (178 lb)  BSA:   Estimated body surface area is 1 86 meters squared as calculated from the following:    Height as of this encounter: 5' 4" (1 626 m)  Weight as of this encounter: 80 7 kg (178 lb)             Scribe Attestation    I,:  Yvette Barrera PA-C am acting as a scribe while in the presence of the attending physician :       I,:  Amari El DO personally performed the services described in this documentation    as scribed in my presence :

## 2022-10-04 ENCOUNTER — OFFICE VISIT (OUTPATIENT)
Dept: PHYSICAL THERAPY | Facility: CLINIC | Age: 67
End: 2022-10-04
Payer: COMMERCIAL

## 2022-10-04 DIAGNOSIS — M25.551 RIGHT HIP PAIN: ICD-10-CM

## 2022-10-04 DIAGNOSIS — M21.70 LEG LENGTH DIFFERENCE, ACQUIRED: Primary | ICD-10-CM

## 2022-10-04 DIAGNOSIS — M16.11 PRIMARY OSTEOARTHRITIS OF RIGHT HIP: ICD-10-CM

## 2022-10-04 DIAGNOSIS — Z96.641 STATUS POST RIGHT HIP REPLACEMENT: ICD-10-CM

## 2022-10-04 PROCEDURE — 97112 NEUROMUSCULAR REEDUCATION: CPT | Performed by: PHYSICAL THERAPIST

## 2022-10-04 PROCEDURE — 97110 THERAPEUTIC EXERCISES: CPT | Performed by: PHYSICAL THERAPIST

## 2022-10-04 NOTE — PROGRESS NOTES
Daily Note     Today's date: 10/4/2022  Patient name: Reema Damon  : 1955  MRN: [id number]  Referring provider: Ethel Chery PA-C  Dx:   Encounter Diagnosis     ICD-10-CM    1  Leg length difference, acquired  M21 70    2  Right hip pain  M25 551    3  Primary osteoarthritis of right hip  M16 11    4  Status post right hip replacement  [id number]        Start Time: 1135          Subjective: Seen by Dr Laura Mcgovern reg her hip pain on   Per his instruction, she has removed all shoe lifts in effort to allow pelvis/spine to adjust to new hip  She reports some improvement in pain noted 2 days ago  Has been trying to practice L-R discrimination at home by looking at hand pictures online  Objective: See treatment diary below      hand 1:  Attempt 1: 18 right, 5 wrong 2 passes in 58 sec  Attempt 2: 22 right, 3 wrong, 0 passes in 42 sec    10/4  Hand 1:  Attempt 1: 22 right, 3 wrong, 0 passes in 54 sec  Attempt 2: 20 right 5 wrong 0 passes in 21 sec          Assessment: Spent more time increasing quad and hip flexor length today with low load long duration stretching  This was painful initially though visible improvements noted end of session  She is becoming more tolerant of soft tissue work and hip stretching  Tolerated treatment well  Patient demonstrated fatigue post treatment and would benefit from continued PT      Plan: Continue per plan of care  Continue w L-R discrim game during MHP warm up     Precautions: s/p R DENI ---- DOS: 22  Protocol: posterior approach  Other factors: 1 in LLD R longer than L  Pt goals: no assistive device for walking, independence with ADLs  F/u appts: orthotics       HEP:  Access Code: [email or web address]  URL: [email or web address]  Date: 2022  Prepared by: Brad Muñoz    Exercises  · Hooklying Isometric Hip Flexion - 10 reps - 5 sec hold  · Supine 90/90 Isometric Hip Extension - 10 reps - 5 sec hold  · Supine Hip Adduction Isometric with [place] - 10 reps - 5 sec hold  · Hooklying Isometric Hip Abduction with Belt - 10 reps - 5 sec hold          Manuals 9/27 9/28 10/4   9/1 9/6 9/8 9/13 9/20   RE         ASHLYN    Hip PROM   ASHLYN          Adductor trigger point release         ASHLYN    Groin stretch   ASHLYN      ASHLYN    Hip flexor, quad stretch   ASHLYN          Glute, ITB stretch   ASHLYN          Glute, HS quad, ITB roller ASHLYN ASHLYN           Hip 4 way iso +Ecc manual ASHLYN ASHLYN Flex, add ASHLYN          AP, lat hip mob  ASHLYN                        Neuro Re-Ed             L-R discrim  hand 1 x2 tries hand 1 x2 tries          Narrow stance balance      30"x2 30"x2 30"x2     tandem      30"x2 ea 30"x2 ea 30"x2 ea     SLS             Foam walk             march                                       Ther Ex             nustep      6' 6' 6'     Fantasma stretch EOT 1'         tailgate 1'   LTR legs crossed 5"x5             90/90 hip flexion iso  5"x10           90/90 hip ext iso  5"x10           H/L hip add iso  5"x10           H/L hip abd iso  5"x10           Standing groin stretch         20"x3    SLR      2x10 2x10 2x10     S/L hip abd      2x10 15x 2x10     bridge      btb abd 5" 2x10 btb abd 5" 2x10 btb abd 5" 2x10     LAQ      3# 5"x20 3# 5"x20 3# 5"x20     Mini squats      5"x10 ea 5"x20  5"x20     march      x10 ea   x20 ea 20x ea  x10   SAQ        3# 2x10     HS stretch      strap 10"x10 ea strap 10"x 10 ea strap 10"x 10 ea                               Ther Activity                                       Gait Training             [facility]       KK                   Modalities             MHP Begin 10' Begin 10' Begin 10'      10' 10'

## 2022-10-07 ENCOUNTER — APPOINTMENT (OUTPATIENT)
Dept: PHYSICAL THERAPY | Facility: CLINIC | Age: 67
End: 2022-10-07

## 2022-10-11 ENCOUNTER — OFFICE VISIT (OUTPATIENT)
Dept: PHYSICAL THERAPY | Facility: CLINIC | Age: 67
End: 2022-10-11
Payer: COMMERCIAL

## 2022-10-11 DIAGNOSIS — Z96.641 STATUS POST RIGHT HIP REPLACEMENT: ICD-10-CM

## 2022-10-11 DIAGNOSIS — M25.551 RIGHT HIP PAIN: Primary | ICD-10-CM

## 2022-10-11 DIAGNOSIS — M16.11 PRIMARY OSTEOARTHRITIS OF RIGHT HIP: ICD-10-CM

## 2022-10-11 PROCEDURE — 97140 MANUAL THERAPY 1/> REGIONS: CPT | Performed by: PHYSICAL THERAPIST

## 2022-10-11 PROCEDURE — 97110 THERAPEUTIC EXERCISES: CPT | Performed by: PHYSICAL THERAPIST

## 2022-10-11 NOTE — PROGRESS NOTES
Daily Note     Today's date: 10/11/2022  Patient name: Nawaf Sanderson  : 1955  MRN: [id number]  Referring provider: Ash Gonzalez PA-C  Dx:   Encounter Diagnosis     ICD-10-CM    1  Right hip pain  M25 551    2  Primary osteoarthritis of right hip  M16 11    3  Status post right hip replacement  [id number]        Start Time: 1128          Subjective: Pain levels are about the same as she had before surgery, no worse  She is trying to walk more each day and has noticed her walking is best at the end of the day, most times does not need the cane at night  Objective: See treatment diary below      hand 1:  Attempt 1: 18 right, 5 wrong 2 passes in 58 sec  Attempt 2: 22 right, 3 wrong, 0 passes in 42 sec    10/4  Hand 1:  Attempt 1: 22 right, 3 wrong, 0 passes in 54 sec  Attempt 2: 20 right 5 wrong 0 passes in 21 sec          Assessment: Trialed re-introducing exercise and balance training today with closed chain exercise  She did well with most and balance is fair  At leasst 60 sec break taken b/t exercises and kept volume low  She did have pain into the anterior hip and groin with RDLs; held second set  Ended with soft tissue work as noted below and MHP  Tolerated treatment well  Patient demonstrated fatigue post treatment and would benefit from continued PT      Plan: Continue per plan of care  RE nv      Precautions: s/p R DENI ---- DOS: 22  Protocol: posterior approach  Other factors: 1 in LLD R longer than L  Pt goals: no assistive device for walking, independence with ADLs  F/u appts: orthotics       HEP:  Access Code: [email or web address]  URL: [email or web address]  Date: 2022  Prepared by: Laurel Lucero    Exercises  · Hooklying Isometric Hip Flexion - 10 reps - 5 sec hold  · Supine 90/90 Isometric Hip Extension - 10 reps - 5 sec hold  · Supine Hip Adduction Isometric with Ball - 10 reps - 5 sec hold  · Hooklying Isometric Hip Abduction with Belt - 10 reps - 5 sec hold          Manuals 9/27 9/28 10/4 10/11         RE             Hip PROM   ASHLYN ASHLYN         Groin stretch   ASHLYN ASHLYN         Hip flexor, quad stretch   ASHLYN          Glute, ITB stretch   ASHLYN          Glute, HS quad, ITB roller ASHLYN ASHLYN  stm ASHLYN         Hip 4 way iso +Ecc manual ASHLYN ASHLYN Flex, add ASHLYN Flex, add, abd ASHLYN         AP, lat hip mob  [address]  ASHLYN                                                             Neuro Re-Ed             L-R discrim  hand 1 x2 tries hand 1 x2 tries          Foam walk lat, f-b    x3 laps ea                                                                                       Ther Ex             bike    L7 5'         Fantasma stretch EOT 1'            LTR legs crossed 5"x5             90/90 hip flexion iso  5"x10           90/90 hip ext iso  5"x10           H/L hip add iso  5"x10           H/L hip abd iso  5"x10           Side step, monster walk    ytb 2x10         Leg press    DL 75# 2x10         RDL    10# KB x10 P! hold                                                                                                   Ther Activity                                       Gait Training             [facility]                          Modalities             MHP Begin 10' Begin 10' Begin 10' End 10'

## 2022-10-12 ENCOUNTER — OFFICE VISIT (OUTPATIENT)
Dept: PHYSICAL THERAPY | Facility: CLINIC | Age: 67
End: 2022-10-12
Payer: COMMERCIAL

## 2022-10-12 DIAGNOSIS — M21.70 LEG LENGTH DIFFERENCE, ACQUIRED: Primary | ICD-10-CM

## 2022-10-12 PROBLEM — J01.10 ACUTE FRONTAL SINUSITIS: Status: RESOLVED | Noted: 2021-01-29 | Resolved: 2022-10-12

## 2022-10-12 PROCEDURE — L3010 FOOT LONGITUDINAL ARCH SUPPO: HCPCS | Performed by: PHYSICAL THERAPIST

## 2022-10-18 ENCOUNTER — EVALUATION (OUTPATIENT)
Dept: PHYSICAL THERAPY | Facility: CLINIC | Age: 67
End: 2022-10-18
Payer: COMMERCIAL

## 2022-10-18 DIAGNOSIS — M21.70 LEG LENGTH DIFFERENCE, ACQUIRED: Primary | ICD-10-CM

## 2022-10-18 DIAGNOSIS — Z96.641 STATUS POST RIGHT HIP REPLACEMENT: ICD-10-CM

## 2022-10-18 DIAGNOSIS — M25.551 RIGHT HIP PAIN: ICD-10-CM

## 2022-10-18 DIAGNOSIS — M16.11 PRIMARY OSTEOARTHRITIS OF RIGHT HIP: ICD-10-CM

## 2022-10-18 PROCEDURE — 97110 THERAPEUTIC EXERCISES: CPT | Performed by: PHYSICAL THERAPIST

## 2022-10-18 PROCEDURE — 97112 NEUROMUSCULAR REEDUCATION: CPT | Performed by: PHYSICAL THERAPIST

## 2022-10-18 PROCEDURE — 97140 MANUAL THERAPY 1/> REGIONS: CPT | Performed by: PHYSICAL THERAPIST

## 2022-10-18 NOTE — PROGRESS NOTES
PT Re-Evaluation     Today's date: 10/18/2022  Patient name: Monica Martino  : 1955  MRN: [id number]  Referring provider: Nimco Samuels PA-C  Dx:   Encounter Diagnosis     ICD-10-CM    1  Leg length difference, acquired  M21 70    2  Right hip pain  M25 551    3  Primary osteoarthritis of right hip  M16 11    4  Status post right hip replacement  [id number]        Start Time: 1127          Assessment  Assessment details: Monica Martino is a 73 y o  female presenting to outpatient physical therapy at [place] [address] with complaints of R hip pain s/p DENI on 22  They present with decreased range of motion, decreased strength, limited flexibility, poor postural awareness, poor body mechanics, poor balance, decreased tolerance to activity and decreased functional mobility due to Right hip pain  (primary encounter diagnosis)  Primary osteoarthritis of right hip  Status post right hip replacement  Roderick Trejo would benefit from skilled physical therapy to address noted impairments in order to allow for full functional return to work and ADL-related activities  Thank you for the referral!    RE: 22  Monica Martino has attended physical therapy for 9 visits  In this time, they have made MINIMAL significant improvements in symptoms and function, as noted by subjective report, improved balance, ROM, strength, flexibility and activity tolerance  They have made positive goal progress with FOTO score DECREASE from 37 at initial evaluation to 32 currently  They do continue to have impairments in pain, ROM, strength, balance, flexibility and activity tolerance  Recommend continued skilled PT in order to maximize function  RE: 10/18/22  Monica Martino has attended physical therapy for 16 visits  In this time, they have made significant improvements in symptoms and function, as noted by subjective report, improved balance, ROM, strength, flexibility and activity tolerance   They have made positive goal progress with [facility] score improvement from 32 at previous evaluation to 58 currently  They do continue to have impairments in pain, ROM, strength, balance, flexibility and activity tolerance  Recommend continued skilled PT in order to maximize function  Impairments: abnormal muscle firing, abnormal or restricted ROM, activity intolerance, impaired balance, impaired physical strength, lacks appropriate home exercise program, pain with function and poor body mechanics  Barriers to therapy: n/a  Understanding of Dx/Px/POC: excellent  Goals  ST   Independent with HEP in 2 weeks - met  2  Decrease pain by 50% in 3 wks - met  3  Able to walk without assistive devices in 3 weeks - not met    LT  Achieve FOTO score of 60/100 in 6 weeks - not met  2   Able to ascend a flight of stairs with alternate step pattern in 6 weeks - not met  3  Strength = 5/5 R hip all planes in 6 weeks - not met      Plan  Plan details: RE in 5 weeks  Patient would benefit from: skilled physical therapy  Planned modality interventions: cryotherapy, electrical stimulation/Russian stimulation and thermotherapy: hydrocollator packs  Planned therapy interventions: abdominal trunk stabilization, manual therapy, neuromuscular re-education, therapeutic activities, therapeutic exercise, body mechanics training and home exercise program  Frequency: 2x week  Duration in visits: 12  Duration in weeks: 6  Plan of Care beginning date: 10/18/2022  Plan of Care expiration date: 2022  Treatment plan discussed with: patient        Subjective Evaluation    History of Present Illness  Mechanism of injury: Pt is 3 days s/p R DENI performed by Dr Anastacia Lawson  Posterior approach  DOS: 22    Pt reports she is doing well  Pain is well managed with tylenol around the clock, rated avg 2/10  Denies falls, instability of the hip, N/T     She's been ambulating with a RW  She does have a SPC at home but does not feel ready for it yet   She is sleeping in bed without much issue, she has a strap which assists with bed mobility, but feels like she does not need it much anymore  She is staying at home with her sister for help with ADLs  She assists with meals, carrying stuff  She is independent with dressing, toileting  She's been washing up in the sink as she is not allowed to submerge the bandage for a week  She isn't doing any exercises, but has been active throughout her home  She does note she has a residual 1in leg length discrepancy, R longer than L  A shoe lift was ordered and hopefully will be available by next week  RE: 9/13/22  Pt is 5 weeks s/p R DENI  Since surgery, she has been ambulating with a RW with increased ease  She has gotten a OTC shoe lift to adjust for her LLD, which has made a positive difference in her gait and balance  She has been consistent with the HEP daily though continues to have pain constantly throughout the day, rated 3/10 on avg  Pain in the AM is worse, rated 5/10  Located lateral and anterior hip, described as a tightness and stiffness  She has been trying to use a SPC in place of the RW, though notes she does not feel confident in walking without bilateral arm support if needed; she also notes increased R hip pain with WB  She mentions increased frequency of urinary leaking/incontinence since the surgery with difficulty stopping the flow once started  The only pain medication she takes is ibuprofen  Denies instability or falls, N/T of the legs or groin, difficulty with gait  RE: 10/18/22  Pt is 10 weeks s/p R DENI  Pt reports she is doing well  She is using the [facility] for amb assist, but trying to use it less and less  She walks a lot and notes the more she walks, the better it feels  Pain is 3/10 in the morning; 1/10 while walking  Pain is located lateral and anterior joint line  She did get custom orthotics made for her LLD, but was advised by Dr Zari Mantilla to not use them in order to let her body adjust to the hip replacement   She is independent with driving and all ADLs  Patient Goals  Patient goals for therapy: decreased edema, decreased pain, improved balance, increased motion, return to work, return to [employer] activities, independence with ADLs/IADLs and increased strength          Objective     Observations     Additional Observation Details  LLD R longer than L approx 1in; incision with appropriate healing    Tenderness     Additional Tenderness Details  TTP with increased tone R hip flexor, adductor; greater troch, glute max/med, HS    R SLR to >90 deg with min HS lag    Lumbar Screen  Lumbar range of motion within normal limits  Neurological Testing     Sensation     Hip   Left Hip   Intact: light touch    Right Hip   Intact: light touch    Reflexes   Left   Patellar (L4): brisk (3+)  Achilles (S1): brisk (3+)    Right   Patellar (L4): brisk (3+)  Achilles (S1): brisk (3+)    Active Range of Motion     Right Hip   Flexion: 135 degrees WFL  Abduction: 45 degrees WFL  External rotation (90/90): 45 degrees WFL  Internal rotation (90/90): 30 degrees with pain    Strength/Myotome Testing     Right Hip   Planes of Motion   Flexion: 4+  Extension: 4+  Abduction: 4+  Adduction: 4+  External rotation: 4  Internal rotation: 4    Isolated Muscles   Gluteus maximums: 4    Right Knee   Flexion: 4  Extension: 4+  Quadriceps contraction: good    Functional Assessment        Comments  Gait: RW, decreased gait speed  Bed mobility: independent  STS: good, able to perform without assist  Squat: fair, unassisted  WB: NL stance, 30 sec unsupported with equal weight distribution through both LE             Precautions: s/p R DENI ---- DOS: 8/8/22  Protocol: posterior approach  Other factors: 1 in LLD R longer than L  Pt goals: no assistive device for walking, independence with ADLs  F/u appts: orthotics 9/23      HEP:  Access Code: [email or web address]  URL: [email or web address]  Date: 09/28/2022  Prepared by: Lexis Cardenas Castagnera    Exercises  · Hooklying Isometric Hip Flexion - 10 reps - 5 sec hold  · Supine 90/90 Isometric Hip Extension - 10 reps - 5 sec hold  · Supine Hip Adduction Isometric with Ball - 10 reps - 5 sec hold  · Hooklying Isometric Hip Abduction with Belt - 10 reps - 5 sec hold          Manuals 9/27 9/28 10/4 10/11 10/18        RE     ASHLYN        Hip PROM   ASHLYN ASHLYN         Groin stretch   ASHLYN ASHLYN         Hip flexor, quad stretch   ASHLYN          Glute, ITB stretch   ASHLYN          Glute, HS quad, ITB roller ASHLYN ASHLYN  stm ASHLYN         Hip 4 way iso +Ecc manual ASHLYN ASHLYN Flex, add ASHLYN Flex, add, abd ASHLYN         AP, lat hip mob  [address] [facility]                                                             Neuro Re-Ed             L-R discrim  hand 1 x2 tries hand 1 x2 tries          Foam walk lat, f-b    x3 laps ea x3 laps                                                                                      Ther Ex             bike    L7 5' 6'        Fantasma stretch EOT 1'            LTR legs crossed 5"x5             90/90 hip flexion iso  5"x10           90/90 hip ext iso  5"x10           H/L hip add iso  5"x10           H/L hip abd iso  5"x10           Side step, monster walk    ytb 2x10 ytb 3x10        Leg press    DL 75# 2x10 DL 75# 3x10  SL 45# 2x10 ea        RDL    10# KB x10 P! hold        march     3# ankle x20 ea        [facility]     3# ankle x20 ea                                                                         Ther Activity                                       Gait Training             [facility]                          Modalities             MHP Begin 10' Begin 10' Begin 10' End 10' End 10'

## 2022-10-21 ENCOUNTER — OFFICE VISIT (OUTPATIENT)
Dept: PHYSICAL THERAPY | Facility: CLINIC | Age: 67
End: 2022-10-21
Payer: COMMERCIAL

## 2022-10-21 DIAGNOSIS — Z96.641 STATUS POST RIGHT HIP REPLACEMENT: ICD-10-CM

## 2022-10-21 DIAGNOSIS — M21.70 LEG LENGTH DIFFERENCE, ACQUIRED: Primary | ICD-10-CM

## 2022-10-21 DIAGNOSIS — M25.551 RIGHT HIP PAIN: ICD-10-CM

## 2022-10-21 DIAGNOSIS — M16.11 PRIMARY OSTEOARTHRITIS OF RIGHT HIP: ICD-10-CM

## 2022-10-21 PROCEDURE — 97110 THERAPEUTIC EXERCISES: CPT | Performed by: PHYSICAL THERAPIST

## 2022-10-21 PROCEDURE — 97112 NEUROMUSCULAR REEDUCATION: CPT | Performed by: PHYSICAL THERAPIST

## 2022-10-21 NOTE — PROGRESS NOTES
Daily Note     Today's date: 10/21/2022  Patient name: Little Galan  : 1955  MRN: [id number]  Referring provider: Andrew Castano PA-C  Dx:   Encounter Diagnosis     ICD-10-CM    1  Leg length difference, acquired  M21 70    2  Right hip pain  M25 551    3  Primary osteoarthritis of right hip  M16 11    4  Status post right hip replacement  [id number]        Start Time: 1004          Subjective: Twisted the low back and having pain to the L side today  She's "babying" it today  Otherwise hip is doing well  Objective: See treatment diary below      Assessment: hip strength and balance coming along well  She will be ready for addition of cable column walk outs nv  No c/o back pain t/o session  Tolerated treatment well  Patient demonstrated fatigue post treatment and would benefit from continued PT      Plan: Continue per plan of care  Precautions: s/p R DENI ---- DOS: 22  Protocol: posterior approach  Other factors: 1 in LLD R longer than L  Pt goals: no assistive device for walking, independence with ADLs  F/u appts: orthotics       HEP:  Access Code: [email or web address]  URL: [email or web address]  Date: 2022  Prepared by: Lexii Standard    Exercises  · Hooklying Isometric Hip Flexion - 10 reps - 5 sec hold  · Supine 90/90 Isometric Hip Extension - 10 reps - 5 sec hold  · Supine Hip Adduction Isometric with Ball - 10 reps - 5 sec hold  · Hooklying Isometric Hip Abduction with Belt - 10 reps - 5 sec hold          Manuals 9/27 9/28 10/4 10/11 10/18 10/21       RE     ASHLYN        Hip PROM   ASHLYN ASHLYN         Groin stretch   [address] ASHLYN         Hip flexor, quad stretch   ASHLYN          Glute, ITB stretch   ASHLYN          Glute, HS quad, ITB roller ASHLYN ASHLYN  stm ASHLYN         Hip 4 way iso +Ecc manual ASHLYN ASHLYN Flex, add ASHLYN Flex, add, abd ASHLYN         AP, lat hip mob  [address] [facility]                                                             Neuro Re-Ed             L-R discrim  hand 1 x2 tries hand 1 x2 tries          Foam walk lat, f-b    x3 laps ea x3 laps +foot cone tap x3 laps ea       RB taps      s-s x20 ea       Foot cone tap      1 cone x10 ea                                                           Ther Ex             bike    L7 5' 6' 7'       Fantasma stretch EOT 1'            LTR legs crossed 5"x5             90/90 hip flexion iso  5"x10           90/90 hip ext iso  5"x10           H/L hip add iso  5"x10           H/L hip abd iso  5"x10           Side step, monster walk    ytb 2x10 ytb 3x10 rtb 3x10       Leg press    DL 75# 2x10 DL 75# 3x10  SL 45# 2x10 ea DL 85# 3x10  SL 45# 3x10 ea       RDL    10# KB x10 P! hold        march     3# ankle x20 ea 3# ankle x20 ea       [facility]     3# ankle x20 ea 3# ankle x20 ea                                                                        Ther Activity                                       Gait Training             [facility]                          Modalities             MHP Begin 10' Begin 10' Begin 10' End 10' End 10' End 10'

## 2022-10-25 ENCOUNTER — APPOINTMENT (OUTPATIENT)
Dept: PHYSICAL THERAPY | Facility: CLINIC | Age: 67
End: 2022-10-25

## 2022-10-28 ENCOUNTER — APPOINTMENT (OUTPATIENT)
Dept: PHYSICAL THERAPY | Facility: CLINIC | Age: 67
End: 2022-10-28

## 2022-11-01 ENCOUNTER — APPOINTMENT (OUTPATIENT)
Dept: PHYSICAL THERAPY | Facility: CLINIC | Age: 67
End: 2022-11-01

## 2022-11-03 ENCOUNTER — APPOINTMENT (OUTPATIENT)
Dept: PHYSICAL THERAPY | Facility: CLINIC | Age: 67
End: 2022-11-03

## 2022-11-08 ENCOUNTER — OFFICE VISIT (OUTPATIENT)
Dept: PHYSICAL THERAPY | Facility: CLINIC | Age: 67
End: 2022-11-08

## 2022-11-08 DIAGNOSIS — M16.11 PRIMARY OSTEOARTHRITIS OF RIGHT HIP: ICD-10-CM

## 2022-11-08 DIAGNOSIS — Z96.641 STATUS POST RIGHT HIP REPLACEMENT: ICD-10-CM

## 2022-11-08 DIAGNOSIS — M25.551 RIGHT HIP PAIN: ICD-10-CM

## 2022-11-08 DIAGNOSIS — M21.70 LEG LENGTH DIFFERENCE, ACQUIRED: Primary | ICD-10-CM

## 2022-11-08 NOTE — PROGRESS NOTES
Daily Note     Today's date: 2022  Patient name: Mary Reardon  : 1955  MRN: [id number]  Referring provider: Keegan Green PA-C  Dx:   Encounter Diagnosis     ICD-10-CM    1  Leg length difference, acquired  M21 70    2  Right hip pain  M25 551    3  Primary osteoarthritis of right hip  M16 11    4  Status post right hip replacement  [id number]        Start Time: 1133          Subjective: Took a few weeks off from therapy due to having sinus infections, migraine HA and COVID  Pt reports she is feeling healthier, though sinus symptoms such as fullness and dizziness are lingering  Overall low back and hip are doing well  Forgot her cane today, which is a good sign to her  Would like to decrease visits to 1x/wk  Objective: See treatment diary below      Assessment: No issues with TE today  Held on any progressions d/t recent break from therapy and getting over sickness  Able to tolerate tandem stance without issue for the first time today  SLS sore on the R hip  Tolerated treatment well  Patient demonstrated fatigue post treatment and would benefit from continued PT      Plan: Continue per plan of care  Precautions: s/p R DENI ---- DOS: 22  Protocol: posterior approach  Other factors: 1 in LLD R longer than L  Pt goals: no assistive device for walking, independence with ADLs  F/u appts: orthotics       HEP:  Access Code: [email or web address]  URL: [email or web address]  Date: 2022  Prepared by: Little Vega    Exercises  · Hooklying Isometric Hip Flexion - 10 reps - 5 sec hold  · Supine 90/90 Isometric Hip Extension - 10 reps - 5 sec hold  · Supine Hip Adduction Isometric with Ball - 10 reps - 5 sec hold  · Hooklying Isometric Hip Abduction with Belt - 10 reps - 5 sec hold          Manuals 9/27 9/28 10/4 10/11 10/18 10/21 11/8      RE     [address]        Hip PROM   ASHLYN ASHLYN         Groin stretch   [address] ASHLYN         Hip flexor, quad stretch   ASHLYN          Glute, ITB stretch   [address]          Glute, HS quad, ITB roller [address] [facility] ASHLYN  stm ASHLYN         Hip 4 way iso +Ecc manual ASHLYN ASHLYN Flex, add ASHLYN Flex, add, abd ASHLYN         AP, lat hip mob  [address] [facility]  ASHLYN                                                             Neuro Re-Ed             L-R discrim  hand 1 x2 tries hand 1 x2 tries          Foam walk lat, f-b    x3 laps ea x3 laps +foot cone tap x3 laps ea       RB taps      s-s x20 ea s-s, f-b x20 ea      Foot cone tap      1 cone x10 ea       tandem       airex 30"x2 ea      SLS       10"x3 ea                                Ther Ex             bike    L7 5' 6' 7' 7'      Fantasma stretch EOT 1'            LTR legs crossed 5"x5             90/90 hip flexion iso  5"x10           90/90 hip ext iso  5"x10           H/L hip add iso  5"x10           H/L hip abd iso  5"x10           Side step, monster walk    ytb 2x10 ytb 3x10 rtb 3x10 gtb 3x10      STS       x20       Leg press    DL 75# 2x10 DL 75# 3x10  SL 45# 2x10 ea DL 85# 3x10  SL 45# 3x10 ea DL 85# 4x10  SL 45# 4x10 ea      RDL    10# KB x10 P! hold        march     3# ankle x20 ea 3# ankle x20 ea 3# ankle x20 ea      HSC     3# ankle x20 ea 3# ankle x20 ea 3# ankle x20 ea                                                                       Ther Activity                                       Gait Training             [facility]                          Modalities             MHP Begin 10' Begin 10' Begin 10' End 10' End 10' End 10'

## 2022-11-11 ENCOUNTER — APPOINTMENT (OUTPATIENT)
Dept: PHYSICAL THERAPY | Facility: CLINIC | Age: 67
End: 2022-11-11

## 2022-11-17 ENCOUNTER — APPOINTMENT (OUTPATIENT)
Dept: PHYSICAL THERAPY | Facility: CLINIC | Age: 67
End: 2022-11-17

## 2022-11-22 ENCOUNTER — APPOINTMENT (OUTPATIENT)
Dept: RADIOLOGY | Facility: CLINIC | Age: 67
End: 2022-11-22

## 2022-11-22 ENCOUNTER — OFFICE VISIT (OUTPATIENT)
Dept: PHYSICAL THERAPY | Facility: CLINIC | Age: 67
End: 2022-11-22

## 2022-11-22 ENCOUNTER — OFFICE VISIT (OUTPATIENT)
Dept: OBGYN CLINIC | Facility: CLINIC | Age: 67
End: 2022-11-22

## 2022-11-22 VITALS
BODY MASS INDEX: 29.88 KG/M2 | DIASTOLIC BLOOD PRESSURE: 80 MMHG | WEIGHT: 175 LBS | SYSTOLIC BLOOD PRESSURE: 124 MMHG | HEIGHT: 64 IN

## 2022-11-22 DIAGNOSIS — M25.551 RIGHT HIP PAIN: ICD-10-CM

## 2022-11-22 DIAGNOSIS — Z96.641 AFTERCARE FOLLOWING RIGHT HIP JOINT REPLACEMENT SURGERY: Primary | ICD-10-CM

## 2022-11-22 DIAGNOSIS — M21.70 LEG LENGTH DIFFERENCE, ACQUIRED: Primary | ICD-10-CM

## 2022-11-22 DIAGNOSIS — Z96.641 AFTERCARE FOLLOWING RIGHT HIP JOINT REPLACEMENT SURGERY: ICD-10-CM

## 2022-11-22 DIAGNOSIS — Z47.1 AFTERCARE FOLLOWING RIGHT HIP JOINT REPLACEMENT SURGERY: Primary | ICD-10-CM

## 2022-11-22 DIAGNOSIS — Z47.1 AFTERCARE FOLLOWING RIGHT HIP JOINT REPLACEMENT SURGERY: ICD-10-CM

## 2022-11-22 DIAGNOSIS — M16.11 PRIMARY OSTEOARTHRITIS OF RIGHT HIP: ICD-10-CM

## 2022-11-22 DIAGNOSIS — Z96.641 STATUS POST RIGHT HIP REPLACEMENT: ICD-10-CM

## 2022-11-22 NOTE — PROGRESS NOTES
Daily Note - d/c summary     Today's date: 2022  Patient name: Cindy Anglin  : 1955  MRN: [id number]  Referring provider: Ruth Lane PA-C  Dx:   Encounter Diagnosis     ICD-10-CM    1  Leg length difference, acquired  M21 70       2  Right hip pain  M25 551       3  Primary osteoarthritis of right hip  M16 11       4  Status post right hip replacement  [id number] [id number]           Start Time: 1155          Subjective: Doing well  Was chasing her chickens around the last weekend  She has been doing very well with walking around with and without the cane  Her strength and balance have been progressing  She is ready for d/c to HEP at this time  Objective: See treatment diary below      Assessment: Cindy Anglin has attended physical therapy for 19 visits  In this time, they have made significant improvements in symptoms and function, as noted by subjective report, improved balance, ROM, strength, flexibility and activity tolerance  They have made positive goal progress with FOTO score improvement from 37 at initial evaluation to 79 currently  Recommend d/c to HEP at this time  Goals  ST   Independent with HEP in 2 weeks - met  2  Decrease pain by 50% in 3 wks - met  3  Able to walk without assistive devices in 3 weeks - met    LT  Achieve FOTO score of 60/100 in 6 weeks - met  2   Able to ascend a flight of stairs with alternate step pattern in 6 weeks - met  3  Strength = 5/5 R hip all planes in 6 weeks - not met        Plan: D/c to HEP  Precautions: s/p R DENI ---- DOS: 22  Protocol: posterior approach  Other factors: 1 in LLD R longer than L  Pt goals: no assistive device for walking, independence with ADLs  F/u appts: orthotics       HEP:  Access Code: [email or web address]  URL: [email or web address]  Date: 2022  Prepared by: Sylvie Morley    Exercises  • Side Stepping with Resistance at Ankles - 3 sets - 10 reps  • Monster Walks - 3 sets - 10 reps  • Squat with Chair Touch - 20 reps  • Step Up - 20 reps  • Lateral Step Up - 20 reps  • Single Leg Stance with Support - 4 sets - 10-15 sec hold          Manuals 9/27 9/28 10/4 10/11 10/18 10/21 11/8 11/22     RE     [address]     Hip PROM   [address] ASHLYN         Groin stretch   [address] ASHLYN         Hip flexor, quad stretch   ASHLYN          Glute, ITB stretch   ASHLYN          Glute, HS quad, ITB roller ASHLYN ASHLYN  stm ASHLYN         Hip 4 way iso +Ecc manual ASHLYN ASHLYN Flex, add ASHLYN Flex, add, abd ASHLYN         AP, lat hip mob  [address]  ASHLYN                                                             Neuro Re-Ed             L-R discrim  hand 1 x2 tries hand 1 x2 tries          Foam walk lat, f-b    x3 laps ea x3 laps +foot cone tap x3 laps ea       RB taps      s-s x20 ea s-s, f-b x20 ea      Foot cone tap      1 cone x10 ea       tandem       airex 30"x2 ea airex 10"x4 ea     SLS       10"x3 ea 10"x4 ea                               Ther Ex             bike    L7 5' 6' 7' 7' 7'     Fantasma stretch EOT 1'            LTR legs crossed 5"x5             90/90 hip flexion iso  5"x10           90/90 hip ext iso  5"x10           H/L hip add iso  5"x10           H/L hip abd iso  5"x10           Side step, monster walk    ytb 2x10 ytb 3x10 rtb 3x10 gtb 3x10 btb 3x10     STS       x20  x20     Leg press    DL 75# 2x10 DL 75# 3x10  SL 45# 2x10 ea DL 85# 3x10  SL 45# 3x10 ea DL 85# 4x10  SL 45# 4x10 ea      RDL    10# KB x10 P! hold        march     3# ankle x20 ea 3# ankle x20 ea 3# ankle x20 ea 3# ankle x20 ea     HSC     3# ankle x20 ea 3# ankle x20 ea 3# ankle x20 ea 3# ankle x20 ea     Step up fwd        8in 2x10 ea     Step up lat        8in 2x10                                            Ther Activity                                       Gait Training             [facility]                          Modalities             MHP Begin 10' Begin 10' Begin 10' End 10' End 10' End 10'

## 2022-11-22 NOTE — PROGRESS NOTES
Assessment:     1  Aftercare following right hip joint replacement surgery        Plan:     Problem List Items Addressed This Visit        Other    Aftercare following right hip joint replacement surgery - Primary     Patient is s/p right total hip replacement 8/2022 overall doing well  Imaging and prognosis reviewed with patient  She can continue with physical therapy and transition to HEP when ready  She can continue with orthotic in shoe due to functional leg length discrepancy secondary to curvature of lumbar spine  She can continue to gradually increase her activities to tolerance  Call prior to dental procedure for antibiotics  See patient back in August 2023 unless any issues with imaging  All patient's questions were answered to her satisfaction  This note is created using dictation transcription  It may contain typographical errors, grammatical errors, improperly dictated words, background noise and other errors  Relevant Orders    XR hip/pelv 2-3 vws right if performed       Subjective:     Patient ID: Lynda Mckeon is a 73 y o  female  Chief Complaint:  73 yr old female in for follow up regarding her right hip replacement 8/2022  She did see Dr Meseret Harris for 2nd opinion  He explained that the leg length discrepancy that she is feeling is coming from the curvature of her lumbar spine with pelvic obliquity  This curvature is causing her pelvis to be off balance, which gives her a functional leg length discrepancy  He advised to use one orthotic in shoe only  She has been attending physical therapy with benefit  She is no longer using SPC to ambulate  Still gets discomfort at times with certain positions in groin  She is using insert in shoe       Allergy:  Allergies   Allergen Reactions   • Erythromycin Nausea Only     Other reaction(s): GI Reaction     Medications:  all current active meds have been reviewed  Past Medical History:  Past Medical History:   Diagnosis Date   • COVID-19 05/2022   • Fibromyalgia, primary    • GERD (gastroesophageal reflux disease) 1999   • Hyperlipidemia    • Hypertension    • Lactose intolerance 1984   • Primary osteoarthritis of right hip 3/16/2022   • Small bowel obstruction [facility])      Past Surgical History:  Past Surgical History:   Procedure Laterality Date   • ABDOMINAL SURGERY  2020   • ADENOIDECTOMY      pt has 2nd appendix removed      • BOWEL RESECTION     • CHOLECYSTECTOMY  2000   • EYE SURGERY      lasik   • OOPHORECTOMY      one    • OK LAP,DIAGNOSTIC ABDOMEN N/A 04/21/2020    Procedure: LAPAROSCOPY DIAGNOSTIC, lysis of adhesions , possible bowel resection;  Surgeon: Nancy Rowland MD;  Location: [facility] MAIN OR;  Service: General   • OK TOTAL HIP ARTHROPLASTY Right 8/8/2022    Procedure: ARTHROPLASTY HIP TOTAL;  Surgeon: Corey Chávez MD;  Location:  MAIN OR;  Service: Orthopedics   • TUBAL LIGATION       Family History:  Family History   Problem Relation Age of Onset   • Fibromyalgia Mother    • Arthritis Mother    • Hypertension Mother    • Vision loss Mother         Glaucoma   • Heart disease Father    • GI problems Father    • Hypertension Father    • Vision loss Father         Macular Degeneration   • Birth defects Sister         Arterial Vascular Malformation   • No Known Problems Daughter    • No Known Problems Maternal Grandmother    • Diabetes Maternal Grandfather    • No Known Problems Paternal Grandmother    • No Known Problems Paternal Grandfather    • No Known Problems Sister    • No Known Problems Sister    • No Known Problems Paternal Aunt    • No Known Problems Paternal Aunt    • Mental illness Brother    • Colon cancer Neg Hx    • Colon polyps Neg Hx      Social History:  Social History     Substance and Sexual Activity   Alcohol Use Yes    Comment: rarely, 3 x year     Social History     Substance and Sexual Activity   Drug Use Yes   • Types: Marijuana    Comment: medical marijuana     Social History     Tobacco Use   Smoking Status Former   • Packs/day: 2 00   • Years: 30 00   • Pack years: 60 00   • Types: Cigarettes   • Start date: 1973   • Quit date: 2020   • Years since quittin 2   Smokeless Tobacco Never     Review of Systems   Constitutional: Negative for chills and fever  HENT: Negative for ear pain and sore throat  Eyes: Negative for pain and visual disturbance  Respiratory: Negative for cough and shortness of breath  Cardiovascular: Negative for chest pain and palpitations  Gastrointestinal: Negative for abdominal pain and vomiting  Genitourinary: Negative for dysuria and hematuria  Musculoskeletal: Negative for arthralgias and back pain  Skin: Negative for color change and rash  Neurological: Negative for seizures and syncope  All other systems reviewed and are negative  Objective:  BP Readings from Last 1 Encounters:   22 124/80      Wt Readings from Last 1 Encounters:   22 79 4 kg (175 lb)      BMI:   Estimated body mass index is 30 04 kg/m² as calculated from the following:    Height as of this encounter: 5' 4" (1 626 m)  Weight as of this encounter: 79 4 kg (175 lb)  BSA:   Estimated body surface area is 1 85 meters squared as calculated from the following:    Height as of this encounter: 5' 4" (1 626 m)  Weight as of this encounter: 79 4 kg (175 lb)  Physical Exam  Constitutional:       Appearance: She is well-developed and well-nourished  Eyes:      Pupils: Pupils are equal, round, and reactive to light  Pulmonary:      Effort: Pulmonary effort is normal       Breath sounds: Normal breath sounds  Musculoskeletal:         General: Tenderness (right hip arthralgia ) present  Skin:     General: Skin is warm and dry  Neurological:      Mental Status: She is alert and oriented to person, place, and time  Deep Tendon Reflexes: Reflexes are normal and symmetric     Psychiatric:         Mood and Affect: Mood and affect normal          Behavior: Behavior normal  Thought Content: Thought content normal          Judgment: Judgment normal        Right Hip Exam     Tenderness   The patient is experiencing no tenderness  Range of Motion   Flexion: normal   External rotation: normal   Internal rotation: normal     Muscle Strength   The patient has normal right hip strength  Other   Erythema: absent  Scars: present (well healed incision )  Sensation: normal  Pulse: present            I have personally reviewed pertinent films in PACS and my interpretation is xr right hip demonstrates stable prosthesis in acceptable position and alignment          Scribe Attestation    I,:  Gisselle Montelongo am acting as a scribe while in the presence of the attending physician :       I,:  Lindsay Woodard MD personally performed the services described in this documentation    as scribed in my presence :

## 2022-11-22 NOTE — ASSESSMENT & PLAN NOTE
Patient is s/p right total hip replacement 8/2022 overall doing well  Imaging and prognosis reviewed with patient  She can continue with physical therapy and transition to HEP when ready  She can continue with orthotic in shoe due to functional leg length discrepancy secondary to curvature of lumbar spine  She can continue to gradually increase her activities to tolerance  Call prior to dental procedure for antibiotics  See patient back in August 2023 unless any issues with imaging  All patient's questions were answered to her satisfaction  This note is created using dictation transcription  It may contain typographical errors, grammatical errors, improperly dictated words, background noise and other errors

## 2022-11-25 ENCOUNTER — APPOINTMENT (OUTPATIENT)
Dept: PHYSICAL THERAPY | Facility: CLINIC | Age: 67
End: 2022-11-25

## 2022-11-29 ENCOUNTER — APPOINTMENT (OUTPATIENT)
Dept: PHYSICAL THERAPY | Facility: CLINIC | Age: 67
End: 2022-11-29

## 2022-12-09 ENCOUNTER — TELEPHONE (OUTPATIENT)
Dept: OBGYN CLINIC | Facility: HOSPITAL | Age: 67
End: 2022-12-09

## 2022-12-09 DIAGNOSIS — Z47.1 AFTERCARE FOLLOWING RIGHT HIP JOINT REPLACEMENT SURGERY: Primary | ICD-10-CM

## 2022-12-09 DIAGNOSIS — Z96.641 AFTERCARE FOLLOWING RIGHT HIP JOINT REPLACEMENT SURGERY: Primary | ICD-10-CM

## 2022-12-09 RX ORDER — AMOXICILLIN 500 MG/1
CAPSULE ORAL
Qty: 4 CAPSULE | Refills: 3 | Status: SHIPPED | OUTPATIENT
Start: 2022-12-09 | End: 2022-12-09

## 2022-12-09 NOTE — LETTER
December 13, 2022     Patient: Yanet Quintero  YOB: 1955  Date of Visit: 12/9/2022      To Whom it May Concern:    Yanet Quintero is under my professional care  Margarita First can proceed with dental appointments 3 months after surgery  She is to take prophylactic antibiotics prior to all dental procedures lifelong  If you have any questions or concerns, please don't hesitate to call           Sincerely,          Nataliya Heller MD        CC: Re: Yanet Uribes

## 2022-12-09 NOTE — TELEPHONE ENCOUNTER
Caller: [facility]    Doctor: Barbara Willoughby    Reason for call: dental office called inquiring if patient need pre-dental antibiotics   Patient had DENI 08/08/2020    Patient has appointment 12/09/2022    Call back#: [phone]    Fax# [phone]

## 2022-12-12 NOTE — TELEPHONE ENCOUNTER
Caller: Nikky odom/ [facility]     Doctor: Ludivina Whalen     Reason for call: They need something in witting stating how long she needs to wait prior to dental work post surgery,  that she had surgery done and how long she will require these for       You can fax this to: [phone]     Call back#: [phone]

## 2023-02-27 ENCOUNTER — HOSPITAL ENCOUNTER (OUTPATIENT)
Dept: PULMONOLOGY | Facility: HOSPITAL | Age: 68
Discharge: HOME | End: 2023-02-27

## 2023-02-27 RX ORDER — ALBUTEROL SULFATE 2.5 MG/3ML
2.5 SOLUTION RESPIRATORY (INHALATION) EVERY 6 HOURS PRN
Status: DISCONTINUED | OUTPATIENT
Start: 2023-02-27 | End: 2023-03-03 | Stop reason: HOSPADM

## 2023-03-04 ENCOUNTER — APPOINTMENT (OUTPATIENT)
Dept: RADIOLOGY | Facility: CLINIC | Age: 68
End: 2023-03-04

## 2023-03-04 ENCOUNTER — OFFICE VISIT (OUTPATIENT)
Dept: URGENT CARE | Facility: CLINIC | Age: 68
End: 2023-03-04

## 2023-03-04 VITALS
BODY MASS INDEX: 30.21 KG/M2 | RESPIRATION RATE: 16 BRPM | WEIGHT: 176 LBS | TEMPERATURE: 100.9 F | SYSTOLIC BLOOD PRESSURE: 142 MMHG | HEART RATE: 77 BPM | DIASTOLIC BLOOD PRESSURE: 94 MMHG | OXYGEN SATURATION: 96 %

## 2023-03-04 DIAGNOSIS — H60.332 ACUTE SWIMMER'S EAR OF LEFT SIDE: ICD-10-CM

## 2023-03-04 DIAGNOSIS — R05.1 ACUTE COUGH: ICD-10-CM

## 2023-03-04 DIAGNOSIS — J20.8 ACUTE BRONCHITIS DUE TO OTHER SPECIFIED ORGANISMS: Primary | ICD-10-CM

## 2023-03-04 RX ORDER — PAROXETINE HYDROCHLORIDE 40 MG/1
TABLET, FILM COATED ORAL
COMMUNITY
Start: 2023-02-24

## 2023-03-04 RX ORDER — AMOXICILLIN 500 MG/1
500 CAPSULE ORAL EVERY 8 HOURS SCHEDULED
Qty: 21 CAPSULE | Refills: 0 | Status: SHIPPED | OUTPATIENT
Start: 2023-03-04 | End: 2023-03-11

## 2023-03-04 RX ORDER — NEOMYCIN SULFATE, POLYMYXIN B SULFATE AND HYDROCORTISONE 10; 3.5; 1 MG/ML; MG/ML; [USP'U]/ML
4 SUSPENSION/ DROPS AURICULAR (OTIC) 4 TIMES DAILY
Qty: 10 ML | Refills: 0 | Status: SHIPPED | OUTPATIENT
Start: 2023-03-04 | End: 2023-03-11

## 2023-03-04 RX ORDER — AZITHROMYCIN 250 MG/1
TABLET, FILM COATED ORAL
Qty: 6 TABLET | Refills: 0 | Status: SHIPPED | OUTPATIENT
Start: 2023-03-04 | End: 2023-03-04 | Stop reason: ALTCHOICE

## 2023-03-04 NOTE — PROGRESS NOTES
[facility]        NAME: David Cody is a 78 y o  female  : 1955    MRN: [id number]  DATE: 2023  TIME: 7:08 PM    Assessment and Orders   Acute bronchitis due to other specified organisms [J20 8]  1  Acute bronchitis due to other specified organisms  XR chest pa & lateral    amoxicillin (AMOXIL) 500 mg capsule    DISCONTINUED: azithromycin (ZITHROMAX) 250 mg tablet      2  Acute swimmer's ear of left side  neomycin-polymyxin-hydrocortisone (CORTISPORIN) 0 35%-10,000 units/mL-1% otic suspension            Plan and Discussion        No acute disease noted on chest xray despite rhochi appreciated on exam  However, given duration of patient's symptoms and new onset of fever, will treat with 7 day course of amoxicillin  Will treat otitis externa with otic antibiotic drops  Discussed ED precautions including (but not limited to)  • Difficultly breathing or shortness of breath  • Chest pain  • Acutely worsening symptoms  Risks and benefits discussed  Patient understands and agrees with the plan  Follow up with PCP  Chief Complaint     Chief Complaint   Patient presents with   • Earache     Throbbing L sided earache started a few days ago  Painful, unable to hear  Taking ibuprofen with mild relief until today  Cough & SOB, chest xray scheduled for monday         History of Present Illness       Cough  This is a new problem  The current episode started 1 to 4 weeks ago (3 weeks)  The problem has been gradually worsening  Associated symptoms include ear pain, a fever, nasal congestion, postnasal drip and a sore throat  Pertinent negatives include no shortness of breath  Earache   There is pain in the left ear  This is a new problem  The current episode started yesterday  The problem has been waxing and waning  The maximum temperature recorded prior to her arrival was 100 4 - 100 9 F  Associated symptoms include coughing and a sore throat         Review of Systems Review of Systems   Constitutional: Positive for fever  HENT: Positive for ear pain, postnasal drip and sore throat  Respiratory: Positive for cough  Negative for shortness of breath            Current Medications       Current Outpatient Medications:   •  amoxicillin (AMOXIL) 500 mg capsule, Take 1 capsule (500 mg total) by mouth every 8 (eight) hours for 7 days, Disp: 21 capsule, Rfl: 0  •  atorvastatin (LIPITOR) 40 mg tablet, Take 40 mg by mouth daily, Disp: , Rfl:   •  lisinopril (ZESTRIL) 10 mg tablet, TAKE 1 TABLET BY MOUTH ONCE A DAY, Disp: 90 tablet, Rfl: 3  •  Multiple Vitamin (MULTI-VITAMIN) tablet, Take 1 tablet by mouth daily, Disp: 30 tablet, Rfl: 2  •  neomycin-polymyxin-hydrocortisone (CORTISPORIN) 0 35%-10,000 units/mL-1% otic suspension, Administer 4 drops into the left ear 4 (four) times a day for 7 days, Disp: 10 mL, Rfl: 0  •  pantoprazole (PROTONIX) 40 mg tablet, , Disp: , Rfl:   •  PARoxetine (PAXIL) 40 MG tablet, TAKE 1 TABLET BY MOUTH EVERY DAY IN THE MORNING FOR 90 DAYS, Disp: , Rfl:   •  timolol (TIMOPTIC) 0 5 % ophthalmic solution, , Disp: , Rfl:   •  verapamil (VERELAN) 240 MG 24 hr capsule, TAKE 1 CAPSULE BY MOUTH EVERY DAY, Disp: 90 capsule, Rfl: 3  •  acetaminophen (TYLENOL) 325 mg tablet, Take 3 tablets (975 mg total) by mouth every 8 (eight) hours, Disp: 60 tablet, Rfl: 1  •  aspirin (ECOTRIN) 325 mg EC tablet, Take 1 tablet (325 mg total) by mouth 2 (two) times a day, Disp: 90 tablet, Rfl: 0  •  Calcium Carbonate-Vit D-Min (CALCIUM 1200 PO), , Disp: , Rfl:   •  [employer] Vitamin C 500 MG tablet, TAKE 1 TABLET BY MOUTH TWICE A DAY, Disp: 180 tablet, Rfl: 1  •  DULoxetine (CYMBALTA) 30 mg delayed release capsule, TAKE 1 CAPSULE DAILY FOR 2 WEEKS AND THEN INCREASE TO 2 CAPSULES DAILY ONWARD 30 DAY(S), Disp: , Rfl:   •  ferrous sulfate 324 (65 Fe) mg, TAKE 1 TABLET BY MOUTH 2 TIMES A DAY BEFORE MEALS, Disp: 180 tablet, Rfl: 1  •  folic acid (FOLVITE) 1 mg tablet, TAKE 1 TABLET BY MOUTH EVERY DAY, Disp: 90 tablet, Rfl: 1  •  Probiotic Product (PROBIOTIC ADVANCED PO), , Disp: , Rfl:     Current Allergies     Allergies as of 03/04/2023 - Reviewed 03/04/2023   Allergen Reaction Noted   • Erythromycin Nausea Only 04/02/2013            The following portions of the patient's history were reviewed and updated as appropriate: allergies, current medications, past family history, past medical history, past social history, past surgical history and problem list      Past Medical History:   Diagnosis Date   • COVID-19 05/2022   • Fibromyalgia, primary    • GERD (gastroesophageal reflux disease) 1999   • Hyperlipidemia    • Hypertension    • Lactose intolerance 1984   • Primary osteoarthritis of right hip 3/16/2022   • Small bowel obstruction [facility])        Past Surgical History:   Procedure Laterality Date   • ABDOMINAL SURGERY  2020   • ADENOIDECTOMY      pt has 2nd appendix removed      • BOWEL RESECTION     • CHOLECYSTECTOMY  2000   • EYE SURGERY      lasik   • OOPHORECTOMY      one    • ND LAP,DIAGNOSTIC ABDOMEN N/A 04/21/2020    Procedure: LAPAROSCOPY DIAGNOSTIC, lysis of adhesions , possible bowel resection;  Surgeon: Luanne Harrell MD;  Location:  MAIN OR;  Service: General   • ND TOTAL HIP ARTHROPLASTY Right 8/8/2022    Procedure: ARTHROPLASTY HIP TOTAL;  Surgeon: Jose Jackson MD;  Location:  MAIN OR;  Service: Orthopedics   • TUBAL LIGATION         Family History   Problem Relation Age of Onset   • Fibromyalgia Mother    • Arthritis Mother    • Hypertension Mother    • Vision loss Mother         Glaucoma   • Heart disease Father    • GI problems Father    • Hypertension Father    • Vision loss Father         Macular Degeneration   • Birth defects Sister         Arterial Vascular Malformation   • No Known Problems Daughter    • No Known Problems Maternal Grandmother    • Diabetes Maternal Grandfather    • No Known Problems Paternal Grandmother    • No Known Problems Paternal Grandfather    • No Known Problems Sister    • No Known Problems Sister    • No Known Problems Paternal Aunt    • No Known Problems Paternal Aunt    • Mental illness Brother    • Colon cancer Neg Hx    • Colon polyps Neg Hx          Medications have been verified  Objective   /94   Pulse 77   Temp (!) 100 9 °F (38 3 °C)   Resp 16   Wt 79 8 kg (176 lb)   SpO2 96%   BMI 30 21 kg/m²   No LMP recorded  Patient is postmenopausal        Physical Exam     Physical Exam  Constitutional:       General: She is not in acute distress  HENT:      Left Ear: Drainage, swelling and tenderness present  Nose: Congestion present  Mouth/Throat:      Pharynx: No posterior oropharyngeal erythema  Cardiovascular:      Rate and Rhythm: Normal rate  Pulmonary:      Effort: Pulmonary effort is normal       Breath sounds: Rhonchi present  Neurological:      General: No focal deficit present  Mental Status: She is alert and oriented to person, place, and time     Psychiatric:         Mood and Affect: Mood normal                Renuka Morrison DO

## 2023-03-13 ENCOUNTER — HOSPITAL ENCOUNTER (OUTPATIENT)
Dept: PULMONOLOGY | Facility: HOSPITAL | Age: 68
Discharge: HOME | End: 2023-03-13

## 2023-03-13 RX ORDER — ALBUTEROL SULFATE 2.5 MG/3ML
2.5 SOLUTION RESPIRATORY (INHALATION) EVERY 6 HOURS PRN
Status: DISCONTINUED | OUTPATIENT
Start: 2023-03-13 | End: 2023-03-17 | Stop reason: HOSPADM

## 2023-04-03 ENCOUNTER — HOSPITAL ENCOUNTER (OUTPATIENT)
Dept: CT IMAGING | Facility: HOSPITAL | Age: 68
Discharge: HOME | End: 2023-04-03

## 2023-04-03 ENCOUNTER — HOSPITAL ENCOUNTER (OUTPATIENT)
Dept: RADIOLOGY | Facility: HOSPITAL | Age: 68
Discharge: HOME | End: 2023-04-03

## 2023-04-03 DIAGNOSIS — Z87.891 PERSONAL HISTORY OF NICOTINE DEPENDENCE: ICD-10-CM

## 2023-04-03 DIAGNOSIS — R06.02 SOBOE (SHORTNESS OF BREATH ON EXERTION): ICD-10-CM

## 2024-02-14 LAB — HBA1C MFR BLD HPLC: 6 %

## 2024-02-28 ENCOUNTER — OFFICE VISIT (OUTPATIENT)
Dept: CARDIOLOGY CLINIC | Facility: CLINIC | Age: 69
End: 2024-02-28
Payer: COMMERCIAL

## 2024-02-28 VITALS
BODY MASS INDEX: 31.49 KG/M2 | DIASTOLIC BLOOD PRESSURE: 92 MMHG | WEIGHT: 189 LBS | SYSTOLIC BLOOD PRESSURE: 148 MMHG | HEART RATE: 57 BPM | HEIGHT: 65 IN

## 2024-02-28 DIAGNOSIS — R06.09 DYSPNEA ON EXERTION: ICD-10-CM

## 2024-02-28 DIAGNOSIS — I10 PRIMARY HYPERTENSION: ICD-10-CM

## 2024-02-28 DIAGNOSIS — E78.5 HYPERLIPIDEMIA, UNSPECIFIED HYPERLIPIDEMIA TYPE: Primary | ICD-10-CM

## 2024-02-28 PROCEDURE — 99203 OFFICE O/P NEW LOW 30 MIN: CPT | Performed by: INTERNAL MEDICINE

## 2024-02-28 PROCEDURE — 93000 ELECTROCARDIOGRAM COMPLETE: CPT | Performed by: INTERNAL MEDICINE

## 2024-02-28 NOTE — PROGRESS NOTES
Cardiology Follow Up    Fay Corley  1955  [id number]  [facility] CARDIAC CATH LAB  [address] [address]  [phone]    1. Hyperlipidemia, unspecified hyperlipidemia type        2. Primary hypertension        3. Dyspnea on exertion            Interval History: Cardiology consultation.  68-year-old female who has no previous cardiac history.  The patient complains of exertional dyspnea.  The patient has been suffering this for several months.  With some crescendo pattern.  She acknowledges similar symptoms a year prior.  There is no associated chest discomfort, she does feel numbness and weakness on both upper and lower extremities at times.  Very sedentary lifestyle.  And also complains of significant fatigue.  There is no orthopnea there is no PND.  The patient suffers from hypertension, on verapamil lisinopril combination.  There is family history of premature coronary disease, the father had bypass surgery premature.  Patient had a CAT scan of the chest, personally reviewed.  Heart was described as normal for age, there is mild calcification noted on the LAD and some mild aortic calcifications in the thoracic aorta.  Suggesting atherosclerosis the patient suffers from dyslipidemia she is on high intensity statin therapy.  recent fractionation, total cholesterol of 213, HDL 54, LDL of 125, triglycerides of 196..  She is not on chronic aspirin therapy as well.  Patient was order pulmonary function test last year, this was not done..  She had an echocardiogram several years ago essentially normal.  EKG in 2022 was also normal.  The patient is a former smoker, having quit over 4 years ago ago.  Heavily with a approximately 90-pack-year history.  There is no history of COPD/emphysema.  The CAT scan did not report any emphysema..    Patient Active Problem List   Diagnosis    Peripheral vestibulopathy of both ears     Hypokalemia    Sacroiliac joint dysfunction    GERD (gastroesophageal reflux disease)    Hypertension    Hyperlipidemia    Acute blood loss anemia    Aftercare following right hip joint replacement surgery    Dyspnea on exertion     Past Medical History:   Diagnosis Date    COVID-19 05/2022    Fibromyalgia, primary     GERD (gastroesophageal reflux disease) 1999    Hyperlipidemia     Hypertension     Lactose intolerance 1984    Primary osteoarthritis of right hip 3/16/2022    Small bowel obstruction (HCC)      Social History     Socioeconomic History    Marital status: Single     Spouse name: Not on file    Number of children: Not on file    Years of education: Not on file    Highest education level: Not on file   Occupational History    Not on file   Tobacco Use    Smoking status: Former     Current packs/day: 0.00     Average packs/day: 2.0 packs/day for 47.6 years (95.3 ttl pk-yrs)     Types: Cigarettes     Start date: 1/1/1973     Quit date: 8/25/2020     Years since quitting: 3.5    Smokeless tobacco: Never   Vaping Use    Vaping status: Never Used   Substance and Sexual Activity    Alcohol use: Yes     Comment: rarely, 3 x year    Drug use: Yes     Types: Marijuana     Comment: medical marijuana    Sexual activity: Not Currently   Other Topics Concern    Not on file   Social History Narrative    Not on file     Social Determinants of Health     Financial Resource Strain: Not on file   Food Insecurity: No Food Insecurity (8/9/2022)    Hunger Vital Sign     Worried About Running Out of Food in the Last Year: Never true     Ran Out of Food in the Last Year: Never true   Transportation Needs: No Transportation Needs (8/9/2022)    PRAPARE - Transportation     Lack of Transportation (Medical): No     Lack of Transportation (Non-Medical): No   Physical Activity: Not on file   Stress: Not on file   Social Connections: Not on file   Intimate Partner Violence: Not on file   Housing Stability: Low Risk  (8/9/2022)     Housing Stability Vital Sign     Unable to Pay for Housing in the Last Year: No     Number of Places Lived in the Last Year: 1     Unstable Housing in the Last Year: No      Family History   Problem Relation Age of Onset    Fibromyalgia Mother     Arthritis Mother     Hypertension Mother     Vision loss Mother         Glaucoma    Heart disease Father     GI problems Father     Hypertension Father     Vision loss Father         Macular Degeneration    Birth defects Sister         Arterial Vascular Malformation    No Known Problems Daughter     No Known Problems Maternal Grandmother     Diabetes Maternal Grandfather     No Known Problems Paternal Grandmother     No Known Problems Paternal Grandfather     No Known Problems Sister     No Known Problems Sister     No Known Problems Paternal Aunt     No Known Problems Paternal Aunt     Mental illness Brother     Colon cancer Neg Hx     Colon polyps Neg Hx      Past Surgical History:   Procedure Laterality Date    ABDOMINAL SURGERY  2020    ADENOIDECTOMY      pt has 2nd appendix removed.     BOWEL RESECTION      CHOLECYSTECTOMY  2000    EYE SURGERY      lasik    OOPHORECTOMY      one     SC LAP,DIAGNOSTIC ABDOMEN N/A 04/21/2020    Procedure: LAPAROSCOPY DIAGNOSTIC, lysis of adhesions , possible bowel resection;  Surgeon: Jim Mccracken MD;  Location:  MAIN OR;  Service: General    SC TOTAL HIP ARTHROPLASTY Right 8/8/2022    Procedure: ARTHROPLASTY HIP TOTAL;  Surgeon: Mina Garcai MD;  Location:  MAIN OR;  Service: Orthopedics    TUBAL LIGATION         Current Outpatient Medications:     acetaminophen (TYLENOL) 325 mg tablet, Take 3 tablets (975 mg total) by mouth every 8 (eight) hours, Disp: 60 tablet, Rfl: 1    aspirin (ECOTRIN) 325 mg EC tablet, Take 1 tablet (325 mg total) by mouth 2 (two) times a day, Disp: 90 tablet, Rfl: 0    atorvastatin (LIPITOR) 40 mg tablet, Take 40 mg by mouth daily, Disp: , Rfl:     Calcium Carbonate-Vit D-Min (CALCIUM 1200 PO), ,  Disp: , Rfl:     CVS Vitamin C 500 MG tablet, TAKE 1 TABLET BY MOUTH TWICE A DAY, Disp: 180 tablet, Rfl: 1    DULoxetine (CYMBALTA) 30 mg delayed release capsule, TAKE 1 CAPSULE DAILY FOR 2 WEEKS AND THEN INCREASE TO 2 CAPSULES DAILY ONWARD 30 DAY(S), Disp: , Rfl:     ferrous sulfate 324 (65 Fe) mg, TAKE 1 TABLET BY MOUTH 2 TIMES A DAY BEFORE MEALS, Disp: 180 tablet, Rfl: 1    folic acid (FOLVITE) 1 mg tablet, TAKE 1 TABLET BY MOUTH EVERY DAY, Disp: 90 tablet, Rfl: 1    lisinopril (ZESTRIL) 10 mg tablet, TAKE 1 TABLET BY MOUTH ONCE A DAY, Disp: 90 tablet, Rfl: 3    Multiple Vitamin (MULTI-VITAMIN) tablet, Take 1 tablet by mouth daily, Disp: 30 tablet, Rfl: 2    neomycin-polymyxin-hydrocortisone (CORTISPORIN) 0.35%-10,000 units/mL-1% otic suspension, Administer 4 drops into the left ear 4 (four) times a day for 7 days, Disp: 10 mL, Rfl: 0    pantoprazole (PROTONIX) 40 mg tablet, , Disp: , Rfl:     PARoxetine (PAXIL) 40 MG tablet, TAKE 1 TABLET BY MOUTH EVERY DAY IN THE MORNING FOR 90 DAYS, Disp: , Rfl:     Probiotic Product (PROBIOTIC ADVANCED PO), , Disp: , Rfl:     timolol (TIMOPTIC) 0.5 % ophthalmic solution, , Disp: , Rfl:     verapamil (VERELAN) 240 MG 24 hr capsule, TAKE 1 CAPSULE BY MOUTH EVERY DAY, Disp: 90 capsule, Rfl: 3  Allergies   Allergen Reactions    Erythromycin Nausea Only     Other reaction(s): GI Reaction       Labs:  Orders Only on 02/14/2024   Component Date Value    Hemoglobin A1C 02/14/2024 6.0      Imaging: No results found.    Review of Systems:  Review of Systems   Constitutional:  Positive for fatigue. Negative for activity change, diaphoresis and fever.   HENT:  Negative for congestion, hearing loss, nosebleeds and trouble swallowing.    Eyes:  Negative for visual disturbance.   Respiratory:  Positive for shortness of breath. Negative for apnea, cough, choking, chest tightness, wheezing and stridor.    Cardiovascular:  Negative for chest pain, palpitations and leg swelling.    Gastrointestinal:  Negative for abdominal distention, abdominal pain, anal bleeding, blood in stool, constipation, diarrhea, nausea and vomiting.   Endocrine: Negative for cold intolerance and heat intolerance.   Genitourinary:  Negative for difficulty urinating, frequency and hematuria.   Musculoskeletal:  Positive for arthralgias and gait problem. Negative for myalgias.   Skin:  Negative for pallor and rash.   Allergic/Immunologic: Negative for immunocompromised state.   Neurological:  Positive for numbness. Negative for dizziness, tremors, syncope and weakness.   Hematological:  Does not bruise/bleed easily.   Psychiatric/Behavioral:  Negative for sleep disturbance. The patient is not nervous/anxious.        Physical Exam:  Physical Exam  Vitals reviewed.   Constitutional:       General: She is not in acute distress.     Appearance: Normal appearance. She is not ill-appearing, toxic-appearing or diaphoretic.   HENT:      Head: Normocephalic.   Eyes:      General: No scleral icterus.     Conjunctiva/sclera: Conjunctivae normal.   Neck:      Vascular: No carotid bruit.   Cardiovascular:      Rate and Rhythm: Normal rate and regular rhythm.      Pulses: Normal pulses.      Heart sounds: Normal heart sounds. No murmur heard.     No friction rub. No gallop.   Pulmonary:      Effort: Pulmonary effort is normal. No respiratory distress.      Breath sounds: Normal breath sounds. No stridor. No wheezing, rhonchi or rales.   Abdominal:      General: Abdomen is flat. Bowel sounds are normal.      Palpations: Abdomen is soft.      Tenderness: There is no abdominal tenderness.   Musculoskeletal:      Right lower leg: No edema.      Left lower leg: No edema.   Skin:     General: Skin is warm and dry.      Capillary Refill: Capillary refill takes less than 2 seconds.      Coloration: Skin is not jaundiced or pale.      Findings: No bruising or erythema.   Neurological:      Mental Status: She is alert and oriented to person,  place, and time.   Psychiatric:         Mood and Affect: Mood normal.         Behavior: Behavior normal.         Thought Content: Thought content normal.         Judgment: Judgment normal.         Discussion/Summary: Coronary atherosclerosis, in the setting of exertional dyspnea, questionable angina equivalent.  Will proceed with stress test and echocardiogram.  No change in medications, further recommendations pending the results of the testing.  Consideration for invasive evaluation if symptoms persist or worsen..    This note was completed in part utilizing [employer] direct voice recognition software.   Grammatical errors, random word insertion, spelling mistakes, and incomplete sentences may be an occasional consequence of the system secondary to software limitations, ambient noise and hardware issues. At the time of dictation, efforts were made to edit, clarify and /or correct errors.  Please read the chart carefully and recognize, using context, where substitutions have occurred.  If you have any questions or concerns about the context, text or information contained within the body of this dictation, please contact myself, the provider, for further clarification.

## 2024-04-11 ENCOUNTER — HOSPITAL ENCOUNTER (OUTPATIENT)
Dept: NON INVASIVE DIAGNOSTICS | Age: 69
Discharge: HOME | End: 2024-04-11
Payer: COMMERCIAL

## 2024-04-11 VITALS
HEIGHT: 65 IN | BODY MASS INDEX: 31.49 KG/M2 | WEIGHT: 189 LBS | HEART RATE: 58 BPM | DIASTOLIC BLOOD PRESSURE: 100 MMHG | SYSTOLIC BLOOD PRESSURE: 164 MMHG

## 2024-04-11 DIAGNOSIS — R06.09 DYSPNEA ON EXERTION: ICD-10-CM

## 2024-04-11 DIAGNOSIS — I10 PRIMARY HYPERTENSION: ICD-10-CM

## 2024-04-11 LAB
AORTIC ROOT: 3.2 CM
APICAL FOUR CHAMBER EJECTION FRACTION: 67 %
ASCENDING AORTA: 3.3 CM
BSA FOR ECHO PROCEDURE: 1.93 M2
DOP CALC LVOT AREA: 3.14 CM2
DOP CALC LVOT DIAMETER: 2 CM
E WAVE DECELERATION TIME: 232 MS
E/A RATIO: 0.9
FRACTIONAL SHORTENING: 41 (ref 28–44)
INTERVENTRICULAR SEPTUM IN DIASTOLE (PARASTERNAL SHORT AXIS VIEW): 1.1 CM
INTERVENTRICULAR SEPTUM: 1.1 CM (ref 0.6–1.1)
LAAS-AP2: 18.1 CM2
LAAS-AP4: 16.2 CM2
LEFT ATRIUM AREA SYSTOLE SINGLE PLANE A4C: 15.3 CM2
LEFT ATRIUM SIZE: 4.1 CM
LEFT ATRIUM VOLUME (MOD BIPLANE): 44 ML
LEFT ATRIUM VOLUME INDEX (MOD BIPLANE): 22.8 ML/M2
LEFT INTERNAL DIMENSION IN SYSTOLE: 2.6 CM (ref 2.1–4)
LEFT VENTRICULAR INTERNAL DIMENSION IN DIASTOLE: 4.4 CM (ref 3.5–6)
LEFT VENTRICULAR POSTERIOR WALL IN END DIASTOLE: 1 CM
LEFT VENTRICULAR STROKE VOLUME: 60 ML
LVSV (TEICH): 60 ML
MAX HR PERCENT: 76 %
MAX HR: 117 BPM
MV E'TISSUE VEL-SEP: 7 CM/S
MV PEAK A VEL: 0.79 M/S
MV PEAK E VEL: 71 CM/S
MV STENOSIS PRESSURE HALF TIME: 67 MS
MV VALVE AREA P 1/2 METHOD: 3.28
RATE PRESSURE PRODUCT: NORMAL
RIGHT ATRIUM AREA SYSTOLE A4C: 16.2 CM2
RIGHT VENTRICLE ID DIMENSION: 3.1 CM
SL CV LEFT ATRIUM LENGTH A2C: 5.5 CM
SL CV LV EF: 65
SL CV PED ECHO LEFT VENTRICLE DIASTOLIC VOLUME (MOD BIPLANE) 2D: 86 ML
SL CV PED ECHO LEFT VENTRICLE SYSTOLIC VOLUME (MOD BIPLANE) 2D: 25 ML
STRESS ANGINA INDEX: 0
STRESS BASELINE HR: 55 BPM
STRESS PEAK HR: 117 BPM
STRESS POST ESTIMATED WORKLOAD: 7 METS
STRESS POST EXERCISE DUR MIN: 4 MIN
STRESS POST EXERCISE DUR SEC: 22 SEC
STRESS POST PEAK BP: 166 MMHG
TR MAX PG: 26 MMHG
TR PEAK VELOCITY: 2.5 M/S
TRICUSPID ANNULAR PLANE SYSTOLIC EXCURSION: 2 CM
TRICUSPID VALVE PEAK REGURGITATION VELOCITY: 2.53 M/S

## 2024-04-11 PROCEDURE — 93018 CV STRESS TEST I&R ONLY: CPT | Performed by: INTERNAL MEDICINE

## 2024-04-11 PROCEDURE — 93306 TTE W/DOPPLER COMPLETE: CPT

## 2024-04-11 PROCEDURE — 93016 CV STRESS TEST SUPVJ ONLY: CPT | Performed by: INTERNAL MEDICINE

## 2024-04-11 PROCEDURE — 93306 TTE W/DOPPLER COMPLETE: CPT | Performed by: INTERNAL MEDICINE

## 2024-04-11 PROCEDURE — 93017 CV STRESS TEST TRACING ONLY: CPT

## 2024-04-15 LAB
CHEST PAIN STATEMENT: NORMAL
MAX DIASTOLIC BP: 110 MMHG
MAX PREDICTED HEART RATE: 153 BPM
PROTOCOL NAME: NORMAL
REASON FOR TERMINATION: NORMAL
STRESS POST EXERCISE DUR MIN: 4 MIN
STRESS POST EXERCISE DUR SEC: 22 SEC
STRESS POST PEAK HR: 141 BPM
STRESS POST PEAK SYSTOLIC BP: 166 MMHG
TARGET HR FORMULA: NORMAL
TEST INDICATION: NORMAL

## 2024-06-21 ENCOUNTER — TELEPHONE (OUTPATIENT)
Dept: CARDIOLOGY CLINIC | Facility: CLINIC | Age: 69
End: 2024-06-21

## 2024-06-21 DIAGNOSIS — I10 PRIMARY HYPERTENSION: ICD-10-CM

## 2024-06-21 DIAGNOSIS — R06.09 DYSPNEA ON EXERTION: Primary | ICD-10-CM

## 2025-02-04 ENCOUNTER — HOSPITAL ENCOUNTER (OUTPATIENT)
Dept: MAMMOGRAPHY | Facility: IMAGING CENTER | Age: 70
Discharge: HOME | End: 2025-02-04
Payer: COMMERCIAL

## 2025-02-04 ENCOUNTER — HOSPITAL ENCOUNTER (OUTPATIENT)
Dept: BONE DENSITY | Facility: IMAGING CENTER | Age: 70
Discharge: HOME | End: 2025-02-04
Payer: COMMERCIAL

## 2025-02-04 VITALS — HEIGHT: 63 IN | WEIGHT: 180.2 LBS | BODY MASS INDEX: 31.93 KG/M2

## 2025-02-04 VITALS — HEIGHT: 65 IN | BODY MASS INDEX: 30.82 KG/M2 | WEIGHT: 185 LBS

## 2025-02-04 DIAGNOSIS — Z78.0 ASYMPTOMATIC MENOPAUSAL STATE: ICD-10-CM

## 2025-02-04 DIAGNOSIS — Z12.31 ENCOUNTER FOR SCREENING MAMMOGRAM FOR MALIGNANT NEOPLASM OF BREAST: ICD-10-CM

## 2025-02-04 DIAGNOSIS — N95.8 OTHER SPECIFIED MENOPAUSAL AND PERIMENOPAUSAL DISORDERS: ICD-10-CM

## 2025-02-04 PROCEDURE — 77080 DXA BONE DENSITY AXIAL: CPT

## 2025-02-04 PROCEDURE — 77063 BREAST TOMOSYNTHESIS BI: CPT

## 2025-02-04 PROCEDURE — 77067 SCR MAMMO BI INCL CAD: CPT

## 2025-02-15 ENCOUNTER — HOSPITAL ENCOUNTER (INPATIENT)
Facility: HOSPITAL | Age: 70
LOS: 5 days | Discharge: HOME | DRG: 871 | End: 2025-02-20
Attending: EMERGENCY MEDICINE | Admitting: INTERNAL MEDICINE
Payer: COMMERCIAL

## 2025-02-15 ENCOUNTER — APPOINTMENT (EMERGENCY)
Dept: CT IMAGING | Facility: HOSPITAL | Age: 70
DRG: 871 | End: 2025-02-15
Payer: COMMERCIAL

## 2025-02-15 ENCOUNTER — APPOINTMENT (EMERGENCY)
Dept: RADIOLOGY | Facility: HOSPITAL | Age: 70
DRG: 871 | End: 2025-02-15
Payer: COMMERCIAL

## 2025-02-15 DIAGNOSIS — E83.42 HYPOMAGNESEMIA: ICD-10-CM

## 2025-02-15 DIAGNOSIS — J18.9 PNEUMONIA: ICD-10-CM

## 2025-02-15 DIAGNOSIS — M53.3 SACROILIAC JOINT DYSFUNCTION: ICD-10-CM

## 2025-02-15 DIAGNOSIS — E87.6 HYPOKALEMIA: ICD-10-CM

## 2025-02-15 DIAGNOSIS — J10.1 INFLUENZA A: Primary | ICD-10-CM

## 2025-02-15 DIAGNOSIS — K44.9 HIATAL HERNIA: ICD-10-CM

## 2025-02-15 DIAGNOSIS — R04.2 HEMOPTYSIS: ICD-10-CM

## 2025-02-15 PROBLEM — R65.20 SEVERE SEPSIS (HCC): Status: ACTIVE | Noted: 2025-02-15

## 2025-02-15 PROBLEM — E87.8 ELECTROLYTE ABNORMALITY: Status: ACTIVE | Noted: 2025-02-15

## 2025-02-15 PROBLEM — J96.00 ACUTE RESPIRATORY FAILURE (HCC): Status: ACTIVE | Noted: 2025-02-15

## 2025-02-15 PROBLEM — A41.9 SEVERE SEPSIS (HCC): Status: ACTIVE | Noted: 2025-02-15

## 2025-02-15 PROBLEM — R06.89 ACUTE RESPIRATORY INSUFFICIENCY: Status: ACTIVE | Noted: 2025-02-15

## 2025-02-15 PROBLEM — J98.4 CAVITARY PNEUMONIA: Status: ACTIVE | Noted: 2025-02-15

## 2025-02-15 LAB
ALBUMIN SERPL BCG-MCNC: 3.7 G/DL (ref 3.5–5)
ALP SERPL-CCNC: 78 U/L (ref 34–104)
ALT SERPL W P-5'-P-CCNC: 13 U/L (ref 7–52)
ANION GAP SERPL CALCULATED.3IONS-SCNC: 11 MMOL/L (ref 4–13)
ANISOCYTOSIS BLD QL SMEAR: PRESENT
APTT PPP: 28 SECONDS (ref 23–34)
AST SERPL W P-5'-P-CCNC: 25 U/L (ref 13–39)
BACTERIA UR QL AUTO: ABNORMAL /HPF
BASOPHILS # BLD MANUAL: 0 THOUSAND/UL (ref 0–0.1)
BASOPHILS NFR MAR MANUAL: 0 % (ref 0–1)
BILIRUB SERPL-MCNC: 0.82 MG/DL (ref 0.2–1)
BILIRUB UR QL STRIP: NEGATIVE
BNP SERPL-MCNC: 194 PG/ML (ref 0–100)
BUN SERPL-MCNC: 13 MG/DL (ref 5–25)
CALCIUM SERPL-MCNC: 7.9 MG/DL (ref 8.4–10.2)
CARDIAC TROPONIN I PNL SERPL HS: 13 NG/L (ref ?–50)
CHLORIDE SERPL-SCNC: 92 MMOL/L (ref 96–108)
CLARITY UR: CLEAR
CO2 SERPL-SCNC: 24 MMOL/L (ref 21–32)
COLOR UR: YELLOW
CREAT SERPL-MCNC: 0.96 MG/DL (ref 0.6–1.3)
D DIMER PPP FEU-MCNC: 2.25 UG/ML FEU
EOSINOPHIL # BLD MANUAL: 0 THOUSAND/UL (ref 0–0.4)
EOSINOPHIL NFR BLD MANUAL: 0 % (ref 0–6)
ERYTHROCYTE [DISTWIDTH] IN BLOOD BY AUTOMATED COUNT: 11.7 % (ref 11.6–15.1)
FLUAV RNA RESP QL NAA+PROBE: POSITIVE
FLUBV RNA RESP QL NAA+PROBE: NEGATIVE
GFR SERPL CREATININE-BSD FRML MDRD: 60 ML/MIN/1.73SQ M
GLUCOSE SERPL-MCNC: 151 MG/DL (ref 65–140)
GLUCOSE UR STRIP-MCNC: NEGATIVE MG/DL
HCT VFR BLD AUTO: 35.9 % (ref 34.8–46.1)
HGB BLD-MCNC: 12.2 G/DL (ref 11.5–15.4)
HGB UR QL STRIP.AUTO: ABNORMAL
HYALINE CASTS #/AREA URNS LPF: ABNORMAL /LPF
INR PPP: 1.07 (ref 0.85–1.19)
KETONES UR STRIP-MCNC: NEGATIVE MG/DL
LACTATE SERPL-SCNC: 1.7 MMOL/L (ref 0.5–2)
LACTATE SERPL-SCNC: 2.2 MMOL/L (ref 0.5–2)
LEUKOCYTE ESTERASE UR QL STRIP: ABNORMAL
LYMPHOCYTES # BLD AUTO: 0.48 THOUSAND/UL (ref 0.6–4.47)
LYMPHOCYTES # BLD AUTO: 2 % (ref 14–44)
MAGNESIUM SERPL-MCNC: 0.9 MG/DL (ref 1.9–2.7)
MCH RBC QN AUTO: 31.4 PG (ref 26.8–34.3)
MCHC RBC AUTO-ENTMCNC: 34 G/DL (ref 31.4–37.4)
MCV RBC AUTO: 92 FL (ref 82–98)
MONOCYTES # BLD AUTO: 0.72 THOUSAND/UL (ref 0–1.22)
MONOCYTES NFR BLD: 3 % (ref 4–12)
MUCOUS THREADS UR QL AUTO: ABNORMAL
NEUTROPHILS # BLD MANUAL: 22.95 THOUSAND/UL (ref 1.85–7.62)
NEUTS BAND NFR BLD MANUAL: 3 % (ref 0–8)
NEUTS SEG NFR BLD AUTO: 92 % (ref 43–75)
NITRITE UR QL STRIP: NEGATIVE
NON-SQ EPI CELLS URNS QL MICRO: ABNORMAL /HPF
PH UR STRIP.AUTO: 6.5 [PH]
PLATELET # BLD AUTO: 235 THOUSANDS/UL (ref 149–390)
PLATELET # BLD AUTO: 265 THOUSANDS/UL (ref 149–390)
PLATELET BLD QL SMEAR: ADEQUATE
PMV BLD AUTO: 9.4 FL (ref 8.9–12.7)
PMV BLD AUTO: 9.4 FL (ref 8.9–12.7)
POLYCHROMASIA BLD QL SMEAR: PRESENT
POTASSIUM SERPL-SCNC: 2.8 MMOL/L (ref 3.5–5.3)
PROCALCITONIN SERPL-MCNC: 0.74 NG/ML
PROT SERPL-MCNC: 7.4 G/DL (ref 6.4–8.4)
PROT UR STRIP-MCNC: ABNORMAL MG/DL
PROTHROMBIN TIME: 14.4 SECONDS (ref 12.3–15)
RBC # BLD AUTO: 3.89 MILLION/UL (ref 3.81–5.12)
RBC #/AREA URNS AUTO: ABNORMAL /HPF
RBC MORPH BLD: PRESENT
RSV RNA RESP QL NAA+PROBE: NEGATIVE
SARS-COV-2 RNA RESP QL NAA+PROBE: NEGATIVE
SODIUM SERPL-SCNC: 127 MMOL/L (ref 135–147)
SP GR UR STRIP.AUTO: <1.005 (ref 1–1.03)
UROBILINOGEN UR STRIP-ACNC: <2 MG/DL
WBC # BLD AUTO: 24.16 THOUSAND/UL (ref 4.31–10.16)
WBC #/AREA URNS AUTO: ABNORMAL /HPF

## 2025-02-15 PROCEDURE — 99285 EMERGENCY DEPT VISIT HI MDM: CPT

## 2025-02-15 PROCEDURE — 94760 N-INVAS EAR/PLS OXIMETRY 1: CPT

## 2025-02-15 PROCEDURE — 87070 CULTURE OTHR SPECIMN AEROBIC: CPT | Performed by: PHYSICIAN ASSISTANT

## 2025-02-15 PROCEDURE — 80053 COMPREHEN METABOLIC PANEL: CPT | Performed by: PHYSICIAN ASSISTANT

## 2025-02-15 PROCEDURE — 0241U HB NFCT DS VIR RESP RNA 4 TRGT: CPT | Performed by: PHYSICIAN ASSISTANT

## 2025-02-15 PROCEDURE — 94664 DEMO&/EVAL PT USE INHALER: CPT

## 2025-02-15 PROCEDURE — 96368 THER/DIAG CONCURRENT INF: CPT

## 2025-02-15 PROCEDURE — 99285 EMERGENCY DEPT VISIT HI MDM: CPT | Performed by: PHYSICIAN ASSISTANT

## 2025-02-15 PROCEDURE — 85730 THROMBOPLASTIN TIME PARTIAL: CPT | Performed by: PHYSICIAN ASSISTANT

## 2025-02-15 PROCEDURE — 85027 COMPLETE CBC AUTOMATED: CPT | Performed by: PHYSICIAN ASSISTANT

## 2025-02-15 PROCEDURE — 85049 AUTOMATED PLATELET COUNT: CPT | Performed by: PHYSICIAN ASSISTANT

## 2025-02-15 PROCEDURE — 85610 PROTHROMBIN TIME: CPT | Performed by: PHYSICIAN ASSISTANT

## 2025-02-15 PROCEDURE — 86480 TB TEST CELL IMMUN MEASURE: CPT | Performed by: PHYSICIAN ASSISTANT

## 2025-02-15 PROCEDURE — 93005 ELECTROCARDIOGRAM TRACING: CPT

## 2025-02-15 PROCEDURE — 85379 FIBRIN DEGRADATION QUANT: CPT | Performed by: PHYSICIAN ASSISTANT

## 2025-02-15 PROCEDURE — 71275 CT ANGIOGRAPHY CHEST: CPT

## 2025-02-15 PROCEDURE — 87205 SMEAR GRAM STAIN: CPT | Performed by: PHYSICIAN ASSISTANT

## 2025-02-15 PROCEDURE — 84145 PROCALCITONIN (PCT): CPT | Performed by: PHYSICIAN ASSISTANT

## 2025-02-15 PROCEDURE — 83605 ASSAY OF LACTIC ACID: CPT | Performed by: PHYSICIAN ASSISTANT

## 2025-02-15 PROCEDURE — 36415 COLL VENOUS BLD VENIPUNCTURE: CPT | Performed by: PHYSICIAN ASSISTANT

## 2025-02-15 PROCEDURE — 81001 URINALYSIS AUTO W/SCOPE: CPT | Performed by: PHYSICIAN ASSISTANT

## 2025-02-15 PROCEDURE — 83735 ASSAY OF MAGNESIUM: CPT | Performed by: PHYSICIAN ASSISTANT

## 2025-02-15 PROCEDURE — 87154 CUL TYP ID BLD PTHGN 6+ TRGT: CPT | Performed by: PHYSICIAN ASSISTANT

## 2025-02-15 PROCEDURE — 87449 NOS EACH ORGANISM AG IA: CPT | Performed by: PHYSICIAN ASSISTANT

## 2025-02-15 PROCEDURE — 85007 BL SMEAR W/DIFF WBC COUNT: CPT | Performed by: PHYSICIAN ASSISTANT

## 2025-02-15 PROCEDURE — 87181 SC STD AGAR DILUTION PER AGT: CPT | Performed by: PHYSICIAN ASSISTANT

## 2025-02-15 PROCEDURE — 87077 CULTURE AEROBIC IDENTIFY: CPT | Performed by: PHYSICIAN ASSISTANT

## 2025-02-15 PROCEDURE — 71045 X-RAY EXAM CHEST 1 VIEW: CPT

## 2025-02-15 PROCEDURE — 96365 THER/PROPH/DIAG IV INF INIT: CPT

## 2025-02-15 PROCEDURE — 83880 ASSAY OF NATRIURETIC PEPTIDE: CPT | Performed by: PHYSICIAN ASSISTANT

## 2025-02-15 PROCEDURE — 87040 BLOOD CULTURE FOR BACTERIA: CPT | Performed by: PHYSICIAN ASSISTANT

## 2025-02-15 PROCEDURE — 96361 HYDRATE IV INFUSION ADD-ON: CPT

## 2025-02-15 PROCEDURE — 99223 1ST HOSP IP/OBS HIGH 75: CPT | Performed by: INTERNAL MEDICINE

## 2025-02-15 PROCEDURE — 84484 ASSAY OF TROPONIN QUANT: CPT | Performed by: PHYSICIAN ASSISTANT

## 2025-02-15 RX ORDER — VERAPAMIL HYDROCHLORIDE 120 MG/1
240 TABLET, FILM COATED, EXTENDED RELEASE ORAL DAILY
Status: DISCONTINUED | OUTPATIENT
Start: 2025-02-16 | End: 2025-02-20 | Stop reason: HOSPADM

## 2025-02-15 RX ORDER — LISINOPRIL 10 MG/1
10 TABLET ORAL DAILY
Status: DISCONTINUED | OUTPATIENT
Start: 2025-02-16 | End: 2025-02-18

## 2025-02-15 RX ORDER — POTASSIUM CHLORIDE 14.9 MG/ML
20 INJECTION INTRAVENOUS ONCE
Status: COMPLETED | OUTPATIENT
Start: 2025-02-15 | End: 2025-02-15

## 2025-02-15 RX ORDER — PAROXETINE 20 MG/1
20 TABLET, FILM COATED ORAL DAILY
Status: DISCONTINUED | OUTPATIENT
Start: 2025-02-16 | End: 2025-02-20 | Stop reason: HOSPADM

## 2025-02-15 RX ORDER — ACETAMINOPHEN 325 MG/1
650 TABLET ORAL EVERY 6 HOURS PRN
Status: DISCONTINUED | OUTPATIENT
Start: 2025-02-15 | End: 2025-02-16

## 2025-02-15 RX ORDER — LANOLIN ALCOHOL/MO/W.PET/CERES
2 CREAM (GRAM) TOPICAL
Status: DISCONTINUED | OUTPATIENT
Start: 2025-02-16 | End: 2025-02-20 | Stop reason: HOSPADM

## 2025-02-15 RX ORDER — LIDOCAINE 50 MG/G
2 PATCH TOPICAL DAILY
Status: DISCONTINUED | OUTPATIENT
Start: 2025-02-15 | End: 2025-02-20 | Stop reason: HOSPADM

## 2025-02-15 RX ORDER — POTASSIUM CHLORIDE 1500 MG/1
40 TABLET, EXTENDED RELEASE ORAL ONCE
Status: COMPLETED | OUTPATIENT
Start: 2025-02-15 | End: 2025-02-15

## 2025-02-15 RX ORDER — GUAIFENESIN/DEXTROMETHORPHAN 100-10MG/5
10 SYRUP ORAL ONCE
Status: COMPLETED | OUTPATIENT
Start: 2025-02-15 | End: 2025-02-15

## 2025-02-15 RX ORDER — PANTOPRAZOLE SODIUM 40 MG/1
40 TABLET, DELAYED RELEASE ORAL
Status: DISCONTINUED | OUTPATIENT
Start: 2025-02-16 | End: 2025-02-20 | Stop reason: HOSPADM

## 2025-02-15 RX ORDER — SACCHAROMYCES BOULARDII 250 MG
250 CAPSULE ORAL 2 TIMES DAILY
Status: DISCONTINUED | OUTPATIENT
Start: 2025-02-15 | End: 2025-02-20 | Stop reason: HOSPADM

## 2025-02-15 RX ORDER — MAGNESIUM SULFATE HEPTAHYDRATE 40 MG/ML
2 INJECTION, SOLUTION INTRAVENOUS ONCE
Status: COMPLETED | OUTPATIENT
Start: 2025-02-15 | End: 2025-02-15

## 2025-02-15 RX ORDER — METRONIDAZOLE 500 MG/100ML
500 INJECTION, SOLUTION INTRAVENOUS ONCE
Status: COMPLETED | OUTPATIENT
Start: 2025-02-15 | End: 2025-02-15

## 2025-02-15 RX ORDER — GUAIFENESIN 600 MG/1
600 TABLET, EXTENDED RELEASE ORAL 2 TIMES DAILY
Status: DISCONTINUED | OUTPATIENT
Start: 2025-02-15 | End: 2025-02-20 | Stop reason: HOSPADM

## 2025-02-15 RX ORDER — ENOXAPARIN SODIUM 100 MG/ML
40 INJECTION SUBCUTANEOUS DAILY
Status: CANCELLED | OUTPATIENT
Start: 2025-02-15

## 2025-02-15 RX ORDER — CEFTRIAXONE 2 G/50ML
2000 INJECTION, SOLUTION INTRAVENOUS ONCE
Status: COMPLETED | OUTPATIENT
Start: 2025-02-15 | End: 2025-02-15

## 2025-02-15 RX ORDER — BENZONATATE 100 MG/1
100 CAPSULE ORAL 3 TIMES DAILY PRN
Status: DISCONTINUED | OUTPATIENT
Start: 2025-02-15 | End: 2025-02-20 | Stop reason: HOSPADM

## 2025-02-15 RX ORDER — LEVALBUTEROL INHALATION SOLUTION 1.25 MG/3ML
1.25 SOLUTION RESPIRATORY (INHALATION) EVERY 6 HOURS PRN
Status: DISCONTINUED | OUTPATIENT
Start: 2025-02-15 | End: 2025-02-16

## 2025-02-15 RX ORDER — ONDANSETRON 2 MG/ML
4 INJECTION INTRAMUSCULAR; INTRAVENOUS EVERY 6 HOURS PRN
Status: DISCONTINUED | OUTPATIENT
Start: 2025-02-15 | End: 2025-02-20 | Stop reason: HOSPADM

## 2025-02-15 RX ORDER — SODIUM CHLORIDE, SODIUM GLUCONATE, SODIUM ACETATE, POTASSIUM CHLORIDE, MAGNESIUM CHLORIDE, SODIUM PHOSPHATE, DIBASIC, AND POTASSIUM PHOSPHATE .53; .5; .37; .037; .03; .012; .00082 G/100ML; G/100ML; G/100ML; G/100ML; G/100ML; G/100ML; G/100ML
125 INJECTION, SOLUTION INTRAVENOUS CONTINUOUS
Status: DISCONTINUED | OUTPATIENT
Start: 2025-02-15 | End: 2025-02-16

## 2025-02-15 RX ORDER — ATORVASTATIN CALCIUM 40 MG/1
40 TABLET, FILM COATED ORAL
Status: DISCONTINUED | OUTPATIENT
Start: 2025-02-15 | End: 2025-02-20 | Stop reason: HOSPADM

## 2025-02-15 RX ADMIN — METRONIDAZOLE 500 MG: 500 INJECTION, SOLUTION INTRAVENOUS at 15:05

## 2025-02-15 RX ADMIN — SODIUM CHLORIDE, SODIUM GLUCONATE, SODIUM ACETATE, POTASSIUM CHLORIDE, MAGNESIUM CHLORIDE, SODIUM PHOSPHATE, DIBASIC, AND POTASSIUM PHOSPHATE 125 ML/HR: .53; .5; .37; .037; .03; .012; .00082 INJECTION, SOLUTION INTRAVENOUS at 21:33

## 2025-02-15 RX ADMIN — ACETAMINOPHEN 650 MG: 325 TABLET, FILM COATED ORAL at 16:26

## 2025-02-15 RX ADMIN — CEFTRIAXONE 2000 MG: 2 INJECTION, SOLUTION INTRAVENOUS at 12:50

## 2025-02-15 RX ADMIN — LIDOCAINE 5% 2 PATCH: 700 PATCH TOPICAL at 17:30

## 2025-02-15 RX ADMIN — MAGNESIUM SULFATE HEPTAHYDRATE 2 G: 40 INJECTION, SOLUTION INTRAVENOUS at 12:53

## 2025-02-15 RX ADMIN — AMPICILLIN SODIUM AND SULBACTAM SODIUM 3 G: 2; 1 INJECTION, POWDER, FOR SOLUTION INTRAMUSCULAR; INTRAVENOUS at 23:28

## 2025-02-15 RX ADMIN — SODIUM CHLORIDE 1000 ML: 0.9 INJECTION, SOLUTION INTRAVENOUS at 11:14

## 2025-02-15 RX ADMIN — POTASSIUM CHLORIDE 20 MEQ: 14.9 INJECTION, SOLUTION INTRAVENOUS at 13:34

## 2025-02-15 RX ADMIN — GUAIFENESIN 600 MG: 600 TABLET, EXTENDED RELEASE ORAL at 17:34

## 2025-02-15 RX ADMIN — Medication 250 MG: at 17:34

## 2025-02-15 RX ADMIN — AMPICILLIN SODIUM AND SULBACTAM SODIUM 3 G: 2; 1 INJECTION, POWDER, FOR SOLUTION INTRAMUSCULAR; INTRAVENOUS at 17:34

## 2025-02-15 RX ADMIN — GUAIFENESIN SYRUP AND DEXTROMETHORPHAN 10 ML: 100; 10 SYRUP ORAL at 12:57

## 2025-02-15 RX ADMIN — AZITHROMYCIN MONOHYDRATE 500 MG: 500 INJECTION, POWDER, LYOPHILIZED, FOR SOLUTION INTRAVENOUS at 12:53

## 2025-02-15 RX ADMIN — LEVALBUTEROL HYDROCHLORIDE 1.25 MG: 1.25 SOLUTION RESPIRATORY (INHALATION) at 21:04

## 2025-02-15 RX ADMIN — POTASSIUM CHLORIDE 40 MEQ: 1500 TABLET, EXTENDED RELEASE ORAL at 12:58

## 2025-02-15 RX ADMIN — SODIUM CHLORIDE, SODIUM GLUCONATE, SODIUM ACETATE, POTASSIUM CHLORIDE, MAGNESIUM CHLORIDE, SODIUM PHOSPHATE, DIBASIC, AND POTASSIUM PHOSPHATE 125 ML/HR: .53; .5; .37; .037; .03; .012; .00082 INJECTION, SOLUTION INTRAVENOUS at 15:05

## 2025-02-15 RX ADMIN — BENZONATATE 100 MG: 100 CAPSULE ORAL at 21:25

## 2025-02-15 RX ADMIN — ATORVASTATIN CALCIUM 40 MG: 40 TABLET, FILM COATED ORAL at 16:26

## 2025-02-15 RX ADMIN — IOHEXOL 50 ML: 350 INJECTION, SOLUTION INTRAVENOUS at 12:13

## 2025-02-15 NOTE — ASSESSMENT & PLAN NOTE
SIRS criteria: Tachycardia, tachypnea, leukocytosis  Suspected source: RUL pneumonia  Lactic acid: 2.2 on admit - now normal  End organ damage: lactic acidosis  BP is stable, start IV Isolyte overnight  IV antibiotics: Start IV Unasyn 3g Q6  Follow up on blood cultures x2  Monitor vital signs, laboratory studies.

## 2025-02-15 NOTE — SEPSIS NOTE
"  Sepsis Note   Fay Corley 69 y.o. female MRN: [id number]  Unit/Bed#: ED 12 Encounter: [id number]       Initial Sepsis Screening       Row Name 02/15/25 1132                Is the patient's history suggestive of a new or worsening infection? Yes (Proceed)  -CD        Suspected source of infection pneumonia  -CD        Indicate SIRS criteria Tachycardia > 90 bpm;Tachypnea > 20 resp per min;Leukocytosis (WBC > 37426 IJL) OR Leukopenia (WBC <4000 IJL) OR Bandemia (WBC >10% bands)  -CD        Are two or more of the above signs & symptoms of infection both present and new to the patient? Yes (Proceed)  -CD        Assess for evidence of organ dysfunction: Are any of the below criteria present within 6 hours of suspected infection and SIRS criteria that are NOT considered to be chronic conditions? Lactate > 2.0  -CD        Date of presentation of severe sepsis 02/15/25  -        Time of presentation of severe sepsis 1133  -CD        Sepsis Note: Click \"NEXT\" below (NOT \"close\") to generate sepsis note based on above information. --                  User Key  (r) = Recorded By, (t) = Taken By, (c) = Cosigned By      Initials Name Provider Type    CD Alanna Newell PA-C Physician Assistant                    Default Flowsheet Data (Last 720 Hours)       Sepsis Reassess       Row Name 02/15/25 1321                   Repeat Volume Status and Tissue Perfusion Assessment Performed    Date of Reassessment: 02/15/25  -        Time of Reassessment: 1321  -CD        Sepsis Reassessment Note: Click \"NEXT\" below (NOT \"close\") to generate sepsis reassessment note. YES (proceed by clicking \"NEXT\")  -CD        Repeat Volume Status and Tissue Perfusion Assessment Performed --                  User Key  (r) = Recorded By, (t) = Taken By, (c) = Cosigned By      Initials Name Provider Type    CD Alanna Newell PA-C Physician Assistant                    Body mass index is 30.9 kg/m².  Wt Readings from Last 1 " Encounters:   02/15/25 81.6 kg (180 lb)     IBW (Ideal Body Weight): 54.7 kg    Ideal body weight: 54.7 kg (120 lb 9.5 oz)  Adjusted ideal body weight: 65.5 kg (144 lb 5.7 oz)

## 2025-02-15 NOTE — ED PROVIDER NOTES
Time reflects when diagnosis was documented in both MDM as applicable and the Disposition within this note       Time User Action Codes Description Comment    2/15/2025 11:50 AM Alanna Newell Add [J10.1] Influenza A     2/15/2025 11:50 AM Alanna Newell Add [R04.2] Hemoptysis     2/15/2025  1:14 PM Alanna Newell Add [J18.9] Pneumonia     2/15/2025  1:15 PM Alanna Newell Add [E87.6] Hypokalemia     2/15/2025  1:15 PM Alanna Newell Add [E83.42] Hypomagnesemia     2/15/2025  1:18 PM Alanna Newell Add [K44.9] Hiatal hernia           ED Disposition       ED Disposition   Admit    Condition   Stable    Date/Time   Sat Feb 15, 2025  1:27 PM    Comment   Case was discussed with DR. Cifuentes and the patient's admission status was agreed to be Admission Status: inpatient status to the service of Dr. Cifuentes .               Assessment & Plan       Medical Decision Making  Patient with cough, fevers, will order labs, CXR, covid/flu/rsv to r/o pneumonia, viral syndrome.  Patient with cavitary lesion on CXR, will order quantiferon gold to r/o TB.  Patient with elevated ddimer, will order CT scan to r/o PE.  Patient with mass like consolidation, concerning for abscess, vs mass, vs necrotizing pneumonia, will start IV abx and add flagyl and admit for further evaluation and monitoring.   Patient with multiple electrolyte abnormalities, will admit for IV replenishment.     Amount and/or Complexity of Data Reviewed  External Data Reviewed: ECG.  Labs: ordered.  Radiology: ordered.  ECG/medicine tests: ordered and independent interpretation performed.    Risk  OTC drugs.  Prescription drug management.  Decision regarding hospitalization.        ED Course as of 02/15/25 1458   Sat Feb 15, 2025   1133 Sepsis alert called     1321 Patient reassessed, pulse ox 94%, cap refill less than 2seconds.       Medications   potassium chloride 20 mEq IVPB (premix) (20 mEq Intravenous New Bag 2/15/25 1334)  "  metroNIDAZOLE (FLAGYL) IVPB (premix) 500 mg 100 mL (has no administration in time range)   ampicillin-sulbactam (UNASYN) in sodium chloride 0.9% 3 g (has no administration in time range)   sodium chloride 0.9 % bolus 1,000 mL (0 mL Intravenous Stopped 2/15/25 1337)   potassium chloride (Klor-Con M20) CR tablet 40 mEq (40 mEq Oral Given 2/15/25 1258)   magnesium sulfate 2 g/50 mL IVPB (premix) 2 g (2 g Intravenous New Bag 2/15/25 1253)   cefTRIAXone (ROCEPHIN) IVPB (premix in dextrose) 2,000 mg 50 mL (0 mg Intravenous Stopped 2/15/25 1339)   azithromycin (ZITHROMAX) 500 mg in sodium chloride 0.9% 250mL IVPB 500 mg (500 mg Intravenous New Bag 2/15/25 1253)   dextromethorphan-guaiFENesin (ROBITUSSIN DM) oral syrup 10 mL (10 mL Oral Given 2/15/25 1257)   iohexol (OMNIPAQUE) 350 MG/ML injection (MULTI-DOSE) 100 mL (50 mL Intravenous Given 2/15/25 1213)       ED Risk Strat Scores                                                History of Present Illness       Chief Complaint   Patient presents with    Cough     Pt states \"I started coughing up blood this AM.\" Pt reports upper back pain for a few days.       Past Medical History:   Diagnosis Date    COVID-19 05/2022    Fibromyalgia, primary     GERD (gastroesophageal reflux disease) 1999    Hyperlipidemia     Hypertension     Lactose intolerance 1984    Primary osteoarthritis of right hip 3/16/2022    Small bowel obstruction (HCC)       Past Surgical History:   Procedure Laterality Date    ABDOMINAL SURGERY  2020    ADENOIDECTOMY      pt has 2nd appendix removed.     BOWEL RESECTION      CHOLECYSTECTOMY  2000    EYE SURGERY      lasik    OOPHORECTOMY N/A 2000    Unknown which ovary    NC ARTHRP ACETBLR/PROX FEM PROSTC AGRFT/ALGRFT Right 08/08/2022    Procedure: ARTHROPLASTY HIP TOTAL;  Surgeon: Mina Garcia MD;  Location:  MAIN OR;  Service: Orthopedics    NC LAPS ABD PRTM&OMENTUM DX W/WO SPEC BR/WA SPX N/A 04/21/2020    Procedure: LAPAROSCOPY DIAGNOSTIC, lysis of " adhesions , possible bowel resection;  Surgeon: Jim Mccracken MD;  Location:  MAIN OR;  Service: General    TUBAL LIGATION        Family History   Problem Relation Age of Onset    Fibromyalgia Mother     Arthritis Mother     Hypertension Mother     Vision loss Mother         Glaucoma    Heart disease Father     GI problems Father     Hypertension Father     Vision loss Father         Macular Degeneration    Birth defects Sister         Arterial Vascular Malformation    No Known Problems Sister     No Known Problems Sister     No Known Problems Daughter     No Known Problems Maternal Grandmother     Diabetes Maternal Grandfather     No Known Problems Paternal Grandmother     No Known Problems Paternal Grandfather     Mental illness Brother     No Known Problems Paternal Aunt     No Known Problems Paternal Aunt     Colon cancer Neg Hx     Colon polyps Neg Hx       Social History     Tobacco Use    Smoking status: Former     Current packs/day: 0.00     Average packs/day: 2.0 packs/day for 47.6 years (95.3 ttl pk-yrs)     Types: Cigarettes     Start date: 1973     Quit date: 2020     Years since quittin.4    Smokeless tobacco: Never   Vaping Use    Vaping status: Never Used   Substance Use Topics    Alcohol use: Yes     Comment: rarely, 3 x year    Drug use: Yes     Types: Marijuana     Comment: medical marijuana      E-Cigarette/Vaping    E-Cigarette Use Never User       E-Cigarette/Vaping Substances    Nicotine No     THC Yes     CBD No     Flavoring No     Other No     Unknown No       I have reviewed and agree with the history as documented.     Patient is a 70 y/o F HTN, that presents to the ED with coughing up blood.  Patient states she has been sick for a few days and this morning started coughing up blood.  She has had fevers, congestion and sore throat.  She denies chest pain, but states she has upper back pain.  No leg pain or swelling.  No sick contacts.        History provided by:   Patient  Cough  Associated symptoms: chills, fever, headaches, myalgias, shortness of breath and sore throat    Associated symptoms: no chest pain and no rash        Review of Systems   Constitutional:  Positive for chills, fatigue and fever.   HENT:  Positive for congestion and sore throat.    Respiratory:  Positive for cough and shortness of breath.    Cardiovascular:  Negative for chest pain, palpitations and leg swelling.   Gastrointestinal:  Negative for abdominal pain, blood in stool, diarrhea, nausea and vomiting.   Genitourinary:  Negative for dysuria.   Musculoskeletal:  Positive for myalgias. Negative for back pain and neck pain.   Skin:  Negative for color change, pallor and rash.   Neurological:  Positive for weakness (generalized) and headaches. Negative for dizziness and numbness.   Psychiatric/Behavioral:  Negative for confusion.    All other systems reviewed and are negative.          Objective       ED Triage Vitals [02/15/25 1054]   Temperature Pulse Blood Pressure Respirations SpO2 Patient Position - Orthostatic VS   (!) 97.2 °F (36.2 °C) 100 122/58 20 95 % Sitting      Temp Source Heart Rate Source BP Location FiO2 (%) Pain Score    Temporal Monitor Right arm -- 6      Vitals      Date and Time Temp Pulse SpO2 Resp BP Pain Score FACES Pain Rating User   02/15/25 1115 -- 107 92 % 30 118/63 -- -- SM   02/15/25 1054 97.2 °F (36.2 °C) 100 95 % 20 122/58 6 -- CM            Physical Exam  Vitals and nursing note reviewed.   Constitutional:       General: She is not in acute distress.     Appearance: Normal appearance. She is well-developed and well-groomed. She is not ill-appearing or diaphoretic.   HENT:      Head: Normocephalic and atraumatic.      Right Ear: External ear normal.      Left Ear: External ear normal.      Nose: Nose normal.      Mouth/Throat:      Mouth: Mucous membranes are moist.      Pharynx: Oropharynx is clear.   Eyes:      Conjunctiva/sclera: Conjunctivae normal.    Cardiovascular:      Rate and Rhythm: Normal rate and regular rhythm.      Heart sounds: Normal heart sounds.   Pulmonary:      Effort: Pulmonary effort is normal.      Breath sounds: Examination of the right-lower field reveals decreased breath sounds. Examination of the left-lower field reveals decreased breath sounds. Decreased breath sounds present. No wheezing, rhonchi or rales.   Musculoskeletal:         General: Normal range of motion.      Cervical back: Normal range of motion.      Right lower leg: No edema.      Left lower leg: No edema.   Skin:     General: Skin is warm and dry.      Coloration: Skin is not jaundiced or pale.      Findings: No rash.   Neurological:      General: No focal deficit present.      Mental Status: She is alert and oriented to person, place, and time.      Cranial Nerves: No cranial nerve deficit.      Motor: No weakness.   Psychiatric:         Mood and Affect: Mood normal.         Behavior: Behavior is cooperative.         Results Reviewed       Procedure Component Value Units Date/Time    Urine Microscopic [[id number]]  (Abnormal) Collected: 02/15/25 1332    Lab Status: Final result Specimen: Urine, Clean Catch Updated: 02/15/25 1415     RBC, UA 0-1 /hpf      WBC, UA 1-2 /hpf      Epithelial Cells Occasional /hpf      Bacteria, UA None Seen /hpf      MUCUS THREADS None Seen     Hyaline Casts, UA 0-1 /lpf     UA w Reflex to Microscopic w Reflex to Culture [[id number]]  (Abnormal) Collected: 02/15/25 1332    Lab Status: Final result Specimen: Urine, Clean Catch Updated: 02/15/25 1413     Color, UA Yellow     Clarity, UA Clear     Specific Gravity, UA <1.005     pH, UA 6.5     Leukocytes, UA Small     Nitrite, UA Negative     Protein, UA 30 (1+) mg/dl      Glucose, UA Negative mg/dl      Ketones, UA Negative mg/dl      Urobilinogen, UA <2.0 mg/dl      Bilirubin, UA Negative     Occult Blood, UA Small    Sputum culture and Gram stain [[id number]]     Lab Status: No result Specimen:  Sputum     AFB Culture with Stain [[id number]]     Lab Status: No result     Lactic acid 2 Hours [[id number]]  (Normal) Collected: 02/15/25 1236    Lab Status: Final result Specimen: Blood from Arm, Left Updated: 02/15/25 1323     LACTIC ACID 1.7 mmol/L     Narrative:      Result may be elevated if tourniquet was used during collection.    Quantiferon TB Gold Plus Yellow [[id number]] Collected: 02/15/25 1236    Lab Status: In process Specimen: Blood from Arm, Left Updated: 02/15/25 1307    Quantiferon TB Gold Plus Green [[id number]] Collected: 02/15/25 1236    Lab Status: In process Specimen: Blood from Arm, Left Updated: 02/15/25 1307    Quantiferon TB Gold Plus Gray [[id number]] Collected: 02/15/25 1236    Lab Status: In process Specimen: Blood from Arm, Left Updated: 02/15/25 1307    Quantiferon TB Gold Plus Purple [[id number]] Collected: 02/15/25 1236    Lab Status: In process Specimen: Blood from Arm, Left Updated: 02/15/25 1307    Magnesium [[id number]]  (Abnormal) Collected: 02/15/25 1101    Lab Status: Final result Specimen: Blood from Arm, Right Updated: 02/15/25 1245     Magnesium 0.9 mg/dL     RBC Morphology Reflex Test [[id number]] Collected: 02/15/25 1101    Lab Status: Final result Specimen: Blood from Arm, Right Updated: 02/15/25 1201    B-Type Natriuretic Peptide(BNP) [[id number]]  (Abnormal) Collected: 02/15/25 1101    Lab Status: Final result Specimen: Blood from Arm, Right Updated: 02/15/25 1155      pg/mL     CBC and differential [[id number]]  (Abnormal) Collected: 02/15/25 1101    Lab Status: Final result Specimen: Blood from Arm, Right Updated: 02/15/25 1150     WBC 24.16 Thousand/uL      RBC 3.89 Million/uL      Hemoglobin 12.2 g/dL      Hematocrit 35.9 %      MCV 92 fL      MCH 31.4 pg      MCHC 34.0 g/dL      RDW 11.7 %      MPV 9.4 fL      Platelets 265 Thousands/uL     Narrative:      This is an appended report.  These results have been appended to a previously verified report.    Manual  Differential(PHLEBS Do Not Order) [[id number]]  (Abnormal) Collected: 02/15/25 1101    Lab Status: Final result Specimen: Blood from Arm, Right Updated: 02/15/25 1150     Segmented % 92 %      Bands % 3 %      Lymphocytes % 2 %      Monocytes % 3 %      Eosinophils % 0 %      Basophils % 0 %      Absolute Neutrophils 22.95 Thousand/uL      Absolute Lymphocytes 0.48 Thousand/uL      Absolute Monocytes 0.72 Thousand/uL      Absolute Eosinophils 0.00 Thousand/uL      Absolute Basophils 0.00 Thousand/uL      Total Counted --     RBC Morphology Present     Platelet Estimate Adequate     Anisocytosis Present     Polychromasia Present    D-Dimer [[id number]]  (Abnormal) Collected: 02/15/25 1101    Lab Status: Final result Specimen: Blood from Arm, Right Updated: 02/15/25 1150     D-Dimer, Quant 2.25 ug/ml FEU     Narrative:      In the evaluation for possible pulmonary embolism, in the appropriate (Well's Score of 4 or less) patient, the age adjusted d-dimer cutoff for this patient can be calculated as:    Age x 0.01 (in ug/mL) for Age-adjusted D-dimer exclusion threshold for a patient over 50 years.    FLU/RSV/COVID - if FLU/RSV clinically relevant (2hr TAT) [[id number]]  (Abnormal) Collected: 02/15/25 1101    Lab Status: Final result Specimen: Nares from Nose Updated: 02/15/25 1146     SARS-CoV-2 Negative     INFLUENZA A PCR Positive     INFLUENZA B PCR Negative     RSV PCR Negative    Narrative:      This test has been performed using the CoV-2/Flu/RSV plus assay on the [employer] GeneXpert platform. This test has been validated by the  and verified by the performing laboratory.     This test is designed to amplify and detect the following: nucleocapsid (N), envelope (E), and RNA-dependent RNA polymerase (RdRP) genes of the SARS-CoV-2 genome; matrix (M), basic polymerase (PB2), and acidic protein (PA) segments of the influenza A genome; matrix (M) and non-structural protein (NS) segments of the influenza B genome,  and the nucleocapsid genes of RSV A and RSV B.     Positive results are indicative of the presence of Flu A, Flu B, RSV, and/or SARS-CoV-2 RNA. Positive results for SARS-CoV-2 or suspected novel influenza should be reported to state, local, or federal health departments according to local reporting requirements.      All results should be assessed in conjunction with clinical presentation and other laboratory markers for clinical management.     FOR PEDIATRIC PATIENTS - copy/paste COVID Guidelines URL to browser: [email or web address]       Procalcitonin [[id number]]  (Abnormal) Collected: 02/15/25 1101    Lab Status: Final result Specimen: Blood from Arm, Right Updated: 02/15/25 1137     Procalcitonin 0.74 ng/ml     HS Troponin 0hr (reflex protocol) [[id number]]  (Normal) Collected: 02/15/25 1101    Lab Status: Final result Specimen: Blood from Arm, Right Updated: 02/15/25 1134     hs TnI 0hr 13 ng/L     Lactic acid, plasma (w/reflex if result > 2.0) [[id number]]  (Abnormal) Collected: 02/15/25 1101    Lab Status: Final result Specimen: Blood from Arm, Right Updated: 02/15/25 1129     LACTIC ACID 2.2 mmol/L     Narrative:      Result may be elevated if tourniquet was used during collection.    Comprehensive metabolic panel [[id number]]  (Abnormal) Collected: 02/15/25 1101    Lab Status: Final result Specimen: Blood from Arm, Right Updated: 02/15/25 1126     Sodium 127 mmol/L      Potassium 2.8 mmol/L      Chloride 92 mmol/L      CO2 24 mmol/L      ANION GAP 11 mmol/L      BUN 13 mg/dL      Creatinine 0.96 mg/dL      Glucose 151 mg/dL      Calcium 7.9 mg/dL      AST 25 U/L      ALT 13 U/L      Alkaline Phosphatase 78 U/L      Total Protein 7.4 g/dL      Albumin 3.7 g/dL      Total Bilirubin 0.82 mg/dL      eGFR 60 ml/min/1.73sq m     Narrative:      National Kid[employer] Disease Foundation guidelines for Chronic Kidney Disease (CKD):     Stage 1 with normal or high GFR (GFR  > 90 mL/min/1.73 square meters)    Stage 2 Mild CKD (GFR = 60-89 mL/min/1.73 square meters)    Stage 3A Moderate CKD (GFR = 45-59 mL/min/1.73 square meters)    Stage 3B Moderate CKD (GFR = 30-44 mL/min/1.73 square meters)    Stage 4 Severe CKD (GFR = 15-29 mL/min/1.73 square meters)    Stage 5 End Stage CKD (GFR <15 mL/min/1.73 square meters)  Note: GFR calculation is accurate only with a steady state creatinine    Protime-INR [[id number]]  (Normal) Collected: 02/15/25 1101    Lab Status: Final result Specimen: Blood from Arm, Right Updated: 02/15/25 1123     Protime 14.4 seconds      INR 1.07    Narrative:      INR Therapeutic Range    Indication                                             INR Range      Atrial Fibrillation                                               2.0-3.0  Hypercoagulable State                                    2.0.2.3  Left Ventricular Asist Device                            2.0-3.0  Mechanical Heart Valve                                  -    Aortic(with afib, MI, embolism, HF, LA enlargement,    and/or coagulopathy)                                     2.0-3.0 (2.5-3.5)     Mitral                                                             2.5-3.5  Prosthetic/Bioprosthetic Heart Valve               2.0-3.0  Venous thromboembolism (VTE: VT, PE        2.0-3.0    APTT [[id number]]  (Normal) Collected: 02/15/25 1101    Lab Status: Final result Specimen: Blood from Arm, Right Updated: 02/15/25 1123     PTT 28 seconds     Blood culture #1 [[id number]] Collected: 02/15/25 1112    Lab Status: In process Specimen: Blood from Arm, Left Updated: 02/15/25 1116    Blood culture #2 [[id number]] Collected: 02/15/25 1101    Lab Status: In process Specimen: Blood from Arm, Right Updated: 02/15/25 1108            CTA chest pe study   Final Interpretation by Debi Pa MD (02/15 1309)      No pulmonary embolus.      9.3 x 5.1 cm masslike consolidation in the posterior right upper lobe with extensive  surrounding groundglass opacity and central air-filled cavity. Given the patient's history, this is likely necrotizing pneumonia versus lung abscess with surrounding    pulmonary hemorrhage. TB not excluded in the appropriate clinical setting. Recommend follow-up with a chest CT in 3 months to assure resolution and exclude cavitary tumor.      Mildly enlarged right hilar node, likely reactive.      Moderate hiatal hernia.      I discussed the findings on the preceding chest radiograph with Dr. Marni Birch on 2/15/2025 at 11:47 a.m.            Workstation performed: [id number]         XR chest 1 view portable   Final Interpretation by Debi Pa MD (02/15 1147)      Moderate consolidation in the right upper lobe with possible cavitation. Given the patient's history of hemoptysis, this could be due to pneumonia, possibly necrotizing, lung abscess, or malignancy. TB not excluded in the appropriate clinical setting if    the lesion is cavitary. Recommend further evaluation with CT.         I personally discussed this study with Dr. Marni Birch on 2/15/2025 11:47 AM.                  Workstation performed: [id number]             Procedures    ED Medication and Procedure Management   Prior to Admission Medications   Prescriptions Last Dose Informant Patient Reported? Taking?   Calcium Carbonate-Vit D-Min (CALCIUM 1200 PO)  Self Yes No   Multiple Vitamin (MULTI-VITAMIN) tablet  Self No No   Sig: Take 1 tablet by mouth daily   PARoxetine (PAXIL) 40 MG tablet  Self Yes No   Sig: Take 20 mg by mouth in the morning   Probiotic Product (PROBIOTIC ADVANCED PO)  Self Yes No   atorvastatin (LIPITOR) 40 mg tablet  Self Yes No   Sig: Take 40 mg by mouth daily   lisinopril (ZESTRIL) 10 mg tablet  Self No No   Sig: TAKE 1 TABLET BY MOUTH ONCE A DAY   pantoprazole (PROTONIX) 40 mg tablet  Self Yes No   timolol (TIMOPTIC) 0.5 % ophthalmic solution  Self Yes No   verapamil (VERELAN) 240 MG 24 hr capsule  Self No No    Sig: TAKE 1 CAPSULE BY MOUTH EVERY DAY      Facility-Administered Medications: None     Current Discharge Medication List        CONTINUE these medications which have NOT CHANGED    Details   atorvastatin (LIPITOR) 40 mg tablet Take 40 mg by mouth daily      Calcium Carbonate-Vit D-Min (CALCIUM 1200 PO)       lisinopril (ZESTRIL) 10 mg tablet TAKE 1 TABLET BY MOUTH ONCE A DAY  Qty: 90 tablet, Refills: 3    Associated Diagnoses: Essential hypertension      Multiple Vitamin (MULTI-VITAMIN) tablet Take 1 tablet by mouth daily  Qty: 30 tablet, Refills: 2    Associated Diagnoses: Primary osteoarthritis of right hip; Preop testing      pantoprazole (PROTONIX) 40 mg tablet       PARoxetine (PAXIL) 40 MG tablet Take 20 mg by mouth in the morning      Probiotic Product (PROBIOTIC ADVANCED PO)       timolol (TIMOPTIC) 0.5 % ophthalmic solution       verapamil (VERELAN) 240 MG 24 hr capsule TAKE 1 CAPSULE BY MOUTH EVERY DAY  Qty: 90 capsule, Refills: 3    Associated Diagnoses: Essential hypertension           No discharge procedures on file.  ED SEPSIS DOCUMENTATION   Time reflects when diagnosis was documented in both MDM as applicable and the Disposition within this note       Time User Action Codes Description Comment    2/15/2025 11:50 AM Alanna Newell [J10.1] Influenza A     2/15/2025 11:50 AM Alanna Newell [R04.2] Hemoptysis     2/15/2025  1:14 PM Alanna Newell [J18.9] Pneumonia     2/15/2025  1:15 PM Alanna Newell [E87.6] Hypokalemia     2/15/2025  1:15 PM Alanna Newell [E83.42] Hypomagnesemia     2/15/2025  1:18 PM Alanna Newell Add [K44.9] Hiatal hernia            Initial Sepsis Screening       Row Name 02/15/25 1132                Is the patient's history suggestive of a new or worsening infection? Yes (Proceed)  -CD        Suspected source of infection pneumonia  -CD        Indicate SIRS criteria Tachycardia > 90 bpm;Tachypnea > 20 resp per min;Leukocytosis  "(WBC > 86428 IJL) OR Leukopenia (WBC <4000 IJL) OR Bandemia (WBC >10% bands)  -CD        Are two or more of the above signs & symptoms of infection both present and new to the patient? Yes (Proceed)  -CD        Assess for evidence of organ dysfunction: Are any of the below criteria present within 6 hours of suspected infection and SIRS criteria that are NOT considered to be chronic conditions? Lactate > 2.0  -CD        Date of presentation of severe sepsis 02/15/25  -        Time of presentation of severe sepsis 1133  -CD        Sepsis Note: Click \"NEXT\" below (NOT \"close\") to generate sepsis note based on above information. --                  User Key  (r) = Recorded By, (t) = Taken By, (c) = Cosigned By      Initials Name Provider Type    CD Alanna Newell PA-C Physician Assistant                  Default Flowsheet Data (Last 720 Hours)       Sepsis Reassess       Row Name 02/15/25 1321                   Repeat Volume Status and Tissue Perfusion Assessment Performed    Date of Reassessment: 02/15/25  -        Time of Reassessment: 1321  -CD        Sepsis Reassessment Note: Click \"NEXT\" below (NOT \"close\") to generate sepsis reassessment note. YES (proceed by clicking \"NEXT\")  -CD        Repeat Volume Status and Tissue Perfusion Assessment Performed --                  User Key  (r) = Recorded By, (t) = Taken By, (c) = Cosigned By      Initials Name Provider Type    CD Alanna Newell PA-C Physician Assistant                     Alanna Newell PA-C  02/15/25 1458    "

## 2025-02-15 NOTE — ASSESSMENT & PLAN NOTE
"Patient endorsing 3 to 4 weeks of progressive coughing, shortness of breath, fevers.  Reported Tmax of 103.8 and now with hemoptysis.  CT imaging with \"9.3 x 5.1 cm masslike consolidation in the posterior right upper lobe with extensive surrounding groundglass opacity and central air-filled cavity. Given the patient's history, this is likely necrotizing pneumonia versus lung abscess with surrounding pulmonary hemorrhage. TB not excluded in the appropriate clinical setting. \"  Discussed with ID  IV antibiotics: IV Unasyn 3g Q6  Obtain AFB and QuantiFERON gold  Obtain sputum culture and Gram stain, strep/Legionella urinary antigens  Respiratory protocol, nebs PRN.  Trend Pro-Olaf   Monitor respiratory status.  "

## 2025-02-15 NOTE — ASSESSMENT & PLAN NOTE
With marked electrolyte abnormalities, due to dehydration.  Sodium 127 upon admission, started on IV Isolyte overnight.  Repeat tomorrow and if worsening consult nephro.  Potassium 2.8, repleted.  Will repeat this tomorrow.  Magnesium 0.9, repleted.  Will repeat this tomorrow.

## 2025-02-15 NOTE — SEPSIS NOTE
"  Sepsis Note   Fay Corley 69 y.o. female MRN: [id number]  Unit/Bed#: ED 12 Encounter: [id number]       Initial Sepsis Screening       Row Name 02/15/25 1132                Is the patient's history suggestive of a new or worsening infection? Yes (Proceed)  -CD        Suspected source of infection pneumonia  -CD        Indicate SIRS criteria Tachycardia > 90 bpm;Tachypnea > 20 resp per min;Leukocytosis (WBC > 15964 IJL) OR Leukopenia (WBC <4000 IJL) OR Bandemia (WBC >10% bands)  -CD        Are two or more of the above signs & symptoms of infection both present and new to the patient? Yes (Proceed)  -CD        Assess for evidence of organ dysfunction: Are any of the below criteria present within 6 hours of suspected infection and SIRS criteria that are NOT considered to be chronic conditions? Lactate > 2.0  -CD        Date of presentation of severe sepsis 02/15/25  -CD        Time of presentation of severe sepsis 1133  -CD        Sepsis Note: Click \"NEXT\" below (NOT \"close\") to generate sepsis note based on above information. --                  User Key  (r) = Recorded By, (t) = Taken By, (c) = Cosigned By      Initials Name Provider Type    CD Alanna Newell PA-C Physician Assistant                        Body mass index is 30.9 kg/m².  Wt Readings from Last 1 Encounters:   02/15/25 81.6 kg (180 lb)     IBW (Ideal Body Weight): 54.7 kg    Ideal body weight: 54.7 kg (120 lb 9.5 oz)  Adjusted ideal body weight: 65.5 kg (144 lb 5.7 oz)    "

## 2025-02-15 NOTE — RESPIRATORY THERAPY NOTE
RT Protocol Note  Fay Corley 69 y.o. female MRN: [id number]  Unit/Bed#: -01 Encounter: [id number]    Assessment    Principal Problem:    Severe sepsis (HCC)  Active Problems:    Hypertension    Cavitary pneumonia    Electrolyte abnormality    Influenza A    Acute respiratory insufficiency    Hemoptysis             Past Medical History:   Diagnosis Date    COVID-19 2022    Fibromyalgia, primary     GERD (gastroesophageal reflux disease)     Hyperlipidemia     Hypertension     Lactose intolerance     Primary osteoarthritis of right hip 3/16/2022    Small bowel obstruction (HCC)      Social History     Socioeconomic History    Marital status: Single     Spouse name: None    Number of children: None    Years of education: None    Highest education level: None   Occupational History    None   Tobacco Use    Smoking status: Former     Current packs/day: 0.00     Average packs/day: 2.0 packs/day for 47.6 years (95.3 ttl pk-yrs)     Types: Cigarettes     Start date: 1973     Quit date: 2020     Years since quittin.4    Smokeless tobacco: Never   Vaping Use    Vaping status: Never Used   Substance and Sexual Activity    Alcohol use: Yes     Comment: rarely, 3 x year    Drug use: Yes     Types: Marijuana     Comment: medical marijuana    Sexual activity: Not Currently   Other Topics Concern    None   Social History Narrative    None     Social Drivers of Health     Financial Resource Strain: Not on file   Food Insecurity: No Food Insecurity (2/15/2025)    Nursing - Inadequate Food Risk Classification     Worried About Running Out of Food in the Last Year: Not on file     Ran Out of Food in the Last Year: Not on file     Ran Out of Food in the Last Year: Never true   Transportation Needs: No Transportation Needs (2/15/2025)    Nursing - Transportation Risk Classification     Lack of Transportation: Not on file     Lack of Transportation: No   Physical Activity: Not on file   Stress: Not on  "file   Social Connections: Not on file   Intimate Partner Violence: Unknown (2/15/2025)    Nursing IPS     Feels Physically and Emotionally Safe: Not on file     Physically Hurt by Someone: Not on file     Humiliated or Emotionally Abused by Someone: Not on file     Physically Hurt by Someone: No     Hurt or Threatened by Someone: No   Housing Stability: Unknown (2/15/2025)    Nursing: Inadequate Housing Risk Classification     Has Housing: Not on file     Worried About Losing Housing: Not on file     Unable to Get Utilities: Not on file     Unable to Pay for Housing in the Last Year: No     Has Housin       Subjective    Subjective Data: Report hemoptysis at home    Objective    Physical Exam:   Assessment Type: Assess only  General Appearance: Alert, Awake  Chest Assessment: Chest expansion symmetrical  Bilateral Breath Sounds: Diminished  R Breath Sounds: Diminished  L Breath Sounds: Diminished  Cough: Productive  O2 Device: Room Air    Vitals:  Blood pressure 127/63, pulse 89, temperature (!) 101.7 °F (38.7 °C), temperature source Oral, resp. rate 20, height 5' 4\" (1.626 m), weight 82.6 kg (182 lb), SpO2 90%.          Imaging and other studies:     O2 Device: Room Air     Plan    Respiratory Plan: Mild Distress pathway  Nebs prn             "

## 2025-02-15 NOTE — ASSESSMENT & PLAN NOTE
Blood pressure upon admission 118/63  Okay to resume home antihypertensive regimen tomorrow  Monitor BP closely and avoid hypotension

## 2025-02-15 NOTE — ASSESSMENT & PLAN NOTE
Testing positive for influenza A however symptoms have been going on for a few weeks.  Likely out of the window of Tamiflu at this time  Supportive care  Treatment for pneumonia as above

## 2025-02-15 NOTE — PLAN OF CARE
Problem: PAIN - ADULT  Goal: Verbalizes/displays adequate comfort level or baseline comfort level  Description: Interventions:  - Encourage patient to monitor pain and request assistance  - Assess pain using appropriate pain scale  - Administer analgesics based on type and severity of pain and evaluate response  - Implement non-pharmacological measures as appropriate and evaluate response  - Consider cultural and social influences on pain and pain management  - Notify physician/advanced practitioner if interventions unsuccessful or patient reports new pain  Outcome: Progressing     Problem: INFECTION - ADULT  Goal: Absence or prevention of progression during hospitalization  Description: INTERVENTIONS:  - Assess and monitor for signs and symptoms of infection  - Monitor lab/diagnostic results  - Monitor all insertion sites, i.e. indwelling lines, tubes, and drains  - Monitor endotracheal if appropriate and nasal secretions for changes in amount and color  - Wing appropriate cooling/warming therapies per order  - Administer medications as ordered  - Instruct and encourage patient and family to use good hand hygiene technique  - Identify and instruct in appropriate isolation precautions for identified infection/condition  Outcome: Progressing  Goal: Absence of fever/infection during neutropenic period  Description: INTERVENTIONS:  - Monitor WBC    Outcome: Progressing     Problem: SAFETY ADULT  Goal: Patient will remain free of falls  Description: INTERVENTIONS:  - Educate patient/family on patient safety including physical limitations  - Instruct patient to call for assistance with activity   - Consult OT/PT to assist with strengthening/mobility   - Keep Call bell within reach  - Keep bed low and locked with side rails adjusted as appropriate  - Keep care items and personal belongings within reach  - Initiate and maintain comfort rounds  - Make Fall Risk Sign visible to staff  - Offer Toileting every 2 Hours,  in advance of need  - Initiate/Maintain bed/chair alarm  - Obtain necessary fall risk management equipment  - Apply yellow socks and bracelet for high fall risk patients  - Consider moving patient to room near nurses station  Outcome: Progressing  Goal: Maintain or return to baseline ADL function  Description: INTERVENTIONS:  -  Assess patient's ability to carry out ADLs; assess patient's baseline for ADL function and identify physical deficits which impact ability to perform ADLs (bathing, care of mouth/teeth, toileting, grooming, dressing, etc.)  - Assess/evaluate cause of self-care deficits   - Assess range of motion  - Assess patient's mobility; develop plan if impaired  - Assess patient's need for assistive devices and provide as appropriate  - Encourage maximum independence but intervene and supervise when necessary  - Involve family in performance of ADLs  - Assess for home care needs following discharge   - Consider OT consult to assist with ADL evaluation and planning for discharge  - Provide patient education as appropriate  Outcome: Progressing  Goal: Maintains/Returns to pre admission functional level  Description: INTERVENTIONS:  - Perform AM-PAC 6 Click Basic Mobility/ Daily Activity assessment daily.  - Set and communicate daily mobility goal to care team and patient/family/caregiver.   - Collaborate with rehabilitation services on mobility goals if consulted  - Perform Range of Motion 3 times a day.  - Reposition patient every 2 hours.  - Dangle patient 3 times a day  - Stand patient 3 times a day  - Ambulate patient 3 times a day  - Out of bed to chair 3 times a day   - Out of bed for meals 3 times a day  - Out of bed for toileting  - Record patient progress and toleration of activity level   Outcome: Progressing     Problem: DISCHARGE PLANNING  Goal: Discharge to home or other facility with appropriate resources  Description: INTERVENTIONS:  - Identify barriers to discharge w/patient and  caregiver  - Arrange for needed discharge resources and transportation as appropriate  - Identify discharge learning needs (meds, wound care, etc.)  - Arrange for interpretive services to assist at discharge as needed  - Refer to Case Management Department for coordinating discharge planning if the patient needs post-hospital services based on physician/advanced practitioner order or complex needs related to functional status, cognitive ability, or social support system  Outcome: Progressing     Problem: Knowledge Deficit  Goal: Patient/family/caregiver demonstrates understanding of disease process, treatment plan, medications, and discharge instructions  Description: Complete learning assessment and assess knowledge base.  Interventions:  - Provide teaching at level of understanding  - Provide teaching via preferred learning methods  Outcome: Progressing

## 2025-02-15 NOTE — H&P
"H&P - Hospitalist   Name: Fay Corley 69 y.o. female I MRN: [id number]  Unit/Bed#: [facility] I Date of Admission: 2/15/2025   Date of Service: 2/15/2025 I Hospital Day: 0     Assessment & Plan  Severe sepsis (HCC)  SIRS criteria: Tachycardia, tachypnea, leukocytosis  Suspected source: RUL pneumonia  Lactic acid: 2.2 on admit - now normal  End organ damage: lactic acidosis  BP is stable, start IV Isolyte overnight  IV antibiotics: Start IV Unasyn 3g Q6  Follow up on blood cultures x2  Monitor vital signs, laboratory studies.  Cavitary pneumonia  Patient endorsing 3 to 4 weeks of progressive coughing, shortness of breath, fevers.  Reported Tmax of 103.8 and now with hemoptysis.  CT imaging with \"9.3 x 5.1 cm masslike consolidation in the posterior right upper lobe with extensive surrounding groundglass opacity and central air-filled cavity. Given the patient's history, this is likely necrotizing pneumonia versus lung abscess with surrounding pulmonary hemorrhage. TB not excluded in the appropriate clinical setting. \"  Discussed with ID  IV antibiotics: IV Unasyn 3g Q6  Obtain AFB and QuantiFERON gold  Obtain sputum culture and Gram stain, strep/Legionella urinary antigens  Respiratory protocol, nebs PRN.  Trend Pro-Olaf   Monitor respiratory status.  Hemoptysis  Patient endorsing hemoptysis which brought her into the hospital  Related to cavitary pneumonia, rule out TB  Pulmonary consult  Hemoglobin upon admission 12.2, at baseline  Avoid DVT prophylaxis and blood thinners for now until hemoptysis resolves  Hypertension  Blood pressure upon admission 118/63  Okay to resume home antihypertensive regimen tomorrow  Monitor BP closely and avoid hypotension  Electrolyte abnormality  With marked electrolyte abnormalities, due to dehydration.  Sodium 127 upon admission, started on IV Isolyte overnight.  Repeat tomorrow and if worsening consult nephro.  Potassium 2.8, repleted.  Will repeat this tomorrow.  Magnesium 0.9, " repleted.  Will repeat this tomorrow.  Influenza A  Testing positive for influenza A however symptoms have been going on for a few weeks.  Likely out of the window of Tamiflu at this time  Supportive care  Treatment for pneumonia as above  Acute respiratory insufficiency  With shortness of breath, coughing, no noted hypoxia.  Secondary to cavitary pneumonia as above  Supportive care, monitor oxygen saturations closely    VTE Pharmacologic Prophylaxis: VTE Score: 7 High Risk (Score >/= 5) - Pharmacological DVT Prophylaxis Contraindicated. Sequential Compression Devices Ordered.  Code Status: full code    Anticipated Length of Stay: Patient will be admitted on an inpatient basis with an anticipated length of stay of greater than 2 midnights secondary to cavitary pneumonia, severe sepsis needing IV antibiotics, pulmonary consult, infectious disease consult.    Total Time for Visit, including Counseling / Coordination of Care: 75 Minutes. Greater than 50% of this total time spent on direct patient counseling and coordination of care.    Chief Complaint: Shortness of breath, cough, fevers, hemoptysis    History of Present Illness:  Fay Corley is a 69 y.o. female with a PMH of hypertension, GERD who presents with chief complaint of hemoptysis.  Patient endorses 3 to 4-week history of illness which include shortness of breath, coughing, viral syndrome with malaise and headache and congestion.  Symptoms progressively got worse, last week starting with fever Tmax 103.8.  Cough has been productive in nature. She started with hemoptysis today which brought her into the ER.    Review of Systems:  Review of Systems   Constitutional:  Negative for activity change, appetite change, chills, fatigue and fever.   HENT:  Negative for congestion, rhinorrhea, sinus pressure and sore throat.    Eyes:  Negative for photophobia, pain and visual disturbance.   Respiratory:  Negative for cough, shortness of breath and wheezing.     Cardiovascular:  Negative for chest pain, palpitations and leg swelling.   Gastrointestinal:  Negative for abdominal distention, abdominal pain, constipation, diarrhea, nausea and vomiting.   Endocrine: Negative for cold intolerance, heat intolerance, polydipsia and polyuria.   Genitourinary:  Negative for difficulty urinating, dysuria, flank pain, frequency and hematuria.   Musculoskeletal:  Negative for arthralgias, back pain and joint swelling.   Skin:  Negative for color change, pallor and rash.   Allergic/Immunologic: Negative.    Neurological:  Negative for dizziness, syncope, weakness, light-headedness and headaches.   Hematological: Negative.    Psychiatric/Behavioral: Negative.         Past Medical and Surgical History:   Past Medical History:   Diagnosis Date    COVID-19 05/2022    Fibromyalgia, primary     GERD (gastroesophageal reflux disease) 1999    Hyperlipidemia     Hypertension     Lactose intolerance 1984    Primary osteoarthritis of right hip 3/16/2022    Small bowel obstruction (HCC)        Past Surgical History:   Procedure Laterality Date    ABDOMINAL SURGERY  2020    ADENOIDECTOMY      pt has 2nd appendix removed.     BOWEL RESECTION      CHOLECYSTECTOMY  2000    EYE SURGERY      lasik    OOPHORECTOMY N/A 2000    Unknown which ovary    PA ARTHRP ACETBLR/PROX FEM PROSTC AGRFT/ALGRFT Right 08/08/2022    Procedure: ARTHROPLASTY HIP TOTAL;  Surgeon: Mina Garcia MD;  Location:  MAIN OR;  Service: Orthopedics    PA LAPS ABD PRTM&OMENTUM DX W/WO SPEC BR/WA SPX N/A 04/21/2020    Procedure: LAPAROSCOPY DIAGNOSTIC, lysis of adhesions , possible bowel resection;  Surgeon: Jim Mccracken MD;  Location:  MAIN OR;  Service: General    TUBAL LIGATION         Meds/Allergies:  Prior to Admission medications    Medication Sig Start Date End Date Taking? Authorizing Provider   atorvastatin (LIPITOR) 40 mg tablet Take 40 mg by mouth daily 2/26/22   Historical Provider, MD   Calcium Carbonate-Vit D-Min  "(CALCIUM 1200 PO)  3/12/22   Historical Provider, MD   lisinopril (ZESTRIL) 10 mg tablet TAKE 1 TABLET BY MOUTH ONCE A DAY 21   Fantasma Berger MD   Multiple Vitamin (MULTI-VITAMIN) tablet Take 1 tablet by mouth daily 22   Priscilla Ley PA-C   pantoprazole (PROTONIX) 40 mg tablet  22   Historical Provider, MD   PARoxetine (PAXIL) 40 MG tablet Take 20 mg by mouth in the morning 23   Historical Provider, MD   Probiotic Product (PROBIOTIC ADVANCED PO)  3/10/22   Historical Provider, MD   timolol (TIMOPTIC) 0.5 % ophthalmic solution  22   Historical Provider, MD   verapamil (VERELAN) 240 MG 24 hr capsule TAKE 1 CAPSULE BY MOUTH EVERY DAY 21   Fantasma Berger MD       All medications reviewed.    Allergies:   Allergies   Allergen Reactions    Erythromycin Nausea Only     Other reaction(s): GI Reaction       Social History:  Marital Status: Single     Substance Use History:   Social History     Substance and Sexual Activity   Alcohol Use Yes    Comment: rarely, 3 x year     Social History     Tobacco Use   Smoking Status Former    Current packs/day: 0.00    Average packs/day: 2.0 packs/day for 47.6 years (95.3 ttl pk-yrs)    Types: Cigarettes    Start date: 1973    Quit date: 2020    Years since quittin.4   Smokeless Tobacco Never     Social History     Substance and Sexual Activity   Drug Use Yes    Types: Marijuana    Comment: medical marijuana       Family History:  Non-contributory    Physical Exam:     Vitals:   Blood Pressure: 118/63 (02/15/25 1115)  Pulse: (!) 107 (02/15/25 1115)  Temperature: (!) 97.2 °F (36.2 °C) (02/15/25 1054)  Temp Source: Temporal (02/15/25 1054)  Respirations: (!) 30 (02/15/25 1115)  Height: 5' 4\" (162.6 cm) (02/15/25 1054)  Weight - Scale: 81.6 kg (180 lb) (02/15/25 1054)  SpO2: 92 % (02/15/25 1115)    Physical Exam  Vitals and nursing note reviewed.   Constitutional:       General: She is not in acute distress.     Appearance: Normal appearance. She is " ill-appearing and toxic-appearing.   HENT:      Head: Normocephalic and atraumatic.      Mouth/Throat:      Mouth: Mucous membranes are moist.   Eyes:      General: No scleral icterus.        Right eye: No discharge.         Left eye: No discharge.      Pupils: Pupils are equal, round, and reactive to light.   Cardiovascular:      Rate and Rhythm: Regular rhythm. Tachycardia present.      Heart sounds: No murmur heard.     No friction rub. No gallop.   Pulmonary:      Effort: No respiratory distress.      Breath sounds: Rhonchi present. No wheezing or rales.   Abdominal:      General: Bowel sounds are normal. There is no distension.      Palpations: Abdomen is soft.      Tenderness: There is no abdominal tenderness. There is no guarding or rebound.   Musculoskeletal:         General: No swelling or deformity.      Cervical back: Neck supple.      Right lower leg: No edema.      Left lower leg: No edema.   Skin:     General: Skin is warm and dry.      Capillary Refill: Capillary refill takes less than 2 seconds.      Coloration: Skin is not jaundiced or pale.      Findings: No erythema or rash.   Neurological:      General: No focal deficit present.      Mental Status: She is alert and oriented to person, place, and time. Mental status is at baseline.      Cranial Nerves: No cranial nerve deficit.      Sensory: No sensory deficit.   Psychiatric:         Mood and Affect: Mood normal.         Behavior: Behavior normal.          Additional Data:     Lab Results:  Results from last 7 days   Lab Units 02/15/25  1101   WBC Thousand/uL 24.16*   HEMOGLOBIN g/dL 12.2   HEMATOCRIT % 35.9   PLATELETS Thousands/uL 265   BANDS PCT % 3   LYMPHO PCT % 2*   MONO PCT % 3*   EOS PCT % 0     Results from last 7 days   Lab Units 02/15/25  1101   SODIUM mmol/L 127*   POTASSIUM mmol/L 2.8*   CHLORIDE mmol/L 92*   CO2 mmol/L 24   BUN mg/dL 13   CREATININE mg/dL 0.96   ANION GAP mmol/L 11   CALCIUM mg/dL 7.9*   ALBUMIN g/dL 3.7   TOTAL  BILIRUBIN mg/dL 0.82   ALK PHOS U/L 78   ALT U/L 13   AST U/L 25   GLUCOSE RANDOM mg/dL 151*     Results from last 7 days   Lab Units 02/15/25  1101   INR  1.07             Results from last 7 days   Lab Units 02/15/25  1236 02/15/25  1101   LACTIC ACID mmol/L 1.7 2.2*   PROCALCITONIN ng/ml  --  0.74*       Imaging: All pertinent imaging reviewed.  CTA chest pe study   Final Result by Debi Pa MD (02/15 9898)      No pulmonary embolus.      9.3 x 5.1 cm masslike consolidation in the posterior right upper lobe with extensive surrounding groundglass opacity and central air-filled cavity. Given the patient's history, this is likely necrotizing pneumonia versus lung abscess with surrounding    pulmonary hemorrhage. TB not excluded in the appropriate clinical setting. Recommend follow-up with a chest CT in 3 months to assure resolution and exclude cavitary tumor.      Mildly enlarged right hilar node, likely reactive.      Moderate hiatal hernia.      I discussed the findings on the preceding chest radiograph with Dr. Marni Birch on 2/15/2025 at 11:47 a.m.            Workstation performed: [id number]         XR chest 1 view portable   Final Result by Debi Pa MD (02/15 8942)      Moderate consolidation in the right upper lobe with possible cavitation. Given the patient's history of hemoptysis, this could be due to pneumonia, possibly necrotizing, lung abscess, or malignancy. TB not excluded in the appropriate clinical setting if    the lesion is cavitary. Recommend further evaluation with CT.         I personally discussed this study with Dr. Marni Birch on 2/15/2025 11:47 AM.                  Workstation performed: [id number]             EKG and Other Studies Reviewed on Admission:   Prior pertinent studies and records reviewed in [facility]Care Everywhere.    ** Please Note: This note has been constructed using a voice recognition system. **

## 2025-02-15 NOTE — ASSESSMENT & PLAN NOTE
With shortness of breath, coughing, no noted hypoxia.  Secondary to cavitary pneumonia as above  Supportive care, monitor oxygen saturations closely

## 2025-02-15 NOTE — ASSESSMENT & PLAN NOTE
Patient endorsing hemoptysis which brought her into the hospital  Related to cavitary pneumonia, rule out TB  Pulmonary consult  Hemoglobin upon admission 12.2, at baseline  Avoid DVT prophylaxis and blood thinners for now until hemoptysis resolves

## 2025-02-16 PROBLEM — R78.81 BACTEREMIA DUE TO STREPTOCOCCUS: Status: ACTIVE | Noted: 2025-02-16

## 2025-02-16 PROBLEM — B95.5 BACTEREMIA DUE TO STREPTOCOCCUS: Status: ACTIVE | Noted: 2025-02-16

## 2025-02-16 LAB
ANION GAP SERPL CALCULATED.3IONS-SCNC: 7 MMOL/L (ref 4–13)
ANION GAP SERPL CALCULATED.3IONS-SCNC: 9 MMOL/L (ref 4–13)
BASOPHILS # BLD AUTO: 0.04 THOUSANDS/ΜL (ref 0–0.1)
BASOPHILS NFR BLD AUTO: 0 % (ref 0–1)
BUN SERPL-MCNC: 11 MG/DL (ref 5–25)
BUN SERPL-MCNC: 13 MG/DL (ref 5–25)
CALCIUM SERPL-MCNC: 7.2 MG/DL (ref 8.4–10.2)
CALCIUM SERPL-MCNC: 7.3 MG/DL (ref 8.4–10.2)
CHLORIDE SERPL-SCNC: 103 MMOL/L (ref 96–108)
CHLORIDE SERPL-SCNC: 99 MMOL/L (ref 96–108)
CO2 SERPL-SCNC: 22 MMOL/L (ref 21–32)
CO2 SERPL-SCNC: 23 MMOL/L (ref 21–32)
CREAT SERPL-MCNC: 0.67 MG/DL (ref 0.6–1.3)
CREAT SERPL-MCNC: 0.75 MG/DL (ref 0.6–1.3)
EOSINOPHIL # BLD AUTO: 0.02 THOUSAND/ΜL (ref 0–0.61)
EOSINOPHIL NFR BLD AUTO: 0 % (ref 0–6)
ERYTHROCYTE [DISTWIDTH] IN BLOOD BY AUTOMATED COUNT: 11.7 % (ref 11.6–15.1)
GAMMA INTERFERON BACKGROUND BLD IA-ACNC: 0.06 IU/ML
GFR SERPL CREATININE-BSD FRML MDRD: 82 ML/MIN/1.73SQ M
GFR SERPL CREATININE-BSD FRML MDRD: 90 ML/MIN/1.73SQ M
GLUCOSE SERPL-MCNC: 107 MG/DL (ref 65–140)
GLUCOSE SERPL-MCNC: 115 MG/DL (ref 65–140)
HCT VFR BLD AUTO: 34.4 % (ref 34.8–46.1)
HGB BLD-MCNC: 11.6 G/DL (ref 11.5–15.4)
IMM GRANULOCYTES # BLD AUTO: 0.18 THOUSAND/UL (ref 0–0.2)
IMM GRANULOCYTES NFR BLD AUTO: 1 % (ref 0–2)
L PNEUMO1 AG UR QL IA.RAPID: NEGATIVE
LYMPHOCYTES # BLD AUTO: 0.93 THOUSANDS/ΜL (ref 0.6–4.47)
LYMPHOCYTES NFR BLD AUTO: 5 % (ref 14–44)
M TB IFN-G BLD-IMP: ABNORMAL
M TB IFN-G CD4+ BCKGRND COR BLD-ACNC: -0.01 IU/ML
M TB IFN-G CD4+ BCKGRND COR BLD-ACNC: 0 IU/ML
MAGNESIUM SERPL-MCNC: 1.6 MG/DL (ref 1.9–2.7)
MCH RBC QN AUTO: 31.2 PG (ref 26.8–34.3)
MCHC RBC AUTO-ENTMCNC: 33.7 G/DL (ref 31.4–37.4)
MCV RBC AUTO: 93 FL (ref 82–98)
MITOGEN IGNF BCKGRD COR BLD-ACNC: <0.1 IU/ML
MONOCYTES # BLD AUTO: 0.86 THOUSAND/ΜL (ref 0.17–1.22)
MONOCYTES NFR BLD AUTO: 5 % (ref 4–12)
NEUTROPHILS # BLD AUTO: 15.82 THOUSANDS/ΜL (ref 1.85–7.62)
NEUTS SEG NFR BLD AUTO: 89 % (ref 43–75)
NRBC BLD AUTO-RTO: 0 /100 WBCS
PLATELET # BLD AUTO: 251 THOUSANDS/UL (ref 149–390)
PMV BLD AUTO: 9.3 FL (ref 8.9–12.7)
POTASSIUM SERPL-SCNC: 2.8 MMOL/L (ref 3.5–5.3)
POTASSIUM SERPL-SCNC: 3.6 MMOL/L (ref 3.5–5.3)
PROCALCITONIN SERPL-MCNC: 0.8 NG/ML
RBC # BLD AUTO: 3.72 MILLION/UL (ref 3.81–5.12)
S PNEUM AG UR QL: NEGATIVE
S PYO DNA BLD POS QL NAA+NON-PROBE: DETECTED
SODIUM SERPL-SCNC: 131 MMOL/L (ref 135–147)
SODIUM SERPL-SCNC: 132 MMOL/L (ref 135–147)
WBC # BLD AUTO: 17.85 THOUSAND/UL (ref 4.31–10.16)

## 2025-02-16 PROCEDURE — 85025 COMPLETE CBC W/AUTO DIFF WBC: CPT | Performed by: PHYSICIAN ASSISTANT

## 2025-02-16 PROCEDURE — 84145 PROCALCITONIN (PCT): CPT | Performed by: PHYSICIAN ASSISTANT

## 2025-02-16 PROCEDURE — 94760 N-INVAS EAR/PLS OXIMETRY 1: CPT

## 2025-02-16 PROCEDURE — 80048 BASIC METABOLIC PNL TOTAL CA: CPT | Performed by: PHYSICIAN ASSISTANT

## 2025-02-16 PROCEDURE — 94640 AIRWAY INHALATION TREATMENT: CPT

## 2025-02-16 PROCEDURE — 83735 ASSAY OF MAGNESIUM: CPT | Performed by: PHYSICIAN ASSISTANT

## 2025-02-16 PROCEDURE — 99232 SBSQ HOSP IP/OBS MODERATE 35: CPT | Performed by: PHYSICIAN ASSISTANT

## 2025-02-16 PROCEDURE — 99223 1ST HOSP IP/OBS HIGH 75: CPT | Performed by: INTERNAL MEDICINE

## 2025-02-16 RX ORDER — MAGNESIUM SULFATE 1 G/100ML
1 INJECTION INTRAVENOUS ONCE
Status: COMPLETED | OUTPATIENT
Start: 2025-02-16 | End: 2025-02-16

## 2025-02-16 RX ORDER — MAGNESIUM SULFATE HEPTAHYDRATE 40 MG/ML
2 INJECTION, SOLUTION INTRAVENOUS ONCE
Status: COMPLETED | OUTPATIENT
Start: 2025-02-16 | End: 2025-02-16

## 2025-02-16 RX ORDER — POTASSIUM CHLORIDE 1500 MG/1
40 TABLET, EXTENDED RELEASE ORAL ONCE
Status: COMPLETED | OUTPATIENT
Start: 2025-02-16 | End: 2025-02-16

## 2025-02-16 RX ORDER — POTASSIUM CHLORIDE 14.9 MG/ML
20 INJECTION INTRAVENOUS ONCE
Status: COMPLETED | OUTPATIENT
Start: 2025-02-16 | End: 2025-02-16

## 2025-02-16 RX ORDER — KETOROLAC TROMETHAMINE 30 MG/ML
15 INJECTION, SOLUTION INTRAMUSCULAR; INTRAVENOUS EVERY 6 HOURS PRN
Status: DISPENSED | OUTPATIENT
Start: 2025-02-16 | End: 2025-02-19

## 2025-02-16 RX ORDER — ALBUTEROL SULFATE 0.83 MG/ML
2.5 SOLUTION RESPIRATORY (INHALATION) EVERY 4 HOURS PRN
Status: DISCONTINUED | OUTPATIENT
Start: 2025-02-16 | End: 2025-02-20 | Stop reason: HOSPADM

## 2025-02-16 RX ORDER — CEFAZOLIN SODIUM 2 G/50ML
2000 SOLUTION INTRAVENOUS EVERY 8 HOURS
Status: DISCONTINUED | OUTPATIENT
Start: 2025-02-16 | End: 2025-02-20 | Stop reason: HOSPADM

## 2025-02-16 RX ORDER — LEVALBUTEROL INHALATION SOLUTION 1.25 MG/3ML
1.25 SOLUTION RESPIRATORY (INHALATION)
Status: DISCONTINUED | OUTPATIENT
Start: 2025-02-16 | End: 2025-02-20 | Stop reason: HOSPADM

## 2025-02-16 RX ORDER — ACETAMINOPHEN 325 MG/1
975 TABLET ORAL EVERY 8 HOURS SCHEDULED
Status: DISCONTINUED | OUTPATIENT
Start: 2025-02-16 | End: 2025-02-20 | Stop reason: HOSPADM

## 2025-02-16 RX ORDER — SODIUM CHLORIDE FOR INHALATION 0.9 %
3 VIAL, NEBULIZER (ML) INHALATION
Status: DISCONTINUED | OUTPATIENT
Start: 2025-02-16 | End: 2025-02-18

## 2025-02-16 RX ADMIN — Medication 250 MG: at 17:04

## 2025-02-16 RX ADMIN — GUAIFENESIN 600 MG: 600 TABLET, EXTENDED RELEASE ORAL at 08:26

## 2025-02-16 RX ADMIN — AMPICILLIN SODIUM AND SULBACTAM SODIUM 3 G: 2; 1 INJECTION, POWDER, FOR SOLUTION INTRAMUSCULAR; INTRAVENOUS at 06:37

## 2025-02-16 RX ADMIN — LEVALBUTEROL HYDROCHLORIDE 1.25 MG: 1.25 SOLUTION RESPIRATORY (INHALATION) at 15:32

## 2025-02-16 RX ADMIN — LIDOCAINE 5% 2 PATCH: 700 PATCH TOPICAL at 08:25

## 2025-02-16 RX ADMIN — POTASSIUM CHLORIDE 40 MEQ: 1500 TABLET, EXTENDED RELEASE ORAL at 03:47

## 2025-02-16 RX ADMIN — ACETAMINOPHEN 650 MG: 325 TABLET, FILM COATED ORAL at 02:11

## 2025-02-16 RX ADMIN — Medication 2 TABLET: at 08:26

## 2025-02-16 RX ADMIN — LEVALBUTEROL HYDROCHLORIDE 1.25 MG: 1.25 SOLUTION RESPIRATORY (INHALATION) at 03:19

## 2025-02-16 RX ADMIN — ACETAMINOPHEN 975 MG: 325 TABLET, FILM COATED ORAL at 21:06

## 2025-02-16 RX ADMIN — PANTOPRAZOLE SODIUM 40 MG: 40 TABLET, DELAYED RELEASE ORAL at 06:38

## 2025-02-16 RX ADMIN — PAROXETINE HYDROCHLORIDE 20 MG: 20 TABLET, FILM COATED ORAL at 08:26

## 2025-02-16 RX ADMIN — TIZANIDINE 4 MG: 4 TABLET ORAL at 02:11

## 2025-02-16 RX ADMIN — MAGNESIUM SULFATE IN DEXTROSE 1 G: 10 INJECTION, SOLUTION INTRAVENOUS at 03:47

## 2025-02-16 RX ADMIN — Medication 250 MG: at 08:26

## 2025-02-16 RX ADMIN — ACETAMINOPHEN 975 MG: 325 TABLET, FILM COATED ORAL at 11:16

## 2025-02-16 RX ADMIN — SODIUM CHLORIDE 1000 ML: 0.9 INJECTION, SOLUTION INTRAVENOUS at 08:13

## 2025-02-16 RX ADMIN — MAGNESIUM SULFATE HEPTAHYDRATE 2 G: 40 INJECTION, SOLUTION INTRAVENOUS at 08:14

## 2025-02-16 RX ADMIN — ATORVASTATIN CALCIUM 40 MG: 40 TABLET, FILM COATED ORAL at 17:04

## 2025-02-16 RX ADMIN — KETOROLAC TROMETHAMINE 15 MG: 30 INJECTION, SOLUTION INTRAMUSCULAR; INTRAVENOUS at 19:29

## 2025-02-16 RX ADMIN — AMPICILLIN SODIUM AND SULBACTAM SODIUM 3 G: 2; 1 INJECTION, POWDER, FOR SOLUTION INTRAMUSCULAR; INTRAVENOUS at 11:02

## 2025-02-16 RX ADMIN — POTASSIUM CHLORIDE 20 MEQ: 14.9 INJECTION, SOLUTION INTRAVENOUS at 04:42

## 2025-02-16 RX ADMIN — LEVALBUTEROL HYDROCHLORIDE 1.25 MG: 1.25 SOLUTION RESPIRATORY (INHALATION) at 20:31

## 2025-02-16 RX ADMIN — Medication 3 ML: at 20:31

## 2025-02-16 RX ADMIN — POTASSIUM CHLORIDE 40 MEQ: 1500 TABLET, EXTENDED RELEASE ORAL at 08:26

## 2025-02-16 RX ADMIN — CEFAZOLIN SODIUM 2000 MG: 2 SOLUTION INTRAVENOUS at 21:05

## 2025-02-16 RX ADMIN — GUAIFENESIN 600 MG: 600 TABLET, EXTENDED RELEASE ORAL at 17:04

## 2025-02-16 RX ADMIN — Medication 3 ML: at 15:32

## 2025-02-16 RX ADMIN — POTASSIUM CHLORIDE 20 MEQ: 14.9 INJECTION, SOLUTION INTRAVENOUS at 08:13

## 2025-02-16 NOTE — ASSESSMENT & PLAN NOTE
Testing positive for influenza A however symptoms have been going on for a few weeks. She is out of the window of Tamiflu at this time  Supportive care  Treatment for pneumonia as above

## 2025-02-16 NOTE — ASSESSMENT & PLAN NOTE
With acute respiratory failure secondary to pneumonia and influenza A  Requiring 3 L/min today on my exam  See plan above, wean O2 as tolerated

## 2025-02-16 NOTE — PLAN OF CARE
Problem: PAIN - ADULT  Goal: Verbalizes/displays adequate comfort level or baseline comfort level  Description: Interventions:  - Encourage patient to monitor pain and request assistance  - Assess pain using appropriate pain scale  - Administer analgesics based on type and severity of pain and evaluate response  - Implement non-pharmacological measures as appropriate and evaluate response  - Consider cultural and social influences on pain and pain management  - Notify physician/advanced practitioner if interventions unsuccessful or patient reports new pain  Outcome: Progressing     Problem: INFECTION - ADULT  Goal: Absence or prevention of progression during hospitalization  Description: INTERVENTIONS:  - Assess and monitor for signs and symptoms of infection  - Monitor lab/diagnostic results  - Monitor all insertion sites, i.e. indwelling lines, tubes, and drains  - Monitor endotracheal if appropriate and nasal secretions for changes in amount and color  - Sullivan appropriate cooling/warming therapies per order  - Administer medications as ordered  - Instruct and encourage patient and family to use good hand hygiene technique  - Identify and instruct in appropriate isolation precautions for identified infection/condition  Outcome: Progressing     Problem: SAFETY ADULT  Goal: Patient will remain free of falls  Description: INTERVENTIONS:  - Educate patient/family on patient safety including physical limitations  - Instruct patient to call for assistance with activity   - Consult OT/PT to assist with strengthening/mobility   - Keep Call bell within reach  - Keep bed low and locked with side rails adjusted as appropriate  - Keep care items and personal belongings within reach  - Initiate and maintain comfort rounds  - Make Fall Risk Sign visible to staff  - Offer Toileting every 2 Hours, in advance of need  - Apply yellow socks and bracelet for high fall risk patients  - Consider moving patient to room near nurses  station  Outcome: Progressing  Goal: Maintain or return to baseline ADL function  Description: INTERVENTIONS:  -  Assess patient's ability to carry out ADLs; assess patient's baseline for ADL function and identify physical deficits which impact ability to perform ADLs (bathing, care of mouth/teeth, toileting, grooming, dressing, etc.)  - Assess/evaluate cause of self-care deficits   - Assess range of motion  - Assess patient's mobility; develop plan if impaired  - Assess patient's need for assistive devices and provide as appropriate  - Encourage maximum independence but intervene and supervise when necessary  - Involve family in performance of ADLs  - Assess for home care needs following discharge   - Consider OT consult to assist with ADL evaluation and planning for discharge  - Provide patient education as appropriate  Outcome: Progressing     Problem: DISCHARGE PLANNING  Goal: Discharge to home or other facility with appropriate resources  Description: INTERVENTIONS:  - Identify barriers to discharge w/patient and caregiver  - Arrange for needed discharge resources and transportation as appropriate  - Identify discharge learning needs (meds, wound care, etc.)  - Arrange for interpretive services to assist at discharge as needed  - Refer to Case Management Department for coordinating discharge planning if the patient needs post-hospital services based on physician/advanced practitioner order or complex needs related to functional status, cognitive ability, or social support system  Outcome: Progressing     Problem: Knowledge Deficit  Goal: Patient/family/caregiver demonstrates understanding of disease process, treatment plan, medications, and discharge instructions  Description: Complete learning assessment and assess knowledge base.  Interventions:  - Provide teaching at level of understanding  - Provide teaching via preferred learning methods  Outcome: Progressing     Problem: CARDIOVASCULAR - ADULT  Goal:  Maintains optimal cardiac output and hemodynamic stability  Description: INTERVENTIONS:  - Monitor I/O, vital signs and rhythm  - Monitor for S/S and trends of decreased cardiac output  - Administer and titrate ordered vasoactive medications to optimize hemodynamic stability  - Assess quality of pulses, skin color and temperature  - Assess for signs of decreased coronary artery perfusion  - Instruct patient to report change in severity of symptoms  Outcome: Progressing  Goal: Absence of cardiac dysrhythmias or at baseline rhythm  Description: INTERVENTIONS:  - Continuous cardiac monitoring, vital signs, obtain 12 lead EKG if ordered  - Administer antiarrhythmic and heart rate control medications as ordered  - Monitor electrolytes and administer replacement therapy as ordered  Outcome: Progressing     Problem: RESPIRATORY - ADULT  Goal: Achieves optimal ventilation and oxygenation  Description: INTERVENTIONS:  - Assess for changes in respiratory status  - Assess for changes in mentation and behavior  - Position to facilitate oxygenation and minimize respiratory effort  - Oxygen administered by appropriate delivery if ordered  - Initiate smoking cessation education as indicated  - Encourage broncho-pulmonary hygiene including cough, deep breathe, Incentive Spirometry  - Assess the need for suctioning and aspirate as needed  - Assess and instruct to report SOB or any respiratory difficulty  - Respiratory Therapy support as indicated  Outcome: Progressing     Problem: METABOLIC, FLUID AND ELECTROLYTES - ADULT  Goal: Fluid balance maintained  Description: INTERVENTIONS:  - Monitor labs   - Monitor I/O and WT  - Instruct patient on fluid and nutrition as appropriate  - Assess for signs & symptoms of volume excess or deficit  Outcome: Progressing

## 2025-02-16 NOTE — ASSESSMENT & PLAN NOTE
Admission blood cultures 1 out of 2 positive for group A streptococcus, strep pyogenes.  Repeat BC x 2 tomorrow   ID following  Continue IV Unasyn for now  Obtain TTE

## 2025-02-16 NOTE — ASSESSMENT & PLAN NOTE
With marked electrolyte abnormalities, due to dehydration.  Sodium 127 upon admission, started on IV Isolyte overnight.  Repeat 131 and improved  Potassium 2.8, will continue repletion and repeat later today  Magnesium 0.9, now 1.6.  Continue to monitor with repletion

## 2025-02-16 NOTE — CONSULTS
Consultation - Infectious Disease   Name: Fay Corley 69 y.o. female I MRN: [id number]  Unit/Bed#: -01 I Date of Admission: 2/15/2025   Date of Service: 2/16/2025 I Hospital Day: 1   Inpatient consult to Infectious Diseases  Consult performed by: Pilo Gabriel MD  Consult ordered by: Giuseppe Roldan PA-C        Physician Requesting Evaluation: Brianna Cifuentes MD       Assessment & Plan    69-year-old woman with cavitary pneumonia and bacteremia secondary to Streptococcus pyogenes, influenza A, leukocytosis, fever, hypertension    1) Influenza, superimposed bactgerial pneumonia, bacteremia - Pt. already with improvement over the past 24 hours inasmuch as she has defervesced and white blood cell count has improved. That said, development of mild hypoxemia is not unexpected given potential for intermittent worsening of symptoms as abscess/bacterial infection resolves. Patient with no epidemiological risk factors for tuberculosis, so will discontinue ongoing AFB/TB workup in the setting of a much more reasonable alternative diagnosis. Given isolation of Streptococcus can also narrow antibiotics to exclude anaerobic coverage. Will plan to continue intravenous therapy while patient remains in house, and transition to oral therapy for total of 4 to 6 weeks. Case discussed with pulmonary, no indication for diagnostic bronchoscopy presently, however could of course reconsider should patient develop need for therapeutic bronchoscopy.    ===>. Will DISCONTINUE AMPICILLIN/SULBACTAM, START CEFAZOLIN, and follow patient closely for toxicity as well as continue improvement whilst on this agent.  ===> Anticipate transition to oral therapy with cefadroxil 1 g p.o. twice daily x 4 weeks from the time of discharge provided that patient's clinical improvement continues.    ===> Will DC pending AFB smear, airborne isolation given alternate diagnosis as discussed above.    ===> Repeat blood cultures ordered for tomorrow  morning by primary team to assess for persistence versus resolution of bacteremia.    2) Leukocytosis - 2/2 #1, will follow as marker of clinical improvement.     3) Fever - 2/2 #1, will follow as marker of clinical improvement.          ===> Discussed w/ 1' team, pulmonary, nursing  ===> Will follow         History of Present Illness   Briefly, 69-year-old woman with hypertension presented to the [facility] emergency department on 2/15 with complaints of hemoptysis, decreased appetite, and feeling poorly over the preceding several weeks.  She reports high fever and influenza-like symptoms several days to a week prior to her presenting symptoms. Workup at the time of admission was notable for leukocytosis (white blood cell count of 24,600, now 17,850), normal creatinine (0.96), and a CT scan which showed a left upper lobe cavitary/infiltrative lesion. Patient was started on ampicillin/sulbactam after being given one-time doses of cefepime, metronidazole, and azithromycin in the emergency department. Testing for influenza A was positive, and blood cultures drawn at the time of admission are growing Streptococcus pyogenes in 1 of 2 sets.  Though she was not hypoxemic at the time of admission she is now on 3 L, and though she complains of pleuritic chest pain and ongoing cough she reports her hemoptysis has improved and she does feel a bit better than when admitted. Tmax this admission was 101.7 °F, however the patient has been afebrile since yesterday evening. Review of available records within the [facility] EMR shows no history of extensively drug-resistant organisms such as those producing ESBL's.    A complete review of systems was done and is negative except as per the HPI.     Family History: negative for cancer, heart disease, stroke, hypertension, diabetes.     Pertinent laboratory studies, as well as imaging studies and associated radiology reports reviewed personally by me with interpretation  noted in the HPI as relevant.      Objective :  Temp:  [98 °F (36.7 °C)-101.7 °F (38.7 °C)] 98.1 °F (36.7 °C)  HR:  [70-99] 78  BP: ()/(51-67) 94/53  Resp:  [20-24] 20  SpO2:  [88 %-96 %] 91 %  O2 Device: Nasal cannula  Nasal Cannula O2 Flow Rate (L/min):  [3 L/min-5 L/min] 3 L/min    General:  No acute distress  Psychiatric:  Awake and alert  Pulmonary: No respiratory distress, some crackles over the right midlung field,  Abdomen:  Soft, nontender  Extremities:  No edema  Skin:  No rashes

## 2025-02-16 NOTE — ASSESSMENT & PLAN NOTE
Blood pressure soft this morning, responding to IV hydration  Hold lisinopril and verapamil  Monitor BP closely and avoid hypotension

## 2025-02-16 NOTE — ASSESSMENT & PLAN NOTE
In the setting of severe pneumonia, currently requiring 3 L nasal cannula, titrate to maintain O2 sats greater than 89%  Would perform home O2 eval prior to discharge

## 2025-02-16 NOTE — CONSULTS
Consultation - Pulmonology   Name: Fay Corley 69 y.o. female I MRN: [id number]  Unit/Bed#: -01 I Date of Admission: 2/15/2025   Date of Service: 2/16/2025 I Hospital Day: 1   Inpatient consult to Pulmonology  Consult performed by: Jorge Bellamy PA-C  Consult ordered by: Giuseppe Roldan PA-C        Physician Requesting Evaluation: Brianna Cifuentes MD   Reason for Evaluation / Principal Problem: Cavitary pneumonia    Assessment & Plan  Severe sepsis (HCC)  Secondary to strep bacteremia and cavitary pneumonia  Cavitary pneumonia  CT scan with 9.3 x 5.1 cm masslike consolidation in the posterior right upper lobe with extensive surrounding groundglass opacity and central air-filled cavity  QuantiFERON gold is pending although very low suspicion for TB, patient with no risk factors  Likely related to strep pyogenes given her positive blood culture, gram-positive cocci in sputum culture as well  Would continue with antibiotics per infectious disease and repeat CT scan in 6 to 8 weeks  Continue Mucinex, will add nebulizer treatments as she did find some relief with this yesterday  If any clinical worsening, would need a bronchoscopy for BAL/cultures  Influenza A  With superimposed bacterial pneumonia  Treatment per infectious disease  Supportive care  Acute respiratory failure (HCC)  In the setting of severe pneumonia, currently requiring 3 L nasal cannula, titrate to maintain O2 sats greater than 89%  Would perform home O2 eval prior to discharge  Hemoptysis  In the setting of cavitary pneumonia  H&H is stable  Would continue to monitor  Bacteremia due to Streptococcus  Antibiotics per infectious disease, repeat blood cultures are pending  Pulmonology service will follow.  Please contact the SecureChat role for the Pulmonology service with any questions/concerns.    History of Present Illness   Fay Corley is a 69 y.o. female former smoker with past medical history of hypertension, fibromyalgia,  GERD who presents with complaint of worsening cough, shortness of breath and hemoptysis.  Patient had been having viral symptoms for about 1 week, some days had felt slightly better than others.  At the end of the week she began to feel worse and started coughing up some blood.  Had also been having fevers up to 103.  She is feeling improved from prior to admission though still significant fatigue, still having some cough.    She quit smoking in 2020, no history of any asthma or COPD.    Review of Systems   Constitutional:  Positive for appetite change (decreased appetite), fatigue and fever.   HENT: Negative.     Respiratory:  Positive for cough and shortness of breath.    Cardiovascular: Negative.    Gastrointestinal: Negative.    Genitourinary: Negative.    Musculoskeletal: Negative.    Skin: Negative.    Allergic/Immunologic: Negative.    Neurological: Negative.    Psychiatric/Behavioral: Negative.         Historical Information   Medical History Review: I have reviewed the patient's PMH, PSH, Social History, Family History, Meds, and Allergies     Current Facility-Administered Medications:     acetaminophen (TYLENOL) tablet 975 mg, Q8H CARLOS    albuterol inhalation solution 2.5 mg, Q4H PRN    atorvastatin (LIPITOR) tablet 40 mg, Daily With Dinner    benzonatate (TESSALON PERLES) capsule 100 mg, TID PRN    calcium carbonate-vitamin D 500 mg-5 mcg tablet 2 tablet, Daily With Breakfast    ceFAZolin (ANCEF) IVPB (premix in dextrose) 2,000 mg 50 mL, Q8H    guaiFENesin (MUCINEX) 12 hr tablet 600 mg, BID    ketorolac (TORADOL) injection 15 mg, Q6H PRN    levalbuterol (XOPENEX) inhalation solution 1.25 mg, TID    lidocaine (LIDODERM) 5 % patch 2 patch, Daily    [Held by provider] lisinopril (ZESTRIL) tablet 10 mg, Daily    ondansetron (ZOFRAN) injection 4 mg, Q6H PRN    pantoprazole (PROTONIX) EC tablet 40 mg, Daily Before Breakfast    PARoxetine (PAXIL) tablet 20 mg, Daily    phenol (CHLORASEPTIC) 1.4 % mucosal liquid 1  spray, Q2H PRN    saccharomyces boulardii (FLORASTOR) capsule 250 mg, BID    sodium chloride 0.9 % inhalation solution 3 mL, TID    tiZANidine (ZANAFLEX) tablet 4 mg, Q8H PRN    [Held by provider] verapamil (CALAN-SR) CR tablet 240 mg, Daily  Prior to Admission Medications   Prescriptions Last Dose Informant Patient Reported? Taking?   Calcium Carbonate-Vit D-Min (CALCIUM 1200 PO) Not Taking Self Yes No   Patient not taking: Reported on 2/15/2025   Multiple Vitamin (MULTI-VITAMIN) tablet Not Taking Self No No   Sig: Take 1 tablet by mouth daily   Patient not taking: Reported on 2/15/2025   PARoxetine (PAXIL) 40 MG tablet 2/15/2025 Self Yes Yes   Sig: Take 20 mg by mouth in the morning   Probiotic Product (PROBIOTIC ADVANCED PO) 2/15/2025 Self Yes Yes   atorvastatin (LIPITOR) 40 mg tablet 2/15/2025 Self Yes Yes   Sig: Take 40 mg by mouth daily   lisinopril (ZESTRIL) 10 mg tablet 2/15/2025 Self No Yes   Sig: TAKE 1 TABLET BY MOUTH ONCE A DAY   pantoprazole (PROTONIX) 40 mg tablet Past Week Self Yes Yes   timolol (TIMOPTIC) 0.5 % ophthalmic solution 2/15/2025 Self Yes Yes   verapamil (VERELAN) 240 MG 24 hr capsule 2/14/2025 Self No Yes   Sig: TAKE 1 CAPSULE BY MOUTH EVERY DAY      Facility-Administered Medications: None     Tobacco History: Quit in 2020  Occupational History:   Family History:  Family History   Problem Relation Age of Onset    Fibromyalgia Mother     Arthritis Mother     Hypertension Mother     Vision loss Mother         Glaucoma    Heart disease Father     GI problems Father     Hypertension Father     Vision loss Father         Macular Degeneration    Birth defects Sister         Arterial Vascular Malformation    No Known Problems Sister     No Known Problems Sister     No Known Problems Daughter     No Known Problems Maternal Grandmother     Diabetes Maternal Grandfather     No Known Problems Paternal Grandmother     No Known Problems Paternal Grandfather     Mental illness Brother     No Known  Problems Paternal Aunt     No Known Problems Paternal Aunt     Colon cancer Neg Hx     Colon polyps Neg Hx        Objective :  Temp:  [98 °F (36.7 °C)-101.7 °F (38.7 °C)] 98.1 °F (36.7 °C)  HR:  [70-99] 78  BP: ()/(51-67) 94/53  Resp:  [20-24] 20  SpO2:  [88 %-96 %] 91 %  O2 Device: Nasal cannula  Nasal Cannula O2 Flow Rate (L/min):  [3 L/min-5 L/min] 3 L/min    Physical Exam  Vitals reviewed.   Constitutional:       General: She is not in acute distress.     Appearance: Normal appearance. She is well-developed. She is not ill-appearing.   HENT:      Head: Normocephalic and atraumatic.      Mouth/Throat:      Pharynx: Oropharynx is clear.   Eyes:      Pupils: Pupils are equal, round, and reactive to light.   Cardiovascular:      Rate and Rhythm: Normal rate and regular rhythm.   Pulmonary:      Effort: Pulmonary effort is normal. No respiratory distress.      Breath sounds: Rhonchi (scattered on right) present. No decreased breath sounds, wheezing or rales.   Abdominal:      General: Abdomen is flat. There is no distension.   Musculoskeletal:         General: Normal range of motion.      Cervical back: Normal range of motion.      Right lower leg: No edema.      Left lower leg: No edema.   Skin:     General: Skin is warm and dry.      Findings: No rash.   Neurological:      Mental Status: She is alert and oriented to person, place, and time.   Psychiatric:         Mood and Affect: Mood normal.         Behavior: Behavior normal.           Lab Results: I have reviewed the following results:  .     02/16/25  0216   WBC 17.85*   HGB 11.6   HCT 34.4*      SODIUM 131*   K 2.8*   CL 99   CO2 23   BUN 11   CREATININE 0.67   GLUC 115   MG 1.6*     ABG: No new results in last 24 hours.    Imaging Results Review: I reviewed radiology reports from this admission including: CT chest.  Other Study Results Review: Other studies reviewed include: Blood cultures  PFT Results Reviewed: None available    VTE Prophylaxis:  VTE covered by:    None

## 2025-02-16 NOTE — QUICK NOTE
Progress Note - Triage Asssessment   Fay Corley 69 y.o. female MRN: [id number]    Time Called ( Time): 0748  Date Called: 02/16/25  Room#: 312  Person requesting evaluation: Horacio SALGADO    Situation:    68 yo presents with 3 weeks of feeling ill then new onset hemoptysis. CTA chest revealed cavitary pneumonia with surrounding pulmonary hemorrhage. Flu A + treated with antibx.    This am bp 80/50 asymptomatic sitting in bed. C/o right sided pleuritic pain worse with inspiration. Lungs coarse scattered rhonchi on right with exp wheeze left clear. Conversational dyspnea. Sats 97% on 3L NC     Interventions:   Hypotension--cont to hold antihtn, IVF, frequent vital reassessment q1 x 4hrs  Acute hypoxic respiratory failure 2/2 flu A and superimposed bacterial cavitary pna--ID consulted, cont unasyn, oxygen for sats >92%, mucinex/IS/Pulm hygiene, pulm following          Triage Assessment:     Cont MS LOC/if resp status changes notify CC low threshold to upgrade to SD    Recommendations discussed with Jamar

## 2025-02-16 NOTE — ASSESSMENT & PLAN NOTE
"Patient endorsing 3 to 4 weeks of progressive coughing, shortness of breath, fevers.  Reported Tmax of 103.8 and now with hemoptysis.  CT imaging with \"9.3 x 5.1 cm masslike consolidation in the posterior right upper lobe with extensive surrounding groundglass opacity and central air-filled cavity. Given the patient's history, this is likely necrotizing pneumonia versus lung abscess with surrounding pulmonary hemorrhage. TB not excluded in the appropriate clinical setting. \"  Discussed with ID  IV antibiotics: IV Unasyn 3g Q6  Follow up QuantiFERON gold - can cancel AFB  Follow up sputum culture and Gram stain, strep/Legionella urinary antigens  Respiratory protocol, nebs PRN.  Monitor respiratory status.  "

## 2025-02-16 NOTE — ASSESSMENT & PLAN NOTE
SIRS criteria: Tachycardia, tachypnea, leukocytosis  Suspected source: RUL pneumonia  Lactic acid: 2.2 on admit - now normal  End organ damage: lactic acidosis  Continue IV isolate and avoid further hypotension  IV antibiotics: Continue IV Unasyn 3g Q6  With bacteremia, see related plan below  Monitor vital signs, laboratory studies.

## 2025-02-16 NOTE — PLAN OF CARE
Problem: PAIN - ADULT  Goal: Verbalizes/displays adequate comfort level or baseline comfort level  Description: Interventions:  - Encourage patient to monitor pain and request assistance  - Assess pain using appropriate pain scale  - Administer analgesics based on type and severity of pain and evaluate response  - Implement non-pharmacological measures as appropriate and evaluate response  - Consider cultural and social influences on pain and pain management  - Notify physician/advanced practitioner if interventions unsuccessful or patient reports new pain  Outcome: Progressing     Problem: INFECTION - ADULT  Goal: Absence or prevention of progression during hospitalization  Description: INTERVENTIONS:  - Assess and monitor for signs and symptoms of infection  - Monitor lab/diagnostic results  - Monitor all insertion sites, i.e. indwelling lines, tubes, and drains  - Monitor endotracheal if appropriate and nasal secretions for changes in amount and color  - Delmar appropriate cooling/warming therapies per order  - Administer medications as ordered  - Instruct and encourage patient and family to use good hand hygiene technique  - Identify and instruct in appropriate isolation precautions for identified infection/condition  Outcome: Progressing  Goal: Absence of fever/infection during neutropenic period  Description: INTERVENTIONS:  - Monitor WBC    Outcome: Progressing     Problem: SAFETY ADULT  Goal: Patient will remain free of falls  Description: INTERVENTIONS:  - Educate patient/family on patient safety including physical limitations  - Instruct patient to call for assistance with activity   - Consult OT/PT to assist with strengthening/mobility   - Keep Call bell within reach  - Keep bed low and locked with side rails adjusted as appropriate  - Keep care items and personal belongings within reach  - Initiate and maintain comfort rounds  - Make Fall Risk Sign visible to staff  - Offer Toileting every 2 Hours,  in advance of need  - Initiate/Maintain bed/chair alarm  - Obtain necessary fall risk management equipment  - Apply yellow socks and bracelet for high fall risk patients  - Consider moving patient to room near nurses station  Outcome: Progressing  Goal: Maintain or return to baseline ADL function  Description: INTERVENTIONS:  -  Assess patient's ability to carry out ADLs; assess patient's baseline for ADL function and identify physical deficits which impact ability to perform ADLs (bathing, care of mouth/teeth, toileting, grooming, dressing, etc.)  - Assess/evaluate cause of self-care deficits   - Assess range of motion  - Assess patient's mobility; develop plan if impaired  - Assess patient's need for assistive devices and provide as appropriate  - Encourage maximum independence but intervene and supervise when necessary  - Involve family in performance of ADLs  - Assess for home care needs following discharge   - Consider OT consult to assist with ADL evaluation and planning for discharge  - Provide patient education as appropriate  Outcome: Progressing  Goal: Maintains/Returns to pre admission functional level  Description: INTERVENTIONS:  - Perform AM-PAC 6 Click Basic Mobility/ Daily Activity assessment daily.  - Set and communicate daily mobility goal to care team and patient/family/caregiver.   - Collaborate with rehabilitation services on mobility goals if consulted  - Perform Range of Motion 3 times a day.  - Reposition patient every 2 hours.  - Dangle patient 3 times a day  - Stand patient 3 times a day  - Ambulate patient 3 times a day  - Out of bed to chair 3 times a day   - Out of bed for meals 3 times a day  - Out of bed for toileting  - Record patient progress and toleration of activity level   Outcome: Progressing     Problem: DISCHARGE PLANNING  Goal: Discharge to home or other facility with appropriate resources  Description: INTERVENTIONS:  - Identify barriers to discharge w/patient and  caregiver  - Arrange for needed discharge resources and transportation as appropriate  - Identify discharge learning needs (meds, wound care, etc.)  - Arrange for interpretive services to assist at discharge as needed  - Refer to Case Management Department for coordinating discharge planning if the patient needs post-hospital services based on physician/advanced practitioner order or complex needs related to functional status, cognitive ability, or social support system  Outcome: Progressing     Problem: Knowledge Deficit  Goal: Patient/family/caregiver demonstrates understanding of disease process, treatment plan, medications, and discharge instructions  Description: Complete learning assessment and assess knowledge base.  Interventions:  - Provide teaching at level of understanding  - Provide teaching via preferred learning methods  Outcome: Progressing     Problem: CARDIOVASCULAR - ADULT  Goal: Maintains optimal cardiac output and hemodynamic stability  Description: INTERVENTIONS:  - Monitor I/O, vital signs and rhythm  - Monitor for S/S and trends of decreased cardiac output  - Administer and titrate ordered vasoactive medications to optimize hemodynamic stability  - Assess quality of pulses, skin color and temperature  - Assess for signs of decreased coronary artery perfusion  - Instruct patient to report change in severity of symptoms  Outcome: Progressing  Goal: Absence of cardiac dysrhythmias or at baseline rhythm  Description: INTERVENTIONS:  - Continuous cardiac monitoring, vital signs, obtain 12 lead EKG if ordered  - Administer antiarrhythmic and heart rate control medications as ordered  - Monitor electrolytes and administer replacement therapy as ordered  Outcome: Progressing     Problem: RESPIRATORY - ADULT  Goal: Achieves optimal ventilation and oxygenation  Description: INTERVENTIONS:  - Assess for changes in respiratory status  - Assess for changes in mentation and behavior  - Position to  facilitate oxygenation and minimize respiratory effort  - Oxygen administered by appropriate delivery if ordered  - Initiate smoking cessation education as indicated  - Encourage broncho-pulmonary hygiene including cough, deep breathe, Incentive Spirometry  - Assess the need for suctioning and aspirate as needed  - Assess and instruct to report SOB or any respiratory difficulty  - Respiratory Therapy support as indicated  Outcome: Progressing     Problem: METABOLIC, FLUID AND ELECTROLYTES - ADULT  Goal: Electrolytes maintained within normal limits  Description: INTERVENTIONS:  - Monitor labs and assess patient for signs and symptoms of electrolyte imbalances  - Administer electrolyte replacement as ordered  - Monitor response to electrolyte replacements, including repeat lab results as appropriate  - Instruct patient on fluid and nutrition as appropriate  Outcome: Progressing  Goal: Fluid balance maintained  Description: INTERVENTIONS:  - Monitor labs   - Monitor I/O and WT  - Instruct patient on fluid and nutrition as appropriate  - Assess for signs & symptoms of volume excess or deficit  Outcome: Progressing  Goal: Glucose maintained within target range  Description: INTERVENTIONS:  - Monitor Blood Glucose as ordered  - Assess for signs and symptoms of hyperglycemia and hypoglycemia  - Administer ordered medications to maintain glucose within target range  - Assess nutritional intake and initiate nutrition service referral as needed  Outcome: Progressing     Problem: Nutrition/Hydration-ADULT  Goal: Nutrient/Hydration intake appropriate for improving, restoring or maintaining nutritional needs  Description: Monitor and assess patient's nutrition/hydration status for malnutrition. Collaborate with interdisciplinary team and initiate plan and interventions as ordered.  Monitor patient's weight and dietary intake as ordered or per policy. Utilize nutrition screening tool and intervene as necessary. Determine  patient's food preferences and provide high-protein, high-caloric foods as appropriate.     INTERVENTIONS:  - Monitor oral intake, urinary output, labs, and treatment plans  - Assess nutrition and hydration status and recommend course of action  - Evaluate amount of meals eaten  - Assist patient with eating if necessary   - Allow adequate time for meals  - Recommend/ encourage appropriate diets, oral nutritional supplements, and vitamin/mineral supplements  - Order, calculate, and assess calorie counts as needed  - Recommend, monitor, and adjust tube feedings and TPN/PPN based on assessed needs  - Assess need for intravenous fluids  - Provide specific nutrition/hydration education as appropriate  - Include patient/family/caregiver in decisions related to nutrition  Outcome: Progressing

## 2025-02-16 NOTE — ASSESSMENT & PLAN NOTE
CT scan with 9.3 x 5.1 cm masslike consolidation in the posterior right upper lobe with extensive surrounding groundglass opacity and central air-filled cavity  QuantiFERON gold is pending although very low suspicion for TB, patient with no risk factors  Likely related to strep pyogenes given her positive blood culture, gram-positive cocci in sputum culture as well  Would continue with antibiotics per infectious disease and repeat CT scan in 6 to 8 weeks  Continue Mucinex, will add nebulizer treatments as she did find some relief with this yesterday  If any clinical worsening, would need a bronchoscopy for BAL/cultures

## 2025-02-16 NOTE — PROGRESS NOTES
"Progress Note - Hospitalist   Name: Fay Corley 69 y.o. female I MRN: [id number]  Unit/Bed#: -01 I Date of Admission: 2/15/2025   Date of Service: 2/16/2025 I Hospital Day: 1    Assessment & Plan  Severe sepsis (HCC)  SIRS criteria: Tachycardia, tachypnea, leukocytosis  Suspected source: RUL pneumonia  Lactic acid: 2.2 on admit - now normal  End organ damage: lactic acidosis  Continue IV isolate and avoid further hypotension  IV antibiotics: Continue IV Unasyn 3g Q6  With bacteremia, see related plan below  Monitor vital signs, laboratory studies.  Cavitary pneumonia  Patient endorsing 3 to 4 weeks of progressive coughing, shortness of breath, fevers.  Reported Tmax of 103.8 and now with hemoptysis.  CT imaging with \"9.3 x 5.1 cm masslike consolidation in the posterior right upper lobe with extensive surrounding groundglass opacity and central air-filled cavity. Given the patient's history, this is likely necrotizing pneumonia versus lung abscess with surrounding pulmonary hemorrhage. TB not excluded in the appropriate clinical setting. \"  Discussed with ID  IV antibiotics: IV Unasyn 3g Q6  Follow up QuantiFERON gold - can cancel AFB  Follow up sputum culture and Gram stain, strep/Legionella urinary antigens  Respiratory protocol, nebs PRN.  Monitor respiratory status.  Hemoptysis  Patient endorsing hemoptysis which brought her into the hospital  Related to cavitary pneumonia, rule out TB  Pulmonary consult  Hemoglobin upon admission 12.2, at baseline  Avoid DVT prophylaxis and blood thinners for now until hemoptysis resolves  Hypertension  Blood pressure soft this morning, responding to IV hydration  Hold lisinopril and verapamil  Monitor BP closely and avoid hypotension  Electrolyte abnormality  With marked electrolyte abnormalities, due to dehydration.  Sodium 127 upon admission, started on IV Isolyte overnight.  Repeat 131 and improved  Potassium 2.8, will continue repletion and repeat later " today  Magnesium 0.9, now 1.6.  Continue to monitor with repletion  Influenza A  Testing positive for influenza A however symptoms have been going on for a few weeks. She is out of the window of Tamiflu at this time  Supportive care  Treatment for pneumonia as above  Acute respiratory failure (HCC)  With acute respiratory failure secondary to pneumonia and influenza A  Requiring 3 L/min today on my exam  See plan above, wean O2 as tolerated  Bacteremia due to Streptococcus  Admission blood cultures 1 out of 2 positive for group A streptococcus, strep pyogenes.  Repeat BC x 2 tomorrow   ID following  Continue IV Unasyn for now  Obtain TTE    Mobility:   Basic Mobility Inpatient Raw Score: 19  JH-HLM Goal: 6: Walk 10 steps or more  JH-HLM Achieved: 6: Walk 10 steps or more  JH-HLM Goal achieved. Continue to encourage appropriate mobility.    VTE Pharmacologic Prophylaxis: VTE Score: 7 High Risk (Score >/= 5) - Pharmacological DVT Prophylaxis Contraindicated. Sequential Compression Devices Ordered.    Education and Discussions with Family / Patient: Updated  (daughter) via phone.    Current Length of Stay: 1 day(s)  Current Patient Status: Inpatient   Certification Statement: The patient will continue to require additional inpatient hospital stay due to severe sepsis due to pneumonia with bacteremia on IV antibiotics awaiting pulmonary and infectious disease consult  Discharge Plan: Anticipate discharge in >72 hrs to home with home services.    Code Status: Level 1 - Full Code    Subjective:   No overnight events.  Tells me she feels a little worse today with worsening shortness of breath and hypoxia.  No fevers this morning.  Still having a lot of chest pain.    Objective:     Vitals:   Temp (24hrs), Av °F (37.2 °C), Min:97.2 °F (36.2 °C), Max:101.7 °F (38.7 °C)    Temp:  [97.2 °F (36.2 °C)-101.7 °F (38.7 °C)] 98.1 °F (36.7 °C)  HR:  [] 78  Resp:  [20-30] 20  BP: ()/(51-67) 94/53  SpO2:   [88 %-96 %] 91 %  Body mass index is 31.24 kg/m².     Input and Output Summary (last 24 hours):     Intake/Output Summary (Last 24 hours) at 2/16/2025 1050  Last data filed at 2/16/2025 0849  Gross per 24 hour   Intake 4839.58 ml   Output 950 ml   Net 3889.58 ml       Physical Exam:   Physical Exam  Vitals and nursing note reviewed.   Constitutional:       General: She is not in acute distress.     Appearance: Normal appearance. She is ill-appearing and toxic-appearing.      Interventions: Nasal cannula in place.   HENT:      Head: Normocephalic and atraumatic.      Mouth/Throat:      Mouth: Mucous membranes are moist.   Eyes:      General: No scleral icterus.        Right eye: No discharge.         Left eye: No discharge.      Pupils: Pupils are equal, round, and reactive to light.   Cardiovascular:      Rate and Rhythm: Normal rate and regular rhythm.      Heart sounds: No murmur heard.     No friction rub. No gallop.   Pulmonary:      Effort: Respiratory distress present.      Breath sounds: Rhonchi present. No wheezing or rales.   Abdominal:      General: Bowel sounds are normal. There is no distension.      Palpations: Abdomen is soft.      Tenderness: There is no abdominal tenderness. There is no guarding or rebound.   Musculoskeletal:         General: No swelling or deformity.      Cervical back: Neck supple.      Right lower leg: No edema.      Left lower leg: No edema.   Skin:     General: Skin is warm and dry.      Capillary Refill: Capillary refill takes less than 2 seconds.      Coloration: Skin is not jaundiced or pale.      Findings: No erythema or rash.   Neurological:      General: No focal deficit present.      Mental Status: She is alert and oriented to person, place, and time. Mental status is at baseline.   Psychiatric:         Mood and Affect: Mood normal.         Behavior: Behavior normal.          Additional Data:     Labs:  Results from last 7 days   Lab Units 02/16/25  0216 02/15/25  1520  02/15/25  1101   WBC Thousand/uL 17.85*  --  24.16*   HEMOGLOBIN g/dL 11.6  --  12.2   HEMATOCRIT % 34.4*  --  35.9   PLATELETS Thousands/uL 251   < > 265   BANDS PCT %  --   --  3   SEGS PCT % 89*  --   --    LYMPHO PCT % 5*  --  2*   MONO PCT % 5  --  3*   EOS PCT % 0  --  0    < > = values in this interval not displayed.     Results from last 7 days   Lab Units 02/16/25  0216 02/15/25  1101   SODIUM mmol/L 131* 127*   POTASSIUM mmol/L 2.8* 2.8*   CHLORIDE mmol/L 99 92*   CO2 mmol/L 23 24   BUN mg/dL 11 13   CREATININE mg/dL 0.67 0.96   ANION GAP mmol/L 9 11   CALCIUM mg/dL 7.2* 7.9*   ALBUMIN g/dL  --  3.7   TOTAL BILIRUBIN mg/dL  --  0.82   ALK PHOS U/L  --  78   ALT U/L  --  13   AST U/L  --  25   GLUCOSE RANDOM mg/dL 115 151*     Results from last 7 days   Lab Units 02/15/25  1101   INR  1.07             Results from last 7 days   Lab Units 02/16/25  0216 02/15/25  1236 02/15/25  1101   LACTIC ACID mmol/L  --  1.7 2.2*   PROCALCITONIN ng/ml 0.80*  --  0.74*       Imaging: Radiology Review: No additional pertinent studies reviewed.    Recent Cultures (last 7 days):   Results from last 7 days   Lab Units 02/15/25  1747 02/15/25  1112 02/15/25  1101   BLOOD CULTURE   --  Received in Microbiology Lab. Culture in Progress.  --    GRAM STAIN RESULT   --   --  Gram positive cocci in pairs and chains*   LEGIONELLA URINARY ANTIGEN  Negative  --   --        Telemetry Orders (From admission, onward)               24 Hour Telemetry Monitoring  (ED Bridging Orders Panel)  Continuous x 24 Hours (Telem)        Expiring   Question:  Reason for 24 Hour Telemetry  Answer:  Metabolic/electrolyte disturbance with high probability of dysrhythmia. K level <3 or >6 OR KCL infusion >10mEq/hr                        Last 24 Hours Medication List:   Current Facility-Administered Medications   Medication Dose Route Frequency Provider Last Rate    acetaminophen  650 mg Oral Q6H PRN Giuseppe Roldan PA-C      albuterol  2.5 mg Nebulization  Q4H PRN Brianna Cifuentes MD      ampicillin-sulbactam  3 g Intravenous Q6H Giuseppe Roldan PA-C      atorvastatin  40 mg Oral Daily With Dinner Giuseppe Roldan PA-C      benzonatate  100 mg Oral TID PRN Giuseppe Roldan PA-C      calcium carbonate-vitamin D  2 tablet Oral Daily With Breakfast Giuseppe Roldan PA-C      guaiFENesin  600 mg Oral BID Giuseppe Roldan PA-C      lidocaine  2 patch Topical Daily Giuseppe Roldan PA-C      [Held by provider] lisinopril  10 mg Oral Daily Giuseppe Roldan PA-C      ondansetron  4 mg Intravenous Q6H PRN Giuseppe Roldan PA-C      pantoprazole  40 mg Oral Daily Before Breakfast Giuseppe Roldan PA-C      PARoxetine  20 mg Oral Daily Giuseppe Roldan PA-C      phenol  1 spray Mouth/Throat Q2H PRN Giuseppe Roldan PA-C      saccharomyces boulardii  250 mg Oral BID Giuseppe Roldan PA-C      tiZANidine  4 mg Oral Q8H PRN Juhi Thomas PA-C      [Held by provider] verapamil  240 mg Oral Daily Giuseppe Roldan PA-C          Today, Patient Was Seen By: Giuseppe Roldan PA-C    **Please Note: This note may have been constructed using a voice recognition system.**

## 2025-02-17 ENCOUNTER — APPOINTMENT (INPATIENT)
Dept: NON INVASIVE DIAGNOSTICS | Facility: HOSPITAL | Age: 70
DRG: 871 | End: 2025-02-17
Payer: COMMERCIAL

## 2025-02-17 LAB
ANION GAP SERPL CALCULATED.3IONS-SCNC: 7 MMOL/L (ref 4–13)
AORTIC ROOT: 3 CM
ASCENDING AORTA: 3.3 CM
BASOPHILS # BLD AUTO: 0.02 THOUSANDS/ΜL (ref 0–0.1)
BASOPHILS NFR BLD AUTO: 0 % (ref 0–1)
BSA FOR ECHO PROCEDURE: 1.88 M2
BUN SERPL-MCNC: 10 MG/DL (ref 5–25)
CALCIUM SERPL-MCNC: 7.5 MG/DL (ref 8.4–10.2)
CHLORIDE SERPL-SCNC: 102 MMOL/L (ref 96–108)
CO2 SERPL-SCNC: 23 MMOL/L (ref 21–32)
CREAT SERPL-MCNC: 0.62 MG/DL (ref 0.6–1.3)
DOP CALC LVOT AREA: 3.14 CM2
DOP CALC LVOT DIAMETER: 2 CM
E WAVE DECELERATION TIME: 231 MS
E/A RATIO: 0.78
EOSINOPHIL # BLD AUTO: 0.04 THOUSAND/ΜL (ref 0–0.61)
EOSINOPHIL NFR BLD AUTO: 0 % (ref 0–6)
ERYTHROCYTE [DISTWIDTH] IN BLOOD BY AUTOMATED COUNT: 12.2 % (ref 11.6–15.1)
FRACTIONAL SHORTENING: 28 (ref 28–44)
GFR SERPL CREATININE-BSD FRML MDRD: 92 ML/MIN/1.73SQ M
GLUCOSE SERPL-MCNC: 112 MG/DL (ref 65–140)
HCT VFR BLD AUTO: 30.8 % (ref 34.8–46.1)
HGB BLD-MCNC: 10.3 G/DL (ref 11.5–15.4)
IMM GRANULOCYTES # BLD AUTO: 0.15 THOUSAND/UL (ref 0–0.2)
IMM GRANULOCYTES NFR BLD AUTO: 1 % (ref 0–2)
INTERVENTRICULAR SEPTUM IN DIASTOLE (PARASTERNAL SHORT AXIS VIEW): 1.2 CM
INTERVENTRICULAR SEPTUM: 1.2 CM (ref 0.6–1.1)
LAAS-AP2: 15.8 CM2
LAAS-AP4: 17.9 CM2
LEFT ATRIUM SIZE: 4.5 CM
LEFT ATRIUM VOLUME (MOD BIPLANE): 48 ML
LEFT ATRIUM VOLUME INDEX (MOD BIPLANE): 25.5 ML/M2
LEFT INTERNAL DIMENSION IN SYSTOLE: 2.8 CM (ref 2.1–4)
LEFT VENTRICLE DIASTOLIC VOLUME (MOD BIPLANE): 82 ML
LEFT VENTRICLE DIASTOLIC VOLUME INDEX (MOD BIPLANE): 43.6 ML/M2
LEFT VENTRICLE SYSTOLIC VOLUME (MOD BIPLANE): 27 ML
LEFT VENTRICLE SYSTOLIC VOLUME INDEX (MOD BIPLANE): 14.4 ML/M2
LEFT VENTRICULAR INTERNAL DIMENSION IN DIASTOLE: 3.9 CM (ref 3.5–6)
LEFT VENTRICULAR POSTERIOR WALL IN END DIASTOLE: 1.1 CM
LEFT VENTRICULAR STROKE VOLUME: 38 ML
LV EF BIPLANE MOD: 67 %
LV EF US.2D.A4C+ESTIMATED: 70 %
LVSV (TEICH): 38 ML
LYMPHOCYTES # BLD AUTO: 0.89 THOUSANDS/ΜL (ref 0.6–4.47)
LYMPHOCYTES NFR BLD AUTO: 7 % (ref 14–44)
MAGNESIUM SERPL-MCNC: 1.9 MG/DL (ref 1.9–2.7)
MCH RBC QN AUTO: 31.3 PG (ref 26.8–34.3)
MCHC RBC AUTO-ENTMCNC: 33.4 G/DL (ref 31.4–37.4)
MCV RBC AUTO: 94 FL (ref 82–98)
MONOCYTES # BLD AUTO: 0.71 THOUSAND/ΜL (ref 0.17–1.22)
MONOCYTES NFR BLD AUTO: 5 % (ref 4–12)
MV E'TISSUE VEL-SEP: 8 CM/S
MV PEAK A VEL: 1.16 M/S
MV PEAK E VEL: 91 CM/S
MV STENOSIS PRESSURE HALF TIME: 67 MS
MV VALVE AREA P 1/2 METHOD: 3.28
NEUTROPHILS # BLD AUTO: 11.93 THOUSANDS/ΜL (ref 1.85–7.62)
NEUTS SEG NFR BLD AUTO: 87 % (ref 43–75)
NRBC BLD AUTO-RTO: 0 /100 WBCS
PLATELET # BLD AUTO: 283 THOUSANDS/UL (ref 149–390)
PMV BLD AUTO: 9.5 FL (ref 8.9–12.7)
POTASSIUM SERPL-SCNC: 3.3 MMOL/L (ref 3.5–5.3)
RBC # BLD AUTO: 3.29 MILLION/UL (ref 3.81–5.12)
RIGHT ATRIUM AREA SYSTOLE A4C: 15.8 CM2
RIGHT VENTRICLE ID DIMENSION: 3.7 CM
SL CV LEFT ATRIUM LENGTH A2C: 5.3 CM
SL CV LV EF: 55
SL CV PED ECHO LEFT VENTRICLE DIASTOLIC VOLUME (MOD BIPLANE) 2D: 68 ML
SL CV PED ECHO LEFT VENTRICLE SYSTOLIC VOLUME (MOD BIPLANE) 2D: 30 ML
SODIUM SERPL-SCNC: 132 MMOL/L (ref 135–147)
TR MAX PG: 30 MMHG
TR PEAK VELOCITY: 2.7 M/S
TRICUSPID ANNULAR PLANE SYSTOLIC EXCURSION: 2.1 CM
TRICUSPID VALVE PEAK REGURGITATION VELOCITY: 2.74 M/S
WBC # BLD AUTO: 13.74 THOUSAND/UL (ref 4.31–10.16)

## 2025-02-17 PROCEDURE — 94760 N-INVAS EAR/PLS OXIMETRY 1: CPT

## 2025-02-17 PROCEDURE — 99232 SBSQ HOSP IP/OBS MODERATE 35: CPT | Performed by: HOSPITALIST

## 2025-02-17 PROCEDURE — 85025 COMPLETE CBC W/AUTO DIFF WBC: CPT | Performed by: INTERNAL MEDICINE

## 2025-02-17 PROCEDURE — 80048 BASIC METABOLIC PNL TOTAL CA: CPT | Performed by: INTERNAL MEDICINE

## 2025-02-17 PROCEDURE — 93306 TTE W/DOPPLER COMPLETE: CPT

## 2025-02-17 PROCEDURE — 99232 SBSQ HOSP IP/OBS MODERATE 35: CPT | Performed by: INTERNAL MEDICINE

## 2025-02-17 PROCEDURE — 94640 AIRWAY INHALATION TREATMENT: CPT

## 2025-02-17 PROCEDURE — 83735 ASSAY OF MAGNESIUM: CPT | Performed by: INTERNAL MEDICINE

## 2025-02-17 PROCEDURE — 93306 TTE W/DOPPLER COMPLETE: CPT | Performed by: INTERNAL MEDICINE

## 2025-02-17 PROCEDURE — 87040 BLOOD CULTURE FOR BACTERIA: CPT | Performed by: INTERNAL MEDICINE

## 2025-02-17 RX ORDER — TIMOLOL MALEATE 5 MG/ML
1 SOLUTION/ DROPS OPHTHALMIC DAILY
Status: DISCONTINUED | OUTPATIENT
Start: 2025-02-17 | End: 2025-02-20 | Stop reason: HOSPADM

## 2025-02-17 RX ORDER — VERAPAMIL HYDROCHLORIDE 120 MG/1
240 TABLET, FILM COATED, EXTENDED RELEASE ORAL ONCE
Status: COMPLETED | OUTPATIENT
Start: 2025-02-17 | End: 2025-02-17

## 2025-02-17 RX ORDER — POTASSIUM CHLORIDE 1500 MG/1
40 TABLET, EXTENDED RELEASE ORAL ONCE
Status: COMPLETED | OUTPATIENT
Start: 2025-02-17 | End: 2025-02-17

## 2025-02-17 RX ORDER — LANOLIN ALCOHOL/MO/W.PET/CERES
400 CREAM (GRAM) TOPICAL 2 TIMES DAILY
Status: COMPLETED | OUTPATIENT
Start: 2025-02-17 | End: 2025-02-18

## 2025-02-17 RX ADMIN — Medication 3 ML: at 14:04

## 2025-02-17 RX ADMIN — LIDOCAINE 5% 2 PATCH: 700 PATCH TOPICAL at 09:11

## 2025-02-17 RX ADMIN — PANTOPRAZOLE SODIUM 40 MG: 40 TABLET, DELAYED RELEASE ORAL at 05:24

## 2025-02-17 RX ADMIN — LEVALBUTEROL HYDROCHLORIDE 1.25 MG: 1.25 SOLUTION RESPIRATORY (INHALATION) at 14:03

## 2025-02-17 RX ADMIN — Medication 3 ML: at 20:57

## 2025-02-17 RX ADMIN — Medication 2 TABLET: at 09:11

## 2025-02-17 RX ADMIN — LEVALBUTEROL HYDROCHLORIDE 1.25 MG: 1.25 SOLUTION RESPIRATORY (INHALATION) at 08:33

## 2025-02-17 RX ADMIN — Medication 400 MG: at 14:08

## 2025-02-17 RX ADMIN — CEFAZOLIN SODIUM 2000 MG: 2 SOLUTION INTRAVENOUS at 21:56

## 2025-02-17 RX ADMIN — VERAPAMIL HYDROCHLORIDE 240 MG: 120 TABLET ORAL at 14:08

## 2025-02-17 RX ADMIN — CEFAZOLIN SODIUM 2000 MG: 2 SOLUTION INTRAVENOUS at 05:23

## 2025-02-17 RX ADMIN — ACETAMINOPHEN 975 MG: 325 TABLET, FILM COATED ORAL at 13:24

## 2025-02-17 RX ADMIN — ATORVASTATIN CALCIUM 40 MG: 40 TABLET, FILM COATED ORAL at 17:15

## 2025-02-17 RX ADMIN — Medication 3 ML: at 08:33

## 2025-02-17 RX ADMIN — PAROXETINE HYDROCHLORIDE 20 MG: 20 TABLET, FILM COATED ORAL at 09:11

## 2025-02-17 RX ADMIN — Medication 250 MG: at 09:11

## 2025-02-17 RX ADMIN — ACETAMINOPHEN 975 MG: 325 TABLET, FILM COATED ORAL at 21:56

## 2025-02-17 RX ADMIN — POTASSIUM CHLORIDE 40 MEQ: 1500 TABLET, EXTENDED RELEASE ORAL at 10:14

## 2025-02-17 RX ADMIN — LEVALBUTEROL HYDROCHLORIDE 1.25 MG: 1.25 SOLUTION RESPIRATORY (INHALATION) at 20:57

## 2025-02-17 RX ADMIN — GUAIFENESIN 600 MG: 600 TABLET, EXTENDED RELEASE ORAL at 17:15

## 2025-02-17 RX ADMIN — ACETAMINOPHEN 975 MG: 325 TABLET, FILM COATED ORAL at 05:24

## 2025-02-17 RX ADMIN — Medication 250 MG: at 17:15

## 2025-02-17 RX ADMIN — CEFAZOLIN SODIUM 2000 MG: 2 SOLUTION INTRAVENOUS at 13:25

## 2025-02-17 RX ADMIN — GUAIFENESIN 600 MG: 600 TABLET, EXTENDED RELEASE ORAL at 09:11

## 2025-02-17 RX ADMIN — ONDANSETRON 4 MG: 2 INJECTION INTRAMUSCULAR; INTRAVENOUS at 09:33

## 2025-02-17 NOTE — ASSESSMENT & PLAN NOTE
Admission blood cultures 1 out of 2 positive for group A streptococcus, strep pyogenes.  Repeat BC x 2 tomorrow   ID following  Continue Ancef  Echo pending

## 2025-02-17 NOTE — ASSESSMENT & PLAN NOTE
CT scan with 9.3 x 5.1 cm masslike consolidation in the posterior right upper lobe with extensive surrounding groundglass opacity and central air-filled cavity  QuantiFERON gold is negative/indeterminate although very low suspicion for TB, patient with no risk factors  Likely related to strep pyogenes given her positive blood culture, gram-positive cocci in sputum culture as well  Would continue with antibiotics per infectious disease and repeat CT scan in 6 to 8 weeks  Continue Mucinex, will add nebulizer treatments as she did find some relief with this yesterday  If any clinical worsening, would need a bronchoscopy for BAL/cultures

## 2025-02-17 NOTE — PROGRESS NOTES
Progress Note - Pulmonology   Name: Fay Corley 69 y.o. female I MRN: [id number]  Unit/Bed#: -01 I Date of Admission: 2/15/2025   Date of Service: 2/17/2025 I Hospital Day: 2    Assessment & Plan  Severe sepsis (HCC)  Secondary to strep bacteremia and cavitary pneumonia  Cavitary pneumonia  CT scan with 9.3 x 5.1 cm masslike consolidation in the posterior right upper lobe with extensive surrounding groundglass opacity and central air-filled cavity  QuantiFERON gold is negative/indeterminate although very low suspicion for TB, patient with no risk factors  Likely related to strep pyogenes given her positive blood culture, gram-positive cocci in sputum culture as well  Would continue with antibiotics per infectious disease and repeat CT scan in 6 to 8 weeks  Continue Mucinex, will add nebulizer treatments as she did find some relief with this yesterday  If any clinical worsening, would need a bronchoscopy for BAL/cultures  Influenza A  With superimposed bacterial pneumonia  Treatment per infectious disease  Supportive care  Acute respiratory failure (HCC)  In the setting of severe pneumonia, currently requiring 3 L nasal cannula, titrate to maintain O2 sats greater than 89%  Would perform home O2 eval prior to discharge  Hemoptysis  In the setting of cavitary pneumonia  H&H is stable  Would continue to monitor  Bacteremia due to Streptococcus  Antibiotics per infectious disease, repeat blood cultures are pending    24 Hour Events : none  Subjective : patient feeling little better, still very tired. No new complaints.     Objective :  Temp:  [97.8 °F (36.6 °C)-98.9 °F (37.2 °C)] 97.8 °F (36.6 °C)  HR:  [] 105  BP: ()/(55-81) 140/81  Resp:  [18-21] 20  SpO2:  [89 %-95 %] 92 %  O2 Device: Nasal cannula  Nasal Cannula O2 Flow Rate (L/min):  [3 L/min-4 L/min] 4 L/min    Physical Exam    General appearance: Alert and awake, in no acute distress  Head: Normocephalic, without obvious abnormality,  atraumatic  Eyes: No scleral icterus   Lungs: +Rhonchi  Heart: Regular rate  Abdomen:  No appreciable distension or tenderness  Extremities: No deformity  Skin: Warm and dry  Neurologic: No acute focal deficits are noted        Lab Results: I have reviewed the following results:   .     02/17/25  0438   WBC 13.74*   HGB 10.3*   HCT 30.8*      SODIUM 132*   K 3.3*      CO2 23   BUN 10   CREATININE 0.62   GLUC 112   MG 1.9     ABG: No new results in last 24 hours.    Imaging Results Review: I reviewed radiology reports from this admission including: CT chest.  Other Study Results Review: No additional pertinent studies reviewed.  PFT Results Reviewed: na

## 2025-02-17 NOTE — PLAN OF CARE
Problem: PAIN - ADULT  Goal: Verbalizes/displays adequate comfort level or baseline comfort level  Description: Interventions:  - Encourage patient to monitor pain and request assistance  - Assess pain using appropriate pain scale  - Administer analgesics based on type and severity of pain and evaluate response  - Implement non-pharmacological measures as appropriate and evaluate response  - Consider cultural and social influences on pain and pain management  - Notify physician/advanced practitioner if interventions unsuccessful or patient reports new pain  Outcome: Progressing     Problem: INFECTION - ADULT  Goal: Absence or prevention of progression during hospitalization  Description: INTERVENTIONS:  - Assess and monitor for signs and symptoms of infection  - Monitor lab/diagnostic results  - Monitor all insertion sites, i.e. indwelling lines, tubes, and drains  - Monitor endotracheal if appropriate and nasal secretions for changes in amount and color  - [place] appropriate cooling/warming therapies per order  - Administer medications as ordered  - Instruct and encourage patient and family to use good hand hygiene technique  - Identify and instruct in appropriate isolation precautions for identified infection/condition  Outcome: Progressing  Goal: Absence of fever/infection during neutropenic period  Description: INTERVENTIONS:  - Monitor WBC    Outcome: Progressing     Problem: SAFETY ADULT  Goal: Patient will remain free of falls  Description: INTERVENTIONS:  - Educate patient/family on patient safety including physical limitations  - Instruct patient to call for assistance with activity   - Consult OT/PT to assist with strengthening/mobility   - Keep Call bell within reach  - Keep bed low and locked with side rails adjusted as appropriate  - Keep care items and personal belongings within reach  - Initiate and maintain comfort rounds  - Make Fall Risk Sign visible to staff  - Offer Toileting every 2 Hours,  in advance of need  - Initiate/Maintain bed alarm  - Obtain necessary fall risk management equipment: yellow socks  - Apply yellow socks and bracelet for high fall risk patients  - Consider moving patient to room near nurses station  Outcome: Progressing  Goal: Maintain or return to baseline ADL function  Description: INTERVENTIONS:  -  Assess patient's ability to carry out ADLs; assess patient's baseline for ADL function and identify physical deficits which impact ability to perform ADLs (bathing, care of mouth/teeth, toileting, grooming, dressing, etc.)  - Assess/evaluate cause of self-care deficits   - Assess range of motion  - Assess patient's mobility; develop plan if impaired  - Assess patient's need for assistive devices and provide as appropriate  - Encourage maximum independence but intervene and supervise when necessary  - Involve family in performance of ADLs  - Assess for home care needs following discharge   - Consider OT consult to assist with ADL evaluation and planning for discharge  - Provide patient education as appropriate  Outcome: Progressing  Goal: Maintains/Returns to pre admission functional level  Description: INTERVENTIONS:  - Perform AM-PAC 6 Click Basic Mobility/ Daily Activity assessment daily.  - Set and communicate daily mobility goal to care team and patient/family/caregiver.   - Collaborate with rehabilitation services on mobility goals if consulted  - Perform Range of Motion 3 times a day.  - Reposition patient every 2 hours.  - Dangle patient 3 times a day  - Stand patient 3 times a day  - Ambulate patient 3 times a day  - Out of bed to chair 3 times a day   - Out of bed for meals 3 times a day  - Out of bed for toileting  - Record patient progress and toleration of activity level   Outcome: Progressing     Problem: DISCHARGE PLANNING  Goal: Discharge to home or other facility with appropriate resources  Description: INTERVENTIONS:  - Identify barriers to discharge w/patient and  caregiver  - Arrange for needed discharge resources and transportation as appropriate  - Identify discharge learning needs (meds, wound care, etc.)  - Arrange for interpretive services to assist at discharge as needed  - Refer to Case Management Department for coordinating discharge planning if the patient needs post-hospital services based on physician/advanced practitioner order or complex needs related to functional status, cognitive ability, or social support system  Outcome: Progressing     Problem: Knowledge Deficit  Goal: Patient/family/caregiver demonstrates understanding of disease process, treatment plan, medications, and discharge instructions  Description: Complete learning assessment and assess knowledge base.  Interventions:  - Provide teaching at level of understanding  - Provide teaching via preferred learning methods  Outcome: Progressing     Problem: CARDIOVASCULAR - ADULT  Goal: Maintains optimal cardiac output and hemodynamic stability  Description: INTERVENTIONS:  - Monitor I/O, vital signs and rhythm  - Monitor for S/S and trends of decreased cardiac output  - Administer and titrate ordered vasoactive medications to optimize hemodynamic stability  - Assess quality of pulses, skin color and temperature  - Assess for signs of decreased coronary artery perfusion  - Instruct patient to report change in severity of symptoms  Outcome: Progressing  Goal: Absence of cardiac dysrhythmias or at baseline rhythm  Description: INTERVENTIONS:  - Continuous cardiac monitoring, vital signs, obtain 12 lead EKG if ordered  - Administer antiarrhythmic and heart rate control medications as ordered  - Monitor electrolytes and administer replacement therapy as ordered  Outcome: Progressing     Problem: RESPIRATORY - ADULT  Goal: Achieves optimal ventilation and oxygenation  Description: INTERVENTIONS:  - Assess for changes in respiratory status  - Assess for changes in mentation and behavior  - Position to  facilitate oxygenation and minimize respiratory effort  - Oxygen administered by appropriate delivery if ordered  - Initiate smoking cessation education as indicated  - Encourage broncho-pulmonary hygiene including cough, deep breathe, Incentive Spirometry  - Assess the need for suctioning and aspirate as needed  - Assess and instruct to report SOB or any respiratory difficulty  - Respiratory Therapy support as indicated  Outcome: Progressing     Problem: METABOLIC, FLUID AND ELECTROLYTES - ADULT  Goal: Electrolytes maintained within normal limits  Description: INTERVENTIONS:  - Monitor labs and assess patient for signs and symptoms of electrolyte imbalances  - Administer electrolyte replacement as ordered  - Monitor response to electrolyte replacements, including repeat lab results as appropriate  - Instruct patient on fluid and nutrition as appropriate  Outcome: Progressing  Goal: Fluid balance maintained  Description: INTERVENTIONS:  - Monitor labs   - Monitor I/O and WT  - Instruct patient on fluid and nutrition as appropriate  - Assess for signs & symptoms of volume excess or deficit  Outcome: Progressing  Goal: Glucose maintained within target range  Description: INTERVENTIONS:  - Monitor Blood Glucose as ordered  - Assess for signs and symptoms of hyperglycemia and hypoglycemia  - Administer ordered medications to maintain glucose within target range  - Assess nutritional intake and initiate nutrition service referral as needed  Outcome: Progressing     Problem: Nutrition/Hydration-ADULT  Goal: Nutrient/Hydration intake appropriate for improving, restoring or maintaining nutritional needs  Description: Monitor and assess patient's nutrition/hydration status for malnutrition. Collaborate with interdisciplinary team and initiate plan and interventions as ordered.  Monitor patient's weight and dietary intake as ordered or per policy. Utilize nutrition screening tool and intervene as necessary. Determine  patient's food preferences and provide high-protein, high-caloric foods as appropriate.     INTERVENTIONS:  - Monitor oral intake, urinary output, labs, and treatment plans  - Assess nutrition and hydration status and recommend course of action  - Evaluate amount of meals eaten  - Assist patient with eating if necessary   - Allow adequate time for meals  - Recommend/ encourage appropriate diets, oral nutritional supplements, and vitamin/mineral supplements  - Order, calculate, and assess calorie counts as needed  - Recommend, monitor, and adjust tube feedings and TPN/PPN based on assessed needs  - Assess need for intravenous fluids  - Provide specific nutrition/hydration education as appropriate  - Include patient/family/caregiver in decisions related to nutrition  Outcome: Progressing

## 2025-02-17 NOTE — PROGRESS NOTES
"Progress Note - Hospitalist   Name: Fay Corley 69 y.o. female I MRN: [id number]  Unit/Bed#: -01 I Date of Admission: 2/15/2025   Date of Service: 2/17/2025 I Hospital Day: 2    Assessment & Plan  Severe sepsis (HCC)  SIRS criteria: Tachycardia, tachypnea, leukocytosis  Suspected source: RUL pneumonia  Lactic acid: 2.2 on admit - now normal  End organ damage: lactic acidosis  Continue with IV Ancef  With bacteremia, see related plan below  Monitor vital signs, laboratory studies.  Cavitary pneumonia  Acute hypoxic respiratory failure secondary to pneumonia, currently on 4 L oxygen.  Wean oxygen as tolerated  Patient endorsing 3 to 4 weeks of progressive coughing, shortness of breath, fevers.  Reported Tmax of 103.8 and now with hemoptysis.  CT imaging with \"9.3 x 5.1 cm masslike consolidation in the posterior right upper lobe with extensive surrounding groundglass opacity and central air-filled cavity. Given the patient's history, this is likely necrotizing pneumonia versus lung abscess with surrounding pulmonary hemorrhage. TB not excluded in the appropriate clinical setting. \"  Discussed with ID  Continue with IV Ancef  Cancel AFB  No concern for TB at this time per ID  Follow cultures  Respiratory protocol, nebs PRN.  Monitor respiratory status.  Pulmonary following  Hemoptysis  Patient endorsing hemoptysis which brought her into the hospital  Related to cavitary pneumonia  Pulmonary following  Monitor hemoglobin  Avoid DVT prophylaxis and blood thinners for now until hemoptysis resolves  Hypertension  Blood pressure soft this morning, responding to IV hydration  Hold lisinopril for now, restart verapamil  Monitor BP closely and avoid hypotension  Electrolyte abnormality  With marked electrolyte abnormalities, due to dehydration.  Sodium 127 upon admission, started on IV Isolyte overnight.  Repeat 131 and improved  Potassium 2.8, will continue repletion and repeat later today  Magnesium 1.9, replete.  " Continue to monitor  Influenza A  Testing positive for influenza A however symptoms have been going on for a few weeks. She is out of the window of Tamiflu at this time  Supportive care  Treatment for pneumonia as above  Acute respiratory failure (HCC)  With acute respiratory failure secondary to pneumonia and influenza A  Requiring 3 L/min today on my exam  See plan above, wean O2 as tolerated  Bacteremia due to Streptococcus  Admission blood cultures 1 out of 2 positive for group A streptococcus, strep pyogenes.  Repeat BC x 2 tomorrow   ID following  Continue Ancef  Echo pending    VTE Pharmacologic Prophylaxis: VTE Score: 7 High Risk (Score >/= 5) - Pharmacological DVT Prophylaxis Contraindicated. Sequential Compression Devices Ordered.     Mobility:   Basic Mobility Inpatient Raw Score: 19  JH-HLM Goal: 6: Walk 10 steps or more  JH-HLM Achieved: 6: Walk 10 steps or more  JH-HLM Goal achieved. Continue to encourage appropriate mobility.    Patient Centered Rounds: I performed bedside rounds with nursing staff today.   Discussions with Specialists or Other Care Team Provider: CM,RN    Current Length of Stay: 2 day(s)  Current Patient Status: Inpatient   Certification Statement: The patient will continue to require additional inpatient hospital stay due to severe sepsis secondary to pneumonia and bacteremia on IV antibiotics  Discharge Plan: Anticipate discharge in 48-72 hrs to home with home services.    Code Status: Level 1 - Full Code    Subjective   Denies chest pain    Objective :  Temp:  [97.8 °F (36.6 °C)-98.9 °F (37.2 °C)] 97.8 °F (36.6 °C)  HR:  [] 105  BP: ()/(55-81) 140/81  Resp:  [18-21] 20  SpO2:  [89 %-95 %] 92 %  O2 Device: Nasal cannula  Nasal Cannula O2 Flow Rate (L/min):  [3 L/min-4 L/min] 4 L/min    Body mass index is 31.24 kg/m².     Input and Output Summary (last 24 hours):     Intake/Output Summary (Last 24 hours) at 2/17/2025 1335  Last data filed at 2/17/2025 1023  Gross per 24  hour   Intake 820 ml   Output 0 ml   Net 820 ml       Physical Exam    itals and nursing note reviewed.   Constitutional:       General: She is not in acute distress.     Appearance: Normal appearance. She is ill-appearing and toxic-appearing.      Interventions: Nasal cannula in place.   HENT:      Head: Normocephalic and atraumatic.      Mouth/Throat:      Mouth: Mucous membranes are moist.   Eyes:      General: No scleral icterus.        Right eye: No discharge.         Left eye: No discharge.      Pupils: Pupils are equal, round, and reactive to light.   Cardiovascular:      Rate and Rhythm: Normal rate and regular rhythm.      Heart sounds: No murmur heard.     No friction rub. No gallop.   Pulmonary:   Rhonchi in bases  Abdominal:      General: Bowel sounds are normal. There is no distension.      Palpations: Abdomen is soft.      Tenderness: There is no abdominal tenderness. There is no guarding or rebound.   Musculoskeletal:         General: No swelling or deformity.      Cervical back: Neck supple.      Right lower leg: No edema.      Left lower leg: No edema.   Skin:     General: Skin is warm and dry.      Capillary Refill: Capillary refill takes less than 2 seconds.      Coloration: Skin is not jaundiced or pale.      Findings: No erythema or rash.   Neurological:      General: No focal deficit present.      Mental Status: She is alert and oriented to person, place, and time. Mental status is at baseline.   Psychiatric:         Mood and Affect: Mood normal.         Behavior: Behavior normal.        Lines/Drains:        Telemetry:  Telemetry Orders (From admission, onward)               24 Hour Telemetry Monitoring  (ED Bridging Orders Panel)  Continuous x 24 Hours (Telem)        Expiring   Question:  Reason for 24 Hour Telemetry  Answer:  Metabolic/electrolyte disturbance with high probability of dysrhythmia. K level <3 or >6 OR KCL infusion >10mEq/hr                     Telemetry Reviewed: Normal Sinus  Rhythm  Indication for Continued Telemetry Use: No indication for continued use. Will discontinue.                Lab Results: I have reviewed the following results:   Results from last 7 days   Lab Units 02/17/25  0438 02/15/25  1520 02/15/25  1101   WBC Thousand/uL 13.74*   < > 24.16*   HEMOGLOBIN g/dL 10.3*   < > 12.2   HEMATOCRIT % 30.8*   < > 35.9   PLATELETS Thousands/uL 283   < > 265   BANDS PCT %  --   --  3   SEGS PCT % 87*   < >  --    LYMPHO PCT % 7*   < > 2*   MONO PCT % 5   < > 3*   EOS PCT % 0   < > 0    < > = values in this interval not displayed.     Results from last 7 days   Lab Units 02/17/25  0438 02/16/25  0216 02/15/25  1101   SODIUM mmol/L 132*   < > 127*   POTASSIUM mmol/L 3.3*   < > 2.8*   CHLORIDE mmol/L 102   < > 92*   CO2 mmol/L 23   < > 24   BUN mg/dL 10   < > 13   CREATININE mg/dL 0.62   < > 0.96   ANION GAP mmol/L 7   < > 11   CALCIUM mg/dL 7.5*   < > 7.9*   ALBUMIN g/dL  --   --  3.7   TOTAL BILIRUBIN mg/dL  --   --  0.82   ALK PHOS U/L  --   --  78   ALT U/L  --   --  13   AST U/L  --   --  25   GLUCOSE RANDOM mg/dL 112   < > 151*    < > = values in this interval not displayed.     Results from last 7 days   Lab Units 02/15/25  1101   INR  1.07             Results from last 7 days   Lab Units 02/16/25  0216 02/15/25  1236 02/15/25  1101   LACTIC ACID mmol/L  --  1.7 2.2*   PROCALCITONIN ng/ml 0.80*  --  0.74*       Recent Cultures (last 7 days):   Results from last 7 days   Lab Units 02/15/25  1747 02/15/25  1631 02/15/25  1112 02/15/25  1101   BLOOD CULTURE   --   --  Staphylococcus epidermidis* Streptococcus pyogenes (Group A)*   GRAM STAIN RESULT   --  3+ Polys*  1+ Gram positive cocci in chains*  No Epithelial cells seen* Gram positive cocci in clusters* Gram positive cocci in pairs and chains*   LEGIONELLA URINARY ANTIGEN  Negative  --   --   --        Imaging Results Review: No pertinent imaging studies reviewed.  Other Study Results Review: No additional pertinent studies  reviewed.    Last 24 Hours Medication List:     Current Facility-Administered Medications:     acetaminophen (TYLENOL) tablet 975 mg, Q8H CARLOS    albuterol inhalation solution 2.5 mg, Q4H PRN    atorvastatin (LIPITOR) tablet 40 mg, Daily With Dinner    benzonatate (TESSALON PERLES) capsule 100 mg, TID PRN    calcium carbonate-vitamin D 500 mg-5 mcg tablet 2 tablet, Daily With Breakfast    ceFAZolin (ANCEF) IVPB (premix in dextrose) 2,000 mg 50 mL, Q8H, Last Rate: 2,000 mg (02/17/25 1325)    guaiFENesin (MUCINEX) 12 hr tablet 600 mg, BID    ketorolac (TORADOL) injection 15 mg, Q6H PRN    levalbuterol (XOPENEX) inhalation solution 1.25 mg, TID    lidocaine (LIDODERM) 5 % patch 2 patch, Daily    [Held by provider] lisinopril (ZESTRIL) tablet 10 mg, Daily    ondansetron (ZOFRAN) injection 4 mg, Q6H PRN    pantoprazole (PROTONIX) EC tablet 40 mg, Daily Before Breakfast    PARoxetine (PAXIL) tablet 20 mg, Daily    phenol (CHLORASEPTIC) 1.4 % mucosal liquid 1 spray, Q2H PRN    saccharomyces boulardii (FLORASTOR) capsule 250 mg, BID    sodium chloride 0.9 % inhalation solution 3 mL, TID    tiZANidine (ZANAFLEX) tablet 4 mg, Q8H PRN    [Held by provider] verapamil (CALAN-SR) CR tablet 240 mg, Daily    Administrative Statements   Today, Patient Was Seen By: Vel Joyce MD  I have spent a total time of 47 minutes in caring for this patient on the day of the visit/encounter including Diagnostic results, Prognosis, Risks and benefits of tx options, Instructions for management, Patient and family education, Importance of tx compliance, Risk factor reductions, Impressions, Counseling / Coordination of care, Documenting in the medical record, Reviewing/placing orders in the medical record (including tests, medications, and/or procedures), Obtaining or reviewing history  , and Communicating with other healthcare professionals .    **Please Note: This note may have been constructed using a voice recognition system.**

## 2025-02-17 NOTE — ASSESSMENT & PLAN NOTE
Blood pressure soft this morning, responding to IV hydration  Hold lisinopril for now, restart verapamil  Monitor BP closely and avoid hypotension

## 2025-02-17 NOTE — ASSESSMENT & PLAN NOTE
SIRS criteria: Tachycardia, tachypnea, leukocytosis  Suspected source: RUL pneumonia  Lactic acid: 2.2 on admit - now normal  End organ damage: lactic acidosis  Continue with IV Ancef  With bacteremia, see related plan below  Monitor vital signs, laboratory studies.

## 2025-02-17 NOTE — ASSESSMENT & PLAN NOTE
With marked electrolyte abnormalities, due to dehydration.  Sodium 127 upon admission, started on IV Isolyte overnight.  Repeat 131 and improved  Potassium 2.8, will continue repletion and repeat later today  Magnesium 1.9, replete.  Continue to monitor

## 2025-02-17 NOTE — PLAN OF CARE
Problem: PAIN - ADULT  Goal: Verbalizes/displays adequate comfort level or baseline comfort level  Description: Interventions:  - Encourage patient to monitor pain and request assistance  - Assess pain using appropriate pain scale  - Administer analgesics based on type and severity of pain and evaluate response  - Implement non-pharmacological measures as appropriate and evaluate response  - Consider cultural and social influences on pain and pain management  - Notify physician/advanced practitioner if interventions unsuccessful or patient reports new pain  Outcome: Progressing     Problem: INFECTION - ADULT  Goal: Absence or prevention of progression during hospitalization  Description: INTERVENTIONS:  - Assess and monitor for signs and symptoms of infection  - Monitor lab/diagnostic results  - Monitor all insertion sites, i.e. indwelling lines, tubes, and drains  - Monitor endotracheal if appropriate and nasal secretions for changes in amount and color  - [place] appropriate cooling/warming therapies per order  - Administer medications as ordered  - Instruct and encourage patient and family to use good hand hygiene technique  - Identify and instruct in appropriate isolation precautions for identified infection/condition  Outcome: Progressing  Goal: Absence of fever/infection during neutropenic period  Description: INTERVENTIONS:  - Monitor WBC    Outcome: Progressing     Problem: SAFETY ADULT  Goal: Patient will remain free of falls  Description: INTERVENTIONS:  - Educate patient/family on patient safety including physical limitations  - Instruct patient to call for assistance with activity   - Consult OT/PT to assist with strengthening/mobility   - Keep Call bell within reach  - Keep bed low and locked with side rails adjusted as appropriate  - Keep care items and personal belongings within reach  - Initiate and maintain comfort rounds  - Make Fall Risk Sign visible to staff  - Offer Toileting every 2 Hours,  in advance of need  - Initiate/Maintain bed alarm  - Obtain necessary fall risk management equipment: yellow socks  - Apply yellow socks and bracelet for high fall risk patients  - Consider moving patient to room near nurses station  Outcome: Progressing  Goal: Maintain or return to baseline ADL function  Description: INTERVENTIONS:  -  Assess patient's ability to carry out ADLs; assess patient's baseline for ADL function and identify physical deficits which impact ability to perform ADLs (bathing, care of mouth/teeth, toileting, grooming, dressing, etc.)  - Assess/evaluate cause of self-care deficits   - Assess range of motion  - Assess patient's mobility; develop plan if impaired  - Assess patient's need for assistive devices and provide as appropriate  - Encourage maximum independence but intervene and supervise when necessary  - Involve family in performance of ADLs  - Assess for home care needs following discharge   - Consider OT consult to assist with ADL evaluation and planning for discharge  - Provide patient education as appropriate  Outcome: Progressing  Goal: Maintains/Returns to pre admission functional level  Description: INTERVENTIONS:  - Perform AM-PAC 6 Click Basic Mobility/ Daily Activity assessment daily.  - Set and communicate daily mobility goal to care team and patient/family/caregiver.   - Collaborate with rehabilitation services on mobility goals if consulted  - Perform Range of Motion 3 times a day.  - Reposition patient every 2 hours.  - Dangle patient 3 times a day  - Stand patient 3 times a day  - Ambulate patient 3 times a day  - Out of bed to chair 3 times a day   - Out of bed for meals 3 times a day  - Out of bed for toileting  - Record patient progress and toleration of activity level   Outcome: Progressing     Problem: DISCHARGE PLANNING  Goal: Discharge to home or other facility with appropriate resources  Description: INTERVENTIONS:  - Identify barriers to discharge w/patient and  caregiver  - Arrange for needed discharge resources and transportation as appropriate  - Identify discharge learning needs (meds, wound care, etc.)  - Arrange for interpretive services to assist at discharge as needed  - Refer to Case Management Department for coordinating discharge planning if the patient needs post-hospital services based on physician/advanced practitioner order or complex needs related to functional status, cognitive ability, or social support system  Outcome: Progressing     Problem: Knowledge Deficit  Goal: Patient/family/caregiver demonstrates understanding of disease process, treatment plan, medications, and discharge instructions  Description: Complete learning assessment and assess knowledge base.  Interventions:  - Provide teaching at level of understanding  - Provide teaching via preferred learning methods  Outcome: Progressing     Problem: CARDIOVASCULAR - ADULT  Goal: Maintains optimal cardiac output and hemodynamic stability  Description: INTERVENTIONS:  - Monitor I/O, vital signs and rhythm  - Monitor for S/S and trends of decreased cardiac output  - Administer and titrate ordered vasoactive medications to optimize hemodynamic stability  - Assess quality of pulses, skin color and temperature  - Assess for signs of decreased coronary artery perfusion  - Instruct patient to report change in severity of symptoms  Outcome: Progressing  Goal: Absence of cardiac dysrhythmias or at baseline rhythm  Description: INTERVENTIONS:  - Continuous cardiac monitoring, vital signs, obtain 12 lead EKG if ordered  - Administer antiarrhythmic and heart rate control medications as ordered  - Monitor electrolytes and administer replacement therapy as ordered  Outcome: Progressing     Problem: RESPIRATORY - ADULT  Goal: Achieves optimal ventilation and oxygenation  Description: INTERVENTIONS:  - Assess for changes in respiratory status  - Assess for changes in mentation and behavior  - Position to  facilitate oxygenation and minimize respiratory effort  - Oxygen administered by appropriate delivery if ordered  - Initiate smoking cessation education as indicated  - Encourage broncho-pulmonary hygiene including cough, deep breathe, Incentive Spirometry  - Assess the need for suctioning and aspirate as needed  - Assess and instruct to report SOB or any respiratory difficulty  - Respiratory Therapy support as indicated  Outcome: Progressing     Problem: METABOLIC, FLUID AND ELECTROLYTES - ADULT  Goal: Electrolytes maintained within normal limits  Description: INTERVENTIONS:  - Monitor labs and assess patient for signs and symptoms of electrolyte imbalances  - Administer electrolyte replacement as ordered  - Monitor response to electrolyte replacements, including repeat lab results as appropriate  - Instruct patient on fluid and nutrition as appropriate  Outcome: Progressing  Goal: Fluid balance maintained  Description: INTERVENTIONS:  - Monitor labs   - Monitor I/O and WT  - Instruct patient on fluid and nutrition as appropriate  - Assess for signs & symptoms of volume excess or deficit  Outcome: Progressing  Goal: Glucose maintained within target range  Description: INTERVENTIONS:  - Monitor Blood Glucose as ordered  - Assess for signs and symptoms of hyperglycemia and hypoglycemia  - Administer ordered medications to maintain glucose within target range  - Assess nutritional intake and initiate nutrition service referral as needed  Outcome: Progressing     Problem: Nutrition/Hydration-ADULT  Goal: Nutrient/Hydration intake appropriate for improving, restoring or maintaining nutritional needs  Description: Monitor and assess patient's nutrition/hydration status for malnutrition. Collaborate with interdisciplinary team and initiate plan and interventions as ordered.  Monitor patient's weight and dietary intake as ordered or per policy. Utilize nutrition screening tool and intervene as necessary. Determine  patient's food preferences and provide high-protein, high-caloric foods as appropriate.     INTERVENTIONS:  - Monitor oral intake, urinary output, labs, and treatment plans  - Assess nutrition and hydration status and recommend course of action  - Evaluate amount of meals eaten  - Assist patient with eating if necessary   - Allow adequate time for meals  - Recommend/ encourage appropriate diets, oral nutritional supplements, and vitamin/mineral supplements  - Order, calculate, and assess calorie counts as needed  - Recommend, monitor, and adjust tube feedings and TPN/PPN based on assessed needs  - Assess need for intravenous fluids  - Provide specific nutrition/hydration education as appropriate  - Include patient/family/caregiver in decisions related to nutrition  Outcome: Progressing

## 2025-02-17 NOTE — PROGRESS NOTES
Progress Note - Infectious Disease   Name: Fay Corley 69 y.o. female I MRN: [id number]  Unit/Bed#: -01 I Date of Admission: 2/15/2025   Date of Service: 2/17/2025 I Hospital Day: 2     Assessment & Plan      69-year-old woman with cavitary pneumonia and bacteremia secondary to Streptococcus pyogenes, influenza A, leukocytosis, fever, hypertension     1) Influenza, superimposed bacterial pneumonia, bacteremia - Pt. with continued improvement, though oxygen requirement has gone up a bit, white blood cell count has improved as discussed below.     ===>. Will CONTINUE CEFAZOLIN, and follow patient closely for toxicity as well as continue improvement whilst on this agent.    ===> Anticipate transition to oral therapy with cefadroxil 1 g p.o. twice daily x 4 weeks from the time of discharge provided that patient's clinical improvement continues.     ===> Staphylococcus epidermidis most likely represents contaminant/false positive, no need to initiate therapy directed specifically against this organism.     ===> Follow-up blood cultures remain without growth.    ===> As discussed previously, no role for oseltamivir given duration of symptoms this agent is likely to be of little of any benefit.    2) Leukocytosis - 2/2 #1, will follow as marker of clinical improvement.  Proving as discussed above.     3) Fever - 2/2 #1, will follow as marker of clinical improvement.  Resolving.           ===> Discussed w/ 1' team, pulmonary, nursing  ===> Will follow            Subjective   Overnight, no acute events.  Patient on 4 L of supplemental oxygen from 3, however white blood cell count has decreased to 13,000.  Second set of blood cultures now positive for Staphylococcus epidermidis, sputum culture positive for yet to be identified gram-positive cocci in chains.  Patient complaining of some diarrhea, however overall feeling improved since yesterday.    Objective :  Temp:  [97.8 °F (36.6 °C)-98.9 °F (37.2 °C)] 97.8 °F  (36.6 °C)  HR:  [] 103  BP: ()/(55-81) 143/81  Resp:  [18-21] 20  SpO2:  [89 %-95 %] 92 %  O2 Device: Nasal cannula  Nasal Cannula O2 Flow Rate (L/min):  [3 L/min-4 L/min] 4 L/min    General:  No acute distress  Psychiatric:  Awake and alert  Pulmonary: Rhonchi in the in the right upper/midlung fields, mildly increased work of breathing.  Abdomen:  Soft, nontender  Extremities:  No edema  Skin:  No rashes

## 2025-02-17 NOTE — ASSESSMENT & PLAN NOTE
Patient endorsing hemoptysis which brought her into the hospital  Related to cavitary pneumonia  Pulmonary following  Monitor hemoglobin  Avoid DVT prophylaxis and blood thinners for now until hemoptysis resolves

## 2025-02-17 NOTE — UTILIZATION REVIEW
"Initial Clinical Review    Admission: Date/Time/Statement:   Admission Orders (From admission, onward)       Ordered        02/15/25 1327  INPATIENT ADMISSION  Once                          Orders Placed This Encounter   Procedures    INPATIENT ADMISSION     Standing Status:   Standing     Number of Occurrences:   1     Level of Care:   Med Surg [16]     Estimated length of stay:   More than 2 Midnights     Certification:   I certify that inpatient services are medically necessary for this patient for a duration of greater than two midnights. See H&P and MD Progress Notes for additional information about the patient's course of treatment.     ED Arrival Information       Expected   -    Arrival   2/15/2025 10:47    Acuity   Urgent              Means of arrival   Walk-In    Escorted by   Family Member    Service   Hospitalist    Admission type   Emergency              Arrival complaint   Coughing blood             Chief Complaint   Patient presents with    Cough     Pt states \"I started coughing up blood this AM.\" Pt reports upper back pain for a few days.       Initial Presentation: 69 y.o. female to ED from home w/ PMH of hypertension, GERD who presents with chief complaint of hemoptysis.  Patient endorses 3 to 4-week history of illness which include shortness of breath, coughing, viral syndrome with malaise and headache and congestion.  Symptoms progressively got worse, last week starting with fever Tmax 103.8.  Cough has been productive in nature. Hemoptysis started today . Tested + influenza A < an 127, K 2.8, mg 0.9, hgb 12.2 ( baseline ) . + tachycardia , tachypnea and leukocytosis CT imaging with \"9.3 x 5.1 cm masslike consolidation in the posterior right upper lobe with extensive surrounding groundglass opacity and central air-filled cavity. Given the patient's history, this is likely necrotizing pneumonia versus lung abscess with surrounding pulmonary hemorrhage. TB not excluded in the appropriate clinical " "setting. \"   Admitted IP status w/ severe sepsis r/t cavitary pneumonia . Plan for IV abx , f/u BC , monitor labs and vitals , check sputum cx and gram stain , trend pct . Supportive care , replete lytes and monitor .     PE: + rhonchi     Anticipated Length of Stay/Certification Statement:  Patient will be admitted on an inpatient basis with an anticipated length of stay of greater than 2 midnights secondary to cavitary pneumonia, severe sepsis needing IV antibiotics, pulmonary consult, infectious disease consult.     Date: 2/16   Day 2: cont IV isolate , IV unasyn . Follow up QuantiFERON gold - can cancel AFB  Follow up sputum culture and Gram stain, strep/Legionella urinary antigens. BP responding to IV hydration . Hold lisinopril and verapamil . Mg inc to 1.6 , cont to replete K and recheck . O2 at 3l , wean as lowell . dmission blood cultures 1 out of 2 positive for group A streptococcus, strep pyogenes.  Repeat BC x 2 tomorrow  ,ID following, Continue IV Unasyn , Obtain TTE.     2/16 ID Consult   Influenza , superimposed bactgerial pneumonia, bacteremia .  discontinue ongoing AFB/TB . Will plan to continue intravenous therapy , DC unasyn and start cefazolin . Repeat BC 2/17 am .     2/16 Pulm Consult   Sepsis w/ hypotension , Large RUL Cavitary pneumonia - likely strep pyogenes as cause  Strep pyogenes bacteremia , Influenza A, Hemoptysis - resolved. Plan cont cafazolin , xoponex , mucinex , IVF .     Date: 2/17  Day 3: Has surpassed a 2nd midnight with active treatments and services.  Cont IV  ancef for bacteremia . Currently on 4l O2 , O 2 sat 92 % , wean as lowell . + rhonchi in bases .  Cancel AFB . No concern for TB at this time. F/u cultures , monitor hgb , mg 1.9 replete and monitor. Echo pending . Wbc dec to 13.74 from 24.16 . Na improved to 132 from 127 . Pct inc to .80 from 0.74 . Required IV zofran x1 today . Anticipate discharge in 48-72 hrs to home with home services.         ED Treatment-Medication " Administration from 02/15/2025 1047 to 02/15/2025 1411         Date/Time Order Dose Route Action     02/15/2025 1114 sodium chloride 0.9 % bolus 1,000 mL 1,000 mL Intravenous New Bag     02/15/2025 1258 potassium chloride (Klor-Con M20) CR tablet 40 mEq 40 mEq Oral Given     02/15/2025 1334 potassium chloride 20 mEq IVPB (premix) 20 mEq Intravenous New Bag     02/15/2025 1253 magnesium sulfate 2 g/50 mL IVPB (premix) 2 g 2 g Intravenous New Bag     02/15/2025 1250 cefTRIAXone (ROCEPHIN) IVPB (premix in dextrose) 2,000 mg 50 mL 2,000 mg Intravenous New Bag     02/15/2025 1253 azithromycin (ZITHROMAX) 500 mg in sodium chloride 0.9% 250mL IVPB 500 mg 500 mg Intravenous New Bag     02/15/2025 1257 dextromethorphan-guaiFENesin (ROBITUSSIN DM) oral syrup 10 mL 10 mL Oral Given     02/15/2025 1213 iohexol (OMNIPAQUE) 350 MG/ML injection (MULTI-DOSE) 100 mL 50 mL Intravenous Given            Scheduled Medications:  acetaminophen, 975 mg, Oral, Q8H CARLOS  atorvastatin, 40 mg, Oral, Daily With Dinner  calcium carbonate-vitamin D, 2 tablet, Oral, Daily With Breakfast  cefazolin, 2,000 mg, Intravenous, Q8H  guaiFENesin, 600 mg, Oral, BID  levalbuterol, 1.25 mg, Nebulization, TID  lidocaine, 2 patch, Topical, Daily  [Held by provider] lisinopril, 10 mg, Oral, Daily  pantoprazole, 40 mg, Oral, Daily Before Breakfast  PARoxetine, 20 mg, Oral, Daily  saccharomyces boulardii, 250 mg, Oral, BID  sodium chloride, 3 mL, Nebulization, TID  [Held by provider] verapamil, 240 mg, Oral, Daily      Continuous IV Infusions:     PRN Meds:  albuterol, 2.5 mg, Nebulization, Q4H PRN  benzonatate, 100 mg, Oral, TID PRN  ketorolac, 15 mg, Intravenous, Q6H PRN 2/16 x1  ondansetron, 4 mg, Intravenous, Q6H PRN  2/17 x1  phenol, 1 spray, Mouth/Throat, Q2H PRN  tiZANidine, 4 mg, Oral, Q8H PRN      ED Triage Vitals [02/15/25 1054]   Temperature Pulse Respirations Blood Pressure SpO2 Pain Score   (!) 97.2 °F (36.2 °C) 100 20 122/58 95 % 6     Weight (last 2  days)       Date/Time Weight    02/15/25 15:19:47 82.6 (182)    02/15/25 1054 81.6 (180)            Vital Signs (last 3 days)       Date/Time Temp Pulse Resp BP MAP (mmHg) SpO2 Calculated FIO2 (%) - Nasal Cannula Nasal Cannula O2 Flow Rate (L/min) O2 Device Patient Position - Orthostatic VS [place] Coma Scale Score Pain    02/17/25 09:10:24 97.8 °F (36.6 °C) 103 20 143/81 102 92 % -- -- -- Lying -- --    02/17/25 0834 -- -- -- -- -- 93 % -- -- -- -- -- --    02/17/25 05:34:23 98.4 °F (36.9 °C) 105 20 140/81 101 93 % 36 4 L/min Nasal cannula Lying -- --    02/17/25 0524 -- -- -- -- -- -- -- -- -- -- -- 7    02/16/25 2106 -- -- -- -- -- -- 36 4 L/min Nasal cannula -- 15 6    02/16/25 2031 -- -- -- -- -- -- 36 4 L/min Nasal cannula -- -- --    02/16/25 2020 -- -- -- -- -- -- 36 4 L/min Nasal cannula -- -- --    02/16/25 19:32:08 98.9 °F (37.2 °C) 104 21 141/80 100 89 % 32 3 L/min Nasal cannula Lying -- --    02/16/25 1929 -- -- -- -- -- -- -- -- -- -- -- 10 - Worst Possible Pain    02/16/25 1913 -- -- -- -- -- -- -- -- -- -- -- 10 - Worst Possible Pain    02/16/25 1533 98.6 °F (37 °C) 84 18 105/59 74 91 % 32 3 L/min Nasal cannula Lying -- --    02/16/25 14:13:22 -- 79 -- 98/55 69 95 % -- -- -- -- -- --    02/16/25 1116 -- -- -- -- -- -- -- -- -- -- -- 10 - Worst Possible Pain    02/16/25 09:57:35 -- 78 -- 94/53 67 91 % -- -- -- -- -- --    02/16/25 0830 -- -- -- -- -- -- 32 3 L/min Nasal cannula -- 15 4    02/16/25 0800 -- 76 -- 93/53 -- -- -- -- -- -- -- --    02/16/25 07:31:54 98.1 °F (36.7 °C) 71 -- 83/51 62 95 % -- -- -- -- -- --    02/16/25 07:30:26 98.1 °F (36.7 °C) 70 20 85/51 62 96 % 32 3 L/min Nasal cannula Lying -- --    02/16/25 0320 -- -- -- -- -- -- 32 3 L/min Nasal cannula -- -- --    02/16/25 0211 -- -- -- -- -- -- -- -- -- -- -- 8    02/16/25 02:10:01 100.1 °F (37.8 °C) 96 24 133/67 89 93 % -- -- -- Sitting -- --    02/16/25 0107 -- -- -- -- -- 90 % 40 5 L/min Nasal cannula -- -- --    02/15/25 2129 --  -- -- -- -- -- -- -- -- -- 15 5    02/15/25 2104 -- -- -- -- -- 91 % 32 3 L/min Nasal cannula -- -- --    02/15/25 20:43:12 98 °F (36.7 °C) 87 20 130/63 85 95 % -- -- None (Room air) Lying -- --    02/15/25 18:32:34 99.6 °F (37.6 °C) 92 -- -- -- 89 % -- -- -- -- -- --    02/15/25 1816 -- 89 -- -- -- 90 % -- -- -- -- -- --    02/15/25 1626 -- -- -- -- -- -- -- -- -- -- -- 7    02/15/25 1530 -- -- -- -- -- -- -- -- None (Room air) -- -- 7    02/15/25 15:19:47 101.7 °F (38.7 °C) 99 20 127/63 84 88 % -- -- None (Room air) Lying -- --    02/15/25 1519 -- -- -- -- -- -- -- -- -- -- -- 7    02/15/25 1118 -- -- -- -- -- -- -- -- None (Room air) -- -- --    02/15/25 1115 -- 107 30 118/63 83 92 % -- -- -- -- -- --    02/15/25 1054 97.2 °F (36.2 °C) 100 20 122/58 -- 95 % -- -- None (Room air) Sitting -- 6              Pertinent Labs/Diagnostic Test Results:   Radiology:  CTA chest pe study   Final Interpretation by Debi Pa MD (02/15 1309)      No pulmonary embolus.      9.3 x 5.1 cm masslike consolidation in the posterior right upper lobe with extensive surrounding groundglass opacity and central air-filled cavity. Given the patient's history, this is likely necrotizing pneumonia versus lung abscess with surrounding    pulmonary hemorrhage. TB not excluded in the appropriate clinical setting. Recommend follow-up with a chest CT in 3 months to assure resolution and exclude cavitary tumor.      Mildly enlarged right hilar node, likely reactive.      Moderate hiatal hernia.      I discussed the findings on the preceding chest radiograph with Dr. Marni Birch on 2/15/2025 at 11:47 a.m.            Workstation performed: [id number]         XR chest 1 view portable   Final Interpretation by Debi Pa MD (02/15 1147)      Moderate consolidation in the right upper lobe with possible cavitation. Given the patient's history of hemoptysis, this could be due to pneumonia, possibly necrotizing, lung abscess, or  "malignancy. TB not excluded in the appropriate clinical setting if    the lesion is cavitary. Recommend further evaluation with CT.         I personally discussed this study with Dr. Marni Birch on 2/15/2025 11:47 AM.                  Workstation performed: [id number]           Cardiology:  No orders to display     GI:  No orders to display       Results from last 7 days   Lab Units 02/15/25  1101   SARS-COV-2  Negative     Results from last 7 days   Lab Units 02/17/25  0438 02/16/25  0216 02/15/25  1520 02/15/25  1101   WBC Thousand/uL 13.74* 17.85*  --  24.16*   HEMOGLOBIN g/dL 10.3* 11.6  --  12.2   HEMATOCRIT % 30.8* 34.4*  --  35.9   PLATELETS Thousands/uL 283 251 235 265   TOTAL NEUT ABS Thousands/µL 11.93* 15.82*  --   --    BANDS PCT %  --   --   --  3         Results from last 7 days   Lab Units 02/17/25  0438 02/16/25  1618 02/16/25  0216 02/15/25  1101   SODIUM mmol/L 132* 132* 131* 127*   POTASSIUM mmol/L 3.3* 3.6 2.8* 2.8*   CHLORIDE mmol/L 102 103 99 92*   CO2 mmol/L 23 22 23 24   ANION GAP mmol/L 7 7 9 11   BUN mg/dL 10 13 11 13   CREATININE mg/dL 0.62 0.75 0.67 0.96   EGFR ml/min/1.73sq m 92 82 90 60   CALCIUM mg/dL 7.5* 7.3* 7.2* 7.9*   MAGNESIUM mg/dL 1.9  --  1.6* 0.9*     Results from last 7 days   Lab Units 02/15/25  1101   AST U/L 25   ALT U/L 13   ALK PHOS U/L 78   TOTAL PROTEIN g/dL 7.4   ALBUMIN g/dL 3.7   TOTAL BILIRUBIN mg/dL 0.82         Results from last 7 days   Lab Units 02/17/25 0438 02/16/25  1618 02/16/25  0216 02/15/25  1101   GLUCOSE RANDOM mg/dL 112 107 115 151*             No results found for: \"BETA-HYDROXYBUTYRATE\"                   Results from last 7 days   Lab Units 02/15/25  1101   HS TNI 0HR ng/L 13     Results from last 7 days   Lab Units 02/15/25  1101   D-DIMER QUANTITATIVE ug/ml FEU 2.25*     Results from last 7 days   Lab Units 02/15/25  1101   PROTIME seconds 14.4   INR  1.07   PTT seconds 28         Results from last 7 days   Lab Units 02/16/25  0216 " 02/15/25  1101   PROCALCITONIN ng/ml 0.80* 0.74*     Results from last 7 days   Lab Units 02/15/25  1236 02/15/25  1101   LACTIC ACID mmol/L 1.7 2.2*             Results from last 7 days   Lab Units 02/15/25  1101   BNP pg/mL 194*                                     Results from last 7 days   Lab Units 02/15/25  1332   CLARITY UA  Clear   COLOR UA  Yellow   SPEC GRAV UA  <1.005*   PH UA  6.5   GLUCOSE UA mg/dl Negative   KETONES UA mg/dl Negative   BLOOD UA  Small*   PROTEIN UA mg/dl 30 (1+)*   NITRITE UA  Negative   BILIRUBIN UA  Negative   UROBILINOGEN UA (BE) mg/dl <2.0   LEUKOCYTES UA  Small*   WBC UA /hpf 1-2   RBC UA /hpf 0-1   BACTERIA UA /hpf None Seen   EPITHELIAL CELLS WET PREP /hpf Occasional   MUCUS THREADS  None Seen     Results from last 7 days   Lab Units 02/15/25  1747 02/15/25  1101   STREP PNEUMONIAE ANTIGEN, URINE  Negative  --    LEGIONELLA URINARY ANTIGEN  Negative  --    INFLUENZA A PCR   --  Positive*   INFLUENZA B PCR   --  Negative   RSV PCR   --  Negative                             Results from last 7 days   Lab Units 02/15/25  1631 02/15/25  1112 02/15/25  1101   BLOOD CULTURE   --  Staphylococcus epidermidis* Streptococcus pyogenes (Group A)*   GRAM STAIN RESULT  3+ Polys*  1+ Gram positive cocci in chains*  No Epithelial cells seen* Gram positive cocci in clusters* Gram positive cocci in pairs and chains*                   Past Medical History:   Diagnosis Date    COVID-19 05/2022    Fibromyalgia, primary     GERD (gastroesophageal reflux disease) 1999    Hyperlipidemia     Hypertension     Lactose intolerance 1984    Primary osteoarthritis of right hip 3/16/2022    Small bowel obstruction (HCC)      Present on Admission:   Hypertension      Admitting Diagnosis: Hiatal hernia [K44.9]  Hypokalemia [E87.6]  Hypomagnesemia [E83.42]  Coughing blood [R04.2]  Pneumonia [J18.9]  Influenza A [J10.1]  Hemoptysis [R04.2]  Age/Sex: 69 y.o. female    Network Utilization Review  Department  ATTENTION: Please call with any questions or concerns to [phone] and carefully listen to the prompts so that you are directed to the right person. All voicemails are confidential.   For Discharge needs, contact Care Management DC Support Team at [phone] opt. [phone]  Send all requests for admission clinical reviews, approved or denied determinations and any other requests to dedicated fax number below belonging to the campus where the patient is receiving treatment. List of dedicated fax numbers for the Facilities:  FACILITY NAME UR FAX NUMBER   ADMISSION DENIALS (Administrative/Medical Necessity) [phone]   DISCHARGE SUPPORT TEAM (NETWORK) [phone]   PARENT CHILD HEALTH (Maternity/NICU/Pediatrics) [phone]   [facility] [phone]   [facility] [phone]   [facility] [phone]   General acute hospital [phone]   [facility] [phone]   [facility] [phone]   [facility] [phone]   [facility] [phone]   [facility] [phone]   [facility] [phone]   [facility] [phone]   [facility] [phone]

## 2025-02-17 NOTE — CASE MANAGEMENT
Case Management Assessment & Discharge Planning Note    Patient name Fay Corley  Location /-01 MRN [id number]  : 1955 Date 2025       Current Admission Date: 2/15/2025  Current Admission Diagnosis:Severe sepsis (HCC)   Patient Active Problem List    Diagnosis Date Noted Date Diagnosed    Bacteremia due to Streptococcus 2025     Cavitary pneumonia 02/15/2025     Severe sepsis (HCC) 02/15/2025     Electrolyte abnormality 02/15/2025     Influenza A 02/15/2025     Acute respiratory failure (HCC) 02/15/2025     Hemoptysis 02/15/2025     Dyspnea on exertion 2024     Aftercare following right hip joint replacement surgery 2022     Acute blood loss anemia 2022     Hypertension      Hyperlipidemia      Sacroiliac joint dysfunction 2022     Hypokalemia 2020     Peripheral vestibulopathy of both ears 2020     GERD (gastroesophageal reflux disease)        LOS (days): 2  Geometric Mean LOS (GMLOS) (days):   Days to GMLOS:     OBJECTIVE:    Risk of Unplanned Readmission Score: 9.41      Current admission status: Inpatient       Preferred Pharmacy:   [id number] [place] - [address]  [address] [address]  Phone: [phone] Fax: [phone]    Primary Care Provider: LOGAN Rodriguez    Primary Insurance: BLUE CROSS [employer] RE[employer]  Secondary Insurance:     ASSESSMENT:  Active Health Care Proxies    There are no active Health Care Proxies on file.       Advance Directives  Does patient have a Health Care POA?: No  Was patient offered paperwork?: Yes (Declined)  Does patient currently have a Health Care decision maker?: No  Does patient have Advance Directives?: Yes (Declined)  Primary Contact: Joseline Gill: dtr: [phone]    Readmission Root Cause  30 Day Readmission: No    Patient Information  Admitted from:: Home  Mental Status: Alert  During Assessment patient was accompanied by:  Daughter  Assessment information provided by:: Patient  Primary Caregiver: Self  Support Systems: Self, Children, Family members  County of Residence: [place]  What city do you live in?: [place]  Home entry access options. Select all that apply.: No steps to enter home  Type of Current Residence: Other (Comment) (in-law suite attached to son's house)  Living Arrangements: Lives w/ Family members, Lives Alone  Is patient a ?: No    Activities of Daily Living Prior to Admission  Functional Status: Independent  Completes ADLs independently?: Yes  Ambulates independently?: Yes  Does patient use assisted devices?: No  Does patient currently own DME?: Yes  What DME does the patient currently own?: Nebulizer  Does patient have a history of Outpatient Therapy (PT/OT)?: No  Does the patient have a history of Short-Term Rehab?: No  Does patient have a history of HHC?: No  Does patient currently have HHC?: No    Patient Information Continued  Income Source: Pension/longterm  Does patient have prescription coverage?: Yes  Does patient receive dialysis treatments?: No  Does patient have a history of substance abuse?: No  Does patient have a history of Mental Health Diagnosis?: No    Means of Transportation  Means of Transport to Appts:: Drives Self    DISCHARGE DETAILS:    Discharge planning discussed with:: patient  Freedom of Choice: Yes  Comments - Freedom of Choice: plans to return home, unsure of discharge needs, does not have oxygen  CM contacted family/caregiver?: No- see comments (Pt's dtr at bedside)    Contacts  Patient Contacts: Joseline Gill: dtr  Relationship to Patient:: Family  Contact Method: In Person  Reason/Outcome: Emergency Contact    Additional Comments: Met with Pt. Pt's dtr at bedside. Discussed role of case management. Pt lives in an in-law suite in basement at son's house, has outside entrance. Independent PTA. Owns/uses nebulizer. Drives, grocery shopping. Does not have POA/living will,  declines information at this time. Denies hx of VNA/SNF/MH/D&A. Pt plans to return home and unsure of discharge needs at this time. CM to follow.

## 2025-02-17 NOTE — ASSESSMENT & PLAN NOTE
"Acute hypoxic respiratory failure secondary to pneumonia, currently on 4 L oxygen.  Wean oxygen as tolerated  Patient endorsing 3 to 4 weeks of progressive coughing, shortness of breath, fevers.  Reported Tmax of 103.8 and now with hemoptysis.  CT imaging with \"9.3 x 5.1 cm masslike consolidation in the posterior right upper lobe with extensive surrounding groundglass opacity and central air-filled cavity. Given the patient's history, this is likely necrotizing pneumonia versus lung abscess with surrounding pulmonary hemorrhage. TB not excluded in the appropriate clinical setting. \"  Discussed with ID  Continue with IV Ancef  Cancel AFB  No concern for TB at this time per ID  Follow cultures  Respiratory protocol, nebs PRN.  Monitor respiratory status.  Pulmonary following  "

## 2025-02-18 LAB
ANION GAP SERPL CALCULATED.3IONS-SCNC: 8 MMOL/L (ref 4–13)
BACTERIA BLD CULT: ABNORMAL
BACTERIA SPT RESP CULT: ABNORMAL
BACTERIA SPT RESP CULT: ABNORMAL
BUN SERPL-MCNC: 6 MG/DL (ref 5–25)
CALCIUM SERPL-MCNC: 7.8 MG/DL (ref 8.4–10.2)
CHLORIDE SERPL-SCNC: 102 MMOL/L (ref 96–108)
CO2 SERPL-SCNC: 24 MMOL/L (ref 21–32)
CREAT SERPL-MCNC: 0.57 MG/DL (ref 0.6–1.3)
ERYTHROCYTE [DISTWIDTH] IN BLOOD BY AUTOMATED COUNT: 12.3 % (ref 11.6–15.1)
GFR SERPL CREATININE-BSD FRML MDRD: 95 ML/MIN/1.73SQ M
GLUCOSE SERPL-MCNC: 106 MG/DL (ref 65–140)
GRAM STN SPEC: ABNORMAL
HCT VFR BLD AUTO: 30.6 % (ref 34.8–46.1)
HGB BLD-MCNC: 10 G/DL (ref 11.5–15.4)
MAGNESIUM SERPL-MCNC: 1.5 MG/DL (ref 1.9–2.7)
MCH RBC QN AUTO: 31.1 PG (ref 26.8–34.3)
MCHC RBC AUTO-ENTMCNC: 32.7 G/DL (ref 31.4–37.4)
MCV RBC AUTO: 95 FL (ref 82–98)
PLATELET # BLD AUTO: 345 THOUSANDS/UL (ref 149–390)
PMV BLD AUTO: 9.3 FL (ref 8.9–12.7)
POTASSIUM SERPL-SCNC: 3 MMOL/L (ref 3.5–5.3)
RBC # BLD AUTO: 3.22 MILLION/UL (ref 3.81–5.12)
S EPIDERMIDIS DNA BLD POS QL NAA+NON-PRB: DETECTED
SODIUM SERPL-SCNC: 134 MMOL/L (ref 135–147)
WBC # BLD AUTO: 11.35 THOUSAND/UL (ref 4.31–10.16)

## 2025-02-18 PROCEDURE — 99232 SBSQ HOSP IP/OBS MODERATE 35: CPT | Performed by: HOSPITALIST

## 2025-02-18 PROCEDURE — 85027 COMPLETE CBC AUTOMATED: CPT | Performed by: HOSPITALIST

## 2025-02-18 PROCEDURE — 99232 SBSQ HOSP IP/OBS MODERATE 35: CPT | Performed by: INTERNAL MEDICINE

## 2025-02-18 PROCEDURE — 83735 ASSAY OF MAGNESIUM: CPT | Performed by: HOSPITALIST

## 2025-02-18 PROCEDURE — 94640 AIRWAY INHALATION TREATMENT: CPT

## 2025-02-18 PROCEDURE — 80048 BASIC METABOLIC PNL TOTAL CA: CPT | Performed by: HOSPITALIST

## 2025-02-18 PROCEDURE — 94760 N-INVAS EAR/PLS OXIMETRY 1: CPT

## 2025-02-18 RX ORDER — LISINOPRIL 10 MG/1
10 TABLET ORAL DAILY
Status: DISCONTINUED | OUTPATIENT
Start: 2025-02-18 | End: 2025-02-20 | Stop reason: HOSPADM

## 2025-02-18 RX ORDER — MAGNESIUM SULFATE HEPTAHYDRATE 40 MG/ML
2 INJECTION, SOLUTION INTRAVENOUS ONCE
Status: COMPLETED | OUTPATIENT
Start: 2025-02-18 | End: 2025-02-18

## 2025-02-18 RX ORDER — POTASSIUM CHLORIDE 1500 MG/1
40 TABLET, EXTENDED RELEASE ORAL ONCE
Status: COMPLETED | OUTPATIENT
Start: 2025-02-18 | End: 2025-02-18

## 2025-02-18 RX ADMIN — GUAIFENESIN 600 MG: 600 TABLET, EXTENDED RELEASE ORAL at 09:09

## 2025-02-18 RX ADMIN — POTASSIUM CHLORIDE 40 MEQ: 1500 TABLET, EXTENDED RELEASE ORAL at 09:33

## 2025-02-18 RX ADMIN — GUAIFENESIN 600 MG: 600 TABLET, EXTENDED RELEASE ORAL at 17:16

## 2025-02-18 RX ADMIN — LEVALBUTEROL HYDROCHLORIDE 1.25 MG: 1.25 SOLUTION RESPIRATORY (INHALATION) at 07:24

## 2025-02-18 RX ADMIN — VERAPAMIL HYDROCHLORIDE 240 MG: 120 TABLET ORAL at 09:33

## 2025-02-18 RX ADMIN — PANTOPRAZOLE SODIUM 40 MG: 40 TABLET, DELAYED RELEASE ORAL at 05:34

## 2025-02-18 RX ADMIN — Medication 2 TABLET: at 09:33

## 2025-02-18 RX ADMIN — Medication 400 MG: at 09:09

## 2025-02-18 RX ADMIN — ACETAMINOPHEN 975 MG: 325 TABLET, FILM COATED ORAL at 14:15

## 2025-02-18 RX ADMIN — ACETAMINOPHEN 975 MG: 325 TABLET, FILM COATED ORAL at 22:26

## 2025-02-18 RX ADMIN — MAGNESIUM SULFATE HEPTAHYDRATE 2 G: 40 INJECTION, SOLUTION INTRAVENOUS at 14:12

## 2025-02-18 RX ADMIN — ATORVASTATIN CALCIUM 40 MG: 40 TABLET, FILM COATED ORAL at 17:16

## 2025-02-18 RX ADMIN — CEFAZOLIN SODIUM 2000 MG: 2 SOLUTION INTRAVENOUS at 22:26

## 2025-02-18 RX ADMIN — CEFAZOLIN SODIUM 2000 MG: 2 SOLUTION INTRAVENOUS at 05:31

## 2025-02-18 RX ADMIN — Medication 250 MG: at 09:09

## 2025-02-18 RX ADMIN — LEVALBUTEROL HYDROCHLORIDE 1.25 MG: 1.25 SOLUTION RESPIRATORY (INHALATION) at 14:32

## 2025-02-18 RX ADMIN — Medication 250 MG: at 17:16

## 2025-02-18 RX ADMIN — LEVALBUTEROL HYDROCHLORIDE 1.25 MG: 1.25 SOLUTION RESPIRATORY (INHALATION) at 20:03

## 2025-02-18 RX ADMIN — CEFAZOLIN SODIUM 2000 MG: 2 SOLUTION INTRAVENOUS at 16:20

## 2025-02-18 RX ADMIN — LISINOPRIL 10 MG: 10 TABLET ORAL at 14:15

## 2025-02-18 RX ADMIN — ACETAMINOPHEN 975 MG: 325 TABLET, FILM COATED ORAL at 05:31

## 2025-02-18 RX ADMIN — PAROXETINE HYDROCHLORIDE 20 MG: 20 TABLET, FILM COATED ORAL at 09:09

## 2025-02-18 RX ADMIN — TIMOLOL MALEATE 1 DROP: 5 SOLUTION OPHTHALMIC at 09:09

## 2025-02-18 NOTE — PROGRESS NOTES
Progress Note - Infectious Disease   Name: Fay Corley 69 y.o. female I MRN: [id number]  Unit/Bed#: -01 I Date of Admission: 2/15/2025   Date of Service: 2/18/2025 I Hospital Day: 3     Assessment & Plan      69-year-old woman with cavitary pneumonia and bacteremia secondary to Streptococcus pyogenes, influenza A, leukocytosis, fever, hypertension     1) Influenza, superimposed bacterial pneumonia, bacteremia - Pt. with continued improvement, Will plan for 4 additional weeks of abx. after d/c.     ===>. While pt. remains in house, please CONTINUE CEFAZOLIN, and follow patient closely for toxicity as well as continue improvement whilst on this agent.     ===> On d/c, can transition to oral therapy with cefadroxil 1 g p.o. twice daily x 4 weeks from the time of discharge provided that patient's clinical improvement continues.     ===> Staphylococcus epidermidis most likely represents contaminant/false positive, no need to initiate therapy directed specifically against this organism.     ===> Follow-up blood cultures remain without growth.       2) Leukocytosis - 2/2 #1, will follow as marker of clinical improvement.  Resolving.     3) Fever - 2/2 #1, will follow as marker of clinical improvement.  Resolved..        ===> Will sign off, but please do not hesitate to contact us with further questions, or if a change in the patient's clinical status warrants.    ===> Patient does NOT need to schedule f/u with ID on d/c UNLESS a change in clinical status or a recrudescence of symptoms warrants, but was given our contact information should any issues with prescribed post-d/c treatment arise.          Subjective   O/N no acute events. Pt. much better today, now w/ appetite, decr. WBC count, O2 req.     Objective :  Temp:  [98 °F (36.7 °C)-98.8 °F (37.1 °C)] 98.8 °F (37.1 °C)  HR:  [87-99] 93  BP: (146-152)/(75-83) 152/77  Resp:  [18-22] 18  SpO2:  [92 %-97 %] 93 %  O2 Device: Nasal cannula  Nasal Cannula O2 Flow  Rate (L/min):  [2 L/min] 2 L/min    General:  No acute distress  Psychiatric:  Awake and alert  Pulmonary:  Normal respiratory excursion without accessory muscle use  Abdomen:  Soft, nontender  Extremities:  No edema  Skin:  No rashes

## 2025-02-18 NOTE — ASSESSMENT & PLAN NOTE
With marked electrolyte abnormalities, due to dehydration.  Hypokalemia and hypomagnesemia  Replete as necessary   Evan Martinez MD

## 2025-02-18 NOTE — ASSESSMENT & PLAN NOTE
"Acute hypoxic respiratory failure secondary to pneumonia, currently on 2 L oxygen.  Wean oxygen as tolerated  Patient endorsing 3 to 4 weeks of progressive coughing, shortness of breath, fevers.  Reported Tmax of 103.8 and now with hemoptysis, but is getting better  CT imaging with \"9.3 x 5.1 cm masslike consolidation in the posterior right upper lobe with extensive surrounding groundglass opacity and central air-filled cavity. Given the patient's history, this is likely necrotizing pneumonia versus lung abscess with surrounding pulmonary hemorrhage. TB not excluded in the appropriate clinical setting. \"  Discussed with ID  Continue with IV Ancef  Cancel AFB  No concern for TB at this time per ID  Follow cultures  Respiratory protocol, nebs PRN.  Monitor respiratory status.  Pulmonary following  "

## 2025-02-18 NOTE — PROGRESS NOTES
"Progress Note - Hospitalist   Name: Fay Corley 69 y.o. female I MRN: [id number]  Unit/Bed#: -01 I Date of Admission: 2/15/2025   Date of Service: 2/18/2025 I Hospital Day: 3    Assessment & Plan  Severe sepsis (HCC)  SIRS criteria: Tachycardia, tachypnea, leukocytosis  Suspected source: RUL pneumonia  Lactic acid: 2.2 on admit - now normal  End organ damage: lactic acidosis  Continue with IV Ancef  With bacteremia, see related plan below  Monitor vital signs, laboratory studies.  Cavitary pneumonia  Acute hypoxic respiratory failure secondary to pneumonia, currently on 2 L oxygen.  Wean oxygen as tolerated  Patient endorsing 3 to 4 weeks of progressive coughing, shortness of breath, fevers.  Reported Tmax of 103.8 and now with hemoptysis, but is getting better  CT imaging with \"9.3 x 5.1 cm masslike consolidation in the posterior right upper lobe with extensive surrounding groundglass opacity and central air-filled cavity. Given the patient's history, this is likely necrotizing pneumonia versus lung abscess with surrounding pulmonary hemorrhage. TB not excluded in the appropriate clinical setting. \"  Discussed with ID  Continue with IV Ancef  Cancel AFB  No concern for TB at this time per ID  Follow cultures  Respiratory protocol, nebs PRN.  Monitor respiratory status.  Pulmonary following  Hemoptysis  Patient endorsing hemoptysis which brought her into the hospital  Related to cavitary pneumonia  Pulmonary following  Monitor hemoglobin; hemoptysis is getting better.  Once resolved then can start DVT prophylaxis  Avoid DVT prophylaxis and blood thinners for now until hemoptysis resolves  Hypertension  Blood pressure soft this morning, responding to IV hydration  Restart lisinopril, restart verapamil  Monitor BP closely and avoid hypotension  Electrolyte abnormality  With marked electrolyte abnormalities, due to dehydration.  Hypokalemia and hypomagnesemia  Replete as necessary  Influenza A  Testing " positive for influenza A however symptoms have been going on for a few weeks. She is out of the window of Tamiflu at this time  Supportive care  Treatment for pneumonia as above  Acute respiratory failure (HCC)  With acute respiratory failure secondary to pneumonia and influenza A  Requiring 2 L/min today on my exam  See plan above, wean O2 as tolerated  Bacteremia due to Streptococcus  Admission blood cultures 1 out of 2 positive for group A streptococcus, strep pyogenes.  Repeat Bcx pending  ID following  Continue Ancef  Echo 2/17 with EF 55%, stage I diastolic dysfunction    VTE Pharmacologic Prophylaxis: VTE Score: 7 High Risk (Score >/= 5) - Pharmacological DVT Prophylaxis Contraindicated. Sequential Compression Devices Ordered.    Mobility:   Basic Mobility Inpatient Raw Score: 19  JH-HLM Goal: 6: Walk 10 steps or more  JH-HLM Achieved: 6: Walk 10 steps or more  JH-HLM Goal achieved. Continue to encourage appropriate mobility.    Patient Centered Rounds: I performed bedside rounds with nursing staff today.   Discussions with Specialists or Other Care Team Provider: RN,TAMMIE,pulmonary    Discussed with family member: Daughter    Current Length of Stay: 3 day(s)  Current Patient Status: Inpatient   Certification Statement: The patient will continue to require additional inpatient hospital stay due to need for IV antibiotics and following cultures.  Wean oxygen as tolerated  Discharge Plan: Anticipate discharge in 24-48 hrs to home.    Code Status: Level 1 - Full Code    Subjective     Denies pain    Objective :  Temp:  [98 °F (36.7 °C)-98.8 °F (37.1 °C)] 98.8 °F (37.1 °C)  HR:  [87-99] 93  BP: (146-152)/(75-83) 152/77  Resp:  [18-22] 18  SpO2:  [92 %-97 %] 93 %  O2 Device: Nasal cannula  Nasal Cannula O2 Flow Rate (L/min):  [2 L/min] 2 L/min    Body mass index is 31.24 kg/m².     Input and Output Summary (last 24 hours):     Intake/Output Summary (Last 24 hours) at 2/18/2025 1252  Last data filed at 2/18/2025  0911  Gross per 24 hour   Intake 1260 ml   Output 600 ml   Net 660 ml       Physical Exam  Vitals and nursing note reviewed.   Constitutional:       General: She is not in acute distress.     Appearance: Normal appearance. She is ill-appearing and toxic-appearing.      Interventions: Nasal cannula in place.   HENT:      Head: Normocephalic and atraumatic.      Mouth/Throat:      Mouth: Mucous membranes are moist.   Eyes:      General: No scleral icterus.        Right eye: No discharge.         Left eye: No discharge.      Pupils: Pupils are equal, round, and reactive to light.   Cardiovascular:      Rate and Rhythm: Normal rate and regular rhythm.      Heart sounds: No murmur heard.     No friction rub. No gallop.   Pulmonary:   Rhonchi in bases  Abdominal:      General: Bowel sounds are normal. There is no distension.      Palpations: Abdomen is soft.      Tenderness: There is no abdominal tenderness. There is no guarding or rebound.   Musculoskeletal:         General: No swelling or deformity.      Cervical back: Neck supple.      Right lower leg: No edema.      Left lower leg: No edema.   Skin:     General: Skin is warm and dry.      Capillary Refill: Capillary refill takes less than 2 seconds.      Coloration: Skin is not jaundiced or pale.      Findings: No erythema or rash.   Neurological:      General: No focal deficit present.      Mental Status: She is alert and oriented to person, place, and time. Mental status is at baseline.   Psychiatric:         Mood and Affect: Mood normal.         Behavior: Behavior normal.        Lines/Drains:        Telemetry:  Telemetry Orders (From admission, onward)               24 Hour Telemetry Monitoring  (ED Bridging Orders Panel)  Continuous x 24 Hours (Telem)        Expiring   Question:  Reason for 24 Hour Telemetry  Answer:  Metabolic/electrolyte disturbance with high probability of dysrhythmia. K level <3 or >6 OR KCL infusion >10mEq/hr                     Telemetry  Reviewed: Normal Sinus Rhythm  Indication for Continued Telemetry Use: No indication for continued use. Will discontinue.                Lab Results: I have reviewed the following results:   Results from last 7 days   Lab Units 02/18/25  0429 02/17/25  0438 02/15/25  1520 02/15/25  1101   WBC Thousand/uL 11.35* 13.74*   < > 24.16*   HEMOGLOBIN g/dL 10.0* 10.3*   < > 12.2   HEMATOCRIT % 30.6* 30.8*   < > 35.9   PLATELETS Thousands/uL 345 283   < > 265   BANDS PCT %  --   --   --  3   SEGS PCT %  --  87*   < >  --    LYMPHO PCT %  --  7*   < > 2*   MONO PCT %  --  5   < > 3*   EOS PCT %  --  0   < > 0    < > = values in this interval not displayed.     Results from last 7 days   Lab Units 02/18/25  0429 02/16/25  0216 02/15/25  1101   SODIUM mmol/L 134*   < > 127*   POTASSIUM mmol/L 3.0*   < > 2.8*   CHLORIDE mmol/L 102   < > 92*   CO2 mmol/L 24   < > 24   BUN mg/dL 6   < > 13   CREATININE mg/dL 0.57*   < > 0.96   ANION GAP mmol/L 8   < > 11   CALCIUM mg/dL 7.8*   < > 7.9*   ALBUMIN g/dL  --   --  3.7   TOTAL BILIRUBIN mg/dL  --   --  0.82   ALK PHOS U/L  --   --  78   ALT U/L  --   --  13   AST U/L  --   --  25   GLUCOSE RANDOM mg/dL 106   < > 151*    < > = values in this interval not displayed.     Results from last 7 days   Lab Units 02/15/25  1101   INR  1.07             Results from last 7 days   Lab Units 02/16/25  0216 02/15/25  1236 02/15/25  1101   LACTIC ACID mmol/L  --  1.7 2.2*   PROCALCITONIN ng/ml 0.80*  --  0.74*       Recent Cultures (last 7 days):   Results from last 7 days   Lab Units 02/17/25  0437 02/15/25  1747 02/15/25  1631 02/15/25  1112 02/15/25  1101   BLOOD CULTURE  Received in Microbiology Lab. Culture in Progress.  Received in Microbiology Lab. Culture in Progress.  --   --  Staphylococcus epidermidis* Beta Hemolytic Streptococcus Group A*   SPUTUM CULTURE   --   --  4+ Growth of Streptococcus pyogenes (Group A)*  1+ Growth of  --   --    GRAM STAIN RESULT   --   --  3+ Polys*  1+ Gram  positive cocci in chains*  No Epithelial cells seen* Gram positive cocci in clusters* Gram positive cocci in pairs and chains*   LEGIONELLA URINARY ANTIGEN   --  Negative  --   --   --        Imaging Results Review: No pertinent imaging studies reviewed.  Other Study Results Review: No additional pertinent studies reviewed.    Last 24 Hours Medication List:     Current Facility-Administered Medications:     acetaminophen (TYLENOL) tablet 975 mg, Q8H CARLOS    albuterol inhalation solution 2.5 mg, Q4H PRN    atorvastatin (LIPITOR) tablet 40 mg, Daily With Dinner    benzonatate (TESSALON PERLES) capsule 100 mg, TID PRN    calcium carbonate-vitamin D 500 mg-5 mcg tablet 2 tablet, Daily With Breakfast    ceFAZolin (ANCEF) IVPB (premix in dextrose) 2,000 mg 50 mL, Q8H, Last Rate: 2,000 mg (02/18/25 0531)    guaiFENesin (MUCINEX) 12 hr tablet 600 mg, BID    ketorolac (TORADOL) injection 15 mg, Q6H PRN    levalbuterol (XOPENEX) inhalation solution 1.25 mg, TID    lidocaine (LIDODERM) 5 % patch 2 patch, Daily    [Held by provider] lisinopril (ZESTRIL) tablet 10 mg, Daily    ondansetron (ZOFRAN) injection 4 mg, Q6H PRN    pantoprazole (PROTONIX) EC tablet 40 mg, Daily Before Breakfast    PARoxetine (PAXIL) tablet 20 mg, Daily    phenol (CHLORASEPTIC) 1.4 % mucosal liquid 1 spray, Q2H PRN    saccharomyces boulardii (FLORASTOR) capsule 250 mg, BID    timolol (TIMOPTIC) 0.5 % ophthalmic solution 1 drop, Daily    tiZANidine (ZANAFLEX) tablet 4 mg, Q8H PRN    verapamil (CALAN-SR) CR tablet 240 mg, Daily    Administrative Statements   Today, Patient Was Seen By: Vel Joyce MD  I have spent a total time of 46 minutes in caring for this patient on the day of the visit/encounter including Diagnostic results, Prognosis, Risks and benefits of tx options, Instructions for management, Patient and family education, Importance of tx compliance, Risk factor reductions, Impressions, Counseling / Coordination of care, Documenting in the  medical record, Reviewing/placing orders in the medical record (including tests, medications, and/or procedures), Obtaining or reviewing history  , and Communicating with other healthcare professionals .    **Please Note: This note may have been constructed using a voice recognition system.**

## 2025-02-18 NOTE — ASSESSMENT & PLAN NOTE
In the setting of severe pneumonia, currently requiring 1 L nasal cannula, titrate to maintain O2 sats greater than 89%  Would perform home O2 eval prior to discharge

## 2025-02-18 NOTE — PLAN OF CARE
Problem: PAIN - ADULT  Goal: Verbalizes/displays adequate comfort level or baseline comfort level  Description: Interventions:  - Encourage patient to monitor pain and request assistance  - Assess pain using appropriate pain scale  - Administer analgesics based on type and severity of pain and evaluate response  - Implement non-pharmacological measures as appropriate and evaluate response  - Consider cultural and social influences on pain and pain management  - Notify physician/advanced practitioner if interventions unsuccessful or patient reports new pain  Outcome: Progressing     Problem: INFECTION - ADULT  Goal: Absence or prevention of progression during hospitalization  Description: INTERVENTIONS:  - Assess and monitor for signs and symptoms of infection  - Monitor lab/diagnostic results  - Monitor all insertion sites, i.e. indwelling lines, tubes, and drains  - Monitor endotracheal if appropriate and nasal secretions for changes in amount and color  - [place] appropriate cooling/warming therapies per order  - Administer medications as ordered  - Instruct and encourage patient and family to use good hand hygiene technique  - Identify and instruct in appropriate isolation precautions for identified infection/condition  Outcome: Progressing  Goal: Absence of fever/infection during neutropenic period  Description: INTERVENTIONS:  - Monitor WBC    Outcome: Progressing     Problem: SAFETY ADULT  Goal: Patient will remain free of falls  Description: INTERVENTIONS:  - Educate patient/family on patient safety including physical limitations  - Instruct patient to call for assistance with activity   - Consult OT/PT to assist with strengthening/mobility   - Keep Call bell within reach  - Keep bed low and locked with side rails adjusted as appropriate  - Keep care items and personal belongings within reach  - Initiate and maintain comfort rounds  - Make Fall Risk Sign visible to staff  - Offer Toileting every 2 Hours,  in advance of need  - Initiate/Maintain bed alarm  - Obtain necessary fall risk management equipment: yellow socks  - Apply yellow socks and bracelet for high fall risk patients  - Consider moving patient to room near nurses station  Outcome: Progressing  Goal: Maintain or return to baseline ADL function  Description: INTERVENTIONS:  -  Assess patient's ability to carry out ADLs; assess patient's baseline for ADL function and identify physical deficits which impact ability to perform ADLs (bathing, care of mouth/teeth, toileting, grooming, dressing, etc.)  - Assess/evaluate cause of self-care deficits   - Assess range of motion  - Assess patient's mobility; develop plan if impaired  - Assess patient's need for assistive devices and provide as appropriate  - Encourage maximum independence but intervene and supervise when necessary  - Involve family in performance of ADLs  - Assess for home care needs following discharge   - Consider OT consult to assist with ADL evaluation and planning for discharge  - Provide patient education as appropriate  Outcome: Progressing  Goal: Maintains/Returns to pre admission functional level  Description: INTERVENTIONS:  - Perform AM-PAC 6 Click Basic Mobility/ Daily Activity assessment daily.  - Set and communicate daily mobility goal to care team and patient/family/caregiver.   - Collaborate with rehabilitation services on mobility goals if consulted  - Perform Range of Motion 3 times a day.  - Reposition patient every 2 hours.  - Dangle patient 3 times a day  - Stand patient 3 times a day  - Ambulate patient 3 times a day  - Out of bed to chair 3 times a day   - Out of bed for meals 3 times a day  - Out of bed for toileting  - Record patient progress and toleration of activity level   Outcome: Progressing     Problem: DISCHARGE PLANNING  Goal: Discharge to home or other facility with appropriate resources  Description: INTERVENTIONS:  - Identify barriers to discharge w/patient and  caregiver  - Arrange for needed discharge resources and transportation as appropriate  - Identify discharge learning needs (meds, wound care, etc.)  - Arrange for interpretive services to assist at discharge as needed  - Refer to Case Management Department for coordinating discharge planning if the patient needs post-hospital services based on physician/advanced practitioner order or complex needs related to functional status, cognitive ability, or social support system  Outcome: Progressing     Problem: Knowledge Deficit  Goal: Patient/family/caregiver demonstrates understanding of disease process, treatment plan, medications, and discharge instructions  Description: Complete learning assessment and assess knowledge base.  Interventions:  - Provide teaching at level of understanding  - Provide teaching via preferred learning methods  Outcome: Progressing     Problem: CARDIOVASCULAR - ADULT  Goal: Maintains optimal cardiac output and hemodynamic stability  Description: INTERVENTIONS:  - Monitor I/O, vital signs and rhythm  - Monitor for S/S and trends of decreased cardiac output  - Administer and titrate ordered vasoactive medications to optimize hemodynamic stability  - Assess quality of pulses, skin color and temperature  - Assess for signs of decreased coronary artery perfusion  - Instruct patient to report change in severity of symptoms  Outcome: Progressing  Goal: Absence of cardiac dysrhythmias or at baseline rhythm  Description: INTERVENTIONS:  - Continuous cardiac monitoring, vital signs, obtain 12 lead EKG if ordered  - Administer antiarrhythmic and heart rate control medications as ordered  - Monitor electrolytes and administer replacement therapy as ordered  Outcome: Progressing     Problem: RESPIRATORY - ADULT  Goal: Achieves optimal ventilation and oxygenation  Description: INTERVENTIONS:  - Assess for changes in respiratory status  - Assess for changes in mentation and behavior  - Position to  facilitate oxygenation and minimize respiratory effort  - Oxygen administered by appropriate delivery if ordered  - Initiate smoking cessation education as indicated  - Encourage broncho-pulmonary hygiene including cough, deep breathe, Incentive Spirometry  - Assess the need for suctioning and aspirate as needed  - Assess and instruct to report SOB or any respiratory difficulty  - Respiratory Therapy support as indicated  Outcome: Progressing     Problem: METABOLIC, FLUID AND ELECTROLYTES - ADULT  Goal: Electrolytes maintained within normal limits  Description: INTERVENTIONS:  - Monitor labs and assess patient for signs and symptoms of electrolyte imbalances  - Administer electrolyte replacement as ordered  - Monitor response to electrolyte replacements, including repeat lab results as appropriate  - Instruct patient on fluid and nutrition as appropriate  Outcome: Progressing  Goal: Fluid balance maintained  Description: INTERVENTIONS:  - Monitor labs   - Monitor I/O and WT  - Instruct patient on fluid and nutrition as appropriate  - Assess for signs & symptoms of volume excess or deficit  Outcome: Progressing  Goal: Glucose maintained within target range  Description: INTERVENTIONS:  - Monitor Blood Glucose as ordered  - Assess for signs and symptoms of hyperglycemia and hypoglycemia  - Administer ordered medications to maintain glucose within target range  - Assess nutritional intake and initiate nutrition service referral as needed  Outcome: Progressing     Problem: Nutrition/Hydration-ADULT  Goal: Nutrient/Hydration intake appropriate for improving, restoring or maintaining nutritional needs  Description: Monitor and assess patient's nutrition/hydration status for malnutrition. Collaborate with interdisciplinary team and initiate plan and interventions as ordered.  Monitor patient's weight and dietary intake as ordered or per policy. Utilize nutrition screening tool and intervene as necessary. Determine  patient's food preferences and provide high-protein, high-caloric foods as appropriate.     INTERVENTIONS:  - Monitor oral intake, urinary output, labs, and treatment plans  - Assess nutrition and hydration status and recommend course of action  - Evaluate amount of meals eaten  - Assist patient with eating if necessary   - Allow adequate time for meals  - Recommend/ encourage appropriate diets, oral nutritional supplements, and vitamin/mineral supplements  - Order, calculate, and assess calorie counts as needed  - Recommend, monitor, and adjust tube feedings and TPN/PPN based on assessed needs  - Assess need for intravenous fluids  - Provide specific nutrition/hydration education as appropriate  - Include patient/family/caregiver in decisions related to nutrition  Outcome: Progressing

## 2025-02-18 NOTE — ASSESSMENT & PLAN NOTE
Admission blood cultures 1 out of 2 positive for group A streptococcus, strep pyogenes.  Repeat Bcx pending  ID following  Continue Ancef  Echo 2/17 with EF 55%, stage I diastolic dysfunction

## 2025-02-18 NOTE — ASSESSMENT & PLAN NOTE
Patient endorsing hemoptysis which brought her into the hospital  Related to cavitary pneumonia  Pulmonary following  Monitor hemoglobin; hemoptysis is getting better.  Once resolved then can start DVT prophylaxis  Avoid DVT prophylaxis and blood thinners for now until hemoptysis resolves

## 2025-02-18 NOTE — ASSESSMENT & PLAN NOTE
Blood pressure soft this morning, responding to IV hydration  Restart lisinopril, restart verapamil  Monitor BP closely and avoid hypotension

## 2025-02-18 NOTE — PROGRESS NOTES
Progress Note - Pulmonology   Name: Fay Corley 69 y.o. female I MRN: [id number]  Unit/Bed#: -01 I Date of Admission: 2/15/2025   Date of Service: 2/18/2025 I Hospital Day: 3    Assessment & Plan  Severe sepsis (HCC)  Secondary to strep bacteremia and cavitary pneumonia  Cavitary pneumonia  CT scan with 9.3 x 5.1 cm masslike consolidation in the posterior right upper lobe with extensive surrounding groundglass opacity and central air-filled cavity  QuantiFERON gold is negative/indeterminate although very low suspicion for TB, patient with no risk factors  Likely related to strep pyogenes given her positive blood culture, gram-positive cocci in sputum culture as well  Would continue with antibiotics per infectious disease and repeat CT scan in 6 to 8 weeks  Continue Mucinex, will continue nebulizer treatments as she did find some relief with this   If any clinical worsening, would need a bronchoscopy for BAL/cultures  Influenza A  With superimposed bacterial pneumonia  Treatment per infectious disease  Supportive care  Acute respiratory failure (HCC)  In the setting of severe pneumonia, currently requiring 1 L nasal cannula, titrate to maintain O2 sats greater than 89%  Would perform home O2 eval prior to discharge  Hemoptysis  In the setting of cavitary pneumonia  H&H is stable  Would continue to monitor  Bacteremia due to Streptococcus  Antibiotics per infectious disease, repeat blood cultures are pending    24 Hour Events : 1 L  Subjective : Patient feeling much better, no new complaints. Coughing up brown sputum instead of bright red blood    Objective :  Temp:  [98 °F (36.7 °C)-98.8 °F (37.1 °C)] 98.8 °F (37.1 °C)  HR:  [87-99] 93  BP: (146-152)/(75-83) 152/77  Resp:  [18-22] 18  SpO2:  [92 %-97 %] 93 %  O2 Device: Nasal cannula  Nasal Cannula O2 Flow Rate (L/min):  [2 L/min] 2 L/min    Physical Exam    General appearance: Alert and awake, in no acute distress  Head: Normocephalic, without obvious  abnormality, atraumatic  Eyes: No scleral icterus   Lungs: Decreased breath sounds  Heart: Regular rate  Abdomen:  No appreciable distension or tenderness  Extremities: No deformity  Skin: Warm and dry  Neurologic: No acute focal deficits are noted        Lab Results: I have reviewed the following results:   .     02/18/25  0429   WBC 11.35*   HGB 10.0*   HCT 30.6*      SODIUM 134*   K 3.0*      CO2 24   BUN 6   CREATININE 0.57*   GLUC 106   MG 1.5*     ABG: No new results in last 24 hours.    Imaging Results Review: I reviewed radiology reports from this admission including: CT chest.  Other Study Results Review: No additional pertinent studies reviewed.  PFT Results Reviewed: na

## 2025-02-18 NOTE — UTILIZATION REVIEW
NOTIFICATION OF INPATIENT ADMISSION   AUTHORIZATION REQUEST   SERVICING FACILITY:   [place] [address]  Tax ID: [id number]  NPI: [id number] ATTENDING PROVIDER:  Attending Name and NPI#: Vel Joyce Md [[id number]]  Address: [address] [place] [address]  Phone: [phone]   ADMISSION INFORMATION:  Place of Service: Inpatient [facility]  Place of Service Code: 21  Inpatient Admission Date/Time: 2/15/25  1:27 PM  Discharge Date/Time: No discharge date for patient encounter.  Admitting Diagnosis Code/Description:  Hiatal hernia [K44.9]  Hypokalemia [E87.6]  Hypomagnesemia [E83.42]  Coughing blood [R04.2]  Pneumonia [J18.9]  Influenza A [J10.1]  Hemoptysis [R04.2]     UTILIZATION REVIEW CONTACT:  Anushka Presley, Utilization   Network Utilization Review Department  Phone: [phone]  Fax: [phone]  Email: [email or web address]  Contact for approvals/pending authorizations, clinical reviews, and discharge.     PHYSICIAN ADVISORY SERVICES:  Medical Necessity Denial & Cxch-to-Caoe Review  Phone: [phone]  Fax: [phone]  Email: [email or web address]     DISCHARGE SUPPORT TEAM:  For Patients Discharge Needs & Updates  Phone: [phone] opt. 2 Fax: [phone]  Email: [email or web address]

## 2025-02-18 NOTE — ASSESSMENT & PLAN NOTE
CT scan with 9.3 x 5.1 cm masslike consolidation in the posterior right upper lobe with extensive surrounding groundglass opacity and central air-filled cavity  QuantiFERON gold is negative/indeterminate although very low suspicion for TB, patient with no risk factors  Likely related to strep pyogenes given her positive blood culture, gram-positive cocci in sputum culture as well  Would continue with antibiotics per infectious disease and repeat CT scan in 6 to 8 weeks  Continue Mucinex, will continue nebulizer treatments as she did find some relief with this   If any clinical worsening, would need a bronchoscopy for BAL/cultures

## 2025-02-18 NOTE — PLAN OF CARE
Problem: PAIN - ADULT  Goal: Verbalizes/displays adequate comfort level or baseline comfort level  Description: Interventions:  - Encourage patient to monitor pain and request assistance  - Assess pain using appropriate pain scale  - Administer analgesics based on type and severity of pain and evaluate response  - Implement non-pharmacological measures as appropriate and evaluate response  - Consider cultural and social influences on pain and pain management  - Notify physician/advanced practitioner if interventions unsuccessful or patient reports new pain  Outcome: Progressing     Problem: INFECTION - ADULT  Goal: Absence or prevention of progression during hospitalization  Description: INTERVENTIONS:  - Assess and monitor for signs and symptoms of infection  - Monitor lab/diagnostic results  - Monitor all insertion sites, i.e. indwelling lines, tubes, and drains  - Monitor endotracheal if appropriate and nasal secretions for changes in amount and color  - [place] appropriate cooling/warming therapies per order  - Administer medications as ordered  - Instruct and encourage patient and family to use good hand hygiene technique  - Identify and instruct in appropriate isolation precautions for identified infection/condition  Outcome: Progressing  Goal: Absence of fever/infection during neutropenic period  Description: INTERVENTIONS:  - Monitor WBC    Outcome: Progressing     Problem: SAFETY ADULT  Goal: Patient will remain free of falls  Description: INTERVENTIONS:  - Educate patient/family on patient safety including physical limitations  - Instruct patient to call for assistance with activity   - Consult OT/PT to assist with strengthening/mobility   - Keep Call bell within reach  - Keep bed low and locked with side rails adjusted as appropriate  - Keep care items and personal belongings within reach  - Initiate and maintain comfort rounds  - Make Fall Risk Sign visible to staff  - Offer Toileting every 2 Hours,  in advance of need  - Initiate/Maintain bed alarm  - Obtain necessary fall risk management equipment: yellow socks  - Apply yellow socks and bracelet for high fall risk patients  - Consider moving patient to room near nurses station  Outcome: Progressing  Goal: Maintain or return to baseline ADL function  Description: INTERVENTIONS:  -  Assess patient's ability to carry out ADLs; assess patient's baseline for ADL function and identify physical deficits which impact ability to perform ADLs (bathing, care of mouth/teeth, toileting, grooming, dressing, etc.)  - Assess/evaluate cause of self-care deficits   - Assess range of motion  - Assess patient's mobility; develop plan if impaired  - Assess patient's need for assistive devices and provide as appropriate  - Encourage maximum independence but intervene and supervise when necessary  - Involve family in performance of ADLs  - Assess for home care needs following discharge   - Consider OT consult to assist with ADL evaluation and planning for discharge  - Provide patient education as appropriate  Outcome: Progressing  Goal: Maintains/Returns to pre admission functional level  Description: INTERVENTIONS:  - Perform AM-PAC 6 Click Basic Mobility/ Daily Activity assessment daily.  - Set and communicate daily mobility goal to care team and patient/family/caregiver.   - Collaborate with rehabilitation services on mobility goals if consulted  - Perform Range of Motion 3 times a day.  - Reposition patient every 2 hours.  - Dangle patient 3 times a day  - Stand patient 3 times a day  - Ambulate patient 3 times a day  - Out of bed to chair 3 times a day   - Out of bed for meals 3 times a day  - Out of bed for toileting  - Record patient progress and toleration of activity level   Outcome: Progressing     Problem: DISCHARGE PLANNING  Goal: Discharge to home or other facility with appropriate resources  Description: INTERVENTIONS:  - Identify barriers to discharge w/patient and  caregiver  - Arrange for needed discharge resources and transportation as appropriate  - Identify discharge learning needs (meds, wound care, etc.)  - Arrange for interpretive services to assist at discharge as needed  - Refer to Case Management Department for coordinating discharge planning if the patient needs post-hospital services based on physician/advanced practitioner order or complex needs related to functional status, cognitive ability, or social support system  Outcome: Progressing

## 2025-02-18 NOTE — ASSESSMENT & PLAN NOTE
With acute respiratory failure secondary to pneumonia and influenza A  Requiring 2 L/min today on my exam  See plan above, wean O2 as tolerated

## 2025-02-19 LAB
ANION GAP SERPL CALCULATED.3IONS-SCNC: 5 MMOL/L (ref 4–13)
BACTERIA BLD CULT: ABNORMAL
BASOPHILS # BLD MANUAL: 0.11 THOUSAND/UL (ref 0–0.1)
BASOPHILS NFR MAR MANUAL: 1 % (ref 0–1)
BUN SERPL-MCNC: 7 MG/DL (ref 5–25)
CALCIUM SERPL-MCNC: 7.5 MG/DL (ref 8.4–10.2)
CHLORIDE SERPL-SCNC: 102 MMOL/L (ref 96–108)
CO2 SERPL-SCNC: 29 MMOL/L (ref 21–32)
CREAT SERPL-MCNC: 0.5 MG/DL (ref 0.6–1.3)
EOSINOPHIL # BLD MANUAL: 0.21 THOUSAND/UL (ref 0–0.4)
EOSINOPHIL NFR BLD MANUAL: 2 % (ref 0–6)
ERYTHROCYTE [DISTWIDTH] IN BLOOD BY AUTOMATED COUNT: 12.5 % (ref 11.6–15.1)
GFR SERPL CREATININE-BSD FRML MDRD: 99 ML/MIN/1.73SQ M
GLUCOSE SERPL-MCNC: 106 MG/DL (ref 65–140)
GRAM STN SPEC: ABNORMAL
HCT VFR BLD AUTO: 31.9 % (ref 34.8–46.1)
HGB BLD-MCNC: 10.5 G/DL (ref 11.5–15.4)
LYMPHOCYTES # BLD AUTO: 1.72 THOUSAND/UL (ref 0.6–4.47)
LYMPHOCYTES # BLD AUTO: 15 % (ref 14–44)
MAGNESIUM SERPL-MCNC: 1.6 MG/DL (ref 1.9–2.7)
MCH RBC QN AUTO: 31.6 PG (ref 26.8–34.3)
MCHC RBC AUTO-ENTMCNC: 32.9 G/DL (ref 31.4–37.4)
MCV RBC AUTO: 96 FL (ref 82–98)
MONOCYTES # BLD AUTO: 0.32 THOUSAND/UL (ref 0–1.22)
MONOCYTES NFR BLD: 3 % (ref 4–12)
NEUTROPHILS # BLD MANUAL: 8.36 THOUSAND/UL (ref 1.85–7.62)
NEUTS SEG NFR BLD AUTO: 78 % (ref 43–75)
NRBC BLD AUTO-RTO: 1 /100 WBC (ref 0–2)
PLATELET # BLD AUTO: 438 THOUSANDS/UL (ref 149–390)
PLATELET BLD QL SMEAR: ABNORMAL
PMV BLD AUTO: 8.9 FL (ref 8.9–12.7)
POIKILOCYTOSIS BLD QL SMEAR: PRESENT
POTASSIUM SERPL-SCNC: 3.2 MMOL/L (ref 3.5–5.3)
RBC # BLD AUTO: 3.32 MILLION/UL (ref 3.81–5.12)
RBC MORPH BLD: PRESENT
SODIUM SERPL-SCNC: 136 MMOL/L (ref 135–147)
VARIANT LYMPHS # BLD AUTO: 1 %
WBC # BLD AUTO: 10.72 THOUSAND/UL (ref 4.31–10.16)

## 2025-02-19 PROCEDURE — 85007 BL SMEAR W/DIFF WBC COUNT: CPT | Performed by: HOSPITALIST

## 2025-02-19 PROCEDURE — 99232 SBSQ HOSP IP/OBS MODERATE 35: CPT | Performed by: HOSPITALIST

## 2025-02-19 PROCEDURE — 80048 BASIC METABOLIC PNL TOTAL CA: CPT | Performed by: HOSPITALIST

## 2025-02-19 PROCEDURE — 94760 N-INVAS EAR/PLS OXIMETRY 1: CPT

## 2025-02-19 PROCEDURE — 94640 AIRWAY INHALATION TREATMENT: CPT

## 2025-02-19 PROCEDURE — 85027 COMPLETE CBC AUTOMATED: CPT | Performed by: HOSPITALIST

## 2025-02-19 PROCEDURE — 99232 SBSQ HOSP IP/OBS MODERATE 35: CPT | Performed by: INTERNAL MEDICINE

## 2025-02-19 PROCEDURE — 83735 ASSAY OF MAGNESIUM: CPT | Performed by: HOSPITALIST

## 2025-02-19 RX ORDER — LANOLIN ALCOHOL/MO/W.PET/CERES
400 CREAM (GRAM) TOPICAL 2 TIMES DAILY
Status: DISCONTINUED | OUTPATIENT
Start: 2025-02-19 | End: 2025-02-20 | Stop reason: HOSPADM

## 2025-02-19 RX ORDER — POTASSIUM CHLORIDE 1500 MG/1
40 TABLET, EXTENDED RELEASE ORAL DAILY
Status: DISCONTINUED | OUTPATIENT
Start: 2025-02-19 | End: 2025-02-20 | Stop reason: HOSPADM

## 2025-02-19 RX ADMIN — Medication 400 MG: at 17:43

## 2025-02-19 RX ADMIN — Medication 250 MG: at 17:43

## 2025-02-19 RX ADMIN — LISINOPRIL 10 MG: 10 TABLET ORAL at 09:04

## 2025-02-19 RX ADMIN — ACETAMINOPHEN 975 MG: 325 TABLET, FILM COATED ORAL at 14:48

## 2025-02-19 RX ADMIN — ACETAMINOPHEN 975 MG: 325 TABLET, FILM COATED ORAL at 05:45

## 2025-02-19 RX ADMIN — POTASSIUM CHLORIDE 20 MEQ: 1500 TABLET, EXTENDED RELEASE ORAL at 11:42

## 2025-02-19 RX ADMIN — CEFAZOLIN SODIUM 2000 MG: 2 SOLUTION INTRAVENOUS at 05:45

## 2025-02-19 RX ADMIN — CEFAZOLIN SODIUM 2000 MG: 2 SOLUTION INTRAVENOUS at 14:48

## 2025-02-19 RX ADMIN — Medication 250 MG: at 09:04

## 2025-02-19 RX ADMIN — Medication 400 MG: at 11:42

## 2025-02-19 RX ADMIN — ACETAMINOPHEN 975 MG: 325 TABLET, FILM COATED ORAL at 22:52

## 2025-02-19 RX ADMIN — VERAPAMIL HYDROCHLORIDE 240 MG: 120 TABLET ORAL at 11:42

## 2025-02-19 RX ADMIN — GUAIFENESIN 600 MG: 600 TABLET, EXTENDED RELEASE ORAL at 17:43

## 2025-02-19 RX ADMIN — CEFAZOLIN SODIUM 2000 MG: 2 SOLUTION INTRAVENOUS at 22:52

## 2025-02-19 RX ADMIN — LEVALBUTEROL HYDROCHLORIDE 1.25 MG: 1.25 SOLUTION RESPIRATORY (INHALATION) at 20:06

## 2025-02-19 RX ADMIN — ATORVASTATIN CALCIUM 40 MG: 40 TABLET, FILM COATED ORAL at 17:43

## 2025-02-19 RX ADMIN — TIMOLOL MALEATE 1 DROP: 5 SOLUTION OPHTHALMIC at 09:04

## 2025-02-19 RX ADMIN — LEVALBUTEROL HYDROCHLORIDE 1.25 MG: 1.25 SOLUTION RESPIRATORY (INHALATION) at 07:28

## 2025-02-19 RX ADMIN — Medication 2 TABLET: at 11:42

## 2025-02-19 RX ADMIN — LEVALBUTEROL HYDROCHLORIDE 1.25 MG: 1.25 SOLUTION RESPIRATORY (INHALATION) at 13:55

## 2025-02-19 RX ADMIN — PANTOPRAZOLE SODIUM 40 MG: 40 TABLET, DELAYED RELEASE ORAL at 05:45

## 2025-02-19 RX ADMIN — PAROXETINE HYDROCHLORIDE 20 MG: 20 TABLET, FILM COATED ORAL at 09:04

## 2025-02-19 RX ADMIN — GUAIFENESIN 600 MG: 600 TABLET, EXTENDED RELEASE ORAL at 09:04

## 2025-02-19 NOTE — PROGRESS NOTES
"Progress Note - Hospitalist   Name: Fay Corley 69 y.o. female I MRN: [id number]  Unit/Bed#: -01 I Date of Admission: 2/15/2025   Date of Service: 2/19/2025 I Hospital Day: 4     Assessment & Plan  Severe sepsis (HCC)  SIRS criteria: Tachycardia, tachypnea, leukocytosis  Suspected source: RUL pneumonia  Lactic acid: 2.2 on admit - now normal  End organ damage: lactic acidosis  Continue with IV Ancef  With bacteremia, see related plan below  Monitor vital signs, laboratory studies.  Cavitary pneumonia  Acute hypoxic respiratory failure secondary to pneumonia, currently on 2 L oxygen.  Wean oxygen as tolerated  Patient endorsing 3 to 4 weeks of progressive coughing, shortness of breath, fevers.  Reported Tmax of 103.8 and now with hemoptysis, but is getting better  CT imaging with \"9.3 x 5.1 cm masslike consolidation in the posterior right upper lobe with extensive surrounding groundglass opacity and central air-filled cavity. Given the patient's history, this is likely necrotizing pneumonia versus lung abscess with surrounding pulmonary hemorrhage. TB not excluded in the appropriate clinical setting. \"  Discussed with ID  Continue with IV Ancef  Cancel AFB  No concern for TB at this time per ID  Follow cultures  Respiratory protocol, nebs PRN.  Monitor respiratory status. Improving.   Pulmonary following  Hemoptysis  Patient endorsing hemoptysis which brought her into the hospital  Related to cavitary pneumonia  Pulmonary following  Monitor hemoglobin; hemoptysis is getting better.  Once resolved then can start DVT prophylaxis  Avoid DVT prophylaxis and blood thinners for now until hemoptysis resolves  Hypertension  Blood pressure soft this morning, responding to IV hydration  Restart lisinopril, restart verapamil  Monitor BP closely and avoid hypotension  Electrolyte abnormality  With marked electrolyte abnormalities, due to dehydration.  Hypokalemia and hypomagnesemia  Replete as necessary  Influenza " A  Testing positive for influenza A however symptoms have been going on for a few weeks. She is out of the window of Tamiflu at this time  Supportive care  Treatment for pneumonia as above  Acute respiratory failure (HCC)  With acute respiratory failure secondary to pneumonia and influenza A  See plan above, wean O2 as tolerated. Weaned to 1L   Bacteremia due to Streptococcus  Admission blood cultures 1 out of 2 positive for group A streptococcus, strep pyogenes.  Repeat Bcx pending  ID following  Continue Ancef  Echo  with EF 55%, stage I diastolic dysfunction   VTE  Prophylaxis:   Pharmacologic: in place  Mechanical VTE Prophylaxis in Place: Yes    Patient Centered Rounds: I have performed bedside rounds with nursing staff today.    Discussions with Specialists or Other Care Team Provider: case management    Education and Discussions with Family / Patient: yes dtr    Mobility:   Basic Mobility Inpatient Raw Score: 19  JH-HLM Goal: 6: Walk 10 steps or more  JH-HLM Achieved: 6: Walk 10 steps or more      Current Length of Stay: 4 day(s)    Current Patient Status: Inpatient        Code Status: Level 1 - Full Code    Discharge Plan: Pt will require continued inpatient hospitalization.    Subjective:   Pt dtr and pt report that she is improved   Not yet at the baseline but getting there  Some dyspnea on exertion    Patient is seen and examined at bedside.  All other ROS are negative.    Objective:     Vitals:   Temp (24hrs), Av.3 °F (36.8 °C), Min:97.1 °F (36.2 °C), Max:99.2 °F (37.3 °C)    Temp:  [97.1 °F (36.2 °C)-99.2 °F (37.3 °C)] 98 °F (36.7 °C)  HR:  [47-95] 47  Resp:  [16-18] 18  BP: (131-160)/(69-91) 131/76  SpO2:  [90 %-95 %] 92 %  Body mass index is 31.24 kg/m².     Input and Output Summary (last 24 hours):       Intake/Output Summary (Last 24 hours) at 2025 1056  Last data filed at 2025 0900  Gross per 24 hour   Intake 960 ml   Output 100 ml   Net 860 ml       Physical Exam:       GEN: No  acute distress, comfortable on o2  HEEENT: No JVD, PERRLA, no scleral icterus  RESP: Lungs decreased  CV: RRR, +s1/s2   ABD: SOFT NON TENDER, POSITIVE BOWEL SOUNDS, NO DISTENTION  PSYCH: CALM  NEURO: [place] baseline, NO FOCAL DEFICITS  SKIN: NO RASH  EXTREM: NO EDEMA    Additional Data:     Labs:    Results from last 7 days   Lab Units 02/19/25  0538 02/18/25  0429 02/17/25  0438 02/15/25  1520 02/15/25  1101   WBC Thousand/uL 10.72*   < > 13.74*   < > 24.16*   HEMOGLOBIN g/dL 10.5*   < > 10.3*   < > 12.2   HEMATOCRIT % 31.9*   < > 30.8*   < > 35.9   PLATELETS Thousands/uL 438*   < > 283   < > 265   BANDS PCT %  --   --   --   --  3   SEGS PCT %  --   --  87*   < >  --    LYMPHO PCT % 15  --  7*   < > 2*   MONO PCT % 3*  --  5   < > 3*   EOS PCT % 2  --  0   < > 0    < > = values in this interval not displayed.     Results from last 7 days   Lab Units 02/19/25  0538 02/16/25  0216 02/15/25  1101   SODIUM mmol/L 136   < > 127*   POTASSIUM mmol/L 3.2*   < > 2.8*   CHLORIDE mmol/L 102   < > 92*   CO2 mmol/L 29   < > 24   BUN mg/dL 7   < > 13   CREATININE mg/dL 0.50*   < > 0.96   ANION GAP mmol/L 5   < > 11   CALCIUM mg/dL 7.5*   < > 7.9*   ALBUMIN g/dL  --   --  3.7   TOTAL BILIRUBIN mg/dL  --   --  0.82   ALK PHOS U/L  --   --  78   ALT U/L  --   --  13   AST U/L  --   --  25   GLUCOSE RANDOM mg/dL 106   < > 151*    < > = values in this interval not displayed.     Results from last 7 days   Lab Units 02/15/25  1101   INR  1.07             Results from last 7 days   Lab Units 02/16/25  0216 02/15/25  1236 02/15/25  1101   LACTIC ACID mmol/L  --  1.7 2.2*   PROCALCITONIN ng/ml 0.80*  --  0.74*       Lines/Drains:  Invasive Devices       Peripheral Intravenous Line  Duration             Peripheral IV 02/17/25 Left Antecubital 2 days                    Telemetry:   Telemetry Orders (From admission, onward)               24 Hour Telemetry Monitoring  (ED Bridging Orders Panel)  Continuous x 24 Hours (Telem)         Expiring   Question:  Reason for 24 Hour Telemetry  Answer:  Metabolic/electrolyte disturbance with high probability of dysrhythmia. K level <3 or >6 OR KCL infusion >10mEq/hr                        * I Have Reviewed All Lab Data Listed Above.           Imaging:     Results for orders placed during the hospital encounter of 02/15/25    XR chest 1 view portable    Narrative  XR CHEST PORTABLE    INDICATION: coughing up blood. Positive for influenza    COMPARISON: CXR and chest CT 4/3/2023.    FINDINGS:    Moderate consolidation in the right upper lobe with possible cavitation. No pneumothorax or pleural effusion.    Normal cardiomediastinal silhouette.    Mild curvature of the spine.    Mild benign eventration of the right hemidiaphragm.    Impression  Moderate consolidation in the right upper lobe with possible cavitation. Given the patient's history of hemoptysis, this could be due to pneumonia, possibly necrotizing, lung abscess, or malignancy. TB not excluded in the appropriate clinical setting if  the lesion is cavitary. Recommend further evaluation with CT.      I personally discussed this study with Dr. Marni Birch on 2/15/2025 11:47 AM.            Workstation performed: [id number]    Results for orders placed during the hospital encounter of 04/03/23    XR chest pa & lateral    Narrative  CHEST    INDICATION:   R06.02: Shortness of breath.    COMPARISON:  CXR 3/4/2023 and chest CT from today.    EXAM PERFORMED/VIEWS:  XR CHEST PA & LATERAL. DUAL ENERGY SUBTRACTION.    FINDINGS:    Cardiomediastinal silhouette normal.    Lungs clear. No effusion or pneumothorax.    Upper abdomen normal. Cholecystectomy.    Mild curvature of the spine.    Impression  No acute cardiopulmonary disease.          Workstation performed: [id number]      *I have reviewed all imaging reports listed above      Recent Cultures (last 7 days):     Results from last 7 days   Lab Units 02/17/25  0437 02/15/25  1747 02/15/25  1631  02/15/25  1112 02/15/25  1101   BLOOD CULTURE  No Growth at 24 hrs.  No Growth at 24 hrs.  --   --  Staphylococcus epidermidis* Beta Hemolytic Streptococcus Group A*   SPUTUM CULTURE   --   --  4+ Growth of Streptococcus pyogenes (Group A)*  1+ Growth of  --   --    GRAM STAIN RESULT   --   --  3+ Polys*  1+ Gram positive cocci in chains*  No Epithelial cells seen* Gram positive cocci in clusters* Gram positive cocci in pairs and chains*   LEGIONELLA URINARY ANTIGEN   --  Negative  --   --   --        Last 24 Hours Medication List:   Current Facility-Administered Medications   Medication Dose Route Frequency Provider Last Rate    acetaminophen  975 mg Oral Q8H [facility] Giuseppe Roldan PA-C      albuterol  2.5 mg Nebulization Q4H PRN Brianna Cifuentes MD      atorvastatin  40 mg Oral Daily With Dinner Giuseppe Roldan PA-C      benzonatate  100 mg Oral TID PRN Giuseppe Roldan PA-C      calcium carbonate-vitamin D  2 tablet Oral Daily With Breakfast Giuseppe Roldan PA-C      cefazolin  2,000 mg Intravenous Q8H Pilo Gabriel MD 2,000 mg (02/19/25 0545)    guaiFENesin  600 mg Oral BID Giuseppe Roldan PA-C      ketorolac  15 mg Intravenous Q6H PRN Vel Joyce MD      levalbuterol  1.25 mg Nebulization TID Jorge Bellamy PA-C      lidocaine  2 patch Topical Daily Giuseppe Roldan PA-C      lisinopril  10 mg Oral Daily Vel Jocye MD      magnesium Oxide  400 mg Oral BID Spenser Salas MD      ondansetron  4 mg Intravenous Q6H PRN Giuseppe Roldan PA-C      pantoprazole  40 mg Oral Daily Before Breakfast Giuseppe Roldan PA-C      PARoxetine  20 mg Oral Daily Giuseppe Roldan PA-C      phenol  1 spray Mouth/Throat Q2H PRN Giuseppe Roldan PA-C      potassium chloride  40 mEq Oral Daily Spenser Salas MD      saccharomyces boulardii  250 mg Oral BID Giuseppe Roldan PA-C      timolol  1 drop Both Eyes Daily Vel Joyce MD      tiZANidine  4 mg Oral Q8H PRN Juhi Thomas PA-C       verapamil  240 mg Oral Daily Vel Joyce MD          Today, Patient Was Seen By: Spenser Salas MD    ** Please Note: Dictation voice to text software may have been used in the creation of this document. **

## 2025-02-19 NOTE — ASSESSMENT & PLAN NOTE
With acute respiratory failure secondary to pneumonia and influenza A  See plan above, wean O2 as tolerated. Weaned to 1L

## 2025-02-19 NOTE — ASSESSMENT & PLAN NOTE
With marked electrolyte abnormalities, due to dehydration.  Hypokalemia and hypomagnesemia  Replete as necessary

## 2025-02-19 NOTE — ASSESSMENT & PLAN NOTE
In the setting of severe pneumonia, currently requiring 0.5 L nasal cannula, titrate to maintain O2 sats greater than 89%  Would perform home O2 eval prior to discharge  Continue Xopenex nebs upon d/c. Patient has nebulizer machine.

## 2025-02-19 NOTE — PLAN OF CARE
Problem: PAIN - ADULT  Goal: Verbalizes/displays adequate comfort level or baseline comfort level  Description: Interventions:  - Encourage patient to monitor pain and request assistance  - Assess pain using appropriate pain scale  - Administer analgesics based on type and severity of pain and evaluate response  - Implement non-pharmacological measures as appropriate and evaluate response  - Consider cultural and social influences on pain and pain management  - Notify physician/advanced practitioner if interventions unsuccessful or patient reports new pain  Outcome: Progressing     Problem: INFECTION - ADULT  Goal: Absence or prevention of progression during hospitalization  Description: INTERVENTIONS:  - Assess and monitor for signs and symptoms of infection  - Monitor lab/diagnostic results  - Monitor all insertion sites, i.e. indwelling lines, tubes, and drains  - Monitor endotracheal if appropriate and nasal secretions for changes in amount and color  - [place] appropriate cooling/warming therapies per order  - Administer medications as ordered  - Instruct and encourage patient and family to use good hand hygiene technique  - Identify and instruct in appropriate isolation precautions for identified infection/condition  Outcome: Progressing  Goal: Absence of fever/infection during neutropenic period  Description: INTERVENTIONS:  - Monitor WBC    Outcome: Progressing     Problem: SAFETY ADULT  Goal: Patient will remain free of falls  Description: INTERVENTIONS:  - Educate patient/family on patient safety including physical limitations  - Instruct patient to call for assistance with activity   - Consult OT/PT to assist with strengthening/mobility   - Keep Call bell within reach  - Keep bed low and locked with side rails adjusted as appropriate  - Keep care items and personal belongings within reach  - Initiate and maintain comfort rounds  - Make Fall Risk Sign visible to staff  - Offer Toileting every 2 Hours,  in advance of need  - Initiate/Maintain bed alarm  - Obtain necessary fall risk management equipment: yellow socks  - Apply yellow socks and bracelet for high fall risk patients  - Consider moving patient to room near nurses station  Outcome: Progressing     Problem: DISCHARGE PLANNING  Goal: Discharge to home or other facility with appropriate resources  Description: INTERVENTIONS:  - Identify barriers to discharge w/patient and caregiver  - Arrange for needed discharge resources and transportation as appropriate  - Identify discharge learning needs (meds, wound care, etc.)  - Arrange for interpretive services to assist at discharge as needed  - Refer to Case Management Department for coordinating discharge planning if the patient needs post-hospital services based on physician/advanced practitioner order or complex needs related to functional status, cognitive ability, or social support system  Outcome: Progressing     Problem: CARDIOVASCULAR - ADULT  Goal: Maintains optimal cardiac output and hemodynamic stability  Description: INTERVENTIONS:  - Monitor I/O, vital signs and rhythm  - Monitor for S/S and trends of decreased cardiac output  - Administer and titrate ordered vasoactive medications to optimize hemodynamic stability  - Assess quality of pulses, skin color and temperature  - Assess for signs of decreased coronary artery perfusion  - Instruct patient to report change in severity of symptoms  Outcome: Progressing  Goal: Absence of cardiac dysrhythmias or at baseline rhythm  Description: INTERVENTIONS:  - Continuous cardiac monitoring, vital signs, obtain 12 lead EKG if ordered  - Administer antiarrhythmic and heart rate control medications as ordered  - Monitor electrolytes and administer replacement therapy as ordered  Outcome: Progressing     Problem: RESPIRATORY - ADULT  Goal: Achieves optimal ventilation and oxygenation  Description: INTERVENTIONS:  - Assess for changes in respiratory status  - Assess  for changes in mentation and behavior  - Position to facilitate oxygenation and minimize respiratory effort  - Oxygen administered by appropriate delivery if ordered  - Initiate smoking cessation education as indicated  - Encourage broncho-pulmonary hygiene including cough, deep breathe, Incentive Spirometry  - Assess the need for suctioning and aspirate as needed  - Assess and instruct to report SOB or any respiratory difficulty  - Respiratory Therapy support as indicated  Outcome: Progressing     Problem: METABOLIC, FLUID AND ELECTROLYTES - ADULT  Goal: Electrolytes maintained within normal limits  Description: INTERVENTIONS:  - Monitor labs and assess patient for signs and symptoms of electrolyte imbalances  - Administer electrolyte replacement as ordered  - Monitor response to electrolyte replacements, including repeat lab results as appropriate  - Instruct patient on fluid and nutrition as appropriate  Outcome: Progressing  Goal: Fluid balance maintained  Description: INTERVENTIONS:  - Monitor labs   - Monitor I/O and WT  - Instruct patient on fluid and nutrition as appropriate  - Assess for signs & symptoms of volume excess or deficit  Outcome: Progressing  Goal: Glucose maintained within target range  Description: INTERVENTIONS:  - Monitor Blood Glucose as ordered  - Assess for signs and symptoms of hyperglycemia and hypoglycemia  - Administer ordered medications to maintain glucose within target range  - Assess nutritional intake and initiate nutrition service referral as needed  Outcome: Progressing     Problem: Nutrition/Hydration-ADULT  Goal: Nutrient/Hydration intake appropriate for improving, restoring or maintaining nutritional needs  Description: Monitor and assess patient's nutrition/hydration status for malnutrition. Collaborate with interdisciplinary team and initiate plan and interventions as ordered.  Monitor patient's weight and dietary intake as ordered or per policy. Utilize nutrition  screening tool and intervene as necessary. Determine patient's food preferences and provide high-protein, high-caloric foods as appropriate.     INTERVENTIONS:  - Monitor oral intake, urinary output, labs, and treatment plans  - Assess nutrition and hydration status and recommend course of action  - Evaluate amount of meals eaten  - Assist patient with eating if necessary   - Allow adequate time for meals  - Recommend/ encourage appropriate diets, oral nutritional supplements, and vitamin/mineral supplements  - Order, calculate, and assess calorie counts as needed  - Recommend, monitor, and adjust tube feedings and TPN/PPN based on assessed needs  - Assess need for intravenous fluids  - Provide specific nutrition/hydration education as appropriate  - Include patient/family/caregiver in decisions related to nutrition  Outcome: Progressing

## 2025-02-19 NOTE — PLAN OF CARE
Problem: PAIN - ADULT  Goal: Verbalizes/displays adequate comfort level or baseline comfort level  Description: Interventions:  - Encourage patient to monitor pain and request assistance  - Assess pain using appropriate pain scale  - Administer analgesics based on type and severity of pain and evaluate response  - Implement non-pharmacological measures as appropriate and evaluate response  - Consider cultural and social influences on pain and pain management  - Notify physician/advanced practitioner if interventions unsuccessful or patient reports new pain  Outcome: Progressing     Problem: INFECTION - ADULT  Goal: Absence or prevention of progression during hospitalization  Description: INTERVENTIONS:  - Assess and monitor for signs and symptoms of infection  - Monitor lab/diagnostic results  - Monitor all insertion sites, i.e. indwelling lines, tubes, and drains  - Monitor endotracheal if appropriate and nasal secretions for changes in amount and color  - [place] appropriate cooling/warming therapies per order  - Administer medications as ordered  - Instruct and encourage patient and family to use good hand hygiene technique  - Identify and instruct in appropriate isolation precautions for identified infection/condition  Outcome: Progressing  Goal: Absence of fever/infection during neutropenic period  Description: INTERVENTIONS:  - Monitor WBC    Outcome: Progressing     Problem: SAFETY ADULT  Goal: Patient will remain free of falls  Description: INTERVENTIONS:  - Educate patient/family on patient safety including physical limitations  - Instruct patient to call for assistance with activity   - Consult OT/PT to assist with strengthening/mobility   - Keep Call bell within reach  - Keep bed low and locked with side rails adjusted as appropriate  - Keep care items and personal belongings within reach  - Initiate and maintain comfort rounds  - Make Fall Risk Sign visible to staff  - Offer Toileting every 2 Hours,  in advance of need  - Initiate/Maintain bed alarm  - Obtain necessary fall risk management equipment: yellow socks  - Apply yellow socks and bracelet for high fall risk patients  - Consider moving patient to room near nurses station  Outcome: Progressing  Goal: Maintain or return to baseline ADL function  Description: INTERVENTIONS:  -  Assess patient's ability to carry out ADLs; assess patient's baseline for ADL function and identify physical deficits which impact ability to perform ADLs (bathing, care of mouth/teeth, toileting, grooming, dressing, etc.)  - Assess/evaluate cause of self-care deficits   - Assess range of motion  - Assess patient's mobility; develop plan if impaired  - Assess patient's need for assistive devices and provide as appropriate  - Encourage maximum independence but intervene and supervise when necessary  - Involve family in performance of ADLs  - Assess for home care needs following discharge   - Consider OT consult to assist with ADL evaluation and planning for discharge  - Provide patient education as appropriate  Outcome: Progressing  Goal: Maintains/Returns to pre admission functional level  Description: INTERVENTIONS:  - Perform AM-PAC 6 Click Basic Mobility/ Daily Activity assessment daily.  - Set and communicate daily mobility goal to care team and patient/family/caregiver.   - Collaborate with rehabilitation services on mobility goals if consulted  - Perform Range of Motion 3 times a day.  - Reposition patient every 2 hours.  - Dangle patient 3 times a day  - Stand patient 3 times a day  - Ambulate patient 3 times a day  - Out of bed to chair 3 times a day   - Out of bed for meals 3 times a day  - Out of bed for toileting  - Record patient progress and toleration of activity level   Outcome: Progressing     Problem: METABOLIC, FLUID AND ELECTROLYTES - ADULT  Goal: Electrolytes maintained within normal limits  Description: INTERVENTIONS:  - Monitor labs and assess patient for signs  and symptoms of electrolyte imbalances  - Administer electrolyte replacement as ordered  - Monitor response to electrolyte replacements, including repeat lab results as appropriate  - Instruct patient on fluid and nutrition as appropriate  Outcome: Progressing  Goal: Fluid balance maintained  Description: INTERVENTIONS:  - Monitor labs   - Monitor I/O and WT  - Instruct patient on fluid and nutrition as appropriate  - Assess for signs & symptoms of volume excess or deficit  Outcome: Progressing  Goal: Glucose maintained within target range  Description: INTERVENTIONS:  - Monitor Blood Glucose as ordered  - Assess for signs and symptoms of hyperglycemia and hypoglycemia  - Administer ordered medications to maintain glucose within target range  - Assess nutritional intake and initiate nutrition service referral as needed  Outcome: Progressing     Problem: RESPIRATORY - ADULT  Goal: Achieves optimal ventilation and oxygenation  Description: INTERVENTIONS:  - Assess for changes in respiratory status  - Assess for changes in mentation and behavior  - Position to facilitate oxygenation and minimize respiratory effort  - Oxygen administered by appropriate delivery if ordered  - Initiate smoking cessation education as indicated  - Encourage broncho-pulmonary hygiene including cough, deep breathe, Incentive Spirometry  - Assess the need for suctioning and aspirate as needed  - Assess and instruct to report SOB or any respiratory difficulty  - Respiratory Therapy support as indicated  Outcome: Progressing

## 2025-02-19 NOTE — ASSESSMENT & PLAN NOTE
CT scan with 9.3 x 5.1 cm masslike consolidation in the posterior right upper lobe with extensive surrounding groundglass opacity and central air-filled cavity  QuantiFERON gold is negative/indeterminate although very low suspicion for TB, patient with no risk factors  Likely related to strep pyogenes given her positive blood culture, gram-positive cocci in sputum culture as well  Would continue with antibiotics per infectious disease and repeat CT scan in 6 to 8 weeks  Continue Mucinex, will continue nebulizer treatments as she did find some relief with this   If any clinical worsening, would need a bronchoscopy for BAL/cultures (no need given improvement)

## 2025-02-19 NOTE — NURSING NOTE
Ambulated patient out into hallway on room air.   Patient denied SOB.  Patient SpO2 ranged: 86%-89%  Patient placed back in room and placed back on 1L of oxygen.

## 2025-02-19 NOTE — PROGRESS NOTES
Progress Note - Pulmonology   Name: Fay Corley 69 y.o. female I MRN: [id number]  Unit/Bed#: -01 I Date of Admission: 2/15/2025   Date of Service: 2/19/2025 I Hospital Day: 4    Assessment & Plan  Severe sepsis (HCC)  Secondary to strep bacteremia and cavitary pneumonia  Cavitary pneumonia  CT scan with 9.3 x 5.1 cm masslike consolidation in the posterior right upper lobe with extensive surrounding groundglass opacity and central air-filled cavity  QuantiFERON gold is negative/indeterminate although very low suspicion for TB, patient with no risk factors  Likely related to strep pyogenes given her positive blood culture, gram-positive cocci in sputum culture as well  Would continue with antibiotics per infectious disease and repeat CT scan in 6 to 8 weeks  Continue Mucinex, will continue nebulizer treatments as she did find some relief with this   If any clinical worsening, would need a bronchoscopy for BAL/cultures (no need given improvement)  Influenza A  With superimposed bacterial pneumonia  Treatment per infectious disease  Supportive care  Acute respiratory failure (HCC)  In the setting of severe pneumonia, currently requiring 0.5 L nasal cannula, titrate to maintain O2 sats greater than 89%  Would perform home O2 eval prior to discharge  Continue Xopenex nebs upon d/c. Patient has nebulizer machine.  Hemoptysis  In the setting of cavitary pneumonia  H&H is stable  Would continue to monitor  Bacteremia due to Streptococcus  Antibiotics per infectious disease    Discussed with SLIM & patient's daughter at bedside    Pulmonary will sign off. Please call with questions. Will arrange outpatient follow-up.     24 Hour Events : improving o2 needs, wbc trending down  Subjective : Fay again is feeling better every day. No new complaints. Sputum now tan in color.     Objective :  Temp:  [97.1 °F (36.2 °C)-99.2 °F (37.3 °C)] 98 °F (36.7 °C)  HR:  [47-95] 47  BP: (131-160)/(69-91) 131/76  Resp:  [16-18]  18  SpO2:  [90 %-95 %] 92 %  O2 Device: Nasal cannula  Nasal Cannula O2 Flow Rate (L/min):  [1 L/min-2 L/min] 1 L/min    Physical Exam    General appearance: Alert and awake, in no acute distress  Head: Normocephalic, without obvious abnormality, atraumatic  Eyes: No scleral icterus   Lungs: +Wheezing  Heart: Regular rate  Abdomen:  No appreciable distension or tenderness  Extremities: No deformity  Skin: Warm and dry  Neurologic: No acute focal deficits are noted        Lab Results: I have reviewed the following results:   .     02/19/25  0538   WBC 10.72*   HGB 10.5*   HCT 31.9*   *   SODIUM 136   K 3.2*      CO2 29   BUN 7   CREATININE 0.50*   GLUC 106   MG 1.6*     ABG: No new results in last 24 hours.    Imaging Results Review: I reviewed radiology reports from this admission including: CT chest.  Other Study Results Review: No additional pertinent studies reviewed.  PFT Results Reviewed: na

## 2025-02-19 NOTE — ASSESSMENT & PLAN NOTE
"Acute hypoxic respiratory failure secondary to pneumonia, currently on 2 L oxygen.  Wean oxygen as tolerated  Patient endorsing 3 to 4 weeks of progressive coughing, shortness of breath, fevers.  Reported Tmax of 103.8 and now with hemoptysis, but is getting better  CT imaging with \"9.3 x 5.1 cm masslike consolidation in the posterior right upper lobe with extensive surrounding groundglass opacity and central air-filled cavity. Given the patient's history, this is likely necrotizing pneumonia versus lung abscess with surrounding pulmonary hemorrhage. TB not excluded in the appropriate clinical setting. \"  Discussed with ID  Continue with IV Ancef  Cancel AFB  No concern for TB at this time per ID  Follow cultures  Respiratory protocol, nebs PRN.  Monitor respiratory status. Improving.   Pulmonary following  "

## 2025-02-20 VITALS
HEART RATE: 80 BPM | SYSTOLIC BLOOD PRESSURE: 148 MMHG | DIASTOLIC BLOOD PRESSURE: 92 MMHG | WEIGHT: 182 LBS | BODY MASS INDEX: 31.07 KG/M2 | TEMPERATURE: 97.7 F | RESPIRATION RATE: 16 BRPM | OXYGEN SATURATION: 94 % | HEIGHT: 64 IN

## 2025-02-20 LAB
ALBUMIN SERPL BCG-MCNC: 2.8 G/DL (ref 3.5–5)
ALP SERPL-CCNC: 75 U/L (ref 34–104)
ALT SERPL W P-5'-P-CCNC: 7 U/L (ref 7–52)
ANION GAP SERPL CALCULATED.3IONS-SCNC: 9 MMOL/L (ref 4–13)
AST SERPL W P-5'-P-CCNC: 19 U/L (ref 13–39)
BILIRUB SERPL-MCNC: 0.27 MG/DL (ref 0.2–1)
BUN SERPL-MCNC: 6 MG/DL (ref 5–25)
CALCIUM ALBUM COR SERPL-MCNC: 8.9 MG/DL (ref 8.3–10.1)
CALCIUM SERPL-MCNC: 7.9 MG/DL (ref 8.4–10.2)
CHLORIDE SERPL-SCNC: 103 MMOL/L (ref 96–108)
CO2 SERPL-SCNC: 26 MMOL/L (ref 21–32)
CREAT SERPL-MCNC: 0.47 MG/DL (ref 0.6–1.3)
ERYTHROCYTE [DISTWIDTH] IN BLOOD BY AUTOMATED COUNT: 12.5 % (ref 11.6–15.1)
GFR SERPL CREATININE-BSD FRML MDRD: 101 ML/MIN/1.73SQ M
GLUCOSE SERPL-MCNC: 101 MG/DL (ref 65–140)
HCT VFR BLD AUTO: 34.1 % (ref 34.8–46.1)
HGB BLD-MCNC: 11 G/DL (ref 11.5–15.4)
MAGNESIUM SERPL-MCNC: 1.4 MG/DL (ref 1.9–2.7)
MCH RBC QN AUTO: 31.6 PG (ref 26.8–34.3)
MCHC RBC AUTO-ENTMCNC: 32.3 G/DL (ref 31.4–37.4)
MCV RBC AUTO: 98 FL (ref 82–98)
NRBC BLD AUTO-RTO: 0 /100 WBCS
PLATELET # BLD AUTO: 453 THOUSANDS/UL (ref 149–390)
PMV BLD AUTO: 8.8 FL (ref 8.9–12.7)
POTASSIUM SERPL-SCNC: 3.3 MMOL/L (ref 3.5–5.3)
PROT SERPL-MCNC: 6.4 G/DL (ref 6.4–8.4)
RBC # BLD AUTO: 3.48 MILLION/UL (ref 3.81–5.12)
SODIUM SERPL-SCNC: 138 MMOL/L (ref 135–147)
WBC # BLD AUTO: 11.03 THOUSAND/UL (ref 4.31–10.16)

## 2025-02-20 PROCEDURE — 94761 N-INVAS EAR/PLS OXIMETRY MLT: CPT

## 2025-02-20 PROCEDURE — 85027 COMPLETE CBC AUTOMATED: CPT | Performed by: HOSPITALIST

## 2025-02-20 PROCEDURE — 99239 HOSP IP/OBS DSCHRG MGMT >30: CPT | Performed by: HOSPITALIST

## 2025-02-20 PROCEDURE — 80053 COMPREHEN METABOLIC PANEL: CPT | Performed by: HOSPITALIST

## 2025-02-20 PROCEDURE — 94640 AIRWAY INHALATION TREATMENT: CPT

## 2025-02-20 PROCEDURE — 94760 N-INVAS EAR/PLS OXIMETRY 1: CPT

## 2025-02-20 PROCEDURE — 83735 ASSAY OF MAGNESIUM: CPT | Performed by: HOSPITALIST

## 2025-02-20 RX ORDER — GUAIFENESIN 600 MG/1
600 TABLET, EXTENDED RELEASE ORAL 2 TIMES DAILY
Qty: 30 TABLET | Refills: 0 | Status: SHIPPED | OUTPATIENT
Start: 2025-02-20

## 2025-02-20 RX ORDER — LANOLIN ALCOHOL/MO/W.PET/CERES
400 CREAM (GRAM) TOPICAL 2 TIMES DAILY
Qty: 60 TABLET | Refills: 0 | Status: SHIPPED | OUTPATIENT
Start: 2025-02-20

## 2025-02-20 RX ORDER — POTASSIUM CHLORIDE 1500 MG/1
40 TABLET, EXTENDED RELEASE ORAL DAILY
Qty: 60 TABLET | Refills: 0 | Status: SHIPPED | OUTPATIENT
Start: 2025-02-21

## 2025-02-20 RX ORDER — CEFADROXIL 500 MG/1
1000 CAPSULE ORAL EVERY 12 HOURS SCHEDULED
Qty: 112 CAPSULE | Refills: 0 | Status: SHIPPED | OUTPATIENT
Start: 2025-02-20 | End: 2025-03-20

## 2025-02-20 RX ORDER — BENZONATATE 100 MG/1
100 CAPSULE ORAL 3 TIMES DAILY PRN
Qty: 20 CAPSULE | Refills: 0 | Status: SHIPPED | OUTPATIENT
Start: 2025-02-20

## 2025-02-20 RX ORDER — LIDOCAINE 50 MG/G
2 PATCH TOPICAL DAILY
Qty: 60 PATCH | Refills: 0 | Status: SHIPPED | OUTPATIENT
Start: 2025-02-21

## 2025-02-20 RX ADMIN — VERAPAMIL HYDROCHLORIDE 240 MG: 120 TABLET ORAL at 08:32

## 2025-02-20 RX ADMIN — Medication 250 MG: at 08:32

## 2025-02-20 RX ADMIN — ACETAMINOPHEN 975 MG: 325 TABLET, FILM COATED ORAL at 05:32

## 2025-02-20 RX ADMIN — LISINOPRIL 10 MG: 10 TABLET ORAL at 08:32

## 2025-02-20 RX ADMIN — POTASSIUM CHLORIDE 40 MEQ: 1500 TABLET, EXTENDED RELEASE ORAL at 08:32

## 2025-02-20 RX ADMIN — PANTOPRAZOLE SODIUM 40 MG: 40 TABLET, DELAYED RELEASE ORAL at 05:32

## 2025-02-20 RX ADMIN — Medication 2 TABLET: at 08:32

## 2025-02-20 RX ADMIN — LEVALBUTEROL HYDROCHLORIDE 1.25 MG: 1.25 SOLUTION RESPIRATORY (INHALATION) at 09:50

## 2025-02-20 RX ADMIN — GUAIFENESIN 600 MG: 600 TABLET, EXTENDED RELEASE ORAL at 08:32

## 2025-02-20 RX ADMIN — PAROXETINE HYDROCHLORIDE 20 MG: 20 TABLET, FILM COATED ORAL at 08:32

## 2025-02-20 RX ADMIN — Medication 400 MG: at 08:32

## 2025-02-20 RX ADMIN — TIMOLOL MALEATE 1 DROP: 5 SOLUTION OPHTHALMIC at 08:38

## 2025-02-20 RX ADMIN — CEFAZOLIN SODIUM 2000 MG: 2 SOLUTION INTRAVENOUS at 05:32

## 2025-02-20 NOTE — ASSESSMENT & PLAN NOTE
Patient endorsing hemoptysis which brought her into the hospital  Related to cavitary pneumonia  Pulmonary following  Monitor hemoglobin; hemoptysis is getting better.  Once resolved then can start DVT prophylaxis  Avoid DVT prophylaxis and blood thinners for now until hemoptysis resolves  RESOLVED

## 2025-02-20 NOTE — NURSING NOTE
Patient discharged to home. Patient wheeled off the unit via wheelchair by RN. Prior to discharge, discharge education provided. Patient verbalized understanding.

## 2025-02-20 NOTE — CASE MANAGEMENT
Case Management Discharge Planning Note    Patient name Fay Corley  Location /-01 MRN [id number]  : 1955 Date 2025       Current Admission Date: 2/15/2025  Current Admission Diagnosis:Severe sepsis (HCC)   Patient Active Problem List    Diagnosis Date Noted Date Diagnosed    Bacteremia due to Streptococcus 2025     Cavitary pneumonia 02/15/2025     Severe sepsis (HCC) 02/15/2025     Electrolyte abnormality 02/15/2025     Influenza A 02/15/2025     Acute respiratory failure (HCC) 02/15/2025     Hemoptysis 02/15/2025     Dyspnea on exertion 2024     Aftercare following right hip joint replacement surgery 2022     Acute blood loss anemia 2022     Hypertension      Hyperlipidemia      Sacroiliac joint dysfunction 2022     Hypokalemia 2020     Peripheral vestibulopathy of both ears 2020     GERD (gastroesophageal reflux disease)        LOS (days): 5  Geometric Mean LOS (GMLOS) (days): 4.9  Days to GMLOS:0     OBJECTIVE:  Risk of Unplanned Readmission Score: 9.64         Current admission status: Inpatient   Preferred Pharmacy:   [id number] [place] - [address] [facility] [address]  [address] [address]  Phone: [phone] Fax: [phone]    Primary Care Provider: LOGAN Rodriguez    Primary Insurance: [employer]  Secondary Insurance:     DISCHARGE DETAILS:    IMM Given (Date):: 25  IMM Given to:: Patient  IMM reviewed with patient, patient agrees with discharge determination.       Additional Comments: Met with Pt and Pt's dtr at bedside. Informed Pt of tentative need for home oxygen upon discharge and [facility]. Await Home O2 eval. CM to follow.

## 2025-02-20 NOTE — CASE MANAGEMENT
Case Management Progress Note    Patient name Fay Corley  Location /-01 MRN [id number]  : 1955 Date 2025       LOS (days): 5  Geometric Mean LOS (GMLOS) (days): 4.9  Days to GMLOS:-0.1        OBJECTIVE:        Current admission status: Inpatient  Preferred Pharmacy:   [id number] [place] - [address]  [address] [place] [address]  Phone: [phone] Fax: [phone]    Primary Care Provider: LOGAN Rodriguez    Primary Insurance: [employer] RE[employer]  Secondary Insurance:     PROGRESS NOTE:  Per Halie Escobar at [facility], [employer] portable tank can be given to Pt and  will be in contact with dtr to schedule delivery tomorrow. [employer] portable tank dropped off at Pt's bedside. Pt's dtr reports they did get a call from [facility] to schedule delivery for tomorrow.

## 2025-02-20 NOTE — RESPIRATORY THERAPY NOTE
Home Oxygen Qualifying Test     Patient name: Fay Corley        : 1955   Date of Test:  2025  Diagnosis:    Home Oxygen Test:    **Medicare Guidelines require item(s) 1-5 on all ambulatory patients or 1 and 2 on non-ambulatory patients.    1. Baseline SPO2 on Room Air at rest 84 %   If <= 88% on Room Air add O2 via NC to obtain SpO2 >=88%. If LPM needed, document LPM 3 needed to reach =>88%    SPO2 during exertion on Room Air  %  During exertion monitor SPO2. If SPO2 increases >=89%, do not add supplemental oxygen    SPO2 on Oxygen at Rest 94 % at 3 LPM    SPO2 during exertion on Oxygen 91 % at 3 LPM    Test performed during exertion activity.      [x]  Supplemental Home Oxygen is indicated.    []  Client does not qualify for home oxygen.    Respiratory Additional Notes-pt ambulated approx.250 feet    Kirstin Donato, RT

## 2025-02-20 NOTE — PLAN OF CARE
Problem: PAIN - ADULT  Goal: Verbalizes/displays adequate comfort level or baseline comfort level  Description: Interventions:  - Encourage patient to monitor pain and request assistance  - Assess pain using appropriate pain scale  - Administer analgesics based on type and severity of pain and evaluate response  - Implement non-pharmacological measures as appropriate and evaluate response  - Consider cultural and social influences on pain and pain management  - Notify physician/advanced practitioner if interventions unsuccessful or patient reports new pain  2/20/2025 1457 by Maryam Cordero  Outcome: Adequate for Discharge  2/20/2025 1411 by Maryam Cordero  Outcome: Progressing     Problem: INFECTION - ADULT  Goal: Absence or prevention of progression during hospitalization  Description: INTERVENTIONS:  - Assess and monitor for signs and symptoms of infection  - Monitor lab/diagnostic results  - Monitor all insertion sites, i.e. indwelling lines, tubes, and drains  - Monitor endotracheal if appropriate and nasal secretions for changes in amount and color  - [place] appropriate cooling/warming therapies per order  - Administer medications as ordered  - Instruct and encourage patient and family to use good hand hygiene technique  - Identify and instruct in appropriate isolation precautions for identified infection/condition  2/20/2025 1457 by Maryam Cordero  Outcome: Adequate for Discharge  2/20/2025 1411 by Maryam Cordero  Outcome: Progressing  Goal: Absence of fever/infection during neutropenic period  Description: INTERVENTIONS:  - Monitor WBC    2/20/2025 1457 by Maryam Cordero  Outcome: Adequate for Discharge  2/20/2025 1411 by Maryam Cordero  Outcome: Progressing     Problem: SAFETY ADULT  Goal: Patient will remain free of falls  Description: INTERVENTIONS:  - Educate patient/family on patient safety including physical limitations  - Instruct patient to call for assistance with activity   -  Consult OT/PT to assist with strengthening/mobility   - Keep Call bell within reach  - Keep bed low and locked with side rails adjusted as appropriate  - Keep care items and personal belongings within reach  - Initiate and maintain comfort rounds  - Make Fall Risk Sign visible to staff  - Offer Toileting every 2 Hours, in advance of need  - Initiate/Maintain bed alarm  - Obtain necessary fall risk management equipment: yellow socks  - Apply yellow socks and bracelet for high fall risk patients  - Consider moving patient to room near nurses station  2/20/2025 1457 by Maryam Cordero  Outcome: Adequate for Discharge  2/20/2025 1411 by Maryam Cordero  Outcome: Progressing  Goal: Maintain or return to baseline ADL function  Description: INTERVENTIONS:  -  Assess patient's ability to carry out ADLs; assess patient's baseline for ADL function and identify physical deficits which impact ability to perform ADLs (bathing, care of mouth/teeth, toileting, grooming, dressing, etc.)  - Assess/evaluate cause of self-care deficits   - Assess range of motion  - Assess patient's mobility; develop plan if impaired  - Assess patient's need for assistive devices and provide as appropriate  - Encourage maximum independence but intervene and supervise when necessary  - Involve family in performance of ADLs  - Assess for home care needs following discharge   - Consider OT consult to assist with ADL evaluation and planning for discharge  - Provide patient education as appropriate  2/20/2025 1457 by Maryam Cordero  Outcome: Adequate for Discharge  2/20/2025 1411 by Maryam Cordero  Outcome: Progressing  Goal: Maintains/Returns to pre admission functional level  Description: INTERVENTIONS:  - Perform AM-PAC 6 Click Basic Mobility/ Daily Activity assessment daily.  - Set and communicate daily mobility goal to care team and patient/family/caregiver.   - Collaborate with rehabilitation services on mobility goals if consulted  - Perform  Range of Motion 3 times a day.  - Reposition patient every 2 hours.  - Dangle patient 3 times a day  - Stand patient 3 times a day  - Ambulate patient 3 times a day  - Out of bed to chair 3 times a day   - Out of bed for meals 3 times a day  - Out of bed for toileting  - Record patient progress and toleration of activity level   2/20/2025 1457 by Maryam Cordero  Outcome: Adequate for Discharge  2/20/2025 1411 by Maryam Cordero  Outcome: Progressing     Problem: DISCHARGE PLANNING  Goal: Discharge to home or other facility with appropriate resources  Description: INTERVENTIONS:  - Identify barriers to discharge w/patient and caregiver  - Arrange for needed discharge resources and transportation as appropriate  - Identify discharge learning needs (meds, wound care, etc.)  - Arrange for interpretive services to assist at discharge as needed  - Refer to Case Management Department for coordinating discharge planning if the patient needs post-hospital services based on physician/advanced practitioner order or complex needs related to functional status, cognitive ability, or social support system  2/20/2025 1457 by Maryam Cordero  Outcome: Adequate for Discharge  2/20/2025 1411 by Maryam Cordero  Outcome: Progressing     Problem: Knowledge Deficit  Goal: Patient/family/caregiver demonstrates understanding of disease process, treatment plan, medications, and discharge instructions  Description: Complete learning assessment and assess knowledge base.  Interventions:  - Provide teaching at level of understanding  - Provide teaching via preferred learning methods  2/20/2025 1457 by Maryam Cordero  Outcome: Adequate for Discharge  2/20/2025 1411 by Maryam Cordero  Outcome: Progressing     Problem: CARDIOVASCULAR - ADULT  Goal: Maintains optimal cardiac output and hemodynamic stability  Description: INTERVENTIONS:  - Monitor I/O, vital signs and rhythm  - Monitor for S/S and trends of decreased cardiac output  -  Administer and titrate ordered vasoactive medications to optimize hemodynamic stability  - Assess quality of pulses, skin color and temperature  - Assess for signs of decreased coronary artery perfusion  - Instruct patient to report change in severity of symptoms  2/20/2025 1457 by Maryam Cordero  Outcome: Adequate for Discharge  2/20/2025 1411 by Maryam Cordero  Outcome: Progressing  Goal: Absence of cardiac dysrhythmias or at baseline rhythm  Description: INTERVENTIONS:  - Continuous cardiac monitoring, vital signs, obtain 12 lead EKG if ordered  - Administer antiarrhythmic and heart rate control medications as ordered  - Monitor electrolytes and administer replacement therapy as ordered  2/20/2025 1457 by Maryam Cordero  Outcome: Adequate for Discharge  2/20/2025 1411 by Maryam Cordero  Outcome: Progressing     Problem: RESPIRATORY - ADULT  Goal: Achieves optimal ventilation and oxygenation  Description: INTERVENTIONS:  - Assess for changes in respiratory status  - Assess for changes in mentation and behavior  - Position to facilitate oxygenation and minimize respiratory effort  - Oxygen administered by appropriate delivery if ordered  - Initiate smoking cessation education as indicated  - Encourage broncho-pulmonary hygiene including cough, deep breathe, Incentive Spirometry  - Assess the need for suctioning and aspirate as needed  - Assess and instruct to report SOB or any respiratory difficulty  - Respiratory Therapy support as indicated  2/20/2025 1457 by Maryam Cordero  Outcome: Adequate for Discharge  2/20/2025 1411 by Maryam Cordero  Outcome: Progressing     Problem: METABOLIC, FLUID AND ELECTROLYTES - ADULT  Goal: Electrolytes maintained within normal limits  Description: INTERVENTIONS:  - Monitor labs and assess patient for signs and symptoms of electrolyte imbalances  - Administer electrolyte replacement as ordered  - Monitor response to electrolyte replacements, including repeat lab results  as appropriate  - Instruct patient on fluid and nutrition as appropriate  2/20/2025 1457 by Maryam Cordero  Outcome: Adequate for Discharge  2/20/2025 1411 by Maryam Cordero  Outcome: Progressing  Goal: Fluid balance maintained  Description: INTERVENTIONS:  - Monitor labs   - Monitor I/O and WT  - Instruct patient on fluid and nutrition as appropriate  - Assess for signs & symptoms of volume excess or deficit  2/20/2025 1457 by Maryam Cordero  Outcome: Adequate for Discharge  2/20/2025 1411 by Maryam Cordero  Outcome: Progressing  Goal: Glucose maintained within target range  Description: INTERVENTIONS:  - Monitor Blood Glucose as ordered  - Assess for signs and symptoms of hyperglycemia and hypoglycemia  - Administer ordered medications to maintain glucose within target range  - Assess nutritional intake and initiate nutrition service referral as needed  2/20/2025 1457 by Maryam Cordero  Outcome: Adequate for Discharge  2/20/2025 1411 by Maryam Cordero  Outcome: Progressing     Problem: Nutrition/Hydration-ADULT  Goal: Nutrient/Hydration intake appropriate for improving, restoring or maintaining nutritional needs  Description: Monitor and assess patient's nutrition/hydration status for malnutrition. Collaborate with interdisciplinary team and initiate plan and interventions as ordered.  Monitor patient's weight and dietary intake as ordered or per policy. Utilize nutrition screening tool and intervene as necessary. Determine patient's food preferences and provide high-protein, high-caloric foods as appropriate.     INTERVENTIONS:  - Monitor oral intake, urinary output, labs, and treatment plans  - Assess nutrition and hydration status and recommend course of action  - Evaluate amount of meals eaten  - Assist patient with eating if necessary   - Allow adequate time for meals  - Recommend/ encourage appropriate diets, oral nutritional supplements, and vitamin/mineral supplements  - Order, calculate, and  assess calorie counts as needed  - Recommend, monitor, and adjust tube feedings and TPN/PPN based on assessed needs  - Assess need for intravenous fluids  - Provide specific nutrition/hydration education as appropriate  - Include patient/family/caregiver in decisions related to nutrition  2/20/2025 1457 by Maryam Cordero  Outcome: Adequate for Discharge  2/20/2025 1411 by Maryam Cordero  Outcome: Progressing

## 2025-02-20 NOTE — PLAN OF CARE
Problem: PAIN - ADULT  Goal: Verbalizes/displays adequate comfort level or baseline comfort level  Description: Interventions:  - Encourage patient to monitor pain and request assistance  - Assess pain using appropriate pain scale  - Administer analgesics based on type and severity of pain and evaluate response  - Implement non-pharmacological measures as appropriate and evaluate response  - Consider cultural and social influences on pain and pain management  - Notify physician/advanced practitioner if interventions unsuccessful or patient reports new pain  Outcome: Progressing     Problem: INFECTION - ADULT  Goal: Absence or prevention of progression during hospitalization  Description: INTERVENTIONS:  - Assess and monitor for signs and symptoms of infection  - Monitor lab/diagnostic results  - Monitor all insertion sites, i.e. indwelling lines, tubes, and drains  - Monitor endotracheal if appropriate and nasal secretions for changes in amount and color  - [place] appropriate cooling/warming therapies per order  - Administer medications as ordered  - Instruct and encourage patient and family to use good hand hygiene technique  - Identify and instruct in appropriate isolation precautions for identified infection/condition  Outcome: Progressing  Goal: Absence of fever/infection during neutropenic period  Description: INTERVENTIONS:  - Monitor WBC    Outcome: Progressing     Problem: SAFETY ADULT  Goal: Patient will remain free of falls  Description: INTERVENTIONS:  - Educate patient/family on patient safety including physical limitations  - Instruct patient to call for assistance with activity   - Consult OT/PT to assist with strengthening/mobility   - Keep Call bell within reach  - Keep bed low and locked with side rails adjusted as appropriate  - Keep care items and personal belongings within reach  - Initiate and maintain comfort rounds  - Make Fall Risk Sign visible to staff  - Offer Toileting every 2 Hours,  in advance of need  - Initiate/Maintain bed alarm  - Obtain necessary fall risk management equipment: yellow socks  - Apply yellow socks and bracelet for high fall risk patients  - Consider moving patient to room near nurses station  Outcome: Progressing  Goal: Maintain or return to baseline ADL function  Description: INTERVENTIONS:  -  Assess patient's ability to carry out ADLs; assess patient's baseline for ADL function and identify physical deficits which impact ability to perform ADLs (bathing, care of mouth/teeth, toileting, grooming, dressing, etc.)  - Assess/evaluate cause of self-care deficits   - Assess range of motion  - Assess patient's mobility; develop plan if impaired  - Assess patient's need for assistive devices and provide as appropriate  - Encourage maximum independence but intervene and supervise when necessary  - Involve family in performance of ADLs  - Assess for home care needs following discharge   - Consider OT consult to assist with ADL evaluation and planning for discharge  - Provide patient education as appropriate  Outcome: Progressing  Goal: Maintains/Returns to pre admission functional level  Description: INTERVENTIONS:  - Perform AM-PAC 6 Click Basic Mobility/ Daily Activity assessment daily.  - Set and communicate daily mobility goal to care team and patient/family/caregiver.   - Collaborate with rehabilitation services on mobility goals if consulted  - Perform Range of Motion 3 times a day.  - Reposition patient every 2 hours.  - Dangle patient 3 times a day  - Stand patient 3 times a day  - Ambulate patient 3 times a day  - Out of bed to chair 3 times a day   - Out of bed for meals 3 times a day  - Out of bed for toileting  - Record patient progress and toleration of activity level   Outcome: Progressing     Problem: DISCHARGE PLANNING  Goal: Discharge to home or other facility with appropriate resources  Description: INTERVENTIONS:  - Identify barriers to discharge w/patient and  caregiver  - Arrange for needed discharge resources and transportation as appropriate  - Identify discharge learning needs (meds, wound care, etc.)  - Arrange for interpretive services to assist at discharge as needed  - Refer to Case Management Department for coordinating discharge planning if the patient needs post-hospital services based on physician/advanced practitioner order or complex needs related to functional status, cognitive ability, or social support system  Outcome: Progressing     Problem: Knowledge Deficit  Goal: Patient/family/caregiver demonstrates understanding of disease process, treatment plan, medications, and discharge instructions  Description: Complete learning assessment and assess knowledge base.  Interventions:  - Provide teaching at level of understanding  - Provide teaching via preferred learning methods  Outcome: Progressing     Problem: CARDIOVASCULAR - ADULT  Goal: Maintains optimal cardiac output and hemodynamic stability  Description: INTERVENTIONS:  - Monitor I/O, vital signs and rhythm  - Monitor for S/S and trends of decreased cardiac output  - Administer and titrate ordered vasoactive medications to optimize hemodynamic stability  - Assess quality of pulses, skin color and temperature  - Assess for signs of decreased coronary artery perfusion  - Instruct patient to report change in severity of symptoms  Outcome: Progressing     Problem: RESPIRATORY - ADULT  Goal: Achieves optimal ventilation and oxygenation  Description: INTERVENTIONS:  - Assess for changes in respiratory status  - Assess for changes in mentation and behavior  - Position to facilitate oxygenation and minimize respiratory effort  - Oxygen administered by appropriate delivery if ordered  - Initiate smoking cessation education as indicated  - Encourage broncho-pulmonary hygiene including cough, deep breathe, Incentive Spirometry  - Assess the need for suctioning and aspirate as needed  - Assess and instruct to  report SOB or any respiratory difficulty  - Respiratory Therapy support as indicated  Outcome: Progressing     Problem: METABOLIC, FLUID AND ELECTROLYTES - ADULT  Goal: Electrolytes maintained within normal limits  Description: INTERVENTIONS:  - Monitor labs and assess patient for signs and symptoms of electrolyte imbalances  - Administer electrolyte replacement as ordered  - Monitor response to electrolyte replacements, including repeat lab results as appropriate  - Instruct patient on fluid and nutrition as appropriate  Outcome: Progressing  Goal: Fluid balance maintained  Description: INTERVENTIONS:  - Monitor labs   - Monitor I/O and WT  - Instruct patient on fluid and nutrition as appropriate  - Assess for signs & symptoms of volume excess or deficit  Outcome: Progressing     Problem: Nutrition/Hydration-ADULT  Goal: Nutrient/Hydration intake appropriate for improving, restoring or maintaining nutritional needs  Description: Monitor and assess patient's nutrition/hydration status for malnutrition. Collaborate with interdisciplinary team and initiate plan and interventions as ordered.  Monitor patient's weight and dietary intake as ordered or per policy. Utilize nutrition screening tool and intervene as necessary. Determine patient's food preferences and provide high-protein, high-caloric foods as appropriate.     INTERVENTIONS:  - Monitor oral intake, urinary output, labs, and treatment plans  - Assess nutrition and hydration status and recommend course of action  - Evaluate amount of meals eaten  - Assist patient with eating if necessary   - Allow adequate time for meals  - Recommend/ encourage appropriate diets, oral nutritional supplements, and vitamin/mineral supplements  - Order, calculate, and assess calorie counts as needed  - Recommend, monitor, and adjust tube feedings and TPN/PPN based on assessed needs  - Assess need for intravenous fluids  - Provide specific nutrition/hydration education as  appropriate  - Include patient/family/caregiver in decisions related to nutrition  Outcome: Progressing

## 2025-02-20 NOTE — PLAN OF CARE
Problem: PAIN - ADULT  Goal: Verbalizes/displays adequate comfort level or baseline comfort level  Description: Interventions:  - Encourage patient to monitor pain and request assistance  - Assess pain using appropriate pain scale  - Administer analgesics based on type and severity of pain and evaluate response  - Implement non-pharmacological measures as appropriate and evaluate response  - Consider cultural and social influences on pain and pain management  - Notify physician/advanced practitioner if interventions unsuccessful or patient reports new pain  2/20/2025 1457 by Maryam Cordero  Outcome: Adequate for Discharge  2/20/2025 1411 by Maryam Cordero  Outcome: Progressing     Problem: INFECTION - ADULT  Goal: Absence or prevention of progression during hospitalization  Description: INTERVENTIONS:  - Assess and monitor for signs and symptoms of infection  - Monitor lab/diagnostic results  - Monitor all insertion sites, i.e. indwelling lines, tubes, and drains  - Monitor endotracheal if appropriate and nasal secretions for changes in amount and color  - [place] appropriate cooling/warming therapies per order  - Administer medications as ordered  - Instruct and encourage patient and family to use good hand hygiene technique  - Identify and instruct in appropriate isolation precautions for identified infection/condition  2/20/2025 1457 by Maryam Cordero  Outcome: Adequate for Discharge  2/20/2025 1411 by Maryam Cordero  Outcome: Progressing  Goal: Absence of fever/infection during neutropenic period  Description: INTERVENTIONS:  - Monitor WBC    2/20/2025 1457 by Maryam Cordero  Outcome: Adequate for Discharge  2/20/2025 1411 by Maryam Cordero  Outcome: Progressing     Problem: SAFETY ADULT  Goal: Patient will remain free of falls  Description: INTERVENTIONS:  - Educate patient/family on patient safety including physical limitations  - Instruct patient to call for assistance with activity   -  Consult OT/PT to assist with strengthening/mobility   - Keep Call bell within reach  - Keep bed low and locked with side rails adjusted as appropriate  - Keep care items and personal belongings within reach  - Initiate and maintain comfort rounds  - Make Fall Risk Sign visible to staff  - Offer Toileting every 2 Hours, in advance of need  - Initiate/Maintain bed alarm  - Obtain necessary fall risk management equipment: yellow socks  - Apply yellow socks and bracelet for high fall risk patients  - Consider moving patient to room near nurses station  2/20/2025 1457 by Maryam Cordero  Outcome: Adequate for Discharge  2/20/2025 1411 by Maryam Cordero  Outcome: Progressing  Goal: Maintain or return to baseline ADL function  Description: INTERVENTIONS:  -  Assess patient's ability to carry out ADLs; assess patient's baseline for ADL function and identify physical deficits which impact ability to perform ADLs (bathing, care of mouth/teeth, toileting, grooming, dressing, etc.)  - Assess/evaluate cause of self-care deficits   - Assess range of motion  - Assess patient's mobility; develop plan if impaired  - Assess patient's need for assistive devices and provide as appropriate  - Encourage maximum independence but intervene and supervise when necessary  - Involve family in performance of ADLs  - Assess for home care needs following discharge   - Consider OT consult to assist with ADL evaluation and planning for discharge  - Provide patient education as appropriate  2/20/2025 1457 by Maryam Cordero  Outcome: Adequate for Discharge  2/20/2025 1411 by Maryam Cordero  Outcome: Progressing  Goal: Maintains/Returns to pre admission functional level  Description: INTERVENTIONS:  - Perform AM-PAC 6 Click Basic Mobility/ Daily Activity assessment daily.  - Set and communicate daily mobility goal to care team and patient/family/caregiver.   - Collaborate with rehabilitation services on mobility goals if consulted  - Perform  Range of Motion 3 times a day.  - Reposition patient every 2 hours.  - Dangle patient 3 times a day  - Stand patient 3 times a day  - Ambulate patient 3 times a day  - Out of bed to chair 3 times a day   - Out of bed for meals 3 times a day  - Out of bed for toileting  - Record patient progress and toleration of activity level   2/20/2025 1457 by Maryam Cordero  Outcome: Adequate for Discharge  2/20/2025 1411 by Maryam Cordero  Outcome: Progressing     Problem: DISCHARGE PLANNING  Goal: Discharge to home or other facility with appropriate resources  Description: INTERVENTIONS:  - Identify barriers to discharge w/patient and caregiver  - Arrange for needed discharge resources and transportation as appropriate  - Identify discharge learning needs (meds, wound care, etc.)  - Arrange for interpretive services to assist at discharge as needed  - Refer to Case Management Department for coordinating discharge planning if the patient needs post-hospital services based on physician/advanced practitioner order or complex needs related to functional status, cognitive ability, or social support system  2/20/2025 1457 by Maryam Cordero  Outcome: Adequate for Discharge  2/20/2025 1411 by Maryam Cordero  Outcome: Progressing     Problem: Knowledge Deficit  Goal: Patient/family/caregiver demonstrates understanding of disease process, treatment plan, medications, and discharge instructions  Description: Complete learning assessment and assess knowledge base.  Interventions:  - Provide teaching at level of understanding  - Provide teaching via preferred learning methods  2/20/2025 1457 by Maryam Cordero  Outcome: Adequate for Discharge  2/20/2025 1411 by Maryam Cordero  Outcome: Progressing     Problem: CARDIOVASCULAR - ADULT  Goal: Maintains optimal cardiac output and hemodynamic stability  Description: INTERVENTIONS:  - Monitor I/O, vital signs and rhythm  - Monitor for S/S and trends of decreased cardiac output  -  Administer and titrate ordered vasoactive medications to optimize hemodynamic stability  - Assess quality of pulses, skin color and temperature  - Assess for signs of decreased coronary artery perfusion  - Instruct patient to report change in severity of symptoms  2/20/2025 1457 by Maryam Cordero  Outcome: Adequate for Discharge  2/20/2025 1411 by Maryam Cordero  Outcome: Progressing  Goal: Absence of cardiac dysrhythmias or at baseline rhythm  Description: INTERVENTIONS:  - Continuous cardiac monitoring, vital signs, obtain 12 lead EKG if ordered  - Administer antiarrhythmic and heart rate control medications as ordered  - Monitor electrolytes and administer replacement therapy as ordered  2/20/2025 1457 by Maryam Cordero  Outcome: Adequate for Discharge  2/20/2025 1411 by Maryam Cordero  Outcome: Progressing     Problem: RESPIRATORY - ADULT  Goal: Achieves optimal ventilation and oxygenation  Description: INTERVENTIONS:  - Assess for changes in respiratory status  - Assess for changes in mentation and behavior  - Position to facilitate oxygenation and minimize respiratory effort  - Oxygen administered by appropriate delivery if ordered  - Initiate smoking cessation education as indicated  - Encourage broncho-pulmonary hygiene including cough, deep breathe, Incentive Spirometry  - Assess the need for suctioning and aspirate as needed  - Assess and instruct to report SOB or any respiratory difficulty  - Respiratory Therapy support as indicated  2/20/2025 1457 by Maryam Cordero  Outcome: Adequate for Discharge  2/20/2025 1411 by Maryam Cordero  Outcome: Progressing     Problem: METABOLIC, FLUID AND ELECTROLYTES - ADULT  Goal: Electrolytes maintained within normal limits  Description: INTERVENTIONS:  - Monitor labs and assess patient for signs and symptoms of electrolyte imbalances  - Administer electrolyte replacement as ordered  - Monitor response to electrolyte replacements, including repeat lab results  as appropriate  - Instruct patient on fluid and nutrition as appropriate  2/20/2025 1457 by Maryam Cordero  Outcome: Adequate for Discharge  2/20/2025 1411 by Maryam Cordero  Outcome: Progressing  Goal: Fluid balance maintained  Description: INTERVENTIONS:  - Monitor labs   - Monitor I/O and WT  - Instruct patient on fluid and nutrition as appropriate  - Assess for signs & symptoms of volume excess or deficit  2/20/2025 1457 by Maryam Cordero  Outcome: Adequate for Discharge  2/20/2025 1411 by Maryam Cordero  Outcome: Progressing  Goal: Glucose maintained within target range  Description: INTERVENTIONS:  - Monitor Blood Glucose as ordered  - Assess for signs and symptoms of hyperglycemia and hypoglycemia  - Administer ordered medications to maintain glucose within target range  - Assess nutritional intake and initiate nutrition service referral as needed  2/20/2025 1457 by Maryam Cordero  Outcome: Adequate for Discharge  2/20/2025 1411 by Maryam Cordero  Outcome: Progressing     Problem: Nutrition/Hydration-ADULT  Goal: Nutrient/Hydration intake appropriate for improving, restoring or maintaining nutritional needs  Description: Monitor and assess patient's nutrition/hydration status for malnutrition. Collaborate with interdisciplinary team and initiate plan and interventions as ordered.  Monitor patient's weight and dietary intake as ordered or per policy. Utilize nutrition screening tool and intervene as necessary. Determine patient's food preferences and provide high-protein, high-caloric foods as appropriate.     INTERVENTIONS:  - Monitor oral intake, urinary output, labs, and treatment plans  - Assess nutrition and hydration status and recommend course of action  - Evaluate amount of meals eaten  - Assist patient with eating if necessary   - Allow adequate time for meals  - Recommend/ encourage appropriate diets, oral nutritional supplements, and vitamin/mineral supplements  - Order, calculate, and  assess calorie counts as needed  - Recommend, monitor, and adjust tube feedings and TPN/PPN based on assessed needs  - Assess need for intravenous fluids  - Provide specific nutrition/hydration education as appropriate  - Include patient/family/caregiver in decisions related to nutrition  2/20/2025 1457 by Maryam Cordero  Outcome: Adequate for Discharge  2/20/2025 1411 by Maryam Cordero  Outcome: Progressing

## 2025-02-20 NOTE — ASSESSMENT & PLAN NOTE
"Acute hypoxic respiratory failure secondary to pneumonia, currently on 2 L oxygen.  Wean oxygen as tolerated  Patient endorsing 3 to 4 weeks of progressive coughing, shortness of breath, fevers.  Reported Tmax of 103.8 and now with hemoptysis, but is getting better  CT imaging with \"9.3 x 5.1 cm masslike consolidation in the posterior right upper lobe with extensive surrounding groundglass opacity and central air-filled cavity. Given the patient's history, this is likely necrotizing pneumonia versus lung abscess with surrounding pulmonary hemorrhage. TB not excluded in the appropriate clinical setting. \"  Discussed with ID  Continue with IV Ancef - > cefadroxil at dc  Cancel AFB  No concern for TB at this time per ID  Follow cultures  Respiratory protocol, nebs PRN.  Monitor respiratory status. Improving.   Pulmonary following  "

## 2025-02-20 NOTE — ASSESSMENT & PLAN NOTE
Admission blood cultures 1 out of 2 positive for group A streptococcus, strep pyogenes.  Repeat Bcx ng  ID following  Continue abx as above  Echo 2/17 with EF 55%, stage I diastolic dysfunction

## 2025-02-20 NOTE — PLAN OF CARE
Problem: PAIN - ADULT  Goal: Verbalizes/displays adequate comfort level or baseline comfort level  Description: Interventions:  - Encourage patient to monitor pain and request assistance  - Assess pain using appropriate pain scale  - Administer analgesics based on type and severity of pain and evaluate response  - Implement non-pharmacological measures as appropriate and evaluate response  - Consider cultural and social influences on pain and pain management  - Notify physician/advanced practitioner if interventions unsuccessful or patient reports new pain  Outcome: Progressing     Problem: INFECTION - ADULT  Goal: Absence or prevention of progression during hospitalization  Description: INTERVENTIONS:  - Assess and monitor for signs and symptoms of infection  - Monitor lab/diagnostic results  - Monitor all insertion sites, i.e. indwelling lines, tubes, and drains  - Monitor endotracheal if appropriate and nasal secretions for changes in amount and color  - [place] appropriate cooling/warming therapies per order  - Administer medications as ordered  - Instruct and encourage patient and family to use good hand hygiene technique  - Identify and instruct in appropriate isolation precautions for identified infection/condition  Outcome: Progressing  Goal: Absence of fever/infection during neutropenic period  Description: INTERVENTIONS:  - Monitor WBC    Outcome: Progressing     Problem: SAFETY ADULT  Goal: Patient will remain free of falls  Description: INTERVENTIONS:  - Educate patient/family on patient safety including physical limitations  - Instruct patient to call for assistance with activity   - Consult OT/PT to assist with strengthening/mobility   - Keep Call bell within reach  - Keep bed low and locked with side rails adjusted as appropriate  - Keep care items and personal belongings within reach  - Initiate and maintain comfort rounds  - Make Fall Risk Sign visible to staff  - Offer Toileting every 2 Hours,  in advance of need  - Initiate/Maintain bed alarm  - Obtain necessary fall risk management equipment: yellow socks  - Apply yellow socks and bracelet for high fall risk patients  - Consider moving patient to room near nurses station  Outcome: Progressing  Goal: Maintain or return to baseline ADL function  Description: INTERVENTIONS:  -  Assess patient's ability to carry out ADLs; assess patient's baseline for ADL function and identify physical deficits which impact ability to perform ADLs (bathing, care of mouth/teeth, toileting, grooming, dressing, etc.)  - Assess/evaluate cause of self-care deficits   - Assess range of motion  - Assess patient's mobility; develop plan if impaired  - Assess patient's need for assistive devices and provide as appropriate  - Encourage maximum independence but intervene and supervise when necessary  - Involve family in performance of ADLs  - Assess for home care needs following discharge   - Consider OT consult to assist with ADL evaluation and planning for discharge  - Provide patient education as appropriate  Outcome: Progressing  Goal: Maintains/Returns to pre admission functional level  Description: INTERVENTIONS:  - Perform AM-PAC 6 Click Basic Mobility/ Daily Activity assessment daily.  - Set and communicate daily mobility goal to care team and patient/family/caregiver.   - Collaborate with rehabilitation services on mobility goals if consulted  - Perform Range of Motion 3 times a day.  - Reposition patient every 2 hours.  - Dangle patient 3 times a day  - Stand patient 3 times a day  - Ambulate patient 3 times a day  - Out of bed to chair 3 times a day   - Out of bed for meals 3 times a day  - Out of bed for toileting  - Record patient progress and toleration of activity level   Outcome: Progressing     Problem: DISCHARGE PLANNING  Goal: Discharge to home or other facility with appropriate resources  Description: INTERVENTIONS:  - Identify barriers to discharge w/patient and  caregiver  - Arrange for needed discharge resources and transportation as appropriate  - Identify discharge learning needs (meds, wound care, etc.)  - Arrange for interpretive services to assist at discharge as needed  - Refer to Case Management Department for coordinating discharge planning if the patient needs post-hospital services based on physician/advanced practitioner order or complex needs related to functional status, cognitive ability, or social support system  Outcome: Progressing     Problem: Knowledge Deficit  Goal: Patient/family/caregiver demonstrates understanding of disease process, treatment plan, medications, and discharge instructions  Description: Complete learning assessment and assess knowledge base.  Interventions:  - Provide teaching at level of understanding  - Provide teaching via preferred learning methods  Outcome: Progressing     Problem: CARDIOVASCULAR - ADULT  Goal: Maintains optimal cardiac output and hemodynamic stability  Description: INTERVENTIONS:  - Monitor I/O, vital signs and rhythm  - Monitor for S/S and trends of decreased cardiac output  - Administer and titrate ordered vasoactive medications to optimize hemodynamic stability  - Assess quality of pulses, skin color and temperature  - Assess for signs of decreased coronary artery perfusion  - Instruct patient to report change in severity of symptoms  Outcome: Progressing  Goal: Absence of cardiac dysrhythmias or at baseline rhythm  Description: INTERVENTIONS:  - Continuous cardiac monitoring, vital signs, obtain 12 lead EKG if ordered  - Administer antiarrhythmic and heart rate control medications as ordered  - Monitor electrolytes and administer replacement therapy as ordered  Outcome: Progressing     Problem: RESPIRATORY - ADULT  Goal: Achieves optimal ventilation and oxygenation  Description: INTERVENTIONS:  - Assess for changes in respiratory status  - Assess for changes in mentation and behavior  - Position to  facilitate oxygenation and minimize respiratory effort  - Oxygen administered by appropriate delivery if ordered  - Initiate smoking cessation education as indicated  - Encourage broncho-pulmonary hygiene including cough, deep breathe, Incentive Spirometry  - Assess the need for suctioning and aspirate as needed  - Assess and instruct to report SOB or any respiratory difficulty  - Respiratory Therapy support as indicated  Outcome: Progressing     Problem: METABOLIC, FLUID AND ELECTROLYTES - ADULT  Goal: Electrolytes maintained within normal limits  Description: INTERVENTIONS:  - Monitor labs and assess patient for signs and symptoms of electrolyte imbalances  - Administer electrolyte replacement as ordered  - Monitor response to electrolyte replacements, including repeat lab results as appropriate  - Instruct patient on fluid and nutrition as appropriate  Outcome: Progressing  Goal: Fluid balance maintained  Description: INTERVENTIONS:  - Monitor labs   - Monitor I/O and WT  - Instruct patient on fluid and nutrition as appropriate  - Assess for signs & symptoms of volume excess or deficit  Outcome: Progressing  Goal: Glucose maintained within target range  Description: INTERVENTIONS:  - Monitor Blood Glucose as ordered  - Assess for signs and symptoms of hyperglycemia and hypoglycemia  - Administer ordered medications to maintain glucose within target range  - Assess nutritional intake and initiate nutrition service referral as needed  Outcome: Progressing     Problem: Nutrition/Hydration-ADULT  Goal: Nutrient/Hydration intake appropriate for improving, restoring or maintaining nutritional needs  Description: Monitor and assess patient's nutrition/hydration status for malnutrition. Collaborate with interdisciplinary team and initiate plan and interventions as ordered.  Monitor patient's weight and dietary intake as ordered or per policy. Utilize nutrition screening tool and intervene as necessary. Determine  patient's food preferences and provide high-protein, high-caloric foods as appropriate.     INTERVENTIONS:  - Monitor oral intake, urinary output, labs, and treatment plans  - Assess nutrition and hydration status and recommend course of action  - Evaluate amount of meals eaten  - Assist patient with eating if necessary   - Allow adequate time for meals  - Recommend/ encourage appropriate diets, oral nutritional supplements, and vitamin/mineral supplements  - Order, calculate, and assess calorie counts as needed  - Recommend, monitor, and adjust tube feedings and TPN/PPN based on assessed needs  - Assess need for intravenous fluids  - Provide specific nutrition/hydration education as appropriate  - Include patient/family/caregiver in decisions related to nutrition  Outcome: Progressing

## 2025-02-20 NOTE — DISCHARGE SUMMARY
"Discharge Summary - Hospitalist   Name: Fay Corley 69 y.o. female I MRN: [id number]  Unit/Bed#: -01 I Date of Admission: 2/15/2025   Date of Service: 2/20/2025 I Hospital Day: 5     Assessment & Plan  Severe sepsis (HCC)  SIRS criteria: Tachycardia, tachypnea, leukocytosis  Suspected source: RUL pneumonia  Lactic acid: 2.2 on admit - now normal  End organ damage: lactic acidosis  Continue with IV Ancef  With bacteremia, see related plan below  Monitor vital signs, laboratory studies.  Cavitary pneumonia  Acute hypoxic respiratory failure secondary to pneumonia, currently on 2 L oxygen.  Wean oxygen as tolerated  Patient endorsing 3 to 4 weeks of progressive coughing, shortness of breath, fevers.  Reported Tmax of 103.8 and now with hemoptysis, but is getting better  CT imaging with \"9.3 x 5.1 cm masslike consolidation in the posterior right upper lobe with extensive surrounding groundglass opacity and central air-filled cavity. Given the patient's history, this is likely necrotizing pneumonia versus lung abscess with surrounding pulmonary hemorrhage. TB not excluded in the appropriate clinical setting. \"  Discussed with ID  Continue with IV Ancef - > cefadroxil at dc  Cancel AFB  No concern for TB at this time per ID  Follow cultures  Respiratory protocol, nebs PRN.  Monitor respiratory status. Improving.   Pulmonary following  Hemoptysis  Patient endorsing hemoptysis which brought her into the hospital  Related to cavitary pneumonia  Pulmonary following  Monitor hemoglobin; hemoptysis is getting better.  Once resolved then can start DVT prophylaxis  Avoid DVT prophylaxis and blood thinners for now until hemoptysis resolves  RESOLVED  Hypertension  Blood pressure soft this morning, responding to IV hydration  Restart lisinopril, restart verapamil  Monitor BP closely and avoid hypotension  Electrolyte abnormality  With marked electrolyte abnormalities, due to dehydration.  Hypokalemia and " hypomagnesemia  Replete as necessary  Influenza A  Testing positive for influenza A however symptoms have been going on for a few weeks. She is out of the window of Tamiflu at this time  Supportive care  Treatment for pneumonia as above  Acute respiratory failure (HCC)  With acute respiratory failure secondary to pneumonia and influenza A  See plan above, wean O2 as tolerated. Weaned to 1L   Bacteremia due to Streptococcus  Admission blood cultures 1 out of 2 positive for group A streptococcus, strep pyogenes.  Repeat Bcx ng  ID following  Continue abx as above  Echo 2/17 with EF 55%, stage I diastolic dysfunction    Hospital Course:     Fay Corley is a 69 y.o. female patient who originally presented to the hospital on   Admission Orders (From admission, onward)       Ordered        02/15/25 1327  INPATIENT ADMISSION  Once                         due to cavitary pneumonia and bacteremia secondary to streptococcus. Pt was evaluated by pulmonary and infectious disease and treated with IV abx therapy. Pt slowly improved and is nearing baseline at discharge. She will complete 28 d of oral abx per ID.     Please see above list of diagnoses and related plan for additional information.     Physical Exam:    GEN: No acute distress, comfortable oin o2  HEEENT: No JVD, PERRLA, no scleral icterus  RESP: Lungs clear to auscultation bilaterally  CV: RRR, +s1/s2   ABD: SOFT NON TENDER, POSITIVE BOWEL SOUNDS, NO DISTENTION  PSYCH: CALM  NEURO: Mentation baseline, NO FOCAL DEFICITS  SKIN: NO RASH  EXTREM: NO EDEMA    CONSULTING PROVIDERS   IP CONSULT TO PULMONOLOGY  IP CONSULT TO INFECTIOUS DISEASES    PROCEDURES PERFORMED  * No surgery found *    RADIOLOGY RESULTS  Echo complete w/ contrast if indicated  Result Date: 2/17/2025  Narrative:   Left Ventricle: Left ventricular cavity size is normal. Wall thickness is mildly increased. The left ventricular ejection fraction is 55%. Systolic function is normal. Wall motion is  normal. Diastolic function is mildly abnormal, consistent with grade I (abnormal) relaxation.   Mitral Valve: There is mild annular calcification. There is mild regurgitation.   Tricuspid Valve: There is mild regurgitation.     CTA chest pe study  Result Date: 2/15/2025  Narrative: CTA - CHEST WITH IV CONTRAST - PULMONARY ANGIOGRAM INDICATION:   SOB, hemoptysis. Per my review of the medical record, sick for a few days with fever, congestion, sore throat, upper back pain. This morning coughing up blood. Positive for influenza. White count 24, . Elevated procalcitonin. Former smoker. COMPARISON: CXR from today, chest CT 4/3/2023. TECHNIQUE: CT angiogram timed for optimal opacification of the pulmonary arteries.  Axial, sagittal, and coronal 2D reformats created from source data.  Coronal 3D MIP postprocessing on the acquisition scanner. Radiation dose length product (DLP):  384.42 mGy-cm .  Radiation dose exposure minimized using iterative reconstruction and automated exposure control. IV Contrast:  50 mL of iohexol FINDINGS: PULMONARY ARTERIES:  No pulmonary embolus. LUNGS: 9.3 x 5.1 cm masslike consolidation in the posterior right upper lobe with extensive surrounding groundglass opacity and central air-filled cavitation. AIRWAYS: No significant filling defects. PLEURA:  Unremarkable. HEART/GREAT VESSELS: Normal heart size. Minimal coronary artery calcification indicating atherosclerotic heart disease. MEDIASTINUM AND CED: Moderate hiatal hernia. Mildly enlarged right hilar node. CHEST WALL AND LOWER NECK: Unremarkable. UPPER ABDOMEN: Cholecystectomy. Mild benign eventration of the right hemidiaphragm. Benign calcified hepatic granuloma. OSSEOUS STRUCTURES: Moderate curvature of the thoracolumbar junction.     Impression: No pulmonary embolus. 9.3 x 5.1 cm masslike consolidation in the posterior right upper lobe with extensive surrounding groundglass opacity and central air-filled cavity. Given the patient's  history, this is likely necrotizing pneumonia versus lung abscess with surrounding pulmonary hemorrhage. TB not excluded in the appropriate clinical setting. Recommend follow-up with a chest CT in 3 months to assure resolution and exclude cavitary tumor. Mildly enlarged right hilar node, likely reactive. Moderate hiatal hernia. I discussed the findings on the preceding chest radiograph with Dr. Marni Birhc on 2/15/2025 at 11:47 a.m. Workstation performed: [id number]     XR chest 1 view portable  Result Date: 2/15/2025  Narrative: XR CHEST PORTABLE INDICATION: coughing up blood. Positive for influenza COMPARISON: CXR and chest CT 4/3/2023. FINDINGS: Moderate consolidation in the right upper lobe with possible cavitation. No pneumothorax or pleural effusion. Normal cardiomediastinal silhouette. Mild curvature of the spine. Mild benign eventration of the right hemidiaphragm.     Impression: Moderate consolidation in the right upper lobe with possible cavitation. Given the patient's history of hemoptysis, this could be due to pneumonia, possibly necrotizing, lung abscess, or malignancy. TB not excluded in the appropriate clinical setting if the lesion is cavitary. Recommend further evaluation with CT. I personally discussed this study with Dr. Marni Birch on 2/15/2025 11:47 AM. Workstation performed: [id number]     DXA bone density spine hip and pelvis  Result Date: 2/5/2025  Narrative: DXA SCAN CLINICAL HISTORY: 69 years postmenopausal female. OTHER RISK FACTORS: PPI therapy, SSRI therapy. PHARMACOLOGIC THERAPY FOR OSTEOPOROSIS:  None. TECHNIQUE: Bone densitometry was performed using a [employer] Digital Horizon A bone densitometer.  Regions of interest appear properly placed. COMPARISON: 3/9/2022. RESULTS: LUMBAR SPINE Level: L1, L3, L4  (L2 vertebra excluded from analysis due to local structural abnormalities or artifact): BMD: 1.010 gm/cm2 T-score: -0.4 LEFT  TOTAL HIP: BMD: 0.855 gm/cm2 T-score: -0.7 LEFT  FEMORAL  NECK: BMD: 0.722 gm/cm2 T score: -1.1 LEFT  FOREARM: 33% RADIUS BMD: 0.618 gm/cm2 T-score: -1.1     Impression: 1. Low bone mass (osteopenia). 2.  Since a DXA study from 3/9/2022, there has been: No statistically significant change in bone mineral density at any of the evaluated skeletal sites. 3.  The 10 year risk of hip fracture is 0.8% with the 10 year risk of major osteoporotic fracture being 8.3% as calculated by the [employer] fracture risk assessment tool (FRAX, which is based on data generated by the WHO Collaborating Datil for Metabolic Bone Diseases). 4.  The current Bone Health and Osteoporosis Foundation (BHOF) guidelines recommend treating patients with a T-score of -2.5 or less in the lumbar spine or hips, or in post-menopausal women and men over the age of 50 with low bone mass (osteopenia; T-score between -1.0 and -2.5) and a FRAX 10 year risk score of > 3% for hip fracture and/or > 20% for major osteoporosis-related fracture (clinical vertebral, hip, forearm, or proximal humerus). 5.  The BHOF recommends follow-up DXA in 1-2 years after initiating or changing medical therapy for osteoporosis and at appropriate intervals thereafter, according to clinical circumstances. More frequent BMD testing may be warranted in higher-risk individuals (multiple fractures, older age, very low BMD). Less frequent BMD testing is warranted as follow-up in patients with initial T-scores in the normal or slightly below normal range (osteopenia) and for patients who have remained fracture free on  treatment. The FRAX algorithm has certain limitations: -FRAX has not been validated in patients currently or previously treated with pharmacotherapy for osteoporosis.  In such patients, clinical judgment must be exercised in interpreting FRAX scores. -Prior hip, vertebral and humeral fragility fractures appear to confer greater risk of subsequent fracture than fractures at other sites (this is especially true for  individuals with severe vertebral fractures), but quantification of this incremental risk is not possible with FRAX. -FRAX underestimates fracture risk in patients with history of multiple fragility fractures. -FRAX may underestimate fracture risk in patients with history of frequent falls. -It is not appropriate to use FRAX to monitor treatment response. WHO CLASSIFICATION: Normal (a T-score of -1.0 or higher) Low bone mineral density (a T-score of less than -1.0 but higher than -2.5) Osteoporosis (a T-score of -2.5 or less) Severe osteoporosis (a T-score of -2.5 or less with a fragility fracture) LEAST SIGNIFICANT CHANGE (AT 95% C.I): Lumbar spine: 0.025 g/cm2; 2.8% Total hip: 0.025 g/cm2; 3.7% Forearm: 0.012 g/cm2; 1.9% SELECTED REFERENCES: Carlos MS, Shanelle SL, Rosas KL, et al. The clinician's guide to prevention and treatment of osteoporosis. Osteoporos Int 2022; 33:3695-6676. Demetrice D, Len SB, Manny A, et al. DXA reporting updates: 2023 Official Positions of the International Society for Clinical Densitometry. J Clin Densitom 2024; 27: 360860 (https://doi.org/10.1016/j.jocd.2023.470711). Workstation performed: [id number]     Mammo screening bilateral w 3d and cad  Result Date: 2/4/2025  Narrative: DIAGNOSIS: Encounter for screening mammogram for malignant neoplasm of breast TECHNIQUE: Digital screening mammography was performed. Computer Aided Detection (CAD) analyzed all applicable images. COMPARISONS: Prior breast imaging dated: 03/09/2022, 02/12/2018, and 01/26/2016 RELEVANT HISTORY: Family Breast Cancer History: No known family history of breast cancer. Family Medical History: No known relevant family medical history. Personal History: Hormone history includes birth control. Surgical history includes oophorectomy. No known relevant medical history. The patient is scheduled in a reminder system for screening mammography. 8-10% of cancers will be missed on mammography. Management of a palpable  abnormality must be based on clinical grounds.  Patients will be notified of their results via letter from our facility. Accredited by American College of Radiology and FDA. RISK ASSESSMENT: 5 Year Tyrer-Cuzick: 0.71% 10 Year Tyrer-Cuzick: 1.46% Lifetime Tyrer-Cuzick: 2.68% TISSUE DENSITY: There are scattered areas of fibroglandular density. INDICATION: Fay Corley is a 69 y.o. female presenting for screening mammography. FINDINGS: There are no suspicious masses, grouped microcalcifications or areas of architectural distortion. The skin and nipple areolar complex are unremarkable.     Impression: No mammographic evidence of malignancy. ASSESSMENT/BI-RADS CATEGORY: Left: 1 - Negative Right: 1 - Negative Overall: 1 - Negative RECOMMENDATION:      - Routine screening mammogram in 1 year for both breasts. Workstation ID: [id number] Signed by:  Manuela Laureano MD       LABS  Results from last 7 days   Lab Units 02/20/25 0520 02/19/25  0538 02/18/25 0429 02/17/25  0438 02/16/25  0216 02/15/25  1520 02/15/25  1101   WBC Thousand/uL 11.03* 10.72* 11.35* 13.74* 17.85*  --  24.16*   HEMOGLOBIN g/dL 11.0* 10.5* 10.0* 10.3* 11.6  --  12.2   HEMATOCRIT % 34.1* 31.9* 30.6* 30.8* 34.4*  --  35.9   MCV fL 98 96 95 94 93  --  92   TOTAL NEUT ABS Thousand/uL  --  8.36*  --   --   --   --  22.95*   BANDS PCT %  --   --   --   --   --   --  3   PLATELETS Thousands/uL 453* 438* 345 283 251 235 265   INR   --   --   --   --   --   --  1.07     Results from last 7 days   Lab Units 02/20/25  0520 02/19/25  0538 02/18/25  0429 02/17/25  0438 02/16/25  1618 02/16/25  0216 02/15/25  1101   SODIUM mmol/L 138 136 134* 132* 132* 131* 127*   POTASSIUM mmol/L 3.3* 3.2* 3.0* 3.3* 3.6 2.8* 2.8*   CHLORIDE mmol/L 103 102 102 102 103 99 92*   CO2 mmol/L 26 29 24 23 22 23 24   BUN mg/dL 6 7 6 10 13 11 13   CREATININE mg/dL 0.47* 0.50* 0.57* 0.62 0.75 0.67 0.96   CALCIUM mg/dL 7.9* 7.5* 7.8* 7.5* 7.3* 7.2* 7.9*   ALBUMIN g/dL 2.8*  --   --   --    --   --  3.7   TOTAL BILIRUBIN mg/dL 0.27  --   --   --   --   --  0.82   ALK PHOS U/L 75  --   --   --   --   --  78   ALT U/L 7  --   --   --   --   --  13   AST U/L 19  --   --   --   --   --  25   EGFR ml/min/1.73sq m 101 99 95 92 82 90 60   GLUCOSE RANDOM mg/dL 101 106 106 112 107 115 151*     Results from last 7 days   Lab Units 02/15/25  1101   HS TNI 0HR ng/L 13          Results from last 7 days   Lab Units 02/15/25  1101   D-DIMER QUANTITATIVE ug/ml FEU 2.25*                 Results from last 7 days   Lab Units 02/16/25  0216 02/15/25  1236 02/15/25  1101   LACTIC ACID mmol/L  --  1.7 2.2*   PROCALCITONIN ng/ml 0.80*  --  0.74*           Cultures:   Results from last 7 days   Lab Units 02/15/25  1332   COLOR UA  Yellow   CLARITY UA  Clear   SPEC GRAV UA  <1.005*   PH UA  6.5   LEUKOCYTES UA  Small*   NITRITE UA  Negative   GLUCOSE UA mg/dl Negative   KETONES UA mg/dl Negative   BILIRUBIN UA  Negative   BLOOD UA  Small*      Results from last 7 days   Lab Units 02/15/25  1332   RBC UA /hpf 0-1   WBC UA /hpf 1-2   EPITHELIAL CELLS WET PREP /hpf Occasional   BACTERIA UA /hpf None Seen      Results from last 7 days   Lab Units 02/17/25  0437 02/15/25  1747 02/15/25  1631 02/15/25  1112 02/15/25  1101   BLOOD CULTURE  No Growth at 48 hrs.  No Growth at 48 hrs.  --   --  Staphylococcus epidermidis* Beta Hemolytic Streptococcus Group A*   SPUTUM CULTURE   --   --  4+ Growth of Streptococcus pyogenes (Group A)*  1+ Growth of  --   --    GRAM STAIN RESULT   --   --  3+ Polys*  1+ Gram positive cocci in chains*  No Epithelial cells seen* Gram positive cocci in clusters* Gram positive cocci in pairs and chains*   INFLUENZA A PCR   --   --   --   --  Positive*   LEGIONELLA URINARY ANTIGEN   --  Negative  --   --   --    STREP PNEUMONIAE ANTIGEN, URINE   --  Negative  --   --   --          Condition at Discharge:  good      Discharge instructions/Information to patient and family:   See after visit summary for  information provided to patient and family.  The above hospital course, results, incidental findings, need for follow up was discussed in detail with the patient and/or family.    Provisions for Follow-Up Care:  See after visit summary for information related to follow-up care and any pertinent home health orders.      Disposition:     Home       Discharge Statement:  I spent 37 minutes discharging the patient. This time was spent on the day of discharge. I had direct contact with the patient on the day of discharge. Greater than 50% of the total time was spent examining patient, answering all patient questions, arranging and discussing plan of care with patient as well as directly providing post-discharge instructions.  Additional time then spent on discharge activities.    Discharge Medications:  See after visit summary for reconciled discharge medications provided to patient and family.      ** Please Note: This note has been constructed using a voice recognition system **

## 2025-02-21 LAB
ATRIAL RATE: 92 BPM
DME PARACHUTE DELIVERY DATE ACTUAL: NORMAL
DME PARACHUTE DELIVERY DATE EXPECTED: NORMAL
DME PARACHUTE DELIVERY DATE REQUESTED: NORMAL
DME PARACHUTE ITEM DESCRIPTION: NORMAL
DME PARACHUTE ORDER STATUS: NORMAL
DME PARACHUTE SUPPLIER NAME: NORMAL
DME PARACHUTE SUPPLIER PHONE: NORMAL
P AXIS: 76 DEGREES
PR INTERVAL: 154 MS
QRS AXIS: -19 DEGREES
QRSD INTERVAL: 80 MS
QT INTERVAL: 342 MS
QTC INTERVAL: 422 MS
T WAVE AXIS: 62 DEGREES
VENTRICULAR RATE: 92 BPM

## 2025-02-21 PROCEDURE — 93010 ELECTROCARDIOGRAM REPORT: CPT | Performed by: INTERNAL MEDICINE

## 2025-02-21 NOTE — UTILIZATION REVIEW
NOTIFICATION OF ADMISSION DISCHARGE   This is a Notification of Discharge from [facility]. Please be advised that this patient has been discharge from our facility. Below you will find the admission and discharge date and time including the patient’s disposition.   UTILIZATION REVIEW CONTACT:  Amber Barnes  Utilization   Network Utilization Review Department  Phone: [phone] x carefully listen to the prompts. All voicemails are confidential.  Email: [email or web address]     ADMISSION INFORMATION  PRESENTATION DATE: 2/15/2025 10:51 AM  OBERVATION ADMISSION DATE: N/A  INPATIENT ADMISSION DATE: 2/15/25  1:27 PM   DISCHARGE DATE: 2/20/2025  3:00 PM   DISPOSITION:Home/Self Care    Network Utilization Review Department  ATTENTION: Please call with any questions or concerns to [phone] and carefully listen to the prompts so that you are directed to the right person. All voicemails are confidential.   For Discharge needs, contact Care Management DC Support Team at [phone] opt. [phone]  Send all requests for admission clinical reviews, approved or denied determinations and any other requests to dedicated fax number below belonging to the campus where the patient is receiving treatment. List of dedicated fax numbers for the Facilities:  FACILITY NAME UR FAX NUMBER   ADMISSION DENIALS (Administrative/Medical Necessity) [phone]   DISCHARGE SUPPORT TEAM ([facility]) [phone]   PARENT CHILD HEALTH (Maternity/NICU/Pediatrics) [phone]   [facility] [phone]   [facility] [phone]   [facility] [phone]   [facility] [phone]   [facility] [phone]   [facility] [phone]   [facility] [phone]   [facility]  [phone]   [facility] [phone]   [facility] [phone]   [facility] [phone]   [facility] [phone]

## 2025-02-22 LAB
BACTERIA BLD CULT: NORMAL
BACTERIA BLD CULT: NORMAL

## 2025-03-02 ENCOUNTER — RESULTS FOLLOW-UP (OUTPATIENT)
Dept: EMERGENCY DEPT | Facility: HOSPITAL | Age: 70
End: 2025-03-02

## 2025-03-02 ENCOUNTER — HOSPITAL ENCOUNTER (EMERGENCY)
Facility: HOSPITAL | Age: 70
Discharge: HOME | End: 2025-03-02
Attending: EMERGENCY MEDICINE | Admitting: EMERGENCY MEDICINE
Payer: COMMERCIAL

## 2025-03-02 ENCOUNTER — APPOINTMENT (EMERGENCY)
Dept: RADIOLOGY | Facility: HOSPITAL | Age: 70
End: 2025-03-02
Payer: COMMERCIAL

## 2025-03-02 VITALS
SYSTOLIC BLOOD PRESSURE: 103 MMHG | HEART RATE: 63 BPM | OXYGEN SATURATION: 95 % | TEMPERATURE: 97.8 F | DIASTOLIC BLOOD PRESSURE: 50 MMHG | RESPIRATION RATE: 22 BRPM

## 2025-03-02 DIAGNOSIS — M54.9 UPPER BACK PAIN ON LEFT SIDE: Primary | ICD-10-CM

## 2025-03-02 DIAGNOSIS — N17.9 AKI (ACUTE KIDNEY INJURY) (HCC): ICD-10-CM

## 2025-03-02 LAB
ALBUMIN SERPL BCG-MCNC: 3.5 G/DL (ref 3.5–5)
ALP SERPL-CCNC: 75 U/L (ref 34–104)
ALT SERPL W P-5'-P-CCNC: 11 U/L (ref 7–52)
ANION GAP SERPL CALCULATED.3IONS-SCNC: 8 MMOL/L (ref 4–13)
AST SERPL W P-5'-P-CCNC: 14 U/L (ref 13–39)
BACTERIA UR QL AUTO: ABNORMAL /HPF
BASOPHILS # BLD AUTO: 0.06 THOUSANDS/ÂΜL (ref 0–0.1)
BASOPHILS NFR BLD AUTO: 1 % (ref 0–1)
BILIRUB SERPL-MCNC: 0.29 MG/DL (ref 0.2–1)
BILIRUB UR QL STRIP: NEGATIVE
BUN SERPL-MCNC: 20 MG/DL (ref 5–25)
CALCIUM SERPL-MCNC: 8.6 MG/DL (ref 8.4–10.2)
CARDIAC TROPONIN I PNL SERPL HS: 4 NG/L (ref ?–50)
CHLORIDE SERPL-SCNC: 96 MMOL/L (ref 96–108)
CLARITY UR: CLEAR
CO2 SERPL-SCNC: 26 MMOL/L (ref 21–32)
COLOR UR: YELLOW
CREAT SERPL-MCNC: 1.5 MG/DL (ref 0.6–1.3)
EOSINOPHIL # BLD AUTO: 0.04 THOUSAND/ÂΜL (ref 0–0.61)
EOSINOPHIL NFR BLD AUTO: 0 % (ref 0–6)
ERYTHROCYTE [DISTWIDTH] IN BLOOD BY AUTOMATED COUNT: 12 % (ref 11.6–15.1)
GFR SERPL CREATININE-BSD FRML MDRD: 35 ML/MIN/1.73SQ M
GLUCOSE SERPL-MCNC: 117 MG/DL (ref 65–140)
GLUCOSE UR STRIP-MCNC: NEGATIVE MG/DL
GRAN CASTS #/AREA URNS LPF: ABNORMAL /[LPF]
HCT VFR BLD AUTO: 32.8 % (ref 34.8–46.1)
HGB BLD-MCNC: 10.3 G/DL (ref 11.5–15.4)
HGB UR QL STRIP.AUTO: NEGATIVE
IMM GRANULOCYTES # BLD AUTO: 0.06 THOUSAND/UL (ref 0–0.2)
IMM GRANULOCYTES NFR BLD AUTO: 1 % (ref 0–2)
KETONES UR STRIP-MCNC: ABNORMAL MG/DL
LEUKOCYTE ESTERASE UR QL STRIP: NEGATIVE
LYMPHOCYTES # BLD AUTO: 2.54 THOUSANDS/ÂΜL (ref 0.6–4.47)
LYMPHOCYTES NFR BLD AUTO: 25 % (ref 14–44)
MCH RBC QN AUTO: 30.7 PG (ref 26.8–34.3)
MCHC RBC AUTO-ENTMCNC: 31.4 G/DL (ref 31.4–37.4)
MCV RBC AUTO: 98 FL (ref 82–98)
MONOCYTES # BLD AUTO: 1.02 THOUSAND/ÂΜL (ref 0.17–1.22)
MONOCYTES NFR BLD AUTO: 10 % (ref 4–12)
NEUTROPHILS # BLD AUTO: 6.39 THOUSANDS/ÂΜL (ref 1.85–7.62)
NEUTS SEG NFR BLD AUTO: 63 % (ref 43–75)
NITRITE UR QL STRIP: NEGATIVE
NON-SQ EPI CELLS URNS QL MICRO: ABNORMAL /HPF
NRBC BLD AUTO-RTO: 0 /100 WBCS
PH UR STRIP.AUTO: 5.5 [PH]
PLATELET # BLD AUTO: 581 THOUSANDS/UL (ref 149–390)
PMV BLD AUTO: 8.2 FL (ref 8.9–12.7)
POTASSIUM SERPL-SCNC: 4.9 MMOL/L (ref 3.5–5.3)
PROT SERPL-MCNC: 7.4 G/DL (ref 6.4–8.4)
PROT UR STRIP-MCNC: ABNORMAL MG/DL
RBC # BLD AUTO: 3.36 MILLION/UL (ref 3.81–5.12)
RBC #/AREA URNS AUTO: ABNORMAL /HPF
SODIUM SERPL-SCNC: 130 MMOL/L (ref 135–147)
SP GR UR STRIP.AUTO: 1.01 (ref 1–1.03)
UROBILINOGEN UR STRIP-ACNC: 2 MG/DL
WBC # BLD AUTO: 10.11 THOUSAND/UL (ref 4.31–10.16)
WBC #/AREA URNS AUTO: ABNORMAL /HPF

## 2025-03-02 PROCEDURE — 96360 HYDRATION IV INFUSION INIT: CPT

## 2025-03-02 PROCEDURE — 99285 EMERGENCY DEPT VISIT HI MDM: CPT | Performed by: EMERGENCY MEDICINE

## 2025-03-02 PROCEDURE — 87186 SC STD MICRODIL/AGAR DIL: CPT | Performed by: EMERGENCY MEDICINE

## 2025-03-02 PROCEDURE — 36415 COLL VENOUS BLD VENIPUNCTURE: CPT | Performed by: EMERGENCY MEDICINE

## 2025-03-02 PROCEDURE — 81001 URINALYSIS AUTO W/SCOPE: CPT | Performed by: EMERGENCY MEDICINE

## 2025-03-02 PROCEDURE — 80053 COMPREHEN METABOLIC PANEL: CPT | Performed by: EMERGENCY MEDICINE

## 2025-03-02 PROCEDURE — 84484 ASSAY OF TROPONIN QUANT: CPT | Performed by: EMERGENCY MEDICINE

## 2025-03-02 PROCEDURE — 71046 X-RAY EXAM CHEST 2 VIEWS: CPT

## 2025-03-02 PROCEDURE — 87077 CULTURE AEROBIC IDENTIFY: CPT | Performed by: EMERGENCY MEDICINE

## 2025-03-02 PROCEDURE — 87086 URINE CULTURE/COLONY COUNT: CPT | Performed by: EMERGENCY MEDICINE

## 2025-03-02 PROCEDURE — 85025 COMPLETE CBC W/AUTO DIFF WBC: CPT | Performed by: EMERGENCY MEDICINE

## 2025-03-02 PROCEDURE — 93005 ELECTROCARDIOGRAM TRACING: CPT

## 2025-03-02 PROCEDURE — 99284 EMERGENCY DEPT VISIT MOD MDM: CPT

## 2025-03-02 RX ORDER — LIDOCAINE 50 MG/G
1 PATCH TOPICAL ONCE
Status: DISCONTINUED | OUTPATIENT
Start: 2025-03-02 | End: 2025-03-02 | Stop reason: HOSPADM

## 2025-03-02 RX ADMIN — SODIUM CHLORIDE 1000 ML: 0.9 INJECTION, SOLUTION INTRAVENOUS at 04:07

## 2025-03-02 RX ADMIN — LIDOCAINE 1 PATCH: 50 PATCH TOPICAL at 05:14

## 2025-03-02 NOTE — ED PROVIDER NOTES
Time reflects when diagnosis was documented in both MDM as applicable and the Disposition within this note       Time User Action Codes Description Comment    3/2/2025  3:34 AM Adi Coreas Add [M54.9] Upper back pain on left side     3/2/2025  3:46 AM Adi Coreas Add [N17.9] MARTÍNEZ (acute kidney injury) (HCC)           ED Disposition       ED Disposition   Discharge    Condition   Stable    Date/Time   Sun Mar 2, 2025  3:50 AM    Comment   Fayjorge Corley discharge to home/self care.                   Assessment & Plan       Medical Decision Making    69 y.o. female presenting for left upper back pain.  Afebrile, nontoxic-appearing.  Will order CBC, CMP, troponin, EKG, CXR to evaluate for anemia, electrolyte abnormality, MARTÍNEZ, arrhythmia, ACS, PTX or pneumonia.  Prior chest imaging reviewed, no evidence of thoracic aortic aneurysm.  Will check UA however doubt pyelonephritis  No rash to suggest zoster.  Ddx would include MSK strain/spasm or pain related to fibromyalgia.  Patient declines analgesia at this time.    Reassessment: VSS, resting comfortably.  Reviewed lab results with patient and daughter at bedside.  No evidence of sepsis or ACS.  CXR appears improved from prior.   MARTÍNEZ noted and treated with IV fluids.    Disposition: I have discussed with the patient our plan to discharge them from the ED and the patient is in agreement with this plan.     Discharge Plan: PRN tylenol for pain. Advised oral hydration and pausing lisinopril given MARTÍNEZ and soft BP. RTED precautions emphasized. The patient was provided a written after visit summary with strict RTED precautions.     Followup: I have discussed with the patient plan to follow up with their PCP. Contact information provided in AVS.    Amount and/or Complexity of Data Reviewed  Labs: ordered. Decision-making details documented in ED Course.  Radiology: ordered and independent interpretation performed.    Risk  Prescription drug management.        ED Course  as of 03/02/25 0525   Sun Mar 02, 2025   0323 Procedure Note: EKG  Date/Time: 03/02/25 3:23 AM   Interpreted by: Adi Coreas DO  Indications / Diagnosis: back pain  ECG reviewed by me, the ED Provider: yes   The EKG demonstrates:  Rhythm: normal sinus rhythm 69 BPM  Intervals: Normal SC and QT intervals  Axis: Normal axis  QRS/Blocks: Normal QRS  ST Changes: No acute ST/T waves changes. No DERIC. No TWI.   0444 Bacteria, UA(!): Moderate   0444 Epithelial Cells(!): Moderate  Contaminated, will await culture.       Medications   lidocaine (LIDODERM) 5 % patch 1 patch (1 patch Topical Medication Applied 3/2/25 0514)   sodium chloride 0.9 % bolus 1,000 mL (0 mL Intravenous Stopped 3/2/25 0518)       ED Risk Strat Scores   HEART Risk Score      Flowsheet Row Most Recent Value   Heart Score Risk Calculator    History 0 Filed at: 03/02/2025 0349   ECG 0 Filed at: 03/02/2025 0349   Age 2 Filed at: 03/02/2025 0349   Risk Factors 2 Filed at: 03/02/2025 0349   Troponin 0 Filed at: 03/02/2025 0349   HEART Score 4 Filed at: 03/02/2025 0349          HEART Risk Score      Flowsheet Row Most Recent Value   Heart Score Risk Calculator    History 0 Filed at: 03/02/2025 0349   ECG 0 Filed at: 03/02/2025 0349   Age 2 Filed at: 03/02/2025 0349   Risk Factors 2 Filed at: 03/02/2025 0349   Troponin 0 Filed at: 03/02/2025 0349   HEART Score 4 Filed at: 03/02/2025 0349                              SBIRT 20yo+      Flowsheet Row Most Recent Value   Initial Alcohol Screen: US AUDIT-C     1. How often do you have a drink containing alcohol? 0 Filed at: 03/02/2025 0232   2. How many drinks containing alcohol do you have on a typical day you are drinking?  0 Filed at: 03/02/2025 0232   3a. Male UNDER 65: How often do you have five or more drinks on one occasion? 0 Filed at: 03/02/2025 0232   3b. FEMALE Any Age, or MALE 65+: How often do you have 4 or more drinks on one occassion? 0 Filed at: 03/02/2025 0232   Audit-C Score 0 Filed at:  03/02/2025 0232   RIKY: How many times in the past year have you...    Used an illegal drug or used a prescription medication for non-medical reasons? Never Filed at: 03/02/2025 0232                            History of Present Illness       Chief Complaint   Patient presents with    Back Pain     Pt reports lower left back pain, pt report she was released on the 15th for a pneumonia infection and the pain felt similar to the pain she had then        Past Medical History:   Diagnosis Date    COVID-19 05/2022    Fibromyalgia, primary     GERD (gastroesophageal reflux disease) 1999    Hyperlipidemia     Hypertension     Lactose intolerance 1984    Primary osteoarthritis of right hip 3/16/2022    Small bowel obstruction (HCC)       Past Surgical History:   Procedure Laterality Date    ABDOMINAL SURGERY  2020    ADENOIDECTOMY      pt has 2nd appendix removed.     BOWEL RESECTION      CHOLECYSTECTOMY  2000    EYE SURGERY      lasik    OOPHORECTOMY N/A 2000    Unknown which ovary    AR ARTHRP ACETBLR/PROX FEM PROSTC AGRFT/ALGRFT Right 08/08/2022    Procedure: ARTHROPLASTY HIP TOTAL;  Surgeon: Mina Garcia MD;  Location: [facility];  Service: Orthopedics    AR LAPS ABD PRTM&OMENTUM DX W/WO SPEC BR/WA SPX N/A 04/21/2020    Procedure: LAPAROSCOPY DIAGNOSTIC, lysis of adhesions , possible bowel resection;  Surgeon: Jim Mccracken MD;  Location: [facility];  Service: General    TUBAL LIGATION        Family History   Problem Relation Age of Onset    Fibromyalgia Mother     Arthritis Mother     Hypertension Mother     Vision loss Mother         Glaucoma    Heart disease Father     GI problems Father     Hypertension Father     Vision loss Father         Macular Degeneration    Birth defects Sister         Arterial Vascular Malformation    No Known Problems Sister     No Known Problems Sister     No Known Problems Daughter     No Known Problems Maternal Grandmother     Diabetes Maternal Grandfather     No Known Problems  Paternal Grandmother     No Known Problems Paternal Grandfather     Mental illness Brother     No Known Problems Paternal Aunt     No Known Problems Paternal Aunt     Colon cancer Neg Hx     Colon polyps Neg Hx       Social History     Tobacco Use    Smoking status: Former     Current packs/day: 0.00     Average packs/day: 2.0 packs/day for 47.6 years (95.3 ttl pk-yrs)     Types: Cigarettes     Start date: 1973     Quit date: 2020     Years since quittin.5    Smokeless tobacco: Never   Vaping Use    Vaping status: Never Used   Substance Use Topics    Alcohol use: Yes     Comment: rarely, 3 x year    Drug use: Yes     Types: Marijuana     Comment: medical marijuana      E-Cigarette/Vaping    E-Cigarette Use Never User       E-Cigarette/Vaping Substances    Nicotine No     THC Yes     CBD No     Flavoring No     Other No     Unknown No       I have reviewed and agree with the history as documented.     Fay Corley is a 69 y.o. year old female with PMH of HTN, HLD, fibromyalgia presenting to the [facility] ED for left upper back pain. Patient reportedly normal state of health yesterday prior to bed this evening.  No recent fever, cough or congestion.  She awoke this morning with left upper back discomfort.  She said no fall or trauma to the area.  She feels the pain is worsened when taking a deep breath.  She denies anterior chest or abdominal pain.  She reports history of similar symptoms in the past when she was diagnosed with pneumonia and given concern seeks evaluation in the emergency department.  Patient took Tylenol for her symptoms and reports they are resolving at time of evaluation.  She denies dysuria/hematuria.  No recent fall or trauma to the area. Patient denies personal or family history of DVT/PE. Denies unilateral leg pain/swelling. Not currently on estrogen-containing contraceptive medication. No recent travel.        History provided by:  Patient, medical records and  relative   used: No    Back Pain  Associated symptoms: no abdominal pain, no chest pain, no dysuria, no fever, no numbness and no weakness        Review of Systems   Constitutional:  Negative for chills and fever.   HENT:  Negative for congestion and rhinorrhea.    Respiratory:  Negative for cough and shortness of breath.    Cardiovascular:  Negative for chest pain and leg swelling.   Gastrointestinal:  Negative for abdominal pain, constipation, diarrhea, nausea and vomiting.   Genitourinary:  Negative for dysuria, flank pain and hematuria.   Musculoskeletal:  Positive for back pain. Negative for arthralgias.   Neurological:  Negative for seizures, syncope, weakness, light-headedness and numbness.   All other systems reviewed and are negative.          Objective       ED Triage Vitals   Temperature Pulse Blood Pressure Respirations SpO2 Patient Position - Orthostatic VS   03/02/25 0230 03/02/25 0229 03/02/25 0229 03/02/25 0229 03/02/25 0229 03/02/25 0229   97.8 °F (36.6 °C) 88 105/61 18 96 % Sitting      Temp Source Heart Rate Source BP Location FiO2 (%) Pain Score    03/02/25 0230 03/02/25 0229 03/02/25 0229 -- --    Oral Monitor Left arm        Vitals      Date and Time Temp Pulse SpO2 Resp BP Pain Score FACES Pain Rating User   03/02/25 0515 -- -- -- -- 103/50 -- --    03/02/25 0415 -- 63 -- 22 111/55 -- -- WM   03/02/25 0300 -- 74 95 % -- 101/58 -- -- WM   03/02/25 0230 97.8 °F (36.6 °C) -- -- -- -- -- -- CK   03/02/25 0230 -- 83 96 % -- 105/61 -- -- WM   03/02/25 0229 -- 88 96 % 18 105/61 -- -- CK            Physical Exam  Vitals and nursing note reviewed.   Constitutional:       General: She is not in acute distress.     Appearance: Normal appearance. She is well-developed. She is not ill-appearing, toxic-appearing or diaphoretic.   HENT:      Head: Normocephalic and atraumatic.      Nose: No congestion or rhinorrhea.   Eyes:      General:         Right eye: No discharge.         Left  eye: No discharge.   Cardiovascular:      Rate and Rhythm: Normal rate and regular rhythm.   Pulmonary:      Effort: Pulmonary effort is normal. No accessory muscle usage or respiratory distress.      Breath sounds: No stridor. Examination of the right-upper field reveals rhonchi. Rhonchi present. No decreased breath sounds, wheezing or rales.   Abdominal:      General: There is no distension.      Palpations: Abdomen is soft.      Tenderness: There is no abdominal tenderness. There is no guarding or rebound.   Musculoskeletal:      Cervical back: Normal range of motion and neck supple. No rigidity or bony tenderness.      Thoracic back: No bony tenderness.      Lumbar back: No bony tenderness.      Right lower leg: No tenderness. No edema.      Left lower leg: No tenderness. No edema.   Skin:     Capillary Refill: Capillary refill takes less than 2 seconds.      Coloration: Skin is not pale.      Findings: No rash. Rash is not vesicular.   Neurological:      Mental Status: She is alert and oriented to person, place, and time.   Psychiatric:         Mood and Affect: Mood normal.         Behavior: Behavior normal.         Results Reviewed       Procedure Component Value Units Date/Time    Urine culture [[id number]] Collected: 03/02/25 0448    Lab Status: In process Specimen: Urine, Clean Catch Updated: 03/02/25 0450    Urine Microscopic [[id number]]  (Abnormal) Collected: 03/02/25 0406    Lab Status: Final result Specimen: Urine, Clean Catch Updated: 03/02/25 0430     RBC, UA None Seen /hpf      WBC, UA 2-4 /hpf      Epithelial Cells Moderate /hpf      Bacteria, UA Moderate /hpf      Granular Casts, UA 0-3    UA w Reflex to Microscopic w Reflex to Culture [[id number]]  (Abnormal) Collected: 03/02/25 0406    Lab Status: Final result Specimen: Urine, Clean Catch Updated: 03/02/25 0426     Color, UA Yellow     Clarity, UA Clear     Specific Gravity, UA 1.015     pH, UA 5.5     Leukocytes, UA Negative     Nitrite, UA  Negative     Protein, UA Trace mg/dl      Glucose, UA Negative mg/dl      Ketones, UA Trace mg/dl      Urobilinogen, UA 2.0 mg/dl      Bilirubin, UA Negative     Occult Blood, UA Negative    HS Troponin 0hr (reflex protocol) [[id number]]  (Normal) Collected: 03/02/25 0317    Lab Status: Final result Specimen: Blood from Arm, Left Updated: 03/02/25 0347     hs TnI 0hr 4 ng/L     Comprehensive metabolic panel [[id number]]  (Abnormal) Collected: 03/02/25 0317    Lab Status: Final result Specimen: Blood from Arm, Left Updated: 03/02/25 0340     Sodium 130 mmol/L      Potassium 4.9 mmol/L      Chloride 96 mmol/L      CO2 26 mmol/L      ANION GAP 8 mmol/L      BUN 20 mg/dL      Creatinine 1.50 mg/dL      Glucose 117 mg/dL      Calcium 8.6 mg/dL      AST 14 U/L      ALT 11 U/L      Alkaline Phosphatase 75 U/L      Total Protein 7.4 g/dL      Albumin 3.5 g/dL      Total Bilirubin 0.29 mg/dL      eGFR 35 ml/min/1.73sq m     Narrative:      National Kid[employer] Disease Foundation guidelines for Chronic Kidney Disease (CKD):     Stage 1 with normal or high GFR (GFR > 90 mL/min/1.73 square meters)    Stage 2 Mild CKD (GFR = 60-89 mL/min/1.73 square meters)    Stage 3A Moderate CKD (GFR = 45-59 mL/min/1.73 square meters)    Stage 3B Moderate CKD (GFR = 30-44 mL/min/1.73 square meters)    Stage 4 Severe CKD (GFR = 15-29 mL/min/1.73 square meters)    Stage 5 End Stage CKD (GFR <15 mL/min/1.73 square meters)  Note: GFR calculation is accurate only with a steady state creatinine    CBC and differential [[id number]]  (Abnormal) Collected: 03/02/25 0317    Lab Status: Final result Specimen: Blood from Arm, Left Updated: 03/02/25 0333     WBC 10.11 Thousand/uL      RBC 3.36 Million/uL      Hemoglobin 10.3 g/dL      Hematocrit 32.8 %      MCV 98 fL      MCH 30.7 pg      MCHC 31.4 g/dL      RDW 12.0 %      MPV 8.2 fL      Platelets 581 Thousands/uL      nRBC 0 /100 WBCs      Segmented % 63 %      Immature Grans % 1 %      Lymphocytes % 25 %       Monocytes % 10 %      Eosinophils Relative 0 %      Basophils Relative 1 %      Absolute Neutrophils 6.39 Thousands/µL      Absolute Immature Grans 0.06 Thousand/uL      Absolute Lymphocytes 2.54 Thousands/µL      Absolute Monocytes 1.02 Thousand/µL      Eosinophils Absolute 0.04 Thousand/µL      Basophils Absolute 0.06 Thousands/µL             XR chest 2 views   ED Interpretation by Adi Coreas DO (03/02 0306)   Right upper lobe opacification at site of prior pneumonia. No new infiltrate. No PTX.          Procedures    ED Medication and Procedure Management   Prior to Admission Medications   Prescriptions Last Dose Informant Patient Reported? Taking?   PARoxetine (PAXIL) 40 MG tablet  Self Yes No   Sig: Take 20 mg by mouth in the morning   Probiotic Product (PROBIOTIC ADVANCED PO)  Self Yes No   atorvastatin (LIPITOR) 40 mg tablet  Self Yes No   Sig: Take 40 mg by mouth daily   benzonatate (TESSALON PERLES) 100 mg capsule   No No   Sig: Take 1 capsule (100 mg total) by mouth 3 (three) times a day as needed for cough   cefadroxil (DURICEF) 500 mg capsule   No No   Sig: Take 2 capsules (1,000 mg total) by mouth every 12 (twelve) hours for 28 days   guaiFENesin (MUCINEX) 600 mg 12 hr tablet   No No   Sig: Take 1 tablet (600 mg total) by mouth 2 (two) times a day   lidocaine (LIDODERM) 5 %   No No   Sig: Apply 2 patches topically over 12 hours daily Remove & Discard patch within 12 hours or as directed by MD   lisinopril (ZESTRIL) 10 mg tablet  Self No No   Sig: TAKE 1 TABLET BY MOUTH ONCE A DAY   magnesium Oxide (MAG-OX) 400 mg TABS   No No   Sig: Take 1 tablet (400 mg total) by mouth 2 (two) times a day   pantoprazole (PROTONIX) 40 mg tablet  Self Yes No   potassium chloride (Klor-Con M20) 20 mEq tablet   No No   Sig: Take 2 tablets (40 mEq total) by mouth daily   timolol (TIMOPTIC) 0.5 % ophthalmic solution  Self Yes No   verapamil (VERELAN) 240 MG 24 hr capsule  Self No No   Sig: TAKE 1 CAPSULE BY MOUTH EVERY  DAY      Facility-Administered Medications: None     Discharge Medication List as of 3/2/2025  5:09 AM        CONTINUE these medications which have NOT CHANGED    Details   atorvastatin (LIPITOR) 40 mg tablet Take 40 mg by mouth daily, Starting Sat 2/26/2022, Historical Med      benzonatate (TESSALON PERLES) 100 mg capsule Take 1 capsule (100 mg total) by mouth 3 (three) times a day as needed for cough, Starting Thu 2/20/2025, Normal      cefadroxil (DURICEF) 500 mg capsule Take 2 capsules (1,000 mg total) by mouth every 12 (twelve) hours for 28 days, Starting Thu 2/20/2025, Until Thu 3/20/2025, Normal      guaiFENesin (MUCINEX) 600 mg 12 hr tablet Take 1 tablet (600 mg total) by mouth 2 (two) times a day, Starting Thu 2/20/2025, Normal      lidocaine (LIDODERM) 5 % Apply 2 patches topically over 12 hours daily Remove & Discard patch within 12 hours or as directed by MD, Starting Fri 2/21/2025, Normal      lisinopril (ZESTRIL) 10 mg tablet TAKE 1 TABLET BY MOUTH ONCE A DAY, NormalPaused since today until manually resumed      magnesium Oxide (MAG-OX) 400 mg TABS Take 1 tablet (400 mg total) by mouth 2 (two) times a day, Starting Thu 2/20/2025, Normal      pantoprazole (PROTONIX) 40 mg tablet Starting Fri 1/21/2022, Historical Med      PARoxetine (PAXIL) 40 MG tablet Take 20 mg by mouth in the morning, Starting Fri 2/24/2023, Historical Med      potassium chloride (Klor-Con M20) 20 mEq tablet Take 2 tablets (40 mEq total) by mouth daily, Starting Fri 2/21/2025, Normal      Probiotic Product (PROBIOTIC ADVANCED PO) Starting Thu 3/10/2022, Historical Med      timolol (TIMOPTIC) 0.5 % ophthalmic solution Starting Sun 5/8/2022, Historical Med      verapamil (VERELAN) 240 MG 24 hr capsule TAKE 1 CAPSULE BY MOUTH EVERY DAY, Normal           No discharge procedures on file.  ED SEPSIS DOCUMENTATION   Time reflects when diagnosis was documented in both MDM as applicable and the Disposition within this note       Time User  Action Codes Description Comment    3/2/2025  3:34 AM Adi Coreas [M54.9] Upper back pain on left side     3/2/2025  3:46 AM Adi Coreas [N17.9] MARTÍNEZ (acute kidney injury) (HCC)                  Adi Coreas, DO  03/02/25 0529

## 2025-03-02 NOTE — DISCHARGE INSTRUCTIONS
You have been seen for back pain. Please take tylenol for your symptoms. Return to the emergency department if you develop worsening pain, trouble breathing, fevers, vomiting, abdominal pain or any other symptoms of concern. Please follow up with your PCP by calling the number provided.

## 2025-03-03 LAB
ATRIAL RATE: 69 BPM
P AXIS: 68 DEGREES
PR INTERVAL: 164 MS
QRS AXIS: 52 DEGREES
QRSD INTERVAL: 78 MS
QT INTERVAL: 388 MS
QTC INTERVAL: 415 MS
T WAVE AXIS: 62 DEGREES
VENTRICULAR RATE: 69 BPM

## 2025-03-03 PROCEDURE — 93010 ELECTROCARDIOGRAM REPORT: CPT | Performed by: INTERNAL MEDICINE

## 2025-03-04 LAB — BACTERIA UR CULT: ABNORMAL

## 2025-03-12 ENCOUNTER — APPOINTMENT (OUTPATIENT)
Dept: LAB | Facility: HOSPITAL | Age: 70
End: 2025-03-12
Payer: COMMERCIAL

## 2025-03-12 DIAGNOSIS — R55 SYNCOPE, UNSPECIFIED SYNCOPE TYPE: ICD-10-CM

## 2025-03-12 DIAGNOSIS — K21.00 GASTROESOPHAGEAL REFLUX DISEASE WITH ESOPHAGITIS, UNSPECIFIED WHETHER HEMORRHAGE: ICD-10-CM

## 2025-03-12 DIAGNOSIS — J18.8 OTHER PNEUMONIA, UNSPECIFIED ORGANISM: ICD-10-CM

## 2025-03-12 DIAGNOSIS — I10 ESSENTIAL HYPERTENSION: ICD-10-CM

## 2025-03-12 DIAGNOSIS — R10.31 RIGHT LOWER QUADRANT PAIN: ICD-10-CM

## 2025-03-12 LAB
ALBUMIN SERPL BCG-MCNC: 4.4 G/DL (ref 3.5–5)
ALP SERPL-CCNC: 104 U/L (ref 34–104)
ALT SERPL W P-5'-P-CCNC: 14 U/L (ref 7–52)
ANION GAP SERPL CALCULATED.3IONS-SCNC: 8 MMOL/L (ref 4–13)
AST SERPL W P-5'-P-CCNC: 16 U/L (ref 13–39)
BASOPHILS # BLD AUTO: 0.05 THOUSANDS/ÂΜL (ref 0–0.1)
BASOPHILS NFR BLD AUTO: 1 % (ref 0–1)
BILIRUB SERPL-MCNC: 0.37 MG/DL (ref 0.2–1)
BUN SERPL-MCNC: 17 MG/DL (ref 5–25)
CALCIUM SERPL-MCNC: 10.2 MG/DL (ref 8.4–10.2)
CHLORIDE SERPL-SCNC: 97 MMOL/L (ref 96–108)
CO2 SERPL-SCNC: 28 MMOL/L (ref 21–32)
CREAT SERPL-MCNC: 1.17 MG/DL (ref 0.6–1.3)
EOSINOPHIL # BLD AUTO: 0.13 THOUSAND/ÂΜL (ref 0–0.61)
EOSINOPHIL NFR BLD AUTO: 2 % (ref 0–6)
ERYTHROCYTE [DISTWIDTH] IN BLOOD BY AUTOMATED COUNT: 12.2 % (ref 11.6–15.1)
GFR SERPL CREATININE-BSD FRML MDRD: 47 ML/MIN/1.73SQ M
GLUCOSE SERPL-MCNC: 92 MG/DL (ref 65–140)
HCT VFR BLD AUTO: 37.8 % (ref 34.8–46.1)
HGB BLD-MCNC: 11.9 G/DL (ref 11.5–15.4)
IMM GRANULOCYTES # BLD AUTO: 0.03 THOUSAND/UL (ref 0–0.2)
IMM GRANULOCYTES NFR BLD AUTO: 0 % (ref 0–2)
LYMPHOCYTES # BLD AUTO: 2.93 THOUSANDS/ÂΜL (ref 0.6–4.47)
LYMPHOCYTES NFR BLD AUTO: 36 % (ref 14–44)
MAGNESIUM SERPL-MCNC: 1.8 MG/DL (ref 1.9–2.7)
MCH RBC QN AUTO: 31.3 PG (ref 26.8–34.3)
MCHC RBC AUTO-ENTMCNC: 31.5 G/DL (ref 31.4–37.4)
MCV RBC AUTO: 100 FL (ref 82–98)
MONOCYTES # BLD AUTO: 0.86 THOUSAND/ÂΜL (ref 0.17–1.22)
MONOCYTES NFR BLD AUTO: 10 % (ref 4–12)
NEUTROPHILS # BLD AUTO: 4.23 THOUSANDS/ÂΜL (ref 1.85–7.62)
NEUTS SEG NFR BLD AUTO: 51 % (ref 43–75)
NRBC BLD AUTO-RTO: 0 /100 WBCS
PLATELET # BLD AUTO: 514 THOUSANDS/UL (ref 149–390)
PMV BLD AUTO: 8.5 FL (ref 8.9–12.7)
POTASSIUM SERPL-SCNC: 5.8 MMOL/L (ref 3.5–5.3)
PROT SERPL-MCNC: 8.8 G/DL (ref 6.4–8.4)
RBC # BLD AUTO: 3.8 MILLION/UL (ref 3.81–5.12)
SODIUM SERPL-SCNC: 133 MMOL/L (ref 135–147)
WBC # BLD AUTO: 8.23 THOUSAND/UL (ref 4.31–10.16)

## 2025-03-12 PROCEDURE — 36415 COLL VENOUS BLD VENIPUNCTURE: CPT

## 2025-03-12 PROCEDURE — 80053 COMPREHEN METABOLIC PANEL: CPT

## 2025-03-12 PROCEDURE — 83735 ASSAY OF MAGNESIUM: CPT

## 2025-03-12 PROCEDURE — 85025 COMPLETE CBC W/AUTO DIFF WBC: CPT

## 2025-03-13 ENCOUNTER — HOSPITAL ENCOUNTER (OUTPATIENT)
Dept: CT IMAGING | Facility: HOSPITAL | Age: 70
Discharge: HOME | End: 2025-03-13
Payer: COMMERCIAL

## 2025-03-13 ENCOUNTER — HOSPITAL ENCOUNTER (EMERGENCY)
Facility: HOSPITAL | Age: 70
Discharge: HOME | End: 2025-03-13
Attending: EMERGENCY MEDICINE
Payer: COMMERCIAL

## 2025-03-13 VITALS
SYSTOLIC BLOOD PRESSURE: 111 MMHG | TEMPERATURE: 97.5 F | RESPIRATION RATE: 18 BRPM | HEART RATE: 80 BPM | OXYGEN SATURATION: 97 % | DIASTOLIC BLOOD PRESSURE: 61 MMHG

## 2025-03-13 DIAGNOSIS — R10.9 ABDOMINAL PAIN: Primary | ICD-10-CM

## 2025-03-13 DIAGNOSIS — K63.89 EPIPLOIC APPENDAGITIS: ICD-10-CM

## 2025-03-13 DIAGNOSIS — R10.31 RIGHT LOWER QUADRANT PAIN: ICD-10-CM

## 2025-03-13 DIAGNOSIS — J18.8 OTHER PNEUMONIA, UNSPECIFIED ORGANISM: ICD-10-CM

## 2025-03-13 DIAGNOSIS — E87.1 HYPONATREMIA: ICD-10-CM

## 2025-03-13 LAB
ALBUMIN SERPL BCG-MCNC: 4.1 G/DL (ref 3.5–5)
ALP SERPL-CCNC: 100 U/L (ref 34–104)
ALT SERPL W P-5'-P-CCNC: 14 U/L (ref 7–52)
ANION GAP SERPL CALCULATED.3IONS-SCNC: 7 MMOL/L (ref 4–13)
AST SERPL W P-5'-P-CCNC: 16 U/L (ref 13–39)
BASOPHILS # BLD AUTO: 0.03 THOUSANDS/ÂΜL (ref 0–0.1)
BASOPHILS NFR BLD AUTO: 0 % (ref 0–1)
BILIRUB SERPL-MCNC: 0.49 MG/DL (ref 0.2–1)
BUN SERPL-MCNC: 16 MG/DL (ref 5–25)
CALCIUM SERPL-MCNC: 9.7 MG/DL (ref 8.4–10.2)
CHLORIDE SERPL-SCNC: 97 MMOL/L (ref 96–108)
CO2 SERPL-SCNC: 28 MMOL/L (ref 21–32)
CREAT SERPL-MCNC: 1.03 MG/DL (ref 0.6–1.3)
EOSINOPHIL # BLD AUTO: 0.09 THOUSAND/ÂΜL (ref 0–0.61)
EOSINOPHIL NFR BLD AUTO: 1 % (ref 0–6)
ERYTHROCYTE [DISTWIDTH] IN BLOOD BY AUTOMATED COUNT: 12.1 % (ref 11.6–15.1)
GFR SERPL CREATININE-BSD FRML MDRD: 55 ML/MIN/1.73SQ M
GLUCOSE SERPL-MCNC: 90 MG/DL (ref 65–140)
HCT VFR BLD AUTO: 36 % (ref 34.8–46.1)
HGB BLD-MCNC: 11.3 G/DL (ref 11.5–15.4)
IMM GRANULOCYTES # BLD AUTO: 0.04 THOUSAND/UL (ref 0–0.2)
IMM GRANULOCYTES NFR BLD AUTO: 1 % (ref 0–2)
LACTATE SERPL-SCNC: 1 MMOL/L (ref 0.5–2)
LIPASE SERPL-CCNC: 72 U/L (ref 11–82)
LYMPHOCYTES # BLD AUTO: 2.4 THOUSANDS/ÂΜL (ref 0.6–4.47)
LYMPHOCYTES NFR BLD AUTO: 35 % (ref 14–44)
MCH RBC QN AUTO: 30.6 PG (ref 26.8–34.3)
MCHC RBC AUTO-ENTMCNC: 31.4 G/DL (ref 31.4–37.4)
MCV RBC AUTO: 98 FL (ref 82–98)
MONOCYTES # BLD AUTO: 0.6 THOUSAND/ÂΜL (ref 0.17–1.22)
MONOCYTES NFR BLD AUTO: 9 % (ref 4–12)
NEUTROPHILS # BLD AUTO: 3.67 THOUSANDS/ÂΜL (ref 1.85–7.62)
NEUTS SEG NFR BLD AUTO: 54 % (ref 43–75)
NRBC BLD AUTO-RTO: 0 /100 WBCS
PLATELET # BLD AUTO: 433 THOUSANDS/UL (ref 149–390)
PMV BLD AUTO: 8.4 FL (ref 8.9–12.7)
POTASSIUM SERPL-SCNC: 4.5 MMOL/L (ref 3.5–5.3)
PROT SERPL-MCNC: 8.1 G/DL (ref 6.4–8.4)
RBC # BLD AUTO: 3.69 MILLION/UL (ref 3.81–5.12)
SODIUM SERPL-SCNC: 132 MMOL/L (ref 135–147)
WBC # BLD AUTO: 6.83 THOUSAND/UL (ref 4.31–10.16)

## 2025-03-13 PROCEDURE — 99204 OFFICE O/P NEW MOD 45 MIN: CPT

## 2025-03-13 PROCEDURE — 83605 ASSAY OF LACTIC ACID: CPT | Performed by: PHYSICIAN ASSISTANT

## 2025-03-13 PROCEDURE — 99285 EMERGENCY DEPT VISIT HI MDM: CPT | Performed by: PHYSICIAN ASSISTANT

## 2025-03-13 PROCEDURE — 85025 COMPLETE CBC W/AUTO DIFF WBC: CPT | Performed by: PHYSICIAN ASSISTANT

## 2025-03-13 PROCEDURE — 36415 COLL VENOUS BLD VENIPUNCTURE: CPT | Performed by: PHYSICIAN ASSISTANT

## 2025-03-13 PROCEDURE — 74177 CT ABD & PELVIS W/CONTRAST: CPT

## 2025-03-13 PROCEDURE — 71250 CT THORAX DX C-: CPT

## 2025-03-13 PROCEDURE — 80053 COMPREHEN METABOLIC PANEL: CPT | Performed by: PHYSICIAN ASSISTANT

## 2025-03-13 PROCEDURE — 99284 EMERGENCY DEPT VISIT MOD MDM: CPT

## 2025-03-13 PROCEDURE — 83690 ASSAY OF LIPASE: CPT | Performed by: PHYSICIAN ASSISTANT

## 2025-03-13 RX ADMIN — IOHEXOL 50 ML: 350 INJECTION, SOLUTION INTRAVENOUS at 11:41

## 2025-03-13 NOTE — CONSULTS
Consultation - Surgery-General   Name: Fay Corley 69 y.o. female I MRN: [id number]  Unit/Bed#: ED 07 I Date of Admission: 3/13/2025   Date of Service: 3/13/2025 I Hospital Day: 0   Consult to surgery general  Consult performed by: Nelly Anderson PA-C  Consult ordered by: Alanna Newell PA-C        Physician Requesting Evaluation: Tamara Fernandes*   Reason for Evaluation / Principal Problem: Epiploic appendagitis     Assessment & Plan  Epiploic appendagitis    Pt is 68 y/o F pmhx HTN, HLD, peripheral vestibulopathy of both ears, and recent hospitlization (02/15-02/20) for sepsis in the setting of cavitary pneumonia who presented to  ED for concerns of an abnormal CT A/P report. General surgery consulted for CT A/P findings representing epiploic appendagitis.     -Complaints of waxing and waning RLQ abdominal pain x 4 days.   -Admits to 1 episode of vomiting Monday. Denies fevers, chills, N/V today.     Afebrile, VSS   WBC 6.83  Lipase 72  LA 1.0    CT A/P - Focal ovoid fat density adjacent to the cecum with surrounding inflammatory fat stranding, which may represent epiploic appendagitis or mesenteric infarct. Advanced colonic diverticulosis with mild wall thickening of the sigmoid colon. No significant surrounding inflammatory changes are seen. Findings may reflect chronic diverticulitis, with acute diverticulitis considered less likely. Correlation with colonoscopy can be considered to exclude underlying lesion, if not performed recently. Multiple appendicoliths, without evidence of acute appendicitis.    Plan:  CT A/P read consistent with very mild epiploic appendagitis.   Pt's symptom presentation and clinical laboratory findings are not consistent with radiologic read of acute diverticulitis. Pt without complaints of LLQ or LUQ abdominal pain, no leukocytosis, afebrile, and denies abnormal changes in bowel habits.   Recommend conservative treatment for management of epiploic  appendagitis. No indication for medical admission. Pain medication as needed and diet as tolerated. No indication for abx.   Continue with outpatient follow up with PCP.     Discussed and CT reviewed with attending on call.     History of Present Illness   Fay Corley is a 69 y.o. female with pmhx HTN, HLD, peripheral vestibulopathy of both ears, and recent hospitlization (02/15-02/20) for sepsis in the setting of cavitary pnuemonia who presented to  ED for concerns of an abnormal CT A/P report. General surgery consulted for CT A/P findings representing epiploic appendagitis. Patient began with RLQ abdominal pain beginning Monday morning. Pt states pain waxes and wanes in intensity, currently rating her pain a 2/10 while in ED. Pt declined when offered pain medication. Pt states her PCP ordered her a CT A/P for today to complete when she was seen on Monday for complaints of abdominal pain. Pt denies any exacerbation factors of her pain. Denies taking OTC or prescribed pain medication. Denies fevers, chills, or N/V upon my examination. Pt daughter does state that she had an episode of vomiting on Monday, however pt suffers from peripheral vestibulopathy and believes she may have had a trigger. Denies chest pain, SOB, LLQ abdominal pain, flank pain, or other changes in her bladder or bowel habits. Pt states her last BM was this afternoon and denies any changes from her normal. Denies further questions or complaints.     Review of Systems   Constitutional:  Negative for chills and fever.   Respiratory:  Negative for chest tightness and shortness of breath.    Gastrointestinal:  Positive for abdominal pain. Negative for abdominal distention, constipation, diarrhea, nausea and vomiting.   Genitourinary:  Negative for difficulty urinating.   Neurological:  Positive for dizziness (Chronic).   All other systems reviewed and are negative.    Medical History Review: I have reviewed the patient's PMH, PSH, Social  History, Family History, Meds, and Allergies   Historical Information   Past Medical History:   Diagnosis Date    COVID-19 2022    Fibromyalgia, primary     GERD (gastroesophageal reflux disease)     Hyperlipidemia     Hypertension     Lactose intolerance     Primary osteoarthritis of right hip 3/16/2022    Small bowel obstruction (HCC)      Past Surgical History:   Procedure Laterality Date    ABDOMINAL SURGERY      ADENOIDECTOMY      pt has 2nd appendix removed.     BOWEL RESECTION      CHOLECYSTECTOMY      EYE SURGERY      lasik    OOPHORECTOMY N/A     Unknown which ovary    NM ARTHRP ACETBLR/PROX FEM PROSTC AGRFT/ALGRFT Right 2022    Procedure: ARTHROPLASTY HIP TOTAL;  Surgeon: Mina Garcia MD;  Location: [facility];  Service: Orthopedics    NM LAPS ABD PRTM&OMENTUM DX W/WO SPEC BR/WA SPX N/A 2020    Procedure: LAPAROSCOPY DIAGNOSTIC, lysis of adhesions , possible bowel resection;  Surgeon: Jim Mccracken MD;  Location: [facility];  Service: General    TUBAL LIGATION       Social History     Tobacco Use    Smoking status: Former     Current packs/day: 0.00     Average packs/day: 2.0 packs/day for 47.6 years (95.3 ttl pk-yrs)     Types: Cigarettes     Start date: 1973     Quit date: 2020     Years since quittin.5    Smokeless tobacco: Never   Vaping Use    Vaping status: Never Used   Substance and Sexual Activity    Alcohol use: Yes     Comment: rarely, 3 x year    Drug use: Yes     Types: Marijuana     Comment: medical marijuana    Sexual activity: Not Currently     E-Cigarette/Vaping    E-Cigarette Use Never User      E-Cigarette/Vaping Substances    Nicotine No     THC Yes     CBD No     Flavoring No     Other No     Unknown No      Family history non-contributory  Social History     Tobacco Use    Smoking status: Former     Current packs/day: 0.00     Average packs/day: 2.0 packs/day for 47.6 years (95.3 ttl pk-yrs)     Types: Cigarettes     Start date:  1973     Quit date: 2020     Years since quittin.5    Smokeless tobacco: Never   Vaping Use    Vaping status: Never Used   Substance and Sexual Activity    Alcohol use: Yes     Comment: rarely, 3 x year    Drug use: Yes     Types: Marijuana     Comment: medical marijuana    Sexual activity: Not Currently     No current facility-administered medications for this encounter.  Prior to Admission Medications   Prescriptions Last Dose Informant Patient Reported? Taking?   PARoxetine (PAXIL) 40 MG tablet  Self Yes No   Sig: Take 20 mg by mouth in the morning   Probiotic Product (PROBIOTIC ADVANCED PO)  Self Yes No   atorvastatin (LIPITOR) 40 mg tablet  Self Yes No   Sig: Take 40 mg by mouth daily   benzonatate (TESSALON PERLES) 100 mg capsule   No No   Sig: Take 1 capsule (100 mg total) by mouth 3 (three) times a day as needed for cough   cefadroxil (DURICEF) 500 mg capsule   No No   Sig: Take 2 capsules (1,000 mg total) by mouth every 12 (twelve) hours for 28 days   guaiFENesin (MUCINEX) 600 mg 12 hr tablet   No No   Sig: Take 1 tablet (600 mg total) by mouth 2 (two) times a day   lidocaine (LIDODERM) 5 %   No No   Sig: Apply 2 patches topically over 12 hours daily Remove & Discard patch within 12 hours or as directed by MD   lisinopril (ZESTRIL) 10 mg tablet  Self No No   Sig: TAKE 1 TABLET BY MOUTH ONCE A DAY   magnesium Oxide (MAG-OX) 400 mg TABS   No No   Sig: Take 1 tablet (400 mg total) by mouth 2 (two) times a day   pantoprazole (PROTONIX) 40 mg tablet  Self Yes No   potassium chloride (Klor-Con M20) 20 mEq tablet   No No   Sig: Take 2 tablets (40 mEq total) by mouth daily   timolol (TIMOPTIC) 0.5 % ophthalmic solution  Self Yes No   verapamil (VERELAN) 240 MG 24 hr capsule  Self No No   Sig: TAKE 1 CAPSULE BY MOUTH EVERY DAY      Facility-Administered Medications: None     Erythromycin    Objective :  Temp:  [97.5 °F (36.4 °C)] 97.5 °F (36.4 °C)  HR:  [75-90] 79  BP: ()/(54-61) 99/54  Resp:   [18] 18  SpO2:  [93 %-96 %] 96 %  O2 Device: None (Room air)      Physical Exam  Vitals and nursing note reviewed.   Constitutional:       Appearance: She is obese.   HENT:      Head: Normocephalic and atraumatic.      Right Ear: External ear normal.      Left Ear: External ear normal.      Nose: Nose normal.   Eyes:      Pupils: Pupils are equal, round, and reactive to light.   Cardiovascular:      Rate and Rhythm: Normal rate and regular rhythm.   Pulmonary:      Effort: Pulmonary effort is normal.      Breath sounds: Normal breath sounds.   Abdominal:      General: Bowel sounds are normal. There is distension.      Palpations: Abdomen is soft.      Tenderness: There is abdominal tenderness in the right lower quadrant. There is guarding (Mild RLQ). There is no right CVA tenderness, left CVA tenderness or rebound.   Skin:     General: Skin is warm and dry.   Neurological:      Mental Status: She is alert. Mental status is at baseline.   Psychiatric:         Mood and Affect: Mood normal.         Behavior: Behavior normal.         Thought Content: Thought content normal.         Judgment: Judgment normal.         Lab Results: I have reviewed the following results:  Recent Labs     03/12/25  1611 03/13/25  1659   WBC 8.23 6.83   HGB 11.9 11.3*   HCT 37.8 36.0   * 433*   SODIUM 133* 132*   K 5.8* 4.5   CL 97 97   CO2 28 28   BUN 17 16   CREATININE 1.17 1.03   GLUC 92 90   MG 1.8*  --    AST 16 16   ALT 14 14   ALB 4.4 4.1   TBILI 0.37 0.49   ALKPHOS 104 100   LACTICACID  --  1.0       Imaging Results Review: I personally reviewed the following image studies in PACS and associated radiology reports: CT abdomen/pelvis. My interpretation of the radiology images/reports is: Epiploic appendagitis.  Other Study Results Review: No additional pertinent studies reviewed.    Nelly Anderson PA-C

## 2025-03-13 NOTE — ED PROVIDER NOTES
Time reflects when diagnosis was documented in both MDM as applicable and the Disposition within this note       Time User Action Codes Description Comment    3/13/2025  5:11 PM Alanna Newell Add [R10.9] Abdominal pain     3/13/2025  6:04 PM Alanna Newell Add [K63.89] Epiploic appendagitis     3/13/2025  6:04 PM Alanna Newell Add [E87.1] Hyponatremia           ED Disposition       ED Disposition   Discharge    Condition   Stable    Date/Time   u Mar 13, 2025  6:04 PM    Comment   Fay Corley discharge to home/self care.                   Assessment & Plan       Medical Decision Making  Patient sent to the ED with RLQ and abnormal CT scan as outpatient.  Discussed case with surgery, surgical AP examined patient and discussed with surgeon, most likely epiploic appendagitis, No concern for mesenteric infarct given normal lactic, and normal CBC, patient appears comfortable and pain is not out of proportion to exam.   Surgery states no need for antibiotics and discussed this with patient.  She was instructed to f/u with PCP tomorrow for repeat abdominal exam and strict return precautions given.  Patient with hyponatremia, instructed patient to increase salt in her diet and f/u with PCP for recheck.     Amount and/or Complexity of Data Reviewed  Labs: ordered.  Discussion of management or test interpretation with external provider(s): D/w surgery.         ED Course as of 03/13/25 1823   Thu Mar 13, 2025   1743 Nelly reviewing CT scan with attending.    1748 Nelly evaluated patient in the ER, awaiting surgeon to review CT scan.    1801 Nelly discussed imaging and exam with Dr. Meehan from surgery.  He reviewed CT scan, does not feel like this is diverticulitis, no need for antibiotics.  He suggested continue tramadol for pain to help with epiploic appendagitis and there is no concern for mesenteric infarct and to f/u with PCP for recheck.  Return precautions given.        Medications - No  data to display    ED Risk Strat Scores                            SBIRT 20yo+      Flowsheet Row Most Recent Value   Initial Alcohol Screen: US AUDIT-C     1. How often do you have a drink containing alcohol? 0 Filed at: 03/13/2025 1624   2. How many drinks containing alcohol do you have on a typical day you are drinking?  0 Filed at: 03/13/2025 1624   3a. Male UNDER 65: How often do you have five or more drinks on one occasion? 0 Filed at: 03/13/2025 1624   3b. FEMALE Any Age, or MALE 65+: How often do you have 4 or more drinks on one occassion? 0 Filed at: 03/13/2025 1624   Audit-C Score 0 Filed at: 03/13/2025 1624   RIKY: How many times in the past year have you...    Used an illegal drug or used a prescription medication for non-medical reasons? Never Filed at: 03/13/2025 1624                            History of Present Illness       Chief Complaint   Patient presents with    Abdominal Pain     Had outpatient CT scan that was abnormal. + nausea, no v/d    Dizziness     Ongoing since last week after a fall with head strike.       Past Medical History:   Diagnosis Date    COVID-19 05/2022    Fibromyalgia, primary     GERD (gastroesophageal reflux disease) 1999    Hyperlipidemia     Hypertension     Lactose intolerance 1984    Primary osteoarthritis of right hip 3/16/2022    Small bowel obstruction (HCC)       Past Surgical History:   Procedure Laterality Date    ABDOMINAL SURGERY  2020    ADENOIDECTOMY      pt has 2nd appendix removed.     BOWEL RESECTION      CHOLECYSTECTOMY  2000    EYE SURGERY      lasik    OOPHORECTOMY N/A 2000    Unknown which ovary    LA ARTHRP ACETBLR/PROX FEM PROSTC AGRFT/ALGRFT Right 08/08/2022    Procedure: ARTHROPLASTY HIP TOTAL;  Surgeon: Mina Garcia MD;  Location:  MAIN OR;  Service: Orthopedics    LA LAPS ABD PRTM&OMENTUM DX W/WO SPEC BR/WA SPX N/A 04/21/2020    Procedure: LAPAROSCOPY DIAGNOSTIC, lysis of adhesions , possible bowel resection;  Surgeon: Jim Mccracken MD;   Location:  MAIN OR;  Service: General    TUBAL LIGATION        Family History   Problem Relation Age of Onset    Fibromyalgia Mother     Arthritis Mother     Hypertension Mother     Vision loss Mother         Glaucoma    Heart disease Father     GI problems Father     Hypertension Father     Vision loss Father         Macular Degeneration    Birth defects Sister         Arterial Vascular Malformation    No Known Problems Sister     No Known Problems Sister     No Known Problems Daughter     No Known Problems Maternal Grandmother     Diabetes Maternal Grandfather     No Known Problems Paternal Grandmother     No Known Problems Paternal Grandfather     Mental illness Brother     No Known Problems Paternal Aunt     No Known Problems Paternal Aunt     Colon cancer Neg Hx     Colon polyps Neg Hx       Social History     Tobacco Use    Smoking status: Former     Current packs/day: 0.00     Average packs/day: 2.0 packs/day for 47.6 years (95.3 ttl pk-yrs)     Types: Cigarettes     Start date: 1973     Quit date: 2020     Years since quittin.5    Smokeless tobacco: Never   Vaping Use    Vaping status: Never Used   Substance Use Topics    Alcohol use: Yes     Comment: rarely, 3 x year    Drug use: Yes     Types: Marijuana     Comment: medical marijuana      E-Cigarette/Vaping    E-Cigarette Use Never User       E-Cigarette/Vaping Substances    Nicotine No     THC Yes     CBD No     Flavoring No     Other No     Unknown No       I have reviewed and agree with the history as documented.     Patient is a 68 y/o F that presents to the ED with RLQ abdominal pain x 3 days.  She has nausea and vomiting 3 days ago, but vomiting resolved.  She denies diarrhea or blood in stool.  Last BM was this morning.  She denies fevers/chills.  She was seen by PCP and had outpatient CT scan that showed epiploic appendagitis vs mesenteric infarct.  Also chronic vs acute diverticulitis.  She did not take anything for pain. Patient  has also had dizziness for about 1 week.  She did hit her head about 1 week ago.  She denies headache or vision changes.       History provided by:  Patient  Abdominal Pain  Associated symptoms: no chills, no cough, no fever and no shortness of breath    Dizziness  Associated symptoms: no headaches, no shortness of breath and no weakness        Review of Systems   Constitutional:  Negative for chills and fever.   HENT: Negative.     Eyes:  Negative for visual disturbance.   Respiratory:  Negative for cough and shortness of breath.    Gastrointestinal:  Positive for abdominal pain.   Musculoskeletal:  Negative for back pain and neck pain.   Skin:  Negative for color change, pallor and rash.   Neurological:  Positive for dizziness. Negative for speech difficulty, weakness, light-headedness, numbness and headaches.   Psychiatric/Behavioral:  Negative for confusion.    All other systems reviewed and are negative.          Objective       ED Triage Vitals   Temperature Pulse Blood Pressure Respirations SpO2 Patient Position - Orthostatic VS   03/13/25 1622 03/13/25 1622 03/13/25 1622 03/13/25 1622 03/13/25 1622 03/13/25 1622   97.5 °F (36.4 °C) 90 115/55 18 94 % Sitting      Temp Source Heart Rate Source BP Location FiO2 (%) Pain Score    03/13/25 1622 03/13/25 1700 03/13/25 1622 -- --    Temporal Monitor Right arm        Vitals      Date and Time Temp Pulse SpO2 Resp BP Pain Score FACES Pain Rating User   03/13/25 1800 -- 80 97 % 18 111/61 -- -- KB   03/13/25 1730 -- 79 96 % 18 99/54 -- -- KB   03/13/25 1700 -- 75 93 % 18 129/61 -- -- KB   03/13/25 1622 97.5 °F (36.4 °C) 90 94 % 18 115/55 -- -- KP            Physical Exam  Vitals and nursing note reviewed.   Constitutional:       General: She is not in acute distress.     Appearance: She is well-developed and well-groomed. She is not ill-appearing or diaphoretic.   HENT:      Head: Normocephalic and atraumatic.      Right Ear: Hearing normal.      Left Ear: Hearing  normal.      Nose: Nose normal.      Mouth/Throat:      Mouth: Mucous membranes are moist.      Pharynx: Oropharynx is clear.   Eyes:      General: Lids are normal.      Conjunctiva/sclera: Conjunctivae normal.      Pupils: Pupils are equal.   Cardiovascular:      Rate and Rhythm: Normal rate and regular rhythm.      Heart sounds: Normal heart sounds.   Pulmonary:      Effort: Pulmonary effort is normal.      Breath sounds: Normal breath sounds. No wheezing, rhonchi or rales.   Abdominal:      General: Abdomen is flat.      Tenderness: There is abdominal tenderness in the right upper quadrant and right lower quadrant. There is guarding (voluntary). There is no rebound.   Musculoskeletal:         General: Normal range of motion.      Cervical back: Normal range of motion and neck supple.      Right lower leg: No edema.      Left lower leg: No edema.   Skin:     General: Skin is warm and dry.      Coloration: Skin is not jaundiced or pale.      Findings: No rash.   Neurological:      General: No focal deficit present.      Mental Status: She is alert and oriented to person, place, and time.      Motor: No weakness.   Psychiatric:         Mood and Affect: Mood normal.         Behavior: Behavior is cooperative.         Results Reviewed       Procedure Component Value Units Date/Time    Comprehensive metabolic panel [[id number]]  (Abnormal) Collected: 03/13/25 1659    Lab Status: Final result Specimen: Blood from Arm, Left Updated: 03/13/25 7893     Sodium 132 mmol/L      Potassium 4.5 mmol/L      Chloride 97 mmol/L      CO2 28 mmol/L      ANION GAP 7 mmol/L      BUN 16 mg/dL      Creatinine 1.03 mg/dL      Glucose 90 mg/dL      Calcium 9.7 mg/dL      AST 16 U/L      ALT 14 U/L      Alkaline Phosphatase 100 U/L      Total Protein 8.1 g/dL      Albumin 4.1 g/dL      Total Bilirubin 0.49 mg/dL      eGFR 55 ml/min/1.73sq m     Narrative:      [employer] guidelines for Chronic Kidney Disease (CKD):      Stage 1 with normal or high GFR (GFR > 90 mL/min/1.73 square meters)    Stage 2 Mild CKD (GFR = 60-89 mL/min/1.73 square meters)    Stage 3A Moderate CKD (GFR = 45-59 mL/min/1.73 square meters)    Stage 3B Moderate CKD (GFR = 30-44 mL/min/1.73 square meters)    Stage 4 Severe CKD (GFR = 15-29 mL/min/1.73 square meters)    Stage 5 End Stage CKD (GFR <15 mL/min/1.73 square meters)  Note: GFR calculation is accurate only with a steady state creatinine    Lipase [[id number]]  (Normal) Collected: 03/13/25 1659    Lab Status: Final result Specimen: Blood from Arm, Left Updated: 03/13/25 1723     Lipase 72 u/L     Lactic acid, plasma (w/reflex if result > 2.0) [[id number]]  (Normal) Collected: 03/13/25 1659    Lab Status: Final result Specimen: Blood from Arm, Left Updated: 03/13/25 1721     LACTIC ACID 1.0 mmol/L     Narrative:      Result may be elevated if tourniquet was used during collection.    CBC and differential [[id number]]  (Abnormal) Collected: 03/13/25 1659    Lab Status: Final result Specimen: Blood from Arm, Left Updated: 03/13/25 1706     WBC 6.83 Thousand/uL      RBC 3.69 Million/uL      Hemoglobin 11.3 g/dL      Hematocrit 36.0 %      MCV 98 fL      MCH 30.6 pg      MCHC 31.4 g/dL      RDW 12.1 %      MPV 8.4 fL      Platelets 433 Thousands/uL      nRBC 0 /100 WBCs      Segmented % 54 %      Immature Grans % 1 %      Lymphocytes % 35 %      Monocytes % 9 %      Eosinophils Relative 1 %      Basophils Relative 0 %      Absolute Neutrophils 3.67 Thousands/µL      Absolute Immature Grans 0.04 Thousand/uL      Absolute Lymphocytes 2.40 Thousands/µL      Absolute Monocytes 0.60 Thousand/µL      Eosinophils Absolute 0.09 Thousand/µL      Basophils Absolute 0.03 Thousands/µL             No orders to display       Procedures    ED Medication and Procedure Management   Prior to Admission Medications   Prescriptions Last Dose Informant Patient Reported? Taking?   PARoxetine (PAXIL) 40 MG tablet  Self Yes No   Sig:  Take 20 mg by mouth in the morning   Probiotic Product (PROBIOTIC ADVANCED PO)  Self Yes No   atorvastatin (LIPITOR) 40 mg tablet  Self Yes No   Sig: Take 40 mg by mouth daily   benzonatate (TESSALON PERLES) 100 mg capsule   No No   Sig: Take 1 capsule (100 mg total) by mouth 3 (three) times a day as needed for cough   cefadroxil (DURICEF) 500 mg capsule   No No   Sig: Take 2 capsules (1,000 mg total) by mouth every 12 (twelve) hours for 28 days   guaiFENesin (MUCINEX) 600 mg 12 hr tablet   No No   Sig: Take 1 tablet (600 mg total) by mouth 2 (two) times a day   lidocaine (LIDODERM) 5 %   No No   Sig: Apply 2 patches topically over 12 hours daily Remove & Discard patch within 12 hours or as directed by MD   lisinopril (ZESTRIL) 10 mg tablet  Self No No   Sig: TAKE 1 TABLET BY MOUTH ONCE A DAY   magnesium Oxide (MAG-OX) 400 mg TABS   No No   Sig: Take 1 tablet (400 mg total) by mouth 2 (two) times a day   pantoprazole (PROTONIX) 40 mg tablet  Self Yes No   potassium chloride (Klor-Con M20) 20 mEq tablet   No No   Sig: Take 2 tablets (40 mEq total) by mouth daily   timolol (TIMOPTIC) 0.5 % ophthalmic solution  Self Yes No   verapamil (VERELAN) 240 MG 24 hr capsule  Self No No   Sig: TAKE 1 CAPSULE BY MOUTH EVERY DAY      Facility-Administered Medications: None     Patient's Medications   Discharge Prescriptions    No medications on file     No discharge procedures on file.  ED SEPSIS DOCUMENTATION   Time reflects when diagnosis was documented in both MDM as applicable and the Disposition within this note       Time User Action Codes Description Comment    3/13/2025  5:11 PM Alanna Newell [R10.9] Abdominal pain     3/13/2025  6:04 PM Alanna Newell [K63.89] Epiploic appendagitis     3/13/2025  6:04 PM Alanna Newell [E87.1] Hyponatremia                  Alanna Newell PA-C  03/13/25 1823

## 2025-03-13 NOTE — DISCHARGE INSTRUCTIONS
Follow up with family doctor in 1 day for recheck.  Continue pain medication as needed.  Return to ER if symptom worsen.  Increase salt in your diet and follow up with family doctor for recheck in sodium.

## 2025-03-17 ENCOUNTER — OFFICE VISIT (OUTPATIENT)
Age: 70
End: 2025-03-17
Payer: COMMERCIAL

## 2025-03-17 ENCOUNTER — DOCUMENTATION (OUTPATIENT)
Age: 70
End: 2025-03-17

## 2025-03-17 VITALS
DIASTOLIC BLOOD PRESSURE: 78 MMHG | WEIGHT: 177.8 LBS | OXYGEN SATURATION: 99 % | BODY MASS INDEX: 30.35 KG/M2 | HEART RATE: 64 BPM | SYSTOLIC BLOOD PRESSURE: 128 MMHG | HEIGHT: 64 IN

## 2025-03-17 DIAGNOSIS — R78.81 BACTEREMIA DUE TO STREPTOCOCCUS: ICD-10-CM

## 2025-03-17 DIAGNOSIS — R42 DIZZY SPELLS: Primary | ICD-10-CM

## 2025-03-17 DIAGNOSIS — J98.4 CAVITARY PNEUMONIA: ICD-10-CM

## 2025-03-17 DIAGNOSIS — R04.2 HEMOPTYSIS: ICD-10-CM

## 2025-03-17 DIAGNOSIS — J18.9 CAVITARY PNEUMONIA: ICD-10-CM

## 2025-03-17 DIAGNOSIS — J96.01 ACUTE RESPIRATORY FAILURE WITH HYPOXIA (HCC): ICD-10-CM

## 2025-03-17 DIAGNOSIS — B95.5 BACTEREMIA DUE TO STREPTOCOCCUS: ICD-10-CM

## 2025-03-17 PROBLEM — R65.20 SEVERE SEPSIS (HCC): Status: RESOLVED | Noted: 2025-02-15 | Resolved: 2025-03-17

## 2025-03-17 PROBLEM — A41.9 SEVERE SEPSIS (HCC): Status: RESOLVED | Noted: 2025-02-15 | Resolved: 2025-03-17

## 2025-03-17 PROBLEM — J10.1 INFLUENZA A: Status: RESOLVED | Noted: 2025-02-15 | Resolved: 2025-03-17

## 2025-03-17 LAB
DME PARACHUTE DELIVERY DATE REQUESTED: NORMAL
DME PARACHUTE ITEM DESCRIPTION: NORMAL
DME PARACHUTE ORDER STATUS: NORMAL
DME PARACHUTE SUPPLIER NAME: NORMAL
DME PARACHUTE SUPPLIER PHONE: NORMAL

## 2025-03-17 PROCEDURE — 99214 OFFICE O/P EST MOD 30 MIN: CPT | Performed by: NURSE PRACTITIONER

## 2025-03-17 PROCEDURE — 94618 PULMONARY STRESS TESTING: CPT | Performed by: NURSE PRACTITIONER

## 2025-03-17 RX ORDER — METHOCARBAMOL 500 MG/1
TABLET, FILM COATED ORAL
COMMUNITY
Start: 2025-03-03 | End: 2025-03-17

## 2025-03-17 RX ORDER — TRAMADOL HYDROCHLORIDE 50 MG/1
50 TABLET ORAL EVERY 6 HOURS PRN
COMMUNITY

## 2025-03-17 RX ORDER — IPRATROPIUM BROMIDE AND ALBUTEROL SULFATE 2.5; .5 MG/3ML; MG/3ML
SOLUTION RESPIRATORY (INHALATION)
COMMUNITY
Start: 2025-02-26 | End: 2025-03-17

## 2025-03-17 NOTE — PROGRESS NOTES
Follow-up  Visit - Pulmonary Medicine   Name: Fay Corley      : 1955      MRN: [id number]  Encounter Provider: LOGAN Duff  Encounter Date: 3/17/2025   [facility]  :  Assessment & Plan  Acute respiratory failure with hypoxia (HCC)  Ambulatory oximetry did not reveal ongoing need for oxygen.  Orders:    Ambulatory Referral to Pulmonology    POCT Oxygen Titration    Discontinue home oxygen    Bacteremia due to Streptococcus  Complete remaining doses of Duricef as directed.  Orders:    Ambulatory Referral to Pulmonology    CT chest without contrast; Future    Cavitary pneumonia  Greatly improved on CT imaging: initially measuring 9.3 x 5.1 cm and as of 3/13/25 scan 4.0 x 3.5 cm and shrinking nicely. Surrounding GGO's have resolved.   Plan to repeat CT chest in 8 weeks to follow resolution.  Orders:    Ambulatory Referral to Pulmonology    CT chest without contrast; Future    Hemoptysis  Has not had any further episodes since hospital stay.  Orders:    Ambulatory Referral to Pulmonology    Dizzy spells  Possibly related to antibiotic vs recent illness vs other etiology. I do not suspect hiatal hernia large enough to cause dizziness.. Follow with PCP.  She was steady on her feet during ambulatory oximetry study.         Return in about 2 months (around 2025).    History of Present Illness   Fay Corley is a 69 y.o. female who presents for follow up after recent hospitalization 2/15/25-25 initially presenting with cough, SOB, feeling sick. She was found to have a cavitary mass in the posterior RUL felt to be strep pyogenes and started on antibiotic. Also had Flu A during this episode. She was discharged on prolonged course of Duricef and supplemental O2.    She feels much better. No further cough and no hemoptysis. No nasal drainage. Feels her lungs have cleared up nicely. She still has some mild shortness of breath with exertion  at her baseline, and reports some mild dizziness. She wonders if the dizziness is related to hiatal hernia and she does have frequent heartburn despite PPI daily and occasional OTC doses of Pepcid 10mg. Following with PCP on Wednesday for this issue.    She is not wearing oxygen. She has not used it since hospital stay.  She has no real pulmonary history - denies prior diagnosis of asthma/COPD or other pulmonary condition. No limits to endurance at baseline but she can develop mild shortness of breath with carrying or hurrying. Denies history of wheezing. She is sleeping well.      Review of Systems  Please note that a 14-point review of systems was performed to include Constitutional, HEENT, Respiratory, CVS, GI, , Musculoskeletal, Integumentary, Neurologic, Rheumatologic, Endocrinologic, Psychiatric, Lymphatic, and Hematologic/Oncologic systems were reviewed and are negative unless otherwise stated in HPI. Positive and negative findings pertinent to this evaluation are incorporated into the history of present illness.       Current Outpatient Medications on File Prior to Visit   Medication Sig Dispense Refill    atorvastatin (LIPITOR) 40 mg tablet Take 40 mg by mouth daily      cefadroxil (DURICEF) 500 mg capsule Take 2 capsules (1,000 mg total) by mouth every 12 (twelve) hours for 28 days 112 capsule 0    guaiFENesin (MUCINEX) 600 mg 12 hr tablet Take 1 tablet (600 mg total) by mouth 2 (two) times a day 30 tablet 0    [Paused] lisinopril (ZESTRIL) 10 mg tablet TAKE 1 TABLET BY MOUTH ONCE A DAY 90 tablet 3    pantoprazole (PROTONIX) 40 mg tablet       PARoxetine (PAXIL) 40 MG tablet Take 20 mg by mouth in the morning      Probiotic Product (PROBIOTIC ADVANCED PO)       timolol (TIMOPTIC) 0.5 % ophthalmic solution       verapamil (VERELAN) 240 MG 24 hr capsule TAKE 1 CAPSULE BY MOUTH EVERY DAY 90 capsule 3    traMADol (ULTRAM) 50 mg tablet Take 50 mg by mouth every 6 (six) hours as needed for moderate pain       "[DISCONTINUED] benzonatate (TESSALON PERLES) 100 mg capsule Take 1 capsule (100 mg total) by mouth 3 (three) times a day as needed for cough (Patient not taking: Reported on 3/17/2025) 20 capsule 0    [DISCONTINUED] ipratropium-albuterol (DUO-NEB) 0.5-2.5 mg/3 mL nebulizer solution Every 6 hours (Patient not taking: Reported on 3/17/2025)      [DISCONTINUED] lidocaine (LIDODERM) 5 % Apply 2 patches topically over 12 hours daily Remove & Discard patch within 12 hours or as directed by MD (Patient not taking: Reported on 3/17/2025) 60 patch 0    [DISCONTINUED] magnesium Oxide (MAG-OX) 400 mg TABS Take 1 tablet (400 mg total) by mouth 2 (two) times a day (Patient not taking: Reported on 3/17/2025) 60 tablet 0    [DISCONTINUED] methocarbamol (ROBAXIN) 500 mg tablet Every 6 hours (Patient not taking: Reported on 3/17/2025)      [DISCONTINUED] potassium chloride (Klor-Con M20) 20 mEq tablet Take 2 tablets (40 mEq total) by mouth daily (Patient not taking: Reported on 3/17/2025) 60 tablet 0     No current facility-administered medications on file prior to visit.         Medical History Reviewed by provider this encounter:  Tobacco  Allergies  Meds  Problems  Med Hx  Surg Hx  Fam Hx     .    Objective   /78 (BP Location: Right arm, Patient Position: Sitting, Cuff Size: Standard)   Pulse 64   Ht 5' 4\" (1.626 m)   Wt 80.6 kg (177 lb 12.8 oz)   SpO2 99%   BMI 30.52 kg/m²     Physical Exam  Vitals reviewed.   Constitutional:       Appearance: Normal appearance. She is obese.   HENT:      Head: Normocephalic.      Nose: Nose normal.      Mouth/Throat:      Mouth: Mucous membranes are moist.      Pharynx: Oropharynx is clear.   Cardiovascular:      Rate and Rhythm: Normal rate and regular rhythm.      Pulses: Normal pulses.      Heart sounds: Normal heart sounds.   Pulmonary:      Effort: Pulmonary effort is normal.      Breath sounds: Normal breath sounds.   Musculoskeletal:         General: Normal range of " "motion.   Skin:     General: Skin is warm.      Capillary Refill: Capillary refill takes less than 2 seconds.   Neurological:      General: No focal deficit present.      Mental Status: She is alert and oriented to person, place, and time.   Psychiatric:         Mood and Affect: Mood normal.         Behavior: Behavior normal.           Diagnostic Data:  Labs: I personally reviewed the most recent laboratory data pertinent to today's visit.  Lab Results   Component Value Date    WBC 6.83 03/13/2025    HGB 11.3 (L) 03/13/2025    HCT 36.0 03/13/2025    MCV 98 03/13/2025     (H) 03/13/2025    EOSPCT 1 03/13/2025    EOSABS 0.09 03/13/2025    SEGSPCT 78 (H) 02/19/2025    MONOPCT 9 03/13/2025    MONOABS 0.32 02/19/2025    BANDSPCT 3 02/15/2025     Lab Results   Component Value Date    CALCIUM 9.7 03/13/2025    K 4.5 03/13/2025    CO2 28 03/13/2025    CL 97 03/13/2025    BUN 16 03/13/2025    CREATININE 1.03 03/13/2025     No results found for: \"IGE\", \"RAST\"  Lab Results   Component Value Date    ALT 14 03/13/2025    AST 16 03/13/2025    ALKPHOS 100 03/13/2025         Radiology results:  Radiology Results Review: I have reviewed radiology reports from [facility] including: chest xray, CT chest, and Echocardiogram.  CXR 2/15/25  Moderate consolidation in the right upper lobe with possible cavitation. Given the patient's history of hemoptysis, this could be due to pneumonia, possibly necrotizing, lung abscess, or malignancy. TB not excluded in the appropriate clinical setting if   the lesion is cavitary. Recommend further evaluation with CT.    CT chest 2/15/25  No pulmonary embolus.     9.3 x 5.1 cm masslike consolidation in the posterior right upper lobe with extensive surrounding groundglass opacity and central air-filled cavity. Given the patient's history, this is likely necrotizing pneumonia versus lung abscess with surrounding   pulmonary hemorrhage. TB not excluded in the appropriate clinical setting. Recommend " follow-up with a chest CT in 3 months to assure resolution and exclude cavitary tumor.     Mildly enlarged right hilar node, likely reactive.     Moderate hiatal hernia.    CXR 3/2/25  Decreasing right upper lobe cavitary masslike consolidation suggesting probable infectious etiology. Continued follow-up is advised to ensure complete resolution.     CT chest 3/13/25  Marked decrease in cavitary consolidation in the right upper lobe most consistent with improving pneumonia. Additional follow-up may be considered to confirm resolution.     ECHO 2/17/25    Left Ventricle: Left ventricular cavity size is normal. Wall thickness is mildly increased. The left ventricular ejection fraction is 55%. Systolic function is normal. Wall motion is normal. Diastolic function is mildly abnormal, consistent with grade I (abnormal) relaxation.    Mitral Valve: There is mild annular calcification. There is mild regurgitation.    Tricuspid Valve: There is mild regurgitation.    Ambulatory Oximetry study  Resting / room air: HR 66, SpO2 97%    She ambulated 6 minutes   Maximal HR 90  SpO2 eagle 89%  She walked 270m      LOGAN Duff

## 2025-03-17 NOTE — ASSESSMENT & PLAN NOTE
Has not had any further episodes since hospital stay.  Orders:    Ambulatory Referral to Pulmonology

## 2025-03-17 NOTE — ASSESSMENT & PLAN NOTE
Complete remaining doses of Duricef as directed.  Orders:    Ambulatory Referral to Pulmonology    CT chest without contrast; Future

## 2025-03-17 NOTE — ASSESSMENT & PLAN NOTE
Greatly improved on CT imaging: initially measuring 9.3 x 5.1 cm and as of 3/13/25 scan 4.0 x 3.5 cm and shrinking nicely. Surrounding GGO's have resolved.   Plan to repeat CT chest in 8 weeks to follow resolution.  Orders:    Ambulatory Referral to Pulmonology    CT chest without contrast; Future

## 2025-03-17 NOTE — ASSESSMENT & PLAN NOTE
Ambulatory oximetry did not reveal ongoing need for oxygen.  Orders:    Ambulatory Referral to Pulmonology    POCT Oxygen Titration    Discontinue home oxygen

## 2025-03-17 NOTE — ASSESSMENT & PLAN NOTE
Possibly related to antibiotic vs recent illness vs other etiology. I do not suspect hiatal hernia large enough to cause dizziness.. Follow with PCP.  She was steady on her feet during ambulatory oximetry study.

## 2025-05-15 ENCOUNTER — HOSPITAL ENCOUNTER (OUTPATIENT)
Dept: CT IMAGING | Facility: HOSPITAL | Age: 70
Discharge: HOME | End: 2025-05-15
Payer: COMMERCIAL

## 2025-05-15 DIAGNOSIS — J98.4 CAVITARY PNEUMONIA: ICD-10-CM

## 2025-05-15 DIAGNOSIS — R78.81 BACTEREMIA DUE TO STREPTOCOCCUS: ICD-10-CM

## 2025-05-15 DIAGNOSIS — J18.9 CAVITARY PNEUMONIA: ICD-10-CM

## 2025-05-15 DIAGNOSIS — B95.5 BACTEREMIA DUE TO STREPTOCOCCUS: ICD-10-CM

## 2025-05-15 PROCEDURE — 71250 CT THORAX DX C-: CPT

## 2025-05-21 ENCOUNTER — RESULTS FOLLOW-UP (OUTPATIENT)
Dept: PULMONOLOGY | Facility: CLINIC | Age: 70
End: 2025-05-21

## 2025-05-23 ENCOUNTER — OFFICE VISIT (OUTPATIENT)
Age: 70
End: 2025-05-23

## 2025-05-23 VITALS
OXYGEN SATURATION: 96 % | HEART RATE: 74 BPM | SYSTOLIC BLOOD PRESSURE: 114 MMHG | TEMPERATURE: 97.5 F | DIASTOLIC BLOOD PRESSURE: 80 MMHG | HEIGHT: 64 IN | WEIGHT: 187.2 LBS | BODY MASS INDEX: 31.96 KG/M2

## 2025-05-23 DIAGNOSIS — J98.4 CAVITARY PNEUMONIA: ICD-10-CM

## 2025-05-23 DIAGNOSIS — R78.81 BACTEREMIA DUE TO STREPTOCOCCUS: Primary | ICD-10-CM

## 2025-05-23 DIAGNOSIS — J18.9 CAVITARY PNEUMONIA: ICD-10-CM

## 2025-05-23 DIAGNOSIS — B95.5 BACTEREMIA DUE TO STREPTOCOCCUS: Primary | ICD-10-CM

## 2025-05-23 DIAGNOSIS — R06.09 DYSPNEA ON EXERTION: ICD-10-CM

## 2025-05-23 PROBLEM — J96.01 ACUTE RESPIRATORY FAILURE WITH HYPOXIA (HCC): Status: RESOLVED | Noted: 2025-03-17 | Resolved: 2025-05-23

## 2025-05-23 PROBLEM — J96.00 ACUTE RESPIRATORY FAILURE (HCC): Status: RESOLVED | Noted: 2025-02-15 | Resolved: 2025-05-23

## 2025-05-23 RX ORDER — ASPIRIN 81 MG/1
81 TABLET ORAL DAILY
COMMUNITY

## 2025-05-23 RX ORDER — FAMOTIDINE 40 MG/1
40 TABLET, FILM COATED ORAL 2 TIMES DAILY
COMMUNITY
Start: 2025-02-26

## 2025-05-23 RX ORDER — LORATADINE 10 MG/1
10 TABLET ORAL DAILY
COMMUNITY

## 2025-05-23 RX ORDER — MAGNESIUM OXIDE 400 MG/1
1 TABLET ORAL 2 TIMES DAILY
COMMUNITY
Start: 2025-02-20

## 2025-05-23 NOTE — ASSESSMENT & PLAN NOTE
Improved from prior however she does have dyspnea upon exertion. This may be multifactorial - deconditioning following recent infection / cavitary pneumonia, with possible pain issues and obesity further limiting.    I have recommended pulmonary rehab and she is willing  Orders:    Ambulatory Referral to Pulmonary Rehabilitation; Future

## 2025-05-23 NOTE — PROGRESS NOTES
Follow-up  Visit - Pulmonary Medicine   Name: Fay Corley      : 1955      MRN: [id number]  Encounter Provider: LOGAN Duff  Encounter Date: 2025   Encounter department: [facility]  :  Assessment & Plan  Bacteremia due to Streptococcus  With a history of cavitary pneumonia. She has completed all prescribed antibiotics and is nearly back to baseline symptomatically.    Image review demonstrates significant improvement. Prior cavitary pneumonia measuring 4.5cm is now approx 2.3cm or about half the size, with resolution of the central cavity.    Wish to follow with another CT in 3-4 months to follow to resolution.    Orders:  •  Ambulatory Referral to Pulmonary Rehabilitation; Future  •  CT chest without contrast; Future    Dyspnea on exertion  Improved from prior however she does have dyspnea upon exertion. This may be multifactorial - deconditioning following recent infection / cavitary pneumonia, with possible pain issues and obesity further limiting.    I have recommended pulmonary rehab and she is willing  Orders:  •  Ambulatory Referral to Pulmonary Rehabilitation; Future    Cavitary pneumonia    Orders:  •  CT chest without contrast; Future      Return in about 4 months (around 2025).    History of Present Illness   Fay Corley is a 69 y.o. female who presents for follow up. She has no cough or wheeze. Shortness of breath with exertion but she has been able to increase her stamina and has started walking. She is sleeping well and offers no complaints.    Review of Systems   Constitutional:  Negative for appetite change and fever.   HENT:  Positive for postnasal drip and rhinorrhea. Negative for ear pain, sneezing, sore throat and trouble swallowing.    Respiratory:  Positive for shortness of breath.    Cardiovascular:  Negative for chest pain.   Musculoskeletal:  Positive for myalgias.   Neurological:  Positive for headaches.   All other  "systems reviewed and are negative.        Medications Ordered Prior to Encounter[1]   Social History[2]     Medical History Reviewed by provider this encounter:  Tobacco  Allergies  Meds  Problems  Med Hx  Surg Hx  Fam Hx     .    Objective   /80 (BP Location: Left arm, Patient Position: Sitting, Cuff Size: Large)   Pulse 74   Temp 97.5 °F (36.4 °C) (Tympanic)   Ht 5' 4\" (1.626 m)   Wt 84.9 kg (187 lb 3.2 oz)   SpO2 96%   BMI 32.13 kg/m²     Physical Exam  Vitals reviewed.   Constitutional:       Appearance: Normal appearance.   HENT:      Head: Normocephalic.      Nose: Nose normal.      Mouth/Throat:      Mouth: Mucous membranes are moist.      Pharynx: Oropharynx is clear.     Cardiovascular:      Rate and Rhythm: Normal rate and regular rhythm.      Pulses: Normal pulses.      Heart sounds: Normal heart sounds.   Pulmonary:      Effort: Pulmonary effort is normal.      Breath sounds: Normal breath sounds.     Musculoskeletal:         General: Normal range of motion.     Skin:     General: Skin is warm.      Capillary Refill: Capillary refill takes less than 2 seconds.     Neurological:      General: No focal deficit present.      Mental Status: She is alert and oriented to person, place, and time.     Psychiatric:         Mood and Affect: Mood normal.         Behavior: Behavior normal.       Diagnostic Data:    Radiology results:  Radiology Results Review: I have reviewed radiology reports from [facility] including: CT chest.  CT chest 5/15/25  1.  Continued interval decrease in the size of the right upper lobe consolidation, with resolution of the cavitary component. The residual opacity is consistent with scarring.  2.  Small hiatal hernia.        LOGAN Duff    Answers submitted by the patient for this visit:  Pulmonology Questionnaire (Submitted on 5/19/2025)  Chief Complaint: Primary symptoms  Chronicity: recurrent  When did you first notice your symptoms?: more than 1 year ago  How often " do your symptoms occur?: intermittently  Since you first noticed this problem, how has it changed?: resolved  Do you have shortness of breath that occurs with effort or exertion?: Yes  Do you have ear congestion?: Yes  Do you have heartburn?: Yes  Do you have fatigue?: Yes  Do you have nasal congestion?: No  Do you have shortness of breath when lying flat?: No  Do you have shortness of breath when you wake up?: No  Do you have sweats?: No  Have you experienced weight loss?: No  Which of the following makes your symptoms worse?: exercise, pollen, strenuous activity  Which of the following makes your symptoms better?: OTC cough suppressant  Risk factors for lung disease: animal exposure, smoking/tobacco exposure           [1]  Current Outpatient Medications on File Prior to Visit   Medication Sig Dispense Refill   • aspirin (Aspirin 81) 81 mg EC tablet Take 81 mg by mouth daily     • atorvastatin (LIPITOR) 40 mg tablet Take 40 mg by mouth in the morning.     • famotidine (PEPCID) 40 MG tablet Take 40 mg by mouth 2 (two) times a day     • loratadine (CLARITIN) 10 mg tablet Take 10 mg by mouth daily     • magnesium oxide (MAG-OX) 400 mg tablet Take 1 tablet by mouth in the morning and 1 tablet before bedtime.     • pantoprazole (PROTONIX) 40 mg tablet      • PARoxetine (PAXIL) 40 MG tablet Take 20 mg by mouth in the morning     • Probiotic Product (PROBIOTIC ADVANCED PO)      • timolol (TIMOPTIC) 0.5 % ophthalmic solution      • verapamil (VERELAN) 240 MG 24 hr capsule TAKE 1 CAPSULE BY MOUTH EVERY DAY (Patient taking differently: Take 120 mg by mouth in the morning) 90 capsule 3   • [Paused] lisinopril (ZESTRIL) 10 mg tablet TAKE 1 TABLET BY MOUTH ONCE A DAY 90 tablet 3   • [DISCONTINUED] guaiFENesin (MUCINEX) 600 mg 12 hr tablet Take 1 tablet (600 mg total) by mouth 2 (two) times a day 30 tablet 0   • [DISCONTINUED] traMADol (ULTRAM) 50 mg tablet Take 50 mg by mouth every 6 (six) hours as needed for moderate pain        No current facility-administered medications on file prior to visit.   [2]  Social History  Tobacco Use   • Smoking status: Former     Current packs/day: 0.00     Average packs/day: 2.0 packs/day for 47.6 years (95.3 ttl pk-yrs)     Types: Cigarettes     Start date: 1973     Quit date: 2020     Years since quittin.7   • Smokeless tobacco: Never   Vaping Use   • Vaping status: Never Used   Substance and Sexual Activity   • Alcohol use: Yes     Comment: rarely, 3 x year   • Drug use: Yes     Types: Marijuana     Comment: medical marijuana   • Sexual activity: Not Currently

## 2025-05-23 NOTE — ASSESSMENT & PLAN NOTE
With a history of cavitary pneumonia. She has completed all prescribed antibiotics and is nearly back to baseline symptomatically.    Image review demonstrates significant improvement. Prior cavitary pneumonia measuring 4.5cm is now approx 2.3cm or about half the size, with resolution of the central cavity.    Wish to follow with another CT in 3-4 months to follow to resolution.    Orders:    Ambulatory Referral to Pulmonary Rehabilitation; Future    CT chest without contrast; Future

## (undated) DEVICE — VIAL DECANTER

## (undated) DEVICE — HOOD: Brand: FLYTE, SURGICOOL

## (undated) DEVICE — GLOVE INDICATOR PI UNDERGLOVE SZ 8 BLUE

## (undated) DEVICE — GLOVE SRG BIOGEL ECLIPSE 8

## (undated) DEVICE — PROXIMATE LINEAR CUTTER RELOAD, BLUE, 75MM: Brand: PROXIMATE

## (undated) DEVICE — SYRINGE 30ML LL

## (undated) DEVICE — INTENDED FOR TISSUE SEPARATION, AND OTHER PROCEDURES THAT REQUIRE A SHARP SURGICAL BLADE TO PUNCTURE OR CUT.: Brand: BARD-PARKER SAFETY BLADES SIZE 10, STERILE

## (undated) DEVICE — 2000CC GUARDIAN II: Brand: GUARDIAN

## (undated) DEVICE — ABDUCTION PILLOW FOAM POSITIONER: Brand: CARDINAL HEALTH

## (undated) DEVICE — PACK PBDS LAP CHOLE RF

## (undated) DEVICE — IMPERVIOUS STOCKINETTE: Brand: DEROYAL

## (undated) DEVICE — GLOVE SRG BIOGEL ORTHOPEDIC 7.5

## (undated) DEVICE — PENCIL ELECTROSURG E-Z CLEAN -0035H

## (undated) DEVICE — 5065 IMPAD REGULAR PAIR: Brand: A-V IMPULSE

## (undated) DEVICE — DRESSING MEPILEX AG BORDER 4 X 8 IN

## (undated) DEVICE — HEAVY DUTY TABLE COVER: Brand: CONVERTORS

## (undated) DEVICE — ASTOUND STANDARD SURGICAL GOWN, XXL: Brand: CONVERTORS

## (undated) DEVICE — SUT STRATAFIX SPIRAL 3-0 PGA/PCL 30 X 30 CM SXMD2B408

## (undated) DEVICE — PROXIMATE RELOADABLE LINEAR CUTTER WITH SAFETY LOCK-OUT, 75MM: Brand: PROXIMATE

## (undated) DEVICE — OCCLUSIVE GAUZE STRIP,3% BISMUTH TRIBROMOPHENATE IN PETROLATUM BLEND: Brand: XEROFORM

## (undated) DEVICE — DRAPE EQUIPMENT RF WAND

## (undated) DEVICE — 3M™ IOBAN™ 2 ANTIMICROBIAL INCISE DRAPE 6650EZ: Brand: IOBAN™ 2

## (undated) DEVICE — INTENDED FOR TISSUE SEPARATION, AND OTHER PROCEDURES THAT REQUIRE A SHARP SURGICAL BLADE TO PUNCTURE OR CUT.: Brand: BARD-PARKER SAFETY BLADES SIZE 11, STERILE

## (undated) DEVICE — SUT VICRYL 1 CP-1 27 IN J268H

## (undated) DEVICE — 3M™ STERI-STRIP™ REINFORCED ADHESIVE SKIN CLOSURES, R1547, 1/2 IN X 4 IN (12 MM X 100 MM), 6 STRIPS/ENVELOPE: Brand: 3M™ STERI-STRIP™

## (undated) DEVICE — NEEDLE 18 G X 1 1/2

## (undated) DEVICE — GLOVE SRG BIOGEL 7

## (undated) DEVICE — CHLORAPREP HI-LITE 26ML ORANGE

## (undated) DEVICE — SUT VICRYL 2-0 CT-1 27 IN J259H

## (undated) DEVICE — ACCESS PLATFORM FOR MINIMALLY INVASIVE SURGERY.: Brand: GELPORT® LAPAROSCOPIC  SYSTEM

## (undated) DEVICE — PACK MAJOR ORTHO W/SPLITS PBDS

## (undated) DEVICE — SAW BLADE RECIPROCATING SINGLE SIDED 258 ORTHO

## (undated) DEVICE — GLOVE INDICATOR PI UNDERGLOVE SZ 6.5 BLUE

## (undated) DEVICE — CAPIT HIP MOP - ACTIS ONLY

## (undated) DEVICE — THE SIMPULSE SOLO SYSTEM WITH ULTREX RETRACTABLE SPLASH SHIELD TIP: Brand: SIMPULSE SOLO

## (undated) DEVICE — DRESSING MEPILEX AG BORDER POST-OP 4 X 8 IN

## (undated) DEVICE — ANTIBACTERIAL VIOLET BRAIDED (POLYGLACTIN 910), SYNTHETIC ABSORBABLE SURGICAL SUTURE: Brand: COATED VICRYL

## (undated) DEVICE — 450 ML BOTTLE OF 0.05% CHLORHEXIDINE GLUCONATE IN 99.95% STERILE WATER FOR IRRIGATION, USP AND APPLICATOR.: Brand: IRRISEPT ANTIMICROBIAL WOUND LAVAGE

## (undated) DEVICE — SUT MONOCRYL 4-0 PS-2 18 IN Y496G

## (undated) DEVICE — GLOVE INDICATOR PI UNDERGLOVE SZ 7 BLUE

## (undated) DEVICE — ELECTRODE BLADE MOD E-Z CLEAN 2.5IN 6.4CM -0012M

## (undated) DEVICE — NEEDLE HYPO 22G X 1-1/2 IN

## (undated) DEVICE — MEDI-VAC YANKAUER SUCTION HANDLE W/BULBOUS AND CONTROL VENT: Brand: CARDINAL HEALTH

## (undated) DEVICE — 3M™ STERI-STRIP™ COMPOUND BENZOIN TINCTURE 40 BAGS/CARTON 4 CARTONS/CASE C1544: Brand: 3M™ STERI-STRIP™

## (undated) DEVICE — SUT VICRYL 0 CT-1 27 IN J260H

## (undated) DEVICE — TOWEL SET X-RAY

## (undated) DEVICE — GLOVE SRG BIOGEL 6.5

## (undated) DEVICE — YELLOW BOAT

## (undated) DEVICE — COBAN 6 IN STERILE

## (undated) DEVICE — GLOVE SRG BIOGEL 7.5

## (undated) DEVICE — ADHESIVE SKIN HIGH VISCOSITY EXOFIN 1ML

## (undated) DEVICE — PAD GROUNDING ADULT

## (undated) DEVICE — TROCAR: Brand: KII FIOS FIRST ENTRY